# Patient Record
Sex: FEMALE | Race: WHITE | NOT HISPANIC OR LATINO | Employment: OTHER | URBAN - METROPOLITAN AREA
[De-identification: names, ages, dates, MRNs, and addresses within clinical notes are randomized per-mention and may not be internally consistent; named-entity substitution may affect disease eponyms.]

---

## 2022-12-13 ENCOUNTER — APPOINTMENT (OUTPATIENT)
Dept: RADIOLOGY | Facility: MEDICAL CENTER | Age: 77
DRG: 853 | End: 2022-12-13
Attending: INTERNAL MEDICINE
Payer: MEDICARE

## 2022-12-13 ENCOUNTER — HOSPITAL ENCOUNTER (INPATIENT)
Facility: MEDICAL CENTER | Age: 77
LOS: 10 days | DRG: 853 | End: 2022-12-23
Attending: STUDENT IN AN ORGANIZED HEALTH CARE EDUCATION/TRAINING PROGRAM | Admitting: INTERNAL MEDICINE
Payer: MEDICARE

## 2022-12-13 DIAGNOSIS — I10 HYPERTENSION, UNSPECIFIED TYPE: ICD-10-CM

## 2022-12-13 DIAGNOSIS — N20.0 NEPHROLITHIASIS: ICD-10-CM

## 2022-12-13 DIAGNOSIS — K57.20 COLONIC DIVERTICULAR ABSCESS: ICD-10-CM

## 2022-12-13 DIAGNOSIS — K57.92 DIVERTICULITIS: ICD-10-CM

## 2022-12-13 DIAGNOSIS — K57.80 DIVERTICULITIS OF INTESTINE WITH PERFORATION AND ABSCESS WITHOUT BLEEDING, UNSPECIFIED PART OF INTESTINAL TRACT: ICD-10-CM

## 2022-12-13 PROBLEM — K65.1 INTRA-ABDOMINAL ABSCESS (HCC): Status: ACTIVE | Noted: 2022-12-13

## 2022-12-13 PROBLEM — A41.9 SEPSIS (HCC): Status: ACTIVE | Noted: 2022-12-13

## 2022-12-13 LAB
ALBUMIN SERPL BCP-MCNC: 3.5 G/DL (ref 3.2–4.9)
ALBUMIN/GLOB SERPL: 1.1 G/DL
ALP SERPL-CCNC: 86 U/L (ref 30–99)
ALT SERPL-CCNC: 12 U/L (ref 2–50)
ANION GAP SERPL CALC-SCNC: 11 MMOL/L (ref 7–16)
AST SERPL-CCNC: 15 U/L (ref 12–45)
BASOPHILS # BLD AUTO: 0.3 % (ref 0–1.8)
BASOPHILS # BLD: 0.04 K/UL (ref 0–0.12)
BILIRUB SERPL-MCNC: 1.3 MG/DL (ref 0.1–1.5)
BUN SERPL-MCNC: 11 MG/DL (ref 8–22)
CALCIUM ALBUM COR SERPL-MCNC: 9.2 MG/DL (ref 8.5–10.5)
CALCIUM SERPL-MCNC: 8.8 MG/DL (ref 8.5–10.5)
CHLORIDE SERPL-SCNC: 104 MMOL/L (ref 96–112)
CO2 SERPL-SCNC: 20 MMOL/L (ref 20–33)
CREAT SERPL-MCNC: 0.94 MG/DL (ref 0.5–1.4)
EOSINOPHIL # BLD AUTO: 0.01 K/UL (ref 0–0.51)
EOSINOPHIL NFR BLD: 0.1 % (ref 0–6.9)
ERYTHROCYTE [DISTWIDTH] IN BLOOD BY AUTOMATED COUNT: 40.8 FL (ref 35.9–50)
GFR SERPLBLD CREATININE-BSD FMLA CKD-EPI: 62 ML/MIN/1.73 M 2
GLOBULIN SER CALC-MCNC: 3.1 G/DL (ref 1.9–3.5)
GLUCOSE SERPL-MCNC: 159 MG/DL (ref 65–99)
HCT VFR BLD AUTO: 39.7 % (ref 37–47)
HGB BLD-MCNC: 13.6 G/DL (ref 12–16)
IMM GRANULOCYTES # BLD AUTO: 0.08 K/UL (ref 0–0.11)
IMM GRANULOCYTES NFR BLD AUTO: 0.6 % (ref 0–0.9)
INR PPP: 1.09 (ref 0.87–1.13)
LACTATE SERPL-SCNC: 1.1 MMOL/L (ref 0.5–2)
LYMPHOCYTES # BLD AUTO: 0.64 K/UL (ref 1–4.8)
LYMPHOCYTES NFR BLD: 4.6 % (ref 22–41)
MCH RBC QN AUTO: 29.4 PG (ref 27–33)
MCHC RBC AUTO-ENTMCNC: 34.3 G/DL (ref 33.6–35)
MCV RBC AUTO: 85.9 FL (ref 81.4–97.8)
MONOCYTES # BLD AUTO: 1.23 K/UL (ref 0–0.85)
MONOCYTES NFR BLD AUTO: 8.8 % (ref 0–13.4)
NEUTROPHILS # BLD AUTO: 11.95 K/UL (ref 2–7.15)
NEUTROPHILS NFR BLD: 85.6 % (ref 44–72)
NRBC # BLD AUTO: 0 K/UL
NRBC BLD-RTO: 0 /100 WBC
PLATELET # BLD AUTO: 250 K/UL (ref 164–446)
PMV BLD AUTO: 9.7 FL (ref 9–12.9)
POTASSIUM SERPL-SCNC: 3.3 MMOL/L (ref 3.6–5.5)
PROCALCITONIN SERPL-MCNC: 0.09 NG/ML
PROT SERPL-MCNC: 6.6 G/DL (ref 6–8.2)
PROTHROMBIN TIME: 13.9 SEC (ref 12–14.6)
RBC # BLD AUTO: 4.62 M/UL (ref 4.2–5.4)
SODIUM SERPL-SCNC: 135 MMOL/L (ref 135–145)
WBC # BLD AUTO: 14 K/UL (ref 4.8–10.8)

## 2022-12-13 PROCEDURE — 36415 COLL VENOUS BLD VENIPUNCTURE: CPT

## 2022-12-13 PROCEDURE — 83605 ASSAY OF LACTIC ACID: CPT

## 2022-12-13 PROCEDURE — 80053 COMPREHEN METABOLIC PANEL: CPT

## 2022-12-13 PROCEDURE — 700111 HCHG RX REV CODE 636 W/ 250 OVERRIDE (IP): Performed by: INTERNAL MEDICINE

## 2022-12-13 PROCEDURE — 700102 HCHG RX REV CODE 250 W/ 637 OVERRIDE(OP): Performed by: INTERNAL MEDICINE

## 2022-12-13 PROCEDURE — 84145 PROCALCITONIN (PCT): CPT

## 2022-12-13 PROCEDURE — A9270 NON-COVERED ITEM OR SERVICE: HCPCS | Performed by: INTERNAL MEDICINE

## 2022-12-13 PROCEDURE — 85025 COMPLETE CBC W/AUTO DIFF WBC: CPT

## 2022-12-13 PROCEDURE — 87040 BLOOD CULTURE FOR BACTERIA: CPT

## 2022-12-13 PROCEDURE — 770006 HCHG ROOM/CARE - MED/SURG/GYN SEMI*

## 2022-12-13 PROCEDURE — 99221 1ST HOSP IP/OBS SF/LOW 40: CPT | Mod: AI | Performed by: INTERNAL MEDICINE

## 2022-12-13 PROCEDURE — 700105 HCHG RX REV CODE 258: Performed by: INTERNAL MEDICINE

## 2022-12-13 PROCEDURE — 85610 PROTHROMBIN TIME: CPT

## 2022-12-13 RX ORDER — ENOXAPARIN SODIUM 100 MG/ML
40 INJECTION SUBCUTANEOUS DAILY
Status: DISCONTINUED | OUTPATIENT
Start: 2022-12-13 | End: 2022-12-23 | Stop reason: HOSPADM

## 2022-12-13 RX ORDER — IBUPROFEN 200 MG
500 CAPSULE ORAL 2 TIMES DAILY
Status: ON HOLD | COMMUNITY
End: 2022-12-23

## 2022-12-13 RX ORDER — ONDANSETRON 4 MG/1
4 TABLET, ORALLY DISINTEGRATING ORAL EVERY 4 HOURS PRN
Status: DISCONTINUED | OUTPATIENT
Start: 2022-12-13 | End: 2022-12-23 | Stop reason: HOSPADM

## 2022-12-13 RX ORDER — POTASSIUM CHLORIDE 20 MEQ/1
40 TABLET, EXTENDED RELEASE ORAL ONCE
Status: COMPLETED | OUTPATIENT
Start: 2022-12-13 | End: 2022-12-13

## 2022-12-13 RX ORDER — ONDANSETRON 2 MG/ML
4 INJECTION INTRAMUSCULAR; INTRAVENOUS EVERY 4 HOURS PRN
Status: DISCONTINUED | OUTPATIENT
Start: 2022-12-13 | End: 2022-12-23 | Stop reason: HOSPADM

## 2022-12-13 RX ORDER — BISACODYL 10 MG
10 SUPPOSITORY, RECTAL RECTAL
Status: DISCONTINUED | OUTPATIENT
Start: 2022-12-13 | End: 2022-12-23 | Stop reason: HOSPADM

## 2022-12-13 RX ORDER — AMOXICILLIN 250 MG
2 CAPSULE ORAL 2 TIMES DAILY
Status: DISCONTINUED | OUTPATIENT
Start: 2022-12-13 | End: 2022-12-23 | Stop reason: HOSPADM

## 2022-12-13 RX ORDER — ACETAMINOPHEN 325 MG/1
650 TABLET ORAL EVERY 6 HOURS PRN
Status: DISCONTINUED | OUTPATIENT
Start: 2022-12-13 | End: 2022-12-23 | Stop reason: HOSPADM

## 2022-12-13 RX ORDER — ATENOLOL 50 MG/1
50 TABLET ORAL DAILY
Status: ON HOLD | COMMUNITY
End: 2022-12-23

## 2022-12-13 RX ORDER — SODIUM CHLORIDE 9 MG/ML
INJECTION, SOLUTION INTRAVENOUS CONTINUOUS
Status: DISCONTINUED | OUTPATIENT
Start: 2022-12-13 | End: 2022-12-14

## 2022-12-13 RX ORDER — POLYETHYLENE GLYCOL 3350 17 G/17G
1 POWDER, FOR SOLUTION ORAL
Status: DISCONTINUED | OUTPATIENT
Start: 2022-12-13 | End: 2022-12-23 | Stop reason: HOSPADM

## 2022-12-13 RX ADMIN — PIPERACILLIN AND TAZOBACTAM 3.38 G: 3; .375 INJECTION, POWDER, LYOPHILIZED, FOR SOLUTION INTRAVENOUS; PARENTERAL at 23:00

## 2022-12-13 RX ADMIN — SODIUM CHLORIDE: 9 INJECTION, SOLUTION INTRAVENOUS at 22:15

## 2022-12-13 RX ADMIN — POTASSIUM CHLORIDE 40 MEQ: 1500 TABLET, EXTENDED RELEASE ORAL at 22:58

## 2022-12-13 ASSESSMENT — COGNITIVE AND FUNCTIONAL STATUS - GENERAL
DAILY ACTIVITIY SCORE: 24
MOBILITY SCORE: 23
SUGGESTED CMS G CODE MODIFIER DAILY ACTIVITY: CH
TURNING FROM BACK TO SIDE WHILE IN FLAT BAD: A LITTLE
SUGGESTED CMS G CODE MODIFIER MOBILITY: CI

## 2022-12-13 ASSESSMENT — ENCOUNTER SYMPTOMS
NECK PAIN: 0
PHOTOPHOBIA: 0
DIARRHEA: 1
SPEECH CHANGE: 0
WEIGHT LOSS: 0
NAUSEA: 1
CONSTIPATION: 0
FEVER: 1
BLURRED VISION: 0
CLAUDICATION: 0
ABDOMINAL PAIN: 1
DOUBLE VISION: 0
CHILLS: 1
HEMOPTYSIS: 0
COUGH: 0
WEAKNESS: 0
VOMITING: 0
ORTHOPNEA: 0
PALPITATIONS: 0
MYALGIAS: 0
DIZZINESS: 0

## 2022-12-13 ASSESSMENT — PATIENT HEALTH QUESTIONNAIRE - PHQ9
2. FEELING DOWN, DEPRESSED, IRRITABLE, OR HOPELESS: NOT AT ALL
SUM OF ALL RESPONSES TO PHQ9 QUESTIONS 1 AND 2: 0
1. LITTLE INTEREST OR PLEASURE IN DOING THINGS: NOT AT ALL

## 2022-12-13 ASSESSMENT — LIFESTYLE VARIABLES
ON A TYPICAL DAY WHEN YOU DRINK ALCOHOL HOW MANY DRINKS DO YOU HAVE: 0
EVER HAD A DRINK FIRST THING IN THE MORNING TO STEADY YOUR NERVES TO GET RID OF A HANGOVER: NO
HAVE PEOPLE ANNOYED YOU BY CRITICIZING YOUR DRINKING: NO
TOTAL SCORE: 0
TOTAL SCORE: 0
HAVE YOU EVER FELT YOU SHOULD CUT DOWN ON YOUR DRINKING: NO
CONSUMPTION TOTAL: NEGATIVE
ALCOHOL_USE: NO
AVERAGE NUMBER OF DAYS PER WEEK YOU HAVE A DRINK CONTAINING ALCOHOL: 0
TOTAL SCORE: 0
EVER FELT BAD OR GUILTY ABOUT YOUR DRINKING: NO
DOES PATIENT WANT TO STOP DRINKING: NO
HOW MANY TIMES IN THE PAST YEAR HAVE YOU HAD 5 OR MORE DRINKS IN A DAY: 0

## 2022-12-13 ASSESSMENT — FIBROSIS 4 INDEX: FIB4 SCORE: 1.33

## 2022-12-13 ASSESSMENT — PAIN DESCRIPTION - PAIN TYPE: TYPE: ACUTE PAIN

## 2022-12-14 ENCOUNTER — APPOINTMENT (OUTPATIENT)
Dept: RADIOLOGY | Facility: MEDICAL CENTER | Age: 77
DRG: 853 | End: 2022-12-14
Attending: INTERNAL MEDICINE
Payer: MEDICARE

## 2022-12-14 LAB
ANION GAP SERPL CALC-SCNC: 11 MMOL/L (ref 7–16)
BASOPHILS # BLD AUTO: 0.2 % (ref 0–1.8)
BASOPHILS # BLD: 0.03 K/UL (ref 0–0.12)
BUN SERPL-MCNC: 11 MG/DL (ref 8–22)
CALCIUM SERPL-MCNC: 8.6 MG/DL (ref 8.5–10.5)
CHLORIDE SERPL-SCNC: 105 MMOL/L (ref 96–112)
CO2 SERPL-SCNC: 20 MMOL/L (ref 20–33)
CREAT SERPL-MCNC: 0.93 MG/DL (ref 0.5–1.4)
CRP SERPL HS-MCNC: 10.84 MG/DL (ref 0–0.75)
EOSINOPHIL # BLD AUTO: 0.01 K/UL (ref 0–0.51)
EOSINOPHIL NFR BLD: 0.1 % (ref 0–6.9)
ERYTHROCYTE [DISTWIDTH] IN BLOOD BY AUTOMATED COUNT: 41 FL (ref 35.9–50)
GFR SERPLBLD CREATININE-BSD FMLA CKD-EPI: 63 ML/MIN/1.73 M 2
GLUCOSE SERPL-MCNC: 120 MG/DL (ref 65–99)
HCT VFR BLD AUTO: 40.2 % (ref 37–47)
HGB BLD-MCNC: 13.5 G/DL (ref 12–16)
IMM GRANULOCYTES # BLD AUTO: 0.09 K/UL (ref 0–0.11)
IMM GRANULOCYTES NFR BLD AUTO: 0.6 % (ref 0–0.9)
LACTATE SERPL-SCNC: 0.8 MMOL/L (ref 0.5–2)
LACTATE SERPL-SCNC: 0.8 MMOL/L (ref 0.5–2)
LACTATE SERPL-SCNC: 1.2 MMOL/L (ref 0.5–2)
LYMPHOCYTES # BLD AUTO: 0.9 K/UL (ref 1–4.8)
LYMPHOCYTES NFR BLD: 6.2 % (ref 22–41)
MCH RBC QN AUTO: 29.1 PG (ref 27–33)
MCHC RBC AUTO-ENTMCNC: 33.6 G/DL (ref 33.6–35)
MCV RBC AUTO: 86.6 FL (ref 81.4–97.8)
MONOCYTES # BLD AUTO: 1.3 K/UL (ref 0–0.85)
MONOCYTES NFR BLD AUTO: 9 % (ref 0–13.4)
NEUTROPHILS # BLD AUTO: 12.17 K/UL (ref 2–7.15)
NEUTROPHILS NFR BLD: 83.9 % (ref 44–72)
NRBC # BLD AUTO: 0 K/UL
NRBC BLD-RTO: 0 /100 WBC
PLATELET # BLD AUTO: 245 K/UL (ref 164–446)
PMV BLD AUTO: 9.2 FL (ref 9–12.9)
POTASSIUM SERPL-SCNC: 3.8 MMOL/L (ref 3.6–5.5)
RBC # BLD AUTO: 4.64 M/UL (ref 4.2–5.4)
SODIUM SERPL-SCNC: 136 MMOL/L (ref 135–145)
WBC # BLD AUTO: 14.5 K/UL (ref 4.8–10.8)

## 2022-12-14 PROCEDURE — 0W9J30Z DRAINAGE OF PELVIC CAVITY WITH DRAINAGE DEVICE, PERCUTANEOUS APPROACH: ICD-10-PCS | Performed by: RADIOLOGY

## 2022-12-14 PROCEDURE — 99153 MOD SED SAME PHYS/QHP EA: CPT

## 2022-12-14 PROCEDURE — 85025 COMPLETE CBC W/AUTO DIFF WBC: CPT

## 2022-12-14 PROCEDURE — 80048 BASIC METABOLIC PNL TOTAL CA: CPT

## 2022-12-14 PROCEDURE — 83605 ASSAY OF LACTIC ACID: CPT | Mod: 91

## 2022-12-14 PROCEDURE — 700117 HCHG RX CONTRAST REV CODE 255: Performed by: INTERNAL MEDICINE

## 2022-12-14 PROCEDURE — 700111 HCHG RX REV CODE 636 W/ 250 OVERRIDE (IP): Performed by: RADIOLOGY

## 2022-12-14 PROCEDURE — 36415 COLL VENOUS BLD VENIPUNCTURE: CPT

## 2022-12-14 PROCEDURE — 87070 CULTURE OTHR SPECIMN AEROBIC: CPT

## 2022-12-14 PROCEDURE — 87205 SMEAR GRAM STAIN: CPT

## 2022-12-14 PROCEDURE — 87186 SC STD MICRODIL/AGAR DIL: CPT

## 2022-12-14 PROCEDURE — 770006 HCHG ROOM/CARE - MED/SURG/GYN SEMI*

## 2022-12-14 PROCEDURE — 700102 HCHG RX REV CODE 250 W/ 637 OVERRIDE(OP): Performed by: RADIOLOGY

## 2022-12-14 PROCEDURE — 74177 CT ABD & PELVIS W/CONTRAST: CPT

## 2022-12-14 PROCEDURE — 700111 HCHG RX REV CODE 636 W/ 250 OVERRIDE (IP)

## 2022-12-14 PROCEDURE — 700105 HCHG RX REV CODE 258: Performed by: STUDENT IN AN ORGANIZED HEALTH CARE EDUCATION/TRAINING PROGRAM

## 2022-12-14 PROCEDURE — 86140 C-REACTIVE PROTEIN: CPT

## 2022-12-14 PROCEDURE — 99221 1ST HOSP IP/OBS SF/LOW 40: CPT | Performed by: SURGERY

## 2022-12-14 PROCEDURE — 700105 HCHG RX REV CODE 258: Performed by: INTERNAL MEDICINE

## 2022-12-14 PROCEDURE — 87077 CULTURE AEROBIC IDENTIFY: CPT

## 2022-12-14 PROCEDURE — 700111 HCHG RX REV CODE 636 W/ 250 OVERRIDE (IP): Performed by: INTERNAL MEDICINE

## 2022-12-14 PROCEDURE — A9270 NON-COVERED ITEM OR SERVICE: HCPCS | Performed by: RADIOLOGY

## 2022-12-14 PROCEDURE — 99233 SBSQ HOSP IP/OBS HIGH 50: CPT | Performed by: STUDENT IN AN ORGANIZED HEALTH CARE EDUCATION/TRAINING PROGRAM

## 2022-12-14 RX ORDER — MIDAZOLAM HYDROCHLORIDE 1 MG/ML
INJECTION INTRAMUSCULAR; INTRAVENOUS
Status: COMPLETED
Start: 2022-12-14 | End: 2022-12-14

## 2022-12-14 RX ORDER — LIDOCAINE HYDROCHLORIDE 10 MG/ML
INJECTION, SOLUTION EPIDURAL; INFILTRATION; INTRACAUDAL; PERINEURAL
Status: DISPENSED
Start: 2022-12-14 | End: 2022-12-15

## 2022-12-14 RX ORDER — SODIUM CHLORIDE 9 MG/ML
500 INJECTION, SOLUTION INTRAVENOUS
Status: ACTIVE | OUTPATIENT
Start: 2022-12-14 | End: 2022-12-14

## 2022-12-14 RX ORDER — SODIUM CHLORIDE, SODIUM LACTATE, POTASSIUM CHLORIDE, CALCIUM CHLORIDE 600; 310; 30; 20 MG/100ML; MG/100ML; MG/100ML; MG/100ML
INJECTION, SOLUTION INTRAVENOUS CONTINUOUS
Status: DISCONTINUED | OUTPATIENT
Start: 2022-12-14 | End: 2022-12-15

## 2022-12-14 RX ORDER — MIDAZOLAM HYDROCHLORIDE 1 MG/ML
.5-2 INJECTION INTRAMUSCULAR; INTRAVENOUS PRN
Status: ACTIVE | OUTPATIENT
Start: 2022-12-14 | End: 2022-12-14

## 2022-12-14 RX ORDER — SIMVASTATIN 10 MG
10 TABLET ORAL DAILY
Status: ON HOLD | COMMUNITY
Start: 2022-10-31 | End: 2023-01-03 | Stop reason: SDUPTHER

## 2022-12-14 RX ORDER — HYDROMORPHONE HYDROCHLORIDE 1 MG/ML
0.5 INJECTION, SOLUTION INTRAMUSCULAR; INTRAVENOUS; SUBCUTANEOUS
Status: DISPENSED | OUTPATIENT
Start: 2022-12-14 | End: 2022-12-15

## 2022-12-14 RX ORDER — ONDANSETRON 2 MG/ML
4 INJECTION INTRAMUSCULAR; INTRAVENOUS PRN
Status: ACTIVE | OUTPATIENT
Start: 2022-12-14 | End: 2022-12-14

## 2022-12-14 RX ORDER — OXYCODONE HYDROCHLORIDE 10 MG/1
10 TABLET ORAL
Status: DISPENSED | OUTPATIENT
Start: 2022-12-14 | End: 2022-12-15

## 2022-12-14 RX ORDER — OXYCODONE HYDROCHLORIDE 5 MG/1
5 TABLET ORAL
Status: ACTIVE | OUTPATIENT
Start: 2022-12-14 | End: 2022-12-15

## 2022-12-14 RX ADMIN — MIDAZOLAM HYDROCHLORIDE 1 MG: 1 INJECTION, SOLUTION INTRAMUSCULAR; INTRAVENOUS at 16:17

## 2022-12-14 RX ADMIN — PIPERACILLIN AND TAZOBACTAM 3.38 G: 3; .375 INJECTION, POWDER, LYOPHILIZED, FOR SOLUTION INTRAVENOUS; PARENTERAL at 21:43

## 2022-12-14 RX ADMIN — PIPERACILLIN AND TAZOBACTAM 3.38 G: 3; .375 INJECTION, POWDER, LYOPHILIZED, FOR SOLUTION INTRAVENOUS; PARENTERAL at 06:15

## 2022-12-14 RX ADMIN — IOHEXOL 100 ML: 350 INJECTION, SOLUTION INTRAVENOUS at 10:40

## 2022-12-14 RX ADMIN — OXYCODONE HYDROCHLORIDE 10 MG: 10 TABLET ORAL at 20:07

## 2022-12-14 RX ADMIN — MIDAZOLAM HYDROCHLORIDE 0.5 MG: 1 INJECTION, SOLUTION INTRAMUSCULAR; INTRAVENOUS at 16:35

## 2022-12-14 RX ADMIN — FENTANYL CITRATE 25 MCG: 50 INJECTION, SOLUTION INTRAMUSCULAR; INTRAVENOUS at 16:17

## 2022-12-14 RX ADMIN — FENTANYL CITRATE 25 MCG: 50 INJECTION, SOLUTION INTRAMUSCULAR; INTRAVENOUS at 16:41

## 2022-12-14 RX ADMIN — MIDAZOLAM HYDROCHLORIDE 0.5 MG: 1 INJECTION, SOLUTION INTRAMUSCULAR; INTRAVENOUS at 16:40

## 2022-12-14 RX ADMIN — FENTANYL CITRATE 25 MCG: 50 INJECTION, SOLUTION INTRAMUSCULAR; INTRAVENOUS at 16:30

## 2022-12-14 RX ADMIN — PIPERACILLIN AND TAZOBACTAM 3.38 G: 3; .375 INJECTION, POWDER, LYOPHILIZED, FOR SOLUTION INTRAVENOUS; PARENTERAL at 13:21

## 2022-12-14 RX ADMIN — FENTANYL CITRATE 25 MCG: 50 INJECTION, SOLUTION INTRAMUSCULAR; INTRAVENOUS at 16:51

## 2022-12-14 RX ADMIN — SODIUM CHLORIDE, POTASSIUM CHLORIDE, SODIUM LACTATE AND CALCIUM CHLORIDE: 600; 310; 30; 20 INJECTION, SOLUTION INTRAVENOUS at 08:37

## 2022-12-14 ASSESSMENT — ENCOUNTER SYMPTOMS
CHILLS: 1
NECK PAIN: 0
VOMITING: 0
DOUBLE VISION: 0
COUGH: 0
CONSTIPATION: 0
WEAKNESS: 0
PHOTOPHOBIA: 0
HEMOPTYSIS: 0
PALPITATIONS: 0
NAUSEA: 1
FEVER: 1
WEIGHT LOSS: 0
BLURRED VISION: 0
SPEECH CHANGE: 0
ABDOMINAL PAIN: 1
DIARRHEA: 1
ORTHOPNEA: 0
CLAUDICATION: 0
DIZZINESS: 0
MYALGIAS: 0

## 2022-12-14 ASSESSMENT — PAIN DESCRIPTION - PAIN TYPE
TYPE: ACUTE PAIN

## 2022-12-14 NOTE — ASSESSMENT & PLAN NOTE
CT scan showed kidney stone and ureteral on the left side around 7 mm   no urinary symptoms  Follow-up with urology as outpatient

## 2022-12-14 NOTE — CONSULTS
"      CHIEF COMPLAINT: Lower abdominal pain.     HISTORY OF PRESENT ILLNESS: The patient is a 77-year-old obese female with prior history of diverticulitis, resulting in a hospitalization in 2015.  She was mated by the medical service as a transfer from outside hospital with diverticular abscess.  Patient was having significant pain yesterday but that is better now.  She has been on antibiotics for almost 24 hours.  She denies any fevers or chills at this time.  She denies any nausea or vomiting.  Had multiple soft/liquid bowel movements since admission.  They seem to be slowing down today.    PAST MEDICAL HISTORY:  has a past medical history of Arthritis, Cancer (HCC), Cataract, and Hypertension.    PAST SURGICAL HISTORY:  has a past surgical history that includes tubal ligation (1975) and gyn surgery (Right, 2013).    ALLERGIES:   Allergies   Allergen Reactions    Sulfa Drugs Unspecified     \"Destroyed my blood cells, I almost \"       CURRENT MEDICATIONS:   Home Medications       Reviewed by Jair Brink (Pharmacy Tech) on 22 at 1228  Med List Status: Complete     Medication Last Dose Status   atenolol (TENORMIN) 50 MG Tab UNK Active   calcium carbonate (OS-TORREY 500) 1250 (500 Ca) MG Tab UNK Active   simvastatin (ZOCOR) 10 MG Tab UNK Active                  FAMILY HISTORY: family history includes Arthritis in her brother; Dementia in her mother; Prostate cancer in her brother; Skin cancer in her brother and sister; Testicular Cancer in her father.    SOCIAL HISTORY:  reports that she has never smoked. She has never been exposed to tobacco smoke. She has never used smokeless tobacco. She reports that she does not drink alcohol and does not use drugs.    REVIEW OF SYSTEMS: Review of systems is remarkable for the following abdominal pain and diarrhea. The remainder of the comprehensive ROS is negative with the exception of the aforementioned HPI, PMH, and PSH bullets in accordance with " "CMS guideline.    PHYSICAL EXAMINATION:      Vital Signs: BP (!) 145/69   Pulse 80   Temp 36.1 °C (97 °F) (Temporal)   Resp 19   Ht 1.6 m (5' 3\")   Wt 105 kg (231 lb 7.7 oz)   SpO2 91%   Physical Exam  Vitals and nursing note reviewed.   Constitutional:       Appearance: She is obese.   HENT:      Head: Normocephalic and atraumatic.      Nose: Nose normal.      Mouth/Throat:      Mouth: Mucous membranes are moist.      Pharynx: Oropharynx is clear.   Eyes:      Extraocular Movements: Extraocular movements intact.      Conjunctiva/sclera: Conjunctivae normal.   Cardiovascular:      Rate and Rhythm: Normal rate and regular rhythm.      Pulses: Normal pulses.   Abdominal:      Palpations: Abdomen is soft.      Comments: These abdomen with left lower quadrant tenderness and palpable fullness  No obvious hernias   Musculoskeletal:         General: Normal range of motion.      Cervical back: Normal range of motion.      Right lower leg: No edema.      Left lower leg: No edema.   Skin:     General: Skin is warm and dry.      Coloration: Skin is not pale.   Neurological:      General: No focal deficit present.      Mental Status: She is alert and oriented to person, place, and time.       LABORATORY VALUES:   Recent Labs     12/13/22 2047 12/14/22 0224   WBC 14.0* 14.5*   RBC 4.62 4.64   HEMOGLOBIN 13.6 13.5   HEMATOCRIT 39.7 40.2   MCV 85.9 86.6   MCH 29.4 29.1   MCHC 34.3 33.6   RDW 40.8 41.0   PLATELETCT 250 245   MPV 9.7 9.2     Recent Labs     12/13/22 2047 12/14/22 0224   SODIUM 135 136   POTASSIUM 3.3* 3.8   CHLORIDE 104 105   CO2 20 20   GLUCOSE 159* 120*   BUN 11 11   CREATININE 0.94 0.93   CALCIUM 8.8 8.6     Recent Labs     12/13/22 2047 12/13/22 2222   ASTSGOT 15  --    ALTSGPT 12  --    TBILIRUBIN 1.3  --    ALKPHOSPHAT 86  --    GLOBULIN 3.1  --    INR  --  1.09     Recent Labs     12/13/22  2222   INR 1.09        IMAGING:   CT-ABDOMEN-PELVIS WITH   Final Result      1.  Acute sigmoid " diverticulitis with adjacent 4.6 x 3.3 cm abscess.   2.  Gallbladder sludge or stones.   3.  2.1 cm hypodense left hepatic lobe lesion, likely a hemangioma, though incompletely characterized on this CT. If there is clinical concern, consider further evaluation with nonemergent MRI hepatic protocol.   4.  Nonobstructing 6 mm inferior pole left renal stone.      Findings conveyed to Dr. Cordova at 1124 via Voalte messaging on 12/14/2022.      CT-DRAIN-PERITONEAL    (Results Pending)       ASSESSMENT AND PLAN:   77-year-old female with history of multiple hospitalizations for diverticular abscess who has recurrent diverticulitis with abscess near the sigmoid colon.  She is not overtly septic and is a candidate for percutaneous drainage.  Given the presentation of multiple recurrences hopefully will be able to manage this conservatively with drainage and antibiotics.  Patient could then be worked up for elective minimally invasive colectomy after colonoscopy.  If there is any deterioration during this hospitalization she may require more urgent colectomy with possible colostomy.    IR drain has been ordered  We will follow    Intra-abdominal abscess (HCC)  With diverticulitis  CT scan at outside facility showed 2x 3 cm abscess  At this moment continue IV antibiotics Zosyn and IV fluid  N.p.o. and general surgery consult tomorrow morning  IR was consulted, however the abscess might be small to get drain  Order CT for abdomen for evaluation      Diverticulitis  History of recurrent diverticulitis  Multiple colonoscopy did not show any mass  Came with abdominal pain with diverticulitis and small abscess  IV fluid and IV antibiotics  General surgery consult tomorrow morning    Hypertension  Patient is taking atenolol at home  Holding medications and close monitoring    Sepsis (HCC)  This is Sepsis Present on admission  SIRS criteria identified on my evaluation include: Tachycardia, with heart rate greater than 90 BPM and  Leukocytosis, with WBC greater than 12,000  Source is intra-abdominal abscess and diverticulitis  Sepsis protocol initiated  Fluid resuscitation ordered per protocol  Crystalloid Fluid Administration: Fluid resuscitation ordered per standard protocol - 30 mL/kg per current or ideal body weight  IV antibiotics as appropriate for source of sepsis  Reassessment: I have reassessed the patient's hemodynamic status  IV antibiotics Zosyn  Blood culture  IR was consulted for possible drain however the abscess it might be small for drain          Nephrolithiasis  CT scan showed kidney stone and ureteral on the left side around 7 mm   no urinary symptoms  Follow-up with urology as outpatient     ____________________________________     Mike Gagnon D.O.    DD: 12/14/2022  1:14 PM

## 2022-12-14 NOTE — CARE PLAN
The patient is Stable - Low risk of patient condition declining or worsening    Shift Goals  Clinical Goals: IV antibiotics, pain meds  Patient Goals: Pain control and rest    Progress made toward(s) clinical / shift goals:  clinical goals    Patient is not progressing towards the following goals:      Problem: Pain - Standard  Goal: Alleviation of pain or a reduction in pain to the patient’s comfort goal  Outcome: Not Met

## 2022-12-14 NOTE — DIETARY
NUTRITION SERVICES: BMI - Pt with BMI >40 (=Body mass index is 41.01 kg/m².), Class III obesity. Weight loss counseling not appropriate in acute care setting. RECOMMEND - If appropriate at DC please refer to outpatient nutrition services for weight management.

## 2022-12-14 NOTE — PROGRESS NOTES
Report received and pt care assumed at 645. Assessment completed and pain checked. A&OX4 and up adlib. Pain 6/10 in her abd. LBM 12/14. IV clean, dry, and intact. LR at 100ml/hr. Call light within reach.

## 2022-12-14 NOTE — ED TRIAGE NOTES
[facility] TRIAGE OFFICER DIRECT ADMISSION REPORT  Transferring [place] [facility]  Transferring physician: Dr meadows    Chief complaint: 77 female presented with left lower quadrant pain associated with fever.  Imaging significant for diverticulitis 2 x3 centimeters abscess.  Transfer requested for surgical evaluation    Pertinent imaging & lab results: Leukocytosis WC 12.86, normal lactic, CT scan noted with 7 mm renal stone    Code Status: full per transferring provider, I personally verified with the transferring provider patient's code status and the transferring provider has confirmed this with the patient.    Patient accepted for transfer: Yes    Admission status: Inpatient.   Floor requested: Med surg     Call surgery consult on arrival    Please inform the triage officer upon arrival of the patient to [facility] for assignment of a hospitalist to perform admission.     For any question or concerns regarding the care of this patient, please reach out to the assigned hospitalist.

## 2022-12-14 NOTE — H&P
Hospital Medicine History & Physical Note    Date of Service  12/13/2022    Primary Care Physician  No primary care provider on file.    Consultants  None     Code Status  Full Code    Chief Complaint  Transferred from outside facility for intra-abdominal abscess and diverticulitis    History of Presenting Illness      77-year-old female with history of hypertension, and diverticulitis who presented 12/13 abdominal pain.  Initially patient was seen in [facility] and later transferred to our facility for higher level of care.  Her symptoms started a week ago with diarrhea and abdominal pain, got worse recently with worsening abdominal pain and fever, her pain around lower abdominal area goes to the left side, denied any urinary symptoms denied any using of antibiotics.  Patient has had multiple episodes of diverticulitis and treated with IV antibiotics.  Patient had multiple colonoscopy and recent one was in 2018 which was normal.  On admission patient had leukocytosis, CT scan showed diverticulitis with 2 multiple 3 cm abscess.  Patient received IV Zosyn and IV fluid.      I discussed the plan of care with patient and bedside RN.  Outside chart was reviewed.  Review of Systems  Review of Systems   Constitutional:  Positive for chills, fever and malaise/fatigue. Negative for weight loss.   HENT:  Negative for ear pain, hearing loss and tinnitus.    Eyes:  Negative for blurred vision, double vision and photophobia.   Respiratory:  Negative for cough and hemoptysis.    Cardiovascular:  Negative for chest pain, palpitations, orthopnea and claudication.   Gastrointestinal:  Positive for abdominal pain, diarrhea and nausea. Negative for constipation and vomiting.   Genitourinary:  Negative for dysuria, frequency and urgency.   Musculoskeletal:  Negative for myalgias and neck pain.   Skin:  Negative for rash.   Neurological:  Negative for dizziness, speech change and weakness.     Past Medical  "History  Hypertension and breast cancer    Surgical History  Breast surgery    Family History  Family history of diverticulitis with her sister  Family history reviewed with patient. There is family history that is pertinent to the chief complaint.     Social History       Allergies  Allergies   Allergen Reactions    Sulfa Drugs Unspecified     \"Destroyed my blood cells, I almost \"       Medications  None       Physical Exam  Temp:  [36.8 °C (98.3 °F)-37.1 °C (98.7 °F)] 37.1 °C (98.7 °F)  Pulse:  [88-89] 88  Resp:  [18-19] 18  BP: (132-157)/(74-75) 157/74  SpO2:  [90 %-93 %] 93 %  Blood Pressure : 132/75   Temperature: 36.8 °C (98.3 °F)   Pulse: 89   Respiration: 19   Pulse Oximetry: 90 %       Physical Exam  Constitutional:       Appearance: She is ill-appearing.   HENT:      Head: Atraumatic.   Cardiovascular:      Rate and Rhythm: Normal rate.      Heart sounds: No murmur heard.  Pulmonary:      Effort: No respiratory distress.      Breath sounds: No wheezing.   Abdominal:      General: There is no distension.      Palpations: Abdomen is soft.      Tenderness: There is abdominal tenderness. There is no guarding.   Musculoskeletal:         General: No deformity.      Cervical back: No rigidity.      Right lower leg: No edema.      Left lower leg: No edema.   Skin:     Coloration: Skin is not jaundiced.      Findings: No bruising.   Neurological:      General: No focal deficit present.      Mental Status: She is alert and oriented to person, place, and time. Mental status is at baseline.      Cranial Nerves: No cranial nerve deficit.      Motor: No weakness.       Laboratory:          No results for input(s): ALTSGPT, ASTSGOT, ALKPHOSPHAT, TBILIRUBIN, DBILIRUBIN, GAMMAGT, AMYLASE, LIPASE, ALB, PREALBUMIN, GLUCOSE in the last 72 hours.      No results for input(s): NTPROBNP in the last 72 hours.      No results for input(s): TROPONINT in the last 72 hours.    Imaging:  IR-CONSULT AND TREAT    (Results Pending) "   CT-ABDOMEN-PELVIS WITH    (Results Pending)       X-Ray:  I have personally reviewed the images and compared with prior images.  EKG:  I have personally reviewed the images and compared with prior images.    Assessment/Plan:  Justification for Admission Status  I anticipate this patient will require at least two midnights for appropriate medical management, necessitating inpatient admission because patient needs IV antibiotics and monitor for intra-abdominal abscess    Patient will need a Med/Surg bed on MEDICAL service .  The need is secondary to intra-abdominal abscess.    * Diverticulitis- (present on admission)  Assessment & Plan  History of recurrent diverticulitis  Multiple colonoscopy did not show any mass  Came with abdominal pain with diverticulitis and small abscess  IV fluid and IV antibiotics  General surgery consult tomorrow morning    Intra-abdominal abscess (HCC)- (present on admission)  Assessment & Plan  With diverticulitis  CT scan at outside facility showed 2x 3 cm abscess  At this moment continue IV antibiotics Zosyn and IV fluid  N.p.o. and general surgery consult tomorrow morning  IR was consulted, however the abscess might be small to get drain  Order CT for abdomen for evaluation      Nephrolithiasis  Assessment & Plan  CT scan showed kidney stone and ureteral on the left side around 7 mm   no urinary symptoms  Follow-up with urology as outpatient    Sepsis (HCC)  Assessment & Plan  This is Sepsis Present on admission  SIRS criteria identified on my evaluation include: Tachycardia, with heart rate greater than 90 BPM and Leukocytosis, with WBC greater than 12,000  Source is intra-abdominal abscess and diverticulitis  Sepsis protocol initiated  Fluid resuscitation ordered per protocol  Crystalloid Fluid Administration: Fluid resuscitation ordered per standard protocol - 30 mL/kg per current or ideal body weight  IV antibiotics as appropriate for source of sepsis  Reassessment: I have  reassessed the patient's hemodynamic status  IV antibiotics Zosyn  Blood culture  IR was consulted for possible drain however the abscess it might be small for drain          Hypertension  Assessment & Plan  Patient is taking atenolol at home  Holding medications and close monitoring      VTE prophylaxis: SCDs/TEDs and enoxaparin ppx

## 2022-12-14 NOTE — PROGRESS NOTES
4 Eyes Skin Assessment Completed by CONNER Morin and CONNER Person.    Head WDL  Ears WDL  Nose WDL  Mouth WDL  Neck WDL  Breast/Chest WDL  Shoulder Blades WDL  Spine WDL  (R) Arm/Elbow/Hand WDL  (L) Arm/Elbow/Hand WDL  Abdomen WDL  Groin WDL  Scrotum/Coccyx/Buttocks WDL  (R) Leg WDL  (L) Leg WDL  (R) Heel/Foot/Toe WDL  (L) Heel/Foot/Toe WDL          Devices In Places Blood Pressure Cuff      Interventions In Place N/A    Possible Skin Injury No    Pictures Uploaded Into Epic N/A  Wound Consult Placed N/A  RN Wound Prevention Protocol Ordered No

## 2022-12-14 NOTE — ASSESSMENT & PLAN NOTE
History of recurrent diverticulitis  Multiple colonoscopy did not show any mass  Came with abdominal pain with diverticulitis and small abscess  IV fluid and IV antibiotics  General surgery consult tomorrow morning

## 2022-12-15 PROBLEM — K57.20 COLONIC DIVERTICULAR ABSCESS: Status: ACTIVE | Noted: 2022-12-15

## 2022-12-15 LAB
ERYTHROCYTE [DISTWIDTH] IN BLOOD BY AUTOMATED COUNT: 42.4 FL (ref 35.9–50)
GRAM STN SPEC: NORMAL
HCT VFR BLD AUTO: 38.1 % (ref 37–47)
HGB BLD-MCNC: 12.4 G/DL (ref 12–16)
MCH RBC QN AUTO: 28.6 PG (ref 27–33)
MCHC RBC AUTO-ENTMCNC: 32.5 G/DL (ref 33.6–35)
MCV RBC AUTO: 87.8 FL (ref 81.4–97.8)
PLATELET # BLD AUTO: 244 K/UL (ref 164–446)
PMV BLD AUTO: 9.6 FL (ref 9–12.9)
RBC # BLD AUTO: 4.34 M/UL (ref 4.2–5.4)
SIGNIFICANT IND 70042: NORMAL
SITE SITE: NORMAL
SOURCE SOURCE: NORMAL
WBC # BLD AUTO: 12.4 K/UL (ref 4.8–10.8)

## 2022-12-15 PROCEDURE — 36415 COLL VENOUS BLD VENIPUNCTURE: CPT

## 2022-12-15 PROCEDURE — 700111 HCHG RX REV CODE 636 W/ 250 OVERRIDE (IP): Performed by: RADIOLOGY

## 2022-12-15 PROCEDURE — 99233 SBSQ HOSP IP/OBS HIGH 50: CPT | Performed by: GENERAL PRACTICE

## 2022-12-15 PROCEDURE — 770006 HCHG ROOM/CARE - MED/SURG/GYN SEMI*

## 2022-12-15 PROCEDURE — 700102 HCHG RX REV CODE 250 W/ 637 OVERRIDE(OP): Performed by: RADIOLOGY

## 2022-12-15 PROCEDURE — A9270 NON-COVERED ITEM OR SERVICE: HCPCS | Performed by: INTERNAL MEDICINE

## 2022-12-15 PROCEDURE — 700102 HCHG RX REV CODE 250 W/ 637 OVERRIDE(OP)

## 2022-12-15 PROCEDURE — 85027 COMPLETE CBC AUTOMATED: CPT

## 2022-12-15 PROCEDURE — 700105 HCHG RX REV CODE 258: Performed by: INTERNAL MEDICINE

## 2022-12-15 PROCEDURE — 700102 HCHG RX REV CODE 250 W/ 637 OVERRIDE(OP): Performed by: INTERNAL MEDICINE

## 2022-12-15 PROCEDURE — A9270 NON-COVERED ITEM OR SERVICE: HCPCS

## 2022-12-15 PROCEDURE — 700111 HCHG RX REV CODE 636 W/ 250 OVERRIDE (IP): Performed by: INTERNAL MEDICINE

## 2022-12-15 PROCEDURE — A9270 NON-COVERED ITEM OR SERVICE: HCPCS | Performed by: RADIOLOGY

## 2022-12-15 RX ORDER — OXYCODONE HYDROCHLORIDE 5 MG/1
5 TABLET ORAL ONCE
Status: COMPLETED | OUTPATIENT
Start: 2022-12-15 | End: 2022-12-15

## 2022-12-15 RX ADMIN — OXYCODONE HYDROCHLORIDE 10 MG: 10 TABLET ORAL at 09:38

## 2022-12-15 RX ADMIN — PIPERACILLIN AND TAZOBACTAM 3.38 G: 3; .375 INJECTION, POWDER, LYOPHILIZED, FOR SOLUTION INTRAVENOUS; PARENTERAL at 15:36

## 2022-12-15 RX ADMIN — OXYCODONE 5 MG: 5 TABLET ORAL at 22:49

## 2022-12-15 RX ADMIN — PIPERACILLIN AND TAZOBACTAM 3.38 G: 3; .375 INJECTION, POWDER, LYOPHILIZED, FOR SOLUTION INTRAVENOUS; PARENTERAL at 06:33

## 2022-12-15 RX ADMIN — ENOXAPARIN SODIUM 40 MG: 40 INJECTION SUBCUTANEOUS at 18:07

## 2022-12-15 RX ADMIN — OXYCODONE HYDROCHLORIDE 10 MG: 10 TABLET ORAL at 03:55

## 2022-12-15 RX ADMIN — PIPERACILLIN AND TAZOBACTAM 3.38 G: 3; .375 INJECTION, POWDER, LYOPHILIZED, FOR SOLUTION INTRAVENOUS; PARENTERAL at 22:22

## 2022-12-15 RX ADMIN — ACETAMINOPHEN 650 MG: 325 TABLET, FILM COATED ORAL at 19:55

## 2022-12-15 RX ADMIN — HYDROMORPHONE HYDROCHLORIDE 0.5 MG: 1 INJECTION, SOLUTION INTRAMUSCULAR; INTRAVENOUS; SUBCUTANEOUS at 11:36

## 2022-12-15 ASSESSMENT — PAIN DESCRIPTION - PAIN TYPE
TYPE: ACUTE PAIN

## 2022-12-15 ASSESSMENT — ENCOUNTER SYMPTOMS: ABDOMINAL PAIN: 1

## 2022-12-15 NOTE — CARE PLAN
The patient is Stable - Low risk of patient condition declining or worsening    Shift Goals  Clinical Goals: Antibiotics  Patient Goals: Pain contrl  Family Goals: n/a    Progress made toward(s) clinical / shift goals:    Problem: Knowledge Deficit - Standard  Goal: Patient and family/care givers will demonstrate understanding of plan of care, disease process/condition, diagnostic tests and medications  Outcome: Progressing       Patient is not progressing towards the following goals:

## 2022-12-15 NOTE — HOSPITAL COURSE
This is a 77-year-old female with past medical history of hypertension, recurrent diverticulitis and morbid obesity with BMI of 41 who was transferred from an outside facility on 12/13/2022 due to sepsis secondary to diverticulitis with multiple abscesses, complicated by perforation.    Repeat CT imaging noted 4.6 x 3.3cm abscess.  IR was consulted, drain was placed on 12/14.  Blood cultures negative to date.  Wound cultures growing E. coli.  Patient currently on Zosyn.  Leukocytosis worsened, abdominal pain worsened, repeat CT imaging performed on 12/18 noted worsening pericolic abscess and now free air with perforation.  Surgery was consulted, patient is for laparotomy with likely Chávez's colectomy and abscess drainage.    Incidentally on CT imaging noted left-sided 7 mm nephrolithiasis, nonobstructing, patient with no urinary complaints follow-up with urology outpatient..

## 2022-12-15 NOTE — PROGRESS NOTES
Hospital Medicine Daily Progress Note    Date of Service  12/14/2022    Chief Complaint  Maira Johnson is a 77 y.o. female admitted 12/13/2022 with abdominal pain diarrhea    Hospital Course  77-year-old female with history of hypertension, and diverticulitis who presented 12/13 abdominal pain.  Initially patient was seen in [facility] and later transferred to our facility for higher level of care.  Her symptoms started a week ago with diarrhea and abdominal pain, got worse recently with worsening abdominal pain and fever, her pain around lower abdominal area goes to the left side, denied any urinary symptoms denied any using of antibiotics.  Patient has had multiple episodes of diverticulitis and treated with IV antibiotics.  Patient had multiple colonoscopy and recent one was in 2018 which was normal.  On admission patient had leukocytosis, CT scan showed diverticulitis with 2 multiple 3 cm abscess.  Patient received IV Zosyn and IV fluid.    Interval Problem Update  12/14/2022:  Patient repeat CT scan he had evidence of approximately 3 x 4 cm abscess in the sigmoid area.  Patient is status post IR guided drainage of the aforementioned abscess.  Patient tolerated procedure well without issue.  Patient will continue on antibiotics appreciate interventional radiology recommendations.  Follow-up in the postoperative setting on 12/15/2022.    I have discussed this patient's plan of care and discharge plan at IDT rounds today with Case Management, Nursing, Nursing leadership, and other members of the IDT team.    Consultants/Specialty  Interventional radiology, general surgery    Code Status  Full Code    Disposition  Patient is not medically cleared for discharge.   Anticipate discharge to to home with close outpatient follow-up.  I have placed the appropriate orders for post-discharge needs.    Review of Systems  Review of Systems   Constitutional:  Positive for chills, fever and malaise/fatigue.  Negative for weight loss.   HENT:  Negative for ear pain, hearing loss and tinnitus.    Eyes:  Negative for blurred vision, double vision and photophobia.   Respiratory:  Negative for cough and hemoptysis.    Cardiovascular:  Negative for chest pain, palpitations, orthopnea and claudication.   Gastrointestinal:  Positive for abdominal pain, diarrhea and nausea. Negative for constipation and vomiting.   Genitourinary:  Negative for dysuria, frequency and urgency.   Musculoskeletal:  Negative for myalgias and neck pain.   Skin:  Negative for rash.   Neurological:  Negative for dizziness, speech change and weakness.      Physical Exam  Temp:  [36.1 °C (97 °F)-37.1 °C (98.7 °F)] 36.1 °C (97 °F)  Pulse:  [80-90] 86  Resp:  [16-19] 17  BP: (128-166)/(69-84) 163/72  SpO2:  [90 %-98 %] 98 %    Physical Exam  Constitutional:       General: She is not in acute distress.     Appearance: She is ill-appearing.   HENT:      Head: Atraumatic.      Right Ear: External ear normal.      Left Ear: External ear normal.      Nose: No congestion or rhinorrhea.      Mouth/Throat:      Pharynx: No oropharyngeal exudate or posterior oropharyngeal erythema.   Eyes:      General:         Right eye: No discharge.         Left eye: No discharge.   Cardiovascular:      Rate and Rhythm: Normal rate.      Heart sounds: No murmur heard.  Pulmonary:      Effort: No respiratory distress.      Breath sounds: No wheezing.   Abdominal:      General: There is no distension.      Palpations: Abdomen is soft.      Tenderness: There is abdominal tenderness. There is no guarding.   Musculoskeletal:         General: No deformity.      Cervical back: No rigidity.      Right lower leg: No edema.      Left lower leg: No edema.   Skin:     Coloration: Skin is not jaundiced.      Findings: No bruising.   Neurological:      General: No focal deficit present.      Mental Status: She is alert and oriented to person, place, and time. Mental status is at baseline.       Cranial Nerves: No cranial nerve deficit.      Motor: No weakness.       Fluids    Intake/Output Summary (Last 24 hours) at 12/14/2022 1753  Last data filed at 12/13/2022 2200  Gross per 24 hour   Intake --   Output 2 ml   Net -2 ml       Laboratory  Recent Labs     12/13/22 2047 12/14/22 0224   WBC 14.0* 14.5*   RBC 4.62 4.64   HEMOGLOBIN 13.6 13.5   HEMATOCRIT 39.7 40.2   MCV 85.9 86.6   MCH 29.4 29.1   MCHC 34.3 33.6   RDW 40.8 41.0   PLATELETCT 250 245   MPV 9.7 9.2     Recent Labs     12/13/22 2047 12/14/22 0224   SODIUM 135 136   POTASSIUM 3.3* 3.8   CHLORIDE 104 105   CO2 20 20   GLUCOSE 159* 120*   BUN 11 11   CREATININE 0.94 0.93   CALCIUM 8.8 8.6     Recent Labs     12/13/22 2222   INR 1.09               Imaging  CT-DRAIN-PERITONEAL   Final Result      1.  CT guided pelvic diverticular abscess catheter drainage.   2.  The current plan is to obtain a follow-up CT in 5-7 days..      CT-ABDOMEN-PELVIS WITH   Final Result      1.  Acute sigmoid diverticulitis with adjacent 4.6 x 3.3 cm abscess.   2.  Gallbladder sludge or stones.   3.  2.1 cm hypodense left hepatic lobe lesion, likely a hemangioma, though incompletely characterized on this CT. If there is clinical concern, consider further evaluation with nonemergent MRI hepatic protocol.   4.  Nonobstructing 6 mm inferior pole left renal stone.      Findings conveyed to Dr. Cordova at 1124 via Voalte messaging on 12/14/2022.           Assessment/Plan  * Diverticulitis- (present on admission)  Assessment & Plan  History of recurrent diverticulitis  Multiple colonoscopy did not show any mass  Came with abdominal pain with diverticulitis and small abscess  IV fluid and IV antibiotics  General surgery consult tomorrow morning    Nephrolithiasis  Assessment & Plan  CT scan showed kidney stone and ureteral on the left side around 7 mm   no urinary symptoms  Follow-up with urology as outpatient    Sepsis (HCC)  Assessment & Plan  This is Sepsis Present on  admission  SIRS criteria identified on my evaluation include: Tachycardia, with heart rate greater than 90 BPM and Leukocytosis, with WBC greater than 12,000  Source is intra-abdominal abscess and diverticulitis  Sepsis protocol initiated  Fluid resuscitation ordered per protocol  Crystalloid Fluid Administration: Fluid resuscitation ordered per standard protocol - 30 mL/kg per current or ideal body weight  IV antibiotics as appropriate for source of sepsis  Reassessment: I have reassessed the patient's hemodynamic status  IV antibiotics Zosyn  Blood culture  IR was consulted for possible drain however the abscess it might be small for drain          Hypertension  Assessment & Plan  Patient is taking atenolol at home  Holding medications and close monitoring    Intra-abdominal abscess (HCC)- (present on admission)  Assessment & Plan  With diverticulitis  CT scan at outside facility showed 2x 3 cm abscess  At this moment continue IV antibiotics Zosyn and IV fluid  N.p.o. and general surgery consult tomorrow morning  IR was consulted, however the abscess might be small to get drain  Order CT for abdomen for evaluation         Please note that this dictation was created using voice recognition software. I have made every reasonable attempt to correct obvious errors, but I expect that there are errors of grammar and possibly context that I did not discover before finalizing the note.    VTE prophylaxis: SCDs/TEDs    I have performed a physical exam and reviewed and updated ROS and Plan today (12/14/2022). In review of yesterday's note (12/13/2022), there are no changes except as documented above.

## 2022-12-15 NOTE — CARE PLAN
The patient is Stable - Low risk of patient condition declining or worsening    Shift Goals  Clinical Goals: ABX  Patient Goals: Pain control  Family Goals: n/a    Progress made toward(s) clinical / shift goals:    Problem: Knowledge Deficit - Standard  Goal: Patient and family/care givers will demonstrate understanding of plan of care, disease process/condition, diagnostic tests and medications  Outcome: Progressing     Problem: Hemodynamics  Goal: Patient's hemodynamics, fluid balance and neurologic status will be stable or improve  Outcome: Progressing     Problem: Fluid Volume  Goal: Fluid volume balance will be maintained  Outcome: Progressing     Problem: Urinary - Renal Perfusion  Goal: Ability to achieve and maintain adequate renal perfusion and functioning will improve  Outcome: Progressing     Problem: Respiratory  Goal: Patient will achieve/maintain optimum respiratory ventilation and gas exchange  Outcome: Progressing       Patient is not progressing towards the following goals:

## 2022-12-15 NOTE — PROGRESS NOTES
Assumed patient care at 0700. Patient Aox4. Patient denies need for pain interventions at this time. Patient belongings are nearby, bed in the lowest position and locked. Will continue to monitor patient during shift accordingly.

## 2022-12-15 NOTE — ASSESSMENT & PLAN NOTE
IV Unasyn until 12/25/2022  CT guided pelvic diverticular abscess catheter drainage   12/14/2022  Exploratory laparotomy with drainage of pelvic abscesses and Chávez's colectomy with mobilization of the splenic flexure    12/18/2022  Pain control, encourage I-S  Diet per Surgery - Full liquids  PT and OT

## 2022-12-15 NOTE — CARE PLAN
Problem: Knowledge Deficit - Standard  Goal: Patient and family/care givers will demonstrate understanding of plan of care, disease process/condition, diagnostic tests and medications  Outcome: Met   The patient is Watcher - Medium risk of patient condition declining or worsening    Shift Goals  Clinical Goals: Patient will not show signs of infection and will tolerate IV antibiotics well during shift w/o complication.  Patient Goals: Pain Control, Comfort care  Family Goals: n/a    Progress made toward(s) clinical / shift goals:  Patient was able to take her antibiotics w/o complication and no new signs of infection.    Patient is not progressing towards the following goals:

## 2022-12-15 NOTE — PROGRESS NOTES
Pt presents to IR CT. Pt was consented by MD at bedside, confirmed by this RN and consent at bedside. Pt transferred to CT table in supine position. Patient underwent a peritoneal drain placement by Dr. Rodriguez. Procedure site was marked by MD and verified using imaging guidance. Pt placed on monitor, prepped and draped in a sterile fashion. Vitals were taken every 5 minutes and remained stable during procedure (see doc flow sheet for results). CO2 waveform capnography was monitored and remained WNL throughout procedure.    Report called to Chucky PRIETO. Pt transported by [facility] with RN to S523. Fluid culture hand delivered to lab.       [employer]   Flexima APDL locking pigtail  10 fr x 25 cm  REF: [id number]  LOT: [id number]  Exp: 07.25.2025

## 2022-12-15 NOTE — OR SURGEON
Immediate Post- Operative Note        Findings: Diverticular Abscess      Procedure(s): CT Drainage      Estimated Blood Loss: Less than 5 ml        Complications: None            12/14/2022     4:58 PM     Zachary Rodriguez M.D.

## 2022-12-15 NOTE — PROGRESS NOTES
Hospital Medicine Daily Progress Note    Date of Service  12/15/2022    Chief Complaint  Maira Johnson is a 77 y.o. female admitted 12/13/2022 with abdominal pain    Hospital Course  This is a 77-year-old female with past medical history of hypertension, recurrent diverticulitis and morbid obesity with BMI of 41 who was transferred from an outside facility on 12/13/2022 due to sepsis secondary to diverticulitis with multiple abscesses.    Repeat CT imaging noted 4.6 x 3.3cm abscess.  IR was consulted, drain was placed on 12/14.  Blood cultures negative to date.  Wound cultures pending.  Patient currently on Zosyn.    Incidentally on CT imaging noted left-sided 7 mm nephrolithiasis, nonobstructing, patient with no urinary complaints follow-up with urology outpatient..     Interval Problem Update  Patient reports her abdominal pain has improved, she is  in the left lower quadrant.  Diet advance from clear liquid to full liquid, will continue to advance as tolerated.    Drain was placed on 12/14 with continued output.  Leukocytosis resolving.    I have discussed this patient's plan of care and discharge plan at IDT rounds today with Case Management, Nursing, Nursing leadership, and other members of the IDT team.    Consultants/Specialty  general surgery and IR    Code Status  Full Code    Disposition  Patient is not medically cleared for discharge.   Anticipate discharge to to home with close outpatient follow-up.  I have placed the appropriate orders for post-discharge needs.    Review of Systems  Review of Systems   Gastrointestinal:  Positive for abdominal pain.   All other systems reviewed and are negative.     Physical Exam  Temp:  [36.3 °C (97.3 °F)-36.9 °C (98.4 °F)] 36.3 °C (97.3 °F)  Pulse:  [81-94] 94  Resp:  [16-20] 20  BP: (128-166)/(59-84) 135/59  SpO2:  [90 %-98 %] 94 %    Physical Exam  Vitals and nursing note reviewed.   Constitutional:       General: She is not in acute distress.      Appearance: She is obese.   HENT:      Head: Normocephalic and atraumatic.      Mouth/Throat:      Mouth: Mucous membranes are moist.      Pharynx: No oropharyngeal exudate.   Eyes:      Extraocular Movements: Extraocular movements intact.      Pupils: Pupils are equal, round, and reactive to light.   Cardiovascular:      Rate and Rhythm: Normal rate and regular rhythm.      Pulses: Normal pulses.      Heart sounds: No murmur heard.    No friction rub. No gallop.   Pulmonary:      Effort: Pulmonary effort is normal. No respiratory distress.      Breath sounds: No wheezing, rhonchi or rales.   Abdominal:      General: Bowel sounds are normal. There is no distension.      Palpations: Abdomen is soft. There is no mass.      Tenderness: There is abdominal tenderness (LLQ).   Musculoskeletal:         General: No swelling or tenderness. Normal range of motion.      Cervical back: Normal range of motion. No rigidity. No muscular tenderness.      Right lower leg: No edema.      Left lower leg: No edema.   Skin:     General: Skin is warm and dry.      Capillary Refill: Capillary refill takes less than 2 seconds.      Findings: No erythema or rash.   Neurological:      General: No focal deficit present.      Mental Status: She is alert and oriented to person, place, and time.      Motor: No weakness.      Gait: Gait normal.       Fluids    Intake/Output Summary (Last 24 hours) at 12/15/2022 1536  Last data filed at 12/15/2022 1000  Gross per 24 hour   Intake 440 ml   Output --   Net 440 ml       Laboratory  Recent Labs     12/13/22  2047 12/14/22  0224 12/15/22  0917   WBC 14.0* 14.5* 12.4*   RBC 4.62 4.64 4.34   HEMOGLOBIN 13.6 13.5 12.4   HEMATOCRIT 39.7 40.2 38.1   MCV 85.9 86.6 87.8   MCH 29.4 29.1 28.6   MCHC 34.3 33.6 32.5*   RDW 40.8 41.0 42.4   PLATELETCT 250 245 244   MPV 9.7 9.2 9.6     Recent Labs     12/13/22 2047 12/14/22 0224   SODIUM 135 136   POTASSIUM 3.3* 3.8   CHLORIDE 104 105   CO2 20 20   GLUCOSE 159*  120*   BUN 11 11   CREATININE 0.94 0.93   CALCIUM 8.8 8.6     Recent Labs     12/13/22  2222   INR 1.09               Imaging  CT-DRAIN-PERITONEAL   Final Result      1.  CT guided pelvic diverticular abscess catheter drainage.   2.  The current plan is to obtain a follow-up CT in 5-7 days..      CT-ABDOMEN-PELVIS WITH   Final Result      1.  Acute sigmoid diverticulitis with adjacent 4.6 x 3.3 cm abscess.   2.  Gallbladder sludge or stones.   3.  2.1 cm hypodense left hepatic lobe lesion, likely a hemangioma, though incompletely characterized on this CT. If there is clinical concern, consider further evaluation with nonemergent MRI hepatic protocol.   4.  Nonobstructing 6 mm inferior pole left renal stone.      Findings conveyed to Dr. Cordova at 1124 via Voalte messaging on 12/14/2022.           Assessment/Plan  * Colonic diverticular abscess  Assessment & Plan  With diverticulitis  Repeat CT imaging noted 4.6 x 3.3cm abscess.    IR was consulted, drain was placed on 12/14, repeat CT in 5-7 days  Blood cultures negative to date.    Wound cultures pending.    Patient currently on Zosyn.    Diverticulitis- (present on admission)  Assessment & Plan  History of recurrent diverticulitis  Multiple colonoscopy did not show any mass  Came with abdominal pain with diverticulitis and small abscess  IV fluid and IV antibiotics  General surgery consult tomorrow morning    Nephrolithiasis  Assessment & Plan  CT scan showed kidney stone and ureteral on the left side around 7 mm   no urinary symptoms  Follow-up with urology as outpatient    Sepsis (HCC)  Assessment & Plan  This is Sepsis Present on admission  SIRS criteria identified on my evaluation include: Tachycardia, with heart rate greater than 90 BPM and Leukocytosis, with WBC greater than 12,000  Source is intra-abdominal abscess and diverticulitis  Sepsis protocol initiated  Fluid resuscitation ordered per protocol  Crystalloid Fluid Administration: Fluid resuscitation  ordered per standard protocol - 30 mL/kg per current or ideal body weight  IV antibiotics as appropriate for source of sepsis  Reassessment: I have reassessed the patient's hemodynamic status    Hypertension  Assessment & Plan  Patient is taking atenolol at home  Holding medications and close monitoring       VTE prophylaxis: enoxaparin ppx    I have performed a physical exam and reviewed and updated ROS and Plan today (12/15/2022). In review of yesterday's note (12/14/2022), there are no changes except as documented above.

## 2022-12-15 NOTE — PROGRESS NOTES
Assumed care of patient. AO x4. With complaints of pain 8 on a scale of (0-10). Routine assessment done. Vital signs within normal limits. Call light within reach and personal belongings within reach. Bed locked and in lowest position. Policies and procedures reinforced patient verbalized understanding.Treaded socks in place. Will continue to monitor.

## 2022-12-16 ENCOUNTER — APPOINTMENT (OUTPATIENT)
Dept: RADIOLOGY | Facility: MEDICAL CENTER | Age: 77
DRG: 853 | End: 2022-12-16
Attending: GENERAL PRACTICE
Payer: MEDICARE

## 2022-12-16 LAB
BACTERIA WND AEROBE CULT: ABNORMAL
BACTERIA WND AEROBE CULT: ABNORMAL
ERYTHROCYTE [DISTWIDTH] IN BLOOD BY AUTOMATED COUNT: 41.8 FL (ref 35.9–50)
GRAM STN SPEC: ABNORMAL
HCT VFR BLD AUTO: 39.6 % (ref 37–47)
HGB BLD-MCNC: 13.1 G/DL (ref 12–16)
MCH RBC QN AUTO: 29.1 PG (ref 27–33)
MCHC RBC AUTO-ENTMCNC: 33.1 G/DL (ref 33.6–35)
MCV RBC AUTO: 88 FL (ref 81.4–97.8)
PLATELET # BLD AUTO: 279 K/UL (ref 164–446)
PMV BLD AUTO: 9.8 FL (ref 9–12.9)
RBC # BLD AUTO: 4.5 M/UL (ref 4.2–5.4)
SIGNIFICANT IND 70042: ABNORMAL
SITE SITE: ABNORMAL
SOURCE SOURCE: ABNORMAL
WBC # BLD AUTO: 13.2 K/UL (ref 4.8–10.8)

## 2022-12-16 PROCEDURE — 36415 COLL VENOUS BLD VENIPUNCTURE: CPT

## 2022-12-16 PROCEDURE — 700102 HCHG RX REV CODE 250 W/ 637 OVERRIDE(OP): Performed by: INTERNAL MEDICINE

## 2022-12-16 PROCEDURE — 770006 HCHG ROOM/CARE - MED/SURG/GYN SEMI*

## 2022-12-16 PROCEDURE — 700105 HCHG RX REV CODE 258: Performed by: INTERNAL MEDICINE

## 2022-12-16 PROCEDURE — 700102 HCHG RX REV CODE 250 W/ 637 OVERRIDE(OP): Performed by: GENERAL PRACTICE

## 2022-12-16 PROCEDURE — A9270 NON-COVERED ITEM OR SERVICE: HCPCS | Performed by: INTERNAL MEDICINE

## 2022-12-16 PROCEDURE — 85027 COMPLETE CBC AUTOMATED: CPT

## 2022-12-16 PROCEDURE — A9270 NON-COVERED ITEM OR SERVICE: HCPCS | Performed by: GENERAL PRACTICE

## 2022-12-16 PROCEDURE — 700111 HCHG RX REV CODE 636 W/ 250 OVERRIDE (IP): Performed by: INTERNAL MEDICINE

## 2022-12-16 PROCEDURE — 99233 SBSQ HOSP IP/OBS HIGH 50: CPT | Performed by: GENERAL PRACTICE

## 2022-12-16 RX ORDER — OXYCODONE HYDROCHLORIDE 5 MG/1
2.5 TABLET ORAL
Status: DISCONTINUED | OUTPATIENT
Start: 2022-12-16 | End: 2022-12-23 | Stop reason: HOSPADM

## 2022-12-16 RX ORDER — HYDROMORPHONE HYDROCHLORIDE 1 MG/ML
0.25 INJECTION, SOLUTION INTRAMUSCULAR; INTRAVENOUS; SUBCUTANEOUS
Status: DISCONTINUED | OUTPATIENT
Start: 2022-12-16 | End: 2022-12-16

## 2022-12-16 RX ORDER — OXYCODONE HYDROCHLORIDE 5 MG/1
5 TABLET ORAL
Status: DISCONTINUED | OUTPATIENT
Start: 2022-12-16 | End: 2022-12-23 | Stop reason: HOSPADM

## 2022-12-16 RX ADMIN — ACETAMINOPHEN 650 MG: 325 TABLET, FILM COATED ORAL at 03:14

## 2022-12-16 RX ADMIN — PIPERACILLIN AND TAZOBACTAM 3.38 G: 3; .375 INJECTION, POWDER, LYOPHILIZED, FOR SOLUTION INTRAVENOUS; PARENTERAL at 07:10

## 2022-12-16 RX ADMIN — PIPERACILLIN AND TAZOBACTAM 3.38 G: 3; .375 INJECTION, POWDER, LYOPHILIZED, FOR SOLUTION INTRAVENOUS; PARENTERAL at 21:36

## 2022-12-16 RX ADMIN — ACETAMINOPHEN 650 MG: 325 TABLET, FILM COATED ORAL at 07:01

## 2022-12-16 RX ADMIN — PIPERACILLIN AND TAZOBACTAM 3.38 G: 3; .375 INJECTION, POWDER, LYOPHILIZED, FOR SOLUTION INTRAVENOUS; PARENTERAL at 15:23

## 2022-12-16 RX ADMIN — ENOXAPARIN SODIUM 40 MG: 40 INJECTION SUBCUTANEOUS at 17:24

## 2022-12-16 RX ADMIN — OXYCODONE 5 MG: 5 TABLET ORAL at 17:23

## 2022-12-16 RX ADMIN — OXYCODONE 2.5 MG: 5 TABLET ORAL at 09:18

## 2022-12-16 RX ADMIN — OXYCODONE 5 MG: 5 TABLET ORAL at 20:18

## 2022-12-16 RX ADMIN — OXYCODONE 2.5 MG: 5 TABLET ORAL at 13:45

## 2022-12-16 ASSESSMENT — PAIN DESCRIPTION - PAIN TYPE
TYPE: ACUTE PAIN

## 2022-12-16 ASSESSMENT — ENCOUNTER SYMPTOMS: ABDOMINAL PAIN: 1

## 2022-12-16 ASSESSMENT — FIBROSIS 4 INDEX: FIB4 SCORE: 1.2

## 2022-12-16 NOTE — CARE PLAN
The patient is Stable - Low risk of patient condition declining or worsening    Shift Goals  Clinical Goals: Abx  Patient Goals: Pain control  Family Goals: n/a    Progress made toward(s) clinical / shift goals:    Problem: Knowledge Deficit - Standard  Goal: Patient and family/care givers will demonstrate understanding of plan of care, disease process/condition, diagnostic tests and medications  12/15/2022 2348 by Gordo Shetty R.N.  Outcome: Progressing  12/15/2022 2345 by Gordo Shetty R.N.  Outcome: Progressing     Problem: Pain - Standard  Goal: Alleviation of pain or a reduction in pain to the patient’s comfort goal  12/15/2022 2348 by Gordo Shetty R.N.  Outcome: Progressing  12/15/2022 2345 by Gordo Shetty R.N.  Outcome: Progressing     Problem: Bowel Elimination  Goal: Establish and maintain regular bowel function  12/15/2022 2348 by Gordo Shetty R.N.  Outcome: Progressing  12/15/2022 2345 by Gordo Shetty R.N.  Outcome: Progressing     Problem: Mobility  Goal: Patient's capacity to carry out activities will improve  12/15/2022 2348 by Gordo Shetty R.N.  Outcome: Progressing  12/15/2022 2345 by Gordo Shetty R.N.  Outcome: Progressing     Problem: Self Care  Goal: Patient will have the ability to perform ADLs independently or with assistance (bathe, groom, dress, toilet and feed)  12/15/2022 2348 by Gordo Shetty R.N.  Outcome: Progressing  12/15/2022 2345 by Gordo Shetty R.N.  Outcome: Progressing     Problem: Infection - Standard  Goal: Patient will remain free from infection  12/15/2022 2348 by Gordo Shetty R.N.  Outcome: Progressing  12/15/2022 2345 by Gordo Shetty R.N.  Outcome: Progressing     Problem: Wound/ / Incision Healing  Goal: Patient's wound/surgical incision will decrease in size and heals properly  12/15/2022 2348 by Gordo Shetty R.N.  Outcome: Progressing  12/15/2022 2345 by  Gordo Shetty R.N.  Outcome: Progressing       Patient is not progressing towards the following goals:       No

## 2022-12-16 NOTE — CARE PLAN
The patient is Stable - Low risk of patient condition declining or worsening    Shift Goals  Clinical Goals: pain control, ABX  Patient Goals: pain control  Family Goals: n/a    Progress made toward(s) clinical / shift goals:    Problem: Knowledge Deficit - Standard  Goal: Patient and family/care givers will demonstrate understanding of plan of care, disease process/condition, diagnostic tests and medications  Outcome: Progressing     Problem: Pain - Standard  Goal: Alleviation of pain or a reduction in pain to the patient’s comfort goal  Outcome: Progressing     Problem: Mobility  Goal: Patient's capacity to carry out activities will improve  Outcome: Progressing     Problem: Infection - Standard  Goal: Patient will remain free from infection  Outcome: Progressing       Patient is not progressing towards the following goals:

## 2022-12-16 NOTE — PROGRESS NOTES
Assumed care of patient. AO x4. With complaints of pain of 7 on a scale of (0-10). Routine assessment done. Vital signs within normal limits. Call light within reach and personal belongings within reach. Bed locked and in lowest position. Policies and procedures reinforced patient verbalized understanding.Treaded socks in place. Will continue to monitor.

## 2022-12-16 NOTE — PROGRESS NOTES
Received report from night shift RN. Pt A&O x 4 lying in bed with eyes open. She is complaining of ABD pain 6/10 pain scale but all she has is Acetaminophen for pain control at this time. Bed low and locked with call light in place for safety. Pt knows to call with needs or concerns.

## 2022-12-16 NOTE — CARE PLAN
The patient is Watcher - Medium risk of patient condition declining or worsening    Shift Goals  Clinical Goals: Patient will not show signs of infection and will tolerate IV antibiotics well during shift.  Patient Goals: Pain Control, Comfort care  Family Goals: n/a    Progress made toward(s) clinical / shift goals:    Problem: Knowledge Deficit - Standard  Goal: Patient and family/care givers will demonstrate understanding of plan of care, disease process/condition, diagnostic tests and medications  Outcome: Met       Patient is not progressing towards the following goals:

## 2022-12-16 NOTE — PROGRESS NOTES
Hospital Medicine Daily Progress Note    Date of Service  12/16/2022    Chief Complaint  Maira Johnson is a 77 y.o. female admitted 12/13/2022 with abdominal pain    Hospital Course  This is a 77-year-old female with past medical history of hypertension, recurrent diverticulitis and morbid obesity with BMI of 41 who was transferred from an outside facility on 12/13/2022 due to sepsis secondary to diverticulitis with multiple abscesses.    Repeat CT imaging noted 4.6 x 3.3cm abscess.  IR was consulted, drain was placed on 12/14.  Blood cultures negative to date.  Wound cultures pending.  Patient currently on Zosyn.    Incidentally on CT imaging noted left-sided 7 mm nephrolithiasis, nonobstructing, patient with no urinary complaints follow-up with urology outpatient..     Interval Problem Update  Patient reports her abdominal pain has improved, she is  in the left lower quadrant.  Diet advance from full liquid to GI soft, will continue to advance as tolerated.    Drain was placed on 12/14 with continued output.  Leukocytosis fluctuating. Will continue to monitor.     I have discussed this patient's plan of care and discharge plan at IDT rounds today with Case Management, Nursing, Nursing leadership, and other members of the IDT team.    Consultants/Specialty  general surgery and IR    Code Status  Full Code    Disposition  Patient is not medically cleared for discharge.   Anticipate discharge to to home with close outpatient follow-up.  I have placed the appropriate orders for post-discharge needs.    Review of Systems  Review of Systems   Gastrointestinal:  Positive for abdominal pain.   All other systems reviewed and are negative.     Physical Exam  Temp:  [36.4 °C (97.6 °F)-37.6 °C (99.7 °F)] 36.6 °C (97.9 °F)  Pulse:  [] 99  Resp:  [16-20] 18  BP: (136-176)/(62-86) 142/70  SpO2:  [91 %-96 %] 96 %    Physical Exam  Vitals and nursing note reviewed.   Constitutional:       General: She is not  in acute distress.     Appearance: She is obese.   HENT:      Head: Normocephalic and atraumatic.      Mouth/Throat:      Mouth: Mucous membranes are moist.      Pharynx: No oropharyngeal exudate.   Eyes:      Extraocular Movements: Extraocular movements intact.      Pupils: Pupils are equal, round, and reactive to light.   Cardiovascular:      Rate and Rhythm: Normal rate and regular rhythm.      Pulses: Normal pulses.      Heart sounds: No murmur heard.    No friction rub. No gallop.   Pulmonary:      Effort: Pulmonary effort is normal. No respiratory distress.      Breath sounds: No wheezing, rhonchi or rales.   Abdominal:      General: Bowel sounds are normal. There is no distension.      Palpations: Abdomen is soft. There is no mass.      Tenderness: There is abdominal tenderness (LLQ).   Musculoskeletal:         General: No swelling or tenderness. Normal range of motion.      Cervical back: Normal range of motion. No rigidity. No muscular tenderness.      Right lower leg: No edema.      Left lower leg: No edema.   Skin:     General: Skin is warm and dry.      Capillary Refill: Capillary refill takes less than 2 seconds.      Findings: No erythema or rash.   Neurological:      General: No focal deficit present.      Mental Status: She is alert and oriented to person, place, and time.      Motor: No weakness.      Gait: Gait normal.       Fluids    Intake/Output Summary (Last 24 hours) at 12/16/2022 1544  Last data filed at 12/16/2022 1000  Gross per 24 hour   Intake 240 ml   Output 76 ml   Net 164 ml       Laboratory  Recent Labs     12/14/22  0224 12/15/22  0917 12/16/22  0120   WBC 14.5* 12.4* 13.2*   RBC 4.64 4.34 4.50   HEMOGLOBIN 13.5 12.4 13.1   HEMATOCRIT 40.2 38.1 39.6   MCV 86.6 87.8 88.0   MCH 29.1 28.6 29.1   MCHC 33.6 32.5* 33.1*   RDW 41.0 42.4 41.8   PLATELETCT 245 244 279   MPV 9.2 9.6 9.8     Recent Labs     12/13/22  2047 12/14/22  0224   SODIUM 135 136   POTASSIUM 3.3* 3.8   CHLORIDE 104 105    CO2 20 20   GLUCOSE 159* 120*   BUN 11 11   CREATININE 0.94 0.93   CALCIUM 8.8 8.6     Recent Labs     12/13/22  2222   INR 1.09               Imaging  IR-US GUIDED PIV   Final Result    Ultrasound-guided PERIPHERAL IV INSERTION performed by    qualified nursing staff as above.      CT-DRAIN-PERITONEAL   Final Result      1.  CT guided pelvic diverticular abscess catheter drainage.   2.  The current plan is to obtain a follow-up CT in 5-7 days..      CT-ABDOMEN-PELVIS WITH   Final Result      1.  Acute sigmoid diverticulitis with adjacent 4.6 x 3.3 cm abscess.   2.  Gallbladder sludge or stones.   3.  2.1 cm hypodense left hepatic lobe lesion, likely a hemangioma, though incompletely characterized on this CT. If there is clinical concern, consider further evaluation with nonemergent MRI hepatic protocol.   4.  Nonobstructing 6 mm inferior pole left renal stone.      Findings conveyed to Dr. Cordova at 1124 via [employer] messaging on 12/14/2022.           Assessment/Plan  * Colonic diverticular abscess  Assessment & Plan  With diverticulitis  Repeat CT imaging noted 4.6 x 3.3cm abscess.    IR was consulted, drain was placed on 12/14, repeat CT in 5-7 days  Blood cultures negative to date.    Wound cultures pending.    Patient currently on Zosyn.    Diverticulitis- (present on admission)  Assessment & Plan  History of recurrent diverticulitis  Multiple colonoscopy did not show any mass  Came with abdominal pain with diverticulitis and small abscess  IV fluid and IV antibiotics  General surgery consult tomorrow morning    Nephrolithiasis  Assessment & Plan  CT scan showed kidney stone and ureteral on the left side around 7 mm   no urinary symptoms  Follow-up with urology as outpatient    Sepsis (HCC)  Assessment & Plan  This is Sepsis Present on admission  SIRS criteria identified on my evaluation include: Tachycardia, with heart rate greater than 90 BPM and Leukocytosis, with WBC greater than 12,000  Source is  intra-abdominal abscess and diverticulitis  Sepsis protocol initiated  Fluid resuscitation ordered per protocol  Crystalloid Fluid Administration: Fluid resuscitation ordered per standard protocol - 30 mL/kg per current or ideal body weight  IV antibiotics as appropriate for source of sepsis  Reassessment: I have reassessed the patient's hemodynamic status    Hypertension  Assessment & Plan  Patient is taking atenolol at home  Holding medications and close monitoring       VTE prophylaxis: enoxaparin ppx    I have performed a physical exam and reviewed and updated ROS and Plan today (12/16/2022). In review of yesterday's note (12/15/2022), there are no changes except as documented above.

## 2022-12-16 NOTE — DISCHARGE PLANNING
As of 12/16 patient is not medically cleared. Might possibly be cleared to discharge over the weekend. No needs. Patient is refusing home health.

## 2022-12-17 LAB
ERYTHROCYTE [DISTWIDTH] IN BLOOD BY AUTOMATED COUNT: 40 FL (ref 35.9–50)
HCT VFR BLD AUTO: 40.5 % (ref 37–47)
HGB BLD-MCNC: 13.5 G/DL (ref 12–16)
MCH RBC QN AUTO: 28.7 PG (ref 27–33)
MCHC RBC AUTO-ENTMCNC: 33.3 G/DL (ref 33.6–35)
MCV RBC AUTO: 86.2 FL (ref 81.4–97.8)
PLATELET # BLD AUTO: 292 K/UL (ref 164–446)
PMV BLD AUTO: 9.7 FL (ref 9–12.9)
RBC # BLD AUTO: 4.7 M/UL (ref 4.2–5.4)
WBC # BLD AUTO: 18.2 K/UL (ref 4.8–10.8)

## 2022-12-17 PROCEDURE — 36415 COLL VENOUS BLD VENIPUNCTURE: CPT

## 2022-12-17 PROCEDURE — A9270 NON-COVERED ITEM OR SERVICE: HCPCS | Performed by: GENERAL PRACTICE

## 2022-12-17 PROCEDURE — 700102 HCHG RX REV CODE 250 W/ 637 OVERRIDE(OP): Performed by: GENERAL PRACTICE

## 2022-12-17 PROCEDURE — 85027 COMPLETE CBC AUTOMATED: CPT

## 2022-12-17 PROCEDURE — 99233 SBSQ HOSP IP/OBS HIGH 50: CPT | Performed by: GENERAL PRACTICE

## 2022-12-17 PROCEDURE — 700105 HCHG RX REV CODE 258: Performed by: INTERNAL MEDICINE

## 2022-12-17 PROCEDURE — 770006 HCHG ROOM/CARE - MED/SURG/GYN SEMI*

## 2022-12-17 PROCEDURE — 700111 HCHG RX REV CODE 636 W/ 250 OVERRIDE (IP): Performed by: INTERNAL MEDICINE

## 2022-12-17 RX ADMIN — OXYCODONE 5 MG: 5 TABLET ORAL at 19:52

## 2022-12-17 RX ADMIN — OXYCODONE 5 MG: 5 TABLET ORAL at 22:54

## 2022-12-17 RX ADMIN — OXYCODONE 5 MG: 5 TABLET ORAL at 09:54

## 2022-12-17 RX ADMIN — PIPERACILLIN AND TAZOBACTAM 3.38 G: 3; .375 INJECTION, POWDER, LYOPHILIZED, FOR SOLUTION INTRAVENOUS; PARENTERAL at 05:48

## 2022-12-17 RX ADMIN — OXYCODONE 5 MG: 5 TABLET ORAL at 00:55

## 2022-12-17 RX ADMIN — PIPERACILLIN AND TAZOBACTAM 3.38 G: 3; .375 INJECTION, POWDER, LYOPHILIZED, FOR SOLUTION INTRAVENOUS; PARENTERAL at 13:53

## 2022-12-17 RX ADMIN — OXYCODONE 5 MG: 5 TABLET ORAL at 13:52

## 2022-12-17 RX ADMIN — OXYCODONE 5 MG: 5 TABLET ORAL at 05:44

## 2022-12-17 RX ADMIN — PIPERACILLIN AND TAZOBACTAM 3.38 G: 3; .375 INJECTION, POWDER, LYOPHILIZED, FOR SOLUTION INTRAVENOUS; PARENTERAL at 21:59

## 2022-12-17 ASSESSMENT — ENCOUNTER SYMPTOMS: ABDOMINAL PAIN: 1

## 2022-12-17 ASSESSMENT — PAIN DESCRIPTION - PAIN TYPE
TYPE: ACUTE PAIN

## 2022-12-17 ASSESSMENT — FIBROSIS 4 INDEX: FIB4 SCORE: 1.2

## 2022-12-17 ASSESSMENT — PAIN SCALES - WONG BAKER: WONGBAKER_NUMERICALRESPONSE: HURTS JUST A LITTLE BIT

## 2022-12-17 NOTE — CARE PLAN
The patient is Stable - Low risk of patient condition declining or worsening    Shift Goals  Clinical Goals: Pain management, IV ABX, MIGUE drain o/p, increase mobility  Patient Goals: Pain management, rest  Family Goals: n/a      Problem: Knowledge Deficit - Standard  Goal: Patient and family/care givers will demonstrate understanding of plan of care, disease process/condition, diagnostic tests and medications  Outcome: Progressing     Problem: Mobility  Goal: Patient's capacity to carry out activities will improve  Outcome: Progressing     Problem: Self Care  Goal: Patient will have the ability to perform ADLs independently or with assistance (bathe, groom, dress, toilet and feed)  Outcome: Progressing     Problem: Infection - Standard  Goal: Patient will remain free from infection  Outcome: Progressing     Problem: Pain - Standard  Goal: Alleviation of pain or a reduction in pain to the patient’s comfort goal  Outcome: Progressing       Progress made toward(s) clinical / shift goals:  Patient updated on the plan of care. All questions answered. Skin and pain assessed. Medicated per MAR. Patient is 1-2 person assist to the restroom. Care ongoing.

## 2022-12-17 NOTE — PROGRESS NOTES
Received report from CONNER Altamirano and assumed care at 19:00. Patient is A&Ox4, VSS on RA, complaining of 8/10 ABD pain. Medicated per MAR. 1 person assist with FWW to the restroom. Fall precautions in place. Bed in the lowest position, wheels locked, and call light within reach. Patient calls appropriately. Care ongoing.

## 2022-12-17 NOTE — PROGRESS NOTES
Hospital Medicine Daily Progress Note    Date of Service  12/17/2022    Chief Complaint  Maira Johnson is a 77 y.o. female admitted 12/13/2022 with abdominal pain    Hospital Course  This is a 77-year-old female with past medical history of hypertension, recurrent diverticulitis and morbid obesity with BMI of 41 who was transferred from an outside facility on 12/13/2022 due to sepsis secondary to diverticulitis with multiple abscesses.    Repeat CT imaging noted 4.6 x 3.3cm abscess.  IR was consulted, drain was placed on 12/14.  Blood cultures negative to date.  Wound cultures pending.  Patient currently on Zosyn.    Incidentally on CT imaging noted left-sided 7 mm nephrolithiasis, nonobstructing, patient with no urinary complaints follow-up with urology outpatient..     Interval Problem Update  Patient reports her abdominal pain has improved, now tender in the right lower quadrant. Tolerating GI Soft diet. + flatus + BM    Drain was placed on 12/14 with continued output.  Leukocytosis fluctuating increasing.  Patient has received 3 contrast CT imaging in the past 4 days.  If white count continues to uptrend, will repeat CT imaging with IV contrast tomorrow.     I have discussed this patient's plan of care and discharge plan at IDT rounds today with Case Management, Nursing, Nursing leadership, and other members of the IDT team.    Consultants/Specialty  general surgery and IR    Code Status  Full Code    Disposition  Patient is not medically cleared for discharge.   Anticipate discharge to to home with close outpatient follow-up.  I have placed the appropriate orders for post-discharge needs.    Review of Systems  Review of Systems   Gastrointestinal:  Positive for abdominal pain.   All other systems reviewed and are negative.     Physical Exam  Temp:  [36.4 °C (97.6 °F)-37.6 °C (99.6 °F)] 36.6 °C (97.9 °F)  Pulse:  [88-96] 94  Resp:  [16-18] 18  BP: (130-159)/(62-87) 159/80  SpO2:  [90 %-92 %] 90 %    Physical  Exam  Vitals and nursing note reviewed.   Constitutional:       General: She is not in acute distress.     Appearance: She is obese.   HENT:      Head: Normocephalic and atraumatic.      Mouth/Throat:      Mouth: Mucous membranes are moist.      Pharynx: No oropharyngeal exudate.   Eyes:      Extraocular Movements: Extraocular movements intact.      Pupils: Pupils are equal, round, and reactive to light.   Cardiovascular:      Rate and Rhythm: Normal rate and regular rhythm.      Pulses: Normal pulses.      Heart sounds: No murmur heard.    No friction rub. No gallop.   Pulmonary:      Effort: Pulmonary effort is normal. No respiratory distress.      Breath sounds: No wheezing, rhonchi or rales.   Abdominal:      General: Bowel sounds are normal. There is no distension.      Palpations: Abdomen is soft. There is no mass.      Tenderness: There is abdominal tenderness (RLQ).      Comments: LLQ drain in place with output   Musculoskeletal:         General: No swelling or tenderness. Normal range of motion.      Cervical back: Normal range of motion. No rigidity. No muscular tenderness.      Right lower leg: No edema.      Left lower leg: No edema.   Skin:     General: Skin is warm and dry.      Capillary Refill: Capillary refill takes less than 2 seconds.      Findings: No erythema or rash.   Neurological:      General: No focal deficit present.      Mental Status: She is alert and oriented to person, place, and time.      Motor: No weakness.      Gait: Gait normal.       Fluids    Intake/Output Summary (Last 24 hours) at 12/17/2022 1351  Last data filed at 12/17/2022 0548  Gross per 24 hour   Intake 1579.38 ml   Output 30 ml   Net 1549.38 ml       Laboratory  Recent Labs     12/15/22  0917 12/16/22  0120 12/17/22  0117   WBC 12.4* 13.2* 18.2*   RBC 4.34 4.50 4.70   HEMOGLOBIN 12.4 13.1 13.5   HEMATOCRIT 38.1 39.6 40.5   MCV 87.8 88.0 86.2   MCH 28.6 29.1 28.7   MCHC 32.5* 33.1* 33.3*   RDW 42.4 41.8 40.0   PLATELETCT  244 279 292   MPV 9.6 9.8 9.7                           Imaging  IR-US GUIDED PIV   Final Result    Ultrasound-guided PERIPHERAL IV INSERTION performed by    qualified nursing staff as above.      CT-DRAIN-PERITONEAL   Final Result      1.  CT guided pelvic diverticular abscess catheter drainage.   2.  The current plan is to obtain a follow-up CT in 5-7 days..      CT-ABDOMEN-PELVIS WITH   Final Result      1.  Acute sigmoid diverticulitis with adjacent 4.6 x 3.3 cm abscess.   2.  Gallbladder sludge or stones.   3.  2.1 cm hypodense left hepatic lobe lesion, likely a hemangioma, though incompletely characterized on this CT. If there is clinical concern, consider further evaluation with nonemergent MRI hepatic protocol.   4.  Nonobstructing 6 mm inferior pole left renal stone.      Findings conveyed to Dr. Cordova at 1124 via [employer] messaging on 12/14/2022.           Assessment/Plan  * Colonic diverticular abscess  Assessment & Plan  With diverticulitis  Repeat CT imaging noted 4.6 x 3.3cm abscess.    IR was consulted, drain was placed on 12/14, repeat CT in 5-7 days  Blood cultures negative to date.    Wound cultures pending.    Patient currently on Zosyn.    Diverticulitis- (present on admission)  Assessment & Plan  History of recurrent diverticulitis  Multiple colonoscopy did not show any mass  Came with abdominal pain with diverticulitis and small abscess  IV fluid and IV antibiotics  General surgery consult tomorrow morning    Nephrolithiasis  Assessment & Plan  CT scan showed kidney stone and ureteral on the left side around 7 mm   no urinary symptoms  Follow-up with urology as outpatient    Sepsis (HCC)  Assessment & Plan  This is Sepsis Present on admission  SIRS criteria identified on my evaluation include: Tachycardia, with heart rate greater than 90 BPM and Leukocytosis, with WBC greater than 12,000  Source is intra-abdominal abscess and diverticulitis  Sepsis protocol initiated  Fluid resuscitation  ordered per protocol  Crystalloid Fluid Administration: Fluid resuscitation ordered per standard protocol - 30 mL/kg per current or ideal body weight  IV antibiotics as appropriate for source of sepsis  Reassessment: I have reassessed the patient's hemodynamic status    Hypertension  Assessment & Plan  Patient is taking atenolol at home  Holding medications and close monitoring       VTE prophylaxis: enoxaparin ppx    I have performed a physical exam and reviewed and updated ROS and Plan today (12/17/2022). In review of yesterday's note (12/16/2022), there are no changes except as documented above.

## 2022-12-18 ENCOUNTER — ANESTHESIA (OUTPATIENT)
Dept: SURGERY | Facility: MEDICAL CENTER | Age: 77
DRG: 853 | End: 2022-12-18
Payer: MEDICARE

## 2022-12-18 ENCOUNTER — ANESTHESIA EVENT (OUTPATIENT)
Dept: SURGERY | Facility: MEDICAL CENTER | Age: 77
DRG: 853 | End: 2022-12-18
Payer: MEDICARE

## 2022-12-18 ENCOUNTER — APPOINTMENT (OUTPATIENT)
Dept: RADIOLOGY | Facility: MEDICAL CENTER | Age: 77
DRG: 853 | End: 2022-12-18
Attending: GENERAL PRACTICE
Payer: MEDICARE

## 2022-12-18 PROBLEM — K57.20 DIVERTICULITIS OF COLON WITH PERFORATION: Status: ACTIVE | Noted: 2022-12-18

## 2022-12-18 LAB
ALBUMIN SERPL BCP-MCNC: 2.5 G/DL (ref 3.2–4.9)
ALBUMIN/GLOB SERPL: 0.8 G/DL
ALP SERPL-CCNC: 103 U/L (ref 30–99)
ALT SERPL-CCNC: 21 U/L (ref 2–50)
ANION GAP SERPL CALC-SCNC: 10 MMOL/L (ref 7–16)
AST SERPL-CCNC: 28 U/L (ref 12–45)
BACTERIA BLD CULT: NORMAL
BACTERIA BLD CULT: NORMAL
BILIRUB SERPL-MCNC: 1 MG/DL (ref 0.1–1.5)
BUN SERPL-MCNC: 11 MG/DL (ref 8–22)
CALCIUM ALBUM COR SERPL-MCNC: 9.9 MG/DL (ref 8.5–10.5)
CALCIUM SERPL-MCNC: 8.7 MG/DL (ref 8.5–10.5)
CHLORIDE SERPL-SCNC: 101 MMOL/L (ref 96–112)
CO2 SERPL-SCNC: 24 MMOL/L (ref 20–33)
CREAT SERPL-MCNC: 0.93 MG/DL (ref 0.5–1.4)
ERYTHROCYTE [DISTWIDTH] IN BLOOD BY AUTOMATED COUNT: 41.1 FL (ref 35.9–50)
GFR SERPLBLD CREATININE-BSD FMLA CKD-EPI: 63 ML/MIN/1.73 M 2
GLOBULIN SER CALC-MCNC: 3.2 G/DL (ref 1.9–3.5)
GLUCOSE SERPL-MCNC: 118 MG/DL (ref 65–99)
HCT VFR BLD AUTO: 37.2 % (ref 37–47)
HGB BLD-MCNC: 12.3 G/DL (ref 12–16)
MCH RBC QN AUTO: 28.7 PG (ref 27–33)
MCHC RBC AUTO-ENTMCNC: 33.1 G/DL (ref 33.6–35)
MCV RBC AUTO: 86.7 FL (ref 81.4–97.8)
PLATELET # BLD AUTO: 267 K/UL (ref 164–446)
PMV BLD AUTO: 9.7 FL (ref 9–12.9)
POTASSIUM SERPL-SCNC: 3.2 MMOL/L (ref 3.6–5.5)
PROT SERPL-MCNC: 5.7 G/DL (ref 6–8.2)
RBC # BLD AUTO: 4.29 M/UL (ref 4.2–5.4)
SIGNIFICANT IND 70042: NORMAL
SIGNIFICANT IND 70042: NORMAL
SITE SITE: NORMAL
SITE SITE: NORMAL
SODIUM SERPL-SCNC: 135 MMOL/L (ref 135–145)
SOURCE SOURCE: NORMAL
SOURCE SOURCE: NORMAL
WBC # BLD AUTO: 18.2 K/UL (ref 4.8–10.8)

## 2022-12-18 PROCEDURE — 99140 ANES COMP EMERGENCY COND: CPT | Performed by: ANESTHESIOLOGY

## 2022-12-18 PROCEDURE — 87205 SMEAR GRAM STAIN: CPT

## 2022-12-18 PROCEDURE — 160009 HCHG ANES TIME/MIN: Performed by: SURGERY

## 2022-12-18 PROCEDURE — 0W9J0ZX DRAINAGE OF PELVIC CAVITY, OPEN APPROACH, DIAGNOSTIC: ICD-10-PCS | Performed by: SURGERY

## 2022-12-18 PROCEDURE — 160036 HCHG PACU - EA ADDL 30 MINS PHASE I: Performed by: SURGERY

## 2022-12-18 PROCEDURE — 700105 HCHG RX REV CODE 258: Performed by: ANESTHESIOLOGY

## 2022-12-18 PROCEDURE — 44143 PARTIAL REMOVAL OF COLON: CPT | Mod: AS | Performed by: NURSE PRACTITIONER

## 2022-12-18 PROCEDURE — 700101 HCHG RX REV CODE 250: Performed by: ANESTHESIOLOGY

## 2022-12-18 PROCEDURE — 700111 HCHG RX REV CODE 636 W/ 250 OVERRIDE (IP): Performed by: ANESTHESIOLOGY

## 2022-12-18 PROCEDURE — 44139 MOBILIZATION OF COLON: CPT | Performed by: SURGERY

## 2022-12-18 PROCEDURE — 110371 HCHG SHELL REV 272: Performed by: SURGERY

## 2022-12-18 PROCEDURE — 160042 HCHG SURGERY MINUTES - EA ADDL 1 MIN LEVEL 5: Performed by: SURGERY

## 2022-12-18 PROCEDURE — 700111 HCHG RX REV CODE 636 W/ 250 OVERRIDE (IP): Performed by: INTERNAL MEDICINE

## 2022-12-18 PROCEDURE — 87102 FUNGUS ISOLATION CULTURE: CPT

## 2022-12-18 PROCEDURE — 99100 ANES PT EXTEME AGE<1 YR&>70: CPT | Performed by: ANESTHESIOLOGY

## 2022-12-18 PROCEDURE — 99233 SBSQ HOSP IP/OBS HIGH 50: CPT | Performed by: GENERAL PRACTICE

## 2022-12-18 PROCEDURE — 160035 HCHG PACU - 1ST 60 MINS PHASE I: Performed by: SURGERY

## 2022-12-18 PROCEDURE — 700102 HCHG RX REV CODE 250 W/ 637 OVERRIDE(OP): Performed by: ANESTHESIOLOGY

## 2022-12-18 PROCEDURE — 87075 CULTR BACTERIA EXCEPT BLOOD: CPT

## 2022-12-18 PROCEDURE — 74177 CT ABD & PELVIS W/CONTRAST: CPT

## 2022-12-18 PROCEDURE — 99233 SBSQ HOSP IP/OBS HIGH 50: CPT | Mod: 57 | Performed by: SURGERY

## 2022-12-18 PROCEDURE — 770001 HCHG ROOM/CARE - MED/SURG/GYN PRIV*

## 2022-12-18 PROCEDURE — 49020 DRAINAGE ABDOM ABSCESS OPEN: CPT | Mod: AS,59 | Performed by: NURSE PRACTITIONER

## 2022-12-18 PROCEDURE — 160002 HCHG RECOVERY MINUTES (STAT): Performed by: SURGERY

## 2022-12-18 PROCEDURE — 700102 HCHG RX REV CODE 250 W/ 637 OVERRIDE(OP): Performed by: GENERAL PRACTICE

## 2022-12-18 PROCEDURE — 49020 DRAINAGE ABDOM ABSCESS OPEN: CPT | Mod: 59 | Performed by: SURGERY

## 2022-12-18 PROCEDURE — 88307 TISSUE EXAM BY PATHOLOGIST: CPT

## 2022-12-18 PROCEDURE — 0WPJX0Z REMOVAL OF DRAINAGE DEVICE FROM PELVIC CAVITY, EXTERNAL APPROACH: ICD-10-PCS | Performed by: SURGERY

## 2022-12-18 PROCEDURE — 0D1E0Z4 BYPASS LARGE INTESTINE TO CUTANEOUS, OPEN APPROACH: ICD-10-PCS | Performed by: SURGERY

## 2022-12-18 PROCEDURE — 36415 COLL VENOUS BLD VENIPUNCTURE: CPT

## 2022-12-18 PROCEDURE — 87070 CULTURE OTHR SPECIMN AEROBIC: CPT

## 2022-12-18 PROCEDURE — 80053 COMPREHEN METABOLIC PANEL: CPT

## 2022-12-18 PROCEDURE — 0DTNFZZ RESECTION OF SIGMOID COLON, VIA NATURAL OR ARTIFICIAL OPENING WITH PERCUTANEOUS ENDOSCOPIC ASSISTANCE: ICD-10-PCS | Performed by: SURGERY

## 2022-12-18 PROCEDURE — 87186 SC STD MICRODIL/AGAR DIL: CPT

## 2022-12-18 PROCEDURE — 44143 PARTIAL REMOVAL OF COLON: CPT | Performed by: SURGERY

## 2022-12-18 PROCEDURE — 160048 HCHG OR STATISTICAL LEVEL 1-5: Performed by: SURGERY

## 2022-12-18 PROCEDURE — 700117 HCHG RX CONTRAST REV CODE 255: Performed by: GENERAL PRACTICE

## 2022-12-18 PROCEDURE — 85027 COMPLETE CBC AUTOMATED: CPT

## 2022-12-18 PROCEDURE — 0DBN0ZZ EXCISION OF SIGMOID COLON, OPEN APPROACH: ICD-10-PCS | Performed by: SURGERY

## 2022-12-18 PROCEDURE — 160031 HCHG SURGERY MINUTES - 1ST 30 MINS LEVEL 5: Performed by: SURGERY

## 2022-12-18 PROCEDURE — 44139 MOBILIZATION OF COLON: CPT | Mod: AS | Performed by: NURSE PRACTITIONER

## 2022-12-18 PROCEDURE — A9270 NON-COVERED ITEM OR SERVICE: HCPCS | Performed by: GENERAL PRACTICE

## 2022-12-18 PROCEDURE — 87076 CULTURE ANAEROBE IDENT EACH: CPT

## 2022-12-18 PROCEDURE — 87077 CULTURE AEROBIC IDENTIFY: CPT | Mod: 91

## 2022-12-18 PROCEDURE — 00790 ANES IPER UPR ABD NOS: CPT | Performed by: ANESTHESIOLOGY

## 2022-12-18 PROCEDURE — 700105 HCHG RX REV CODE 258: Performed by: INTERNAL MEDICINE

## 2022-12-18 PROCEDURE — A9270 NON-COVERED ITEM OR SERVICE: HCPCS | Performed by: ANESTHESIOLOGY

## 2022-12-18 RX ORDER — PIPERACILLIN SODIUM, TAZOBACTAM SODIUM 3; .375 G/15ML; G/15ML
INJECTION, POWDER, LYOPHILIZED, FOR SOLUTION INTRAVENOUS PRN
Status: DISCONTINUED | OUTPATIENT
Start: 2022-12-18 | End: 2022-12-18 | Stop reason: SURG

## 2022-12-18 RX ORDER — MIDAZOLAM HYDROCHLORIDE 1 MG/ML
1 INJECTION INTRAMUSCULAR; INTRAVENOUS
Status: DISCONTINUED | OUTPATIENT
Start: 2022-12-18 | End: 2022-12-18 | Stop reason: HOSPADM

## 2022-12-18 RX ORDER — HALOPERIDOL 5 MG/ML
1 INJECTION INTRAMUSCULAR
Status: COMPLETED | OUTPATIENT
Start: 2022-12-18 | End: 2022-12-18

## 2022-12-18 RX ORDER — MEPERIDINE HYDROCHLORIDE 25 MG/ML
12.5 INJECTION INTRAMUSCULAR; INTRAVENOUS; SUBCUTANEOUS
Status: DISCONTINUED | OUTPATIENT
Start: 2022-12-18 | End: 2022-12-18 | Stop reason: HOSPADM

## 2022-12-18 RX ORDER — OXYCODONE HCL 5 MG/5 ML
10 SOLUTION, ORAL ORAL
Status: COMPLETED | OUTPATIENT
Start: 2022-12-18 | End: 2022-12-18

## 2022-12-18 RX ORDER — DEXAMETHASONE SODIUM PHOSPHATE 4 MG/ML
INJECTION, SOLUTION INTRA-ARTICULAR; INTRALESIONAL; INTRAMUSCULAR; INTRAVENOUS; SOFT TISSUE PRN
Status: DISCONTINUED | OUTPATIENT
Start: 2022-12-18 | End: 2022-12-18 | Stop reason: SURG

## 2022-12-18 RX ORDER — POTASSIUM CHLORIDE 20 MEQ/1
40 TABLET, EXTENDED RELEASE ORAL ONCE
Status: ACTIVE | OUTPATIENT
Start: 2022-12-18 | End: 2022-12-19

## 2022-12-18 RX ORDER — HYDROMORPHONE HYDROCHLORIDE 1 MG/ML
0.4 INJECTION, SOLUTION INTRAMUSCULAR; INTRAVENOUS; SUBCUTANEOUS
Status: DISCONTINUED | OUTPATIENT
Start: 2022-12-18 | End: 2022-12-18 | Stop reason: HOSPADM

## 2022-12-18 RX ORDER — HYDROMORPHONE HYDROCHLORIDE 1 MG/ML
0.2 INJECTION, SOLUTION INTRAMUSCULAR; INTRAVENOUS; SUBCUTANEOUS
Status: DISCONTINUED | OUTPATIENT
Start: 2022-12-18 | End: 2022-12-18 | Stop reason: HOSPADM

## 2022-12-18 RX ORDER — SODIUM CHLORIDE, SODIUM LACTATE, POTASSIUM CHLORIDE, CALCIUM CHLORIDE 600; 310; 30; 20 MG/100ML; MG/100ML; MG/100ML; MG/100ML
INJECTION, SOLUTION INTRAVENOUS CONTINUOUS
Status: DISCONTINUED | OUTPATIENT
Start: 2022-12-18 | End: 2022-12-18 | Stop reason: HOSPADM

## 2022-12-18 RX ORDER — LIDOCAINE HYDROCHLORIDE 20 MG/ML
INJECTION, SOLUTION EPIDURAL; INFILTRATION; INTRACAUDAL; PERINEURAL PRN
Status: DISCONTINUED | OUTPATIENT
Start: 2022-12-18 | End: 2022-12-18 | Stop reason: SURG

## 2022-12-18 RX ORDER — HYDROMORPHONE HYDROCHLORIDE 1 MG/ML
0.1 INJECTION, SOLUTION INTRAMUSCULAR; INTRAVENOUS; SUBCUTANEOUS
Status: DISCONTINUED | OUTPATIENT
Start: 2022-12-18 | End: 2022-12-18 | Stop reason: HOSPADM

## 2022-12-18 RX ORDER — ROCURONIUM BROMIDE 10 MG/ML
INJECTION, SOLUTION INTRAVENOUS PRN
Status: DISCONTINUED | OUTPATIENT
Start: 2022-12-18 | End: 2022-12-18 | Stop reason: SURG

## 2022-12-18 RX ORDER — LABETALOL HYDROCHLORIDE 5 MG/ML
5 INJECTION, SOLUTION INTRAVENOUS
Status: DISCONTINUED | OUTPATIENT
Start: 2022-12-18 | End: 2022-12-18 | Stop reason: HOSPADM

## 2022-12-18 RX ORDER — DIPHENHYDRAMINE HYDROCHLORIDE 50 MG/ML
12.5 INJECTION INTRAMUSCULAR; INTRAVENOUS
Status: DISCONTINUED | OUTPATIENT
Start: 2022-12-18 | End: 2022-12-18 | Stop reason: HOSPADM

## 2022-12-18 RX ORDER — OXYCODONE HCL 5 MG/5 ML
5 SOLUTION, ORAL ORAL
Status: COMPLETED | OUTPATIENT
Start: 2022-12-18 | End: 2022-12-18

## 2022-12-18 RX ORDER — HYDRALAZINE HYDROCHLORIDE 20 MG/ML
5 INJECTION INTRAMUSCULAR; INTRAVENOUS
Status: DISCONTINUED | OUTPATIENT
Start: 2022-12-18 | End: 2022-12-18 | Stop reason: HOSPADM

## 2022-12-18 RX ORDER — SODIUM CHLORIDE, SODIUM LACTATE, POTASSIUM CHLORIDE, CALCIUM CHLORIDE 600; 310; 30; 20 MG/100ML; MG/100ML; MG/100ML; MG/100ML
INJECTION, SOLUTION INTRAVENOUS
Status: DISCONTINUED | OUTPATIENT
Start: 2022-12-18 | End: 2022-12-18 | Stop reason: SURG

## 2022-12-18 RX ORDER — ONDANSETRON 2 MG/ML
4 INJECTION INTRAMUSCULAR; INTRAVENOUS
Status: COMPLETED | OUTPATIENT
Start: 2022-12-18 | End: 2022-12-18

## 2022-12-18 RX ORDER — POTASSIUM CHLORIDE 20 MEQ/1
40 TABLET, EXTENDED RELEASE ORAL ONCE
Status: COMPLETED | OUTPATIENT
Start: 2022-12-18 | End: 2022-12-18

## 2022-12-18 RX ADMIN — IOHEXOL 100 ML: 350 INJECTION, SOLUTION INTRAVENOUS at 10:52

## 2022-12-18 RX ADMIN — POTASSIUM CHLORIDE 40 MEQ: 1500 TABLET, EXTENDED RELEASE ORAL at 10:18

## 2022-12-18 RX ADMIN — ROCURONIUM BROMIDE 50 MG: 10 INJECTION, SOLUTION INTRAVENOUS at 14:38

## 2022-12-18 RX ADMIN — FENTANYL CITRATE 50 MCG: 50 INJECTION INTRAMUSCULAR; INTRAVENOUS at 16:17

## 2022-12-18 RX ADMIN — OXYCODONE HYDROCHLORIDE 10 MG: 5 SOLUTION ORAL at 15:54

## 2022-12-18 RX ADMIN — HYDROMORPHONE HYDROCHLORIDE 0.2 MG: 1 INJECTION, SOLUTION INTRAMUSCULAR; INTRAVENOUS; SUBCUTANEOUS at 17:03

## 2022-12-18 RX ADMIN — FENTANYL CITRATE 100 MCG: 50 INJECTION, SOLUTION INTRAMUSCULAR; INTRAVENOUS at 14:34

## 2022-12-18 RX ADMIN — PIPERACILLIN AND TAZOBACTAM 3.38 G: 3; .375 INJECTION, POWDER, LYOPHILIZED, FOR SOLUTION INTRAVENOUS; PARENTERAL at 05:14

## 2022-12-18 RX ADMIN — PIPERACILLIN SODIUM AND TAZOBACTAM SODIUM 3.38 G: 3; .375 INJECTION, POWDER, FOR SOLUTION INTRAVENOUS at 14:52

## 2022-12-18 RX ADMIN — PROPOFOL 150 MG: 10 INJECTION, EMULSION INTRAVENOUS at 14:38

## 2022-12-18 RX ADMIN — OXYCODONE 5 MG: 5 TABLET ORAL at 03:56

## 2022-12-18 RX ADMIN — OXYCODONE 5 MG: 5 TABLET ORAL at 21:42

## 2022-12-18 RX ADMIN — LIDOCAINE HYDROCHLORIDE 50 MG: 20 INJECTION, SOLUTION EPIDURAL; INFILTRATION; INTRACAUDAL at 14:38

## 2022-12-18 RX ADMIN — HALOPERIDOL LACTATE 1 MG: 5 INJECTION, SOLUTION INTRAMUSCULAR at 16:09

## 2022-12-18 RX ADMIN — FENTANYL CITRATE 50 MCG: 50 INJECTION INTRAMUSCULAR; INTRAVENOUS at 16:09

## 2022-12-18 RX ADMIN — PIPERACILLIN AND TAZOBACTAM 3.38 G: 3; .375 INJECTION, POWDER, LYOPHILIZED, FOR SOLUTION INTRAVENOUS; PARENTERAL at 21:41

## 2022-12-18 RX ADMIN — HYDROMORPHONE HYDROCHLORIDE 0.4 MG: 1 INJECTION, SOLUTION INTRAMUSCULAR; INTRAVENOUS; SUBCUTANEOUS at 16:40

## 2022-12-18 RX ADMIN — HALOPERIDOL LACTATE 1 MG: 5 INJECTION, SOLUTION INTRAMUSCULAR at 16:28

## 2022-12-18 RX ADMIN — ONDANSETRON 4 MG: 2 INJECTION INTRAMUSCULAR; INTRAVENOUS at 16:03

## 2022-12-18 RX ADMIN — DEXAMETHASONE SODIUM PHOSPHATE 8 MG: 4 INJECTION, SOLUTION INTRA-ARTICULAR; INTRALESIONAL; INTRAMUSCULAR; INTRAVENOUS; SOFT TISSUE at 14:47

## 2022-12-18 RX ADMIN — HYDROMORPHONE HYDROCHLORIDE 0.4 MG: 1 INJECTION, SOLUTION INTRAMUSCULAR; INTRAVENOUS; SUBCUTANEOUS at 16:47

## 2022-12-18 RX ADMIN — SODIUM CHLORIDE, POTASSIUM CHLORIDE, SODIUM LACTATE AND CALCIUM CHLORIDE: 600; 310; 30; 20 INJECTION, SOLUTION INTRAVENOUS at 14:52

## 2022-12-18 ASSESSMENT — PAIN DESCRIPTION - PAIN TYPE
TYPE: SURGICAL PAIN
TYPE: ACUTE PAIN
TYPE: ACUTE PAIN
TYPE: SURGICAL PAIN

## 2022-12-18 ASSESSMENT — ENCOUNTER SYMPTOMS: ABDOMINAL PAIN: 1

## 2022-12-18 ASSESSMENT — PAIN SCALES - GENERAL: PAIN_LEVEL: 4

## 2022-12-18 NOTE — ANESTHESIA PREPROCEDURE EVALUATION
Case: [id number] Date/Time: 12/18/22 1348    Procedure: LAPAROTOMY, EXPLORATORY (Abdomen)    Anesthesia type: General    Location: [place] [address] / SURGERY [facility]    Surgeons: Huseyin Asher M.D.          Relevant Problems   CARDIAC   (positive) Hypertension         (positive) Nephrolithiasis       Physical Exam    Airway   Mallampati: II  TM distance: >3 FB  Neck ROM: full       Cardiovascular - normal exam  Rhythm: regular  Rate: normal  (-) murmur     Dental - normal exam           Pulmonary - normal exam  Breath sounds clear to auscultation     Abdominal   (+) obese  Abdomen: tender     Neurological - normal exam                 Anesthesia Plan    ASA 3- EMERGENT   ASA physical status 3 criteria: morbid obesity - BMI greater than or equal to 40ASA physical status emergent criteria: acute peritonitis    Plan - general       Airway plan will be ETT          Induction: intravenous    Postoperative Plan: Postoperative administration of opioids is intended.    Pertinent diagnostic labs and testing reviewed    Informed Consent:    Anesthetic plan and risks discussed with patient.    Use of blood products discussed with: patient whom consented to blood products.

## 2022-12-18 NOTE — PROGRESS NOTES
Received report and assumed care of patient at change of shift. Patient is A&Ox4, on RA, and reports 4/10 pain at this time. Patient assessment completed, bed in lowest position, and call light and personal belongings are within reach. Patient expressed no further needs at this time.

## 2022-12-18 NOTE — PROGRESS NOTES
Received report and assumed care of patient at change of shift. Patient is A&Ox4, on RA, and reports 3/10 pain at this time. Patient assessment completed, bed in lowest position, and call light and personal belongings are within reach. Patient expressed no further needs at this time.

## 2022-12-18 NOTE — ASSESSMENT & PLAN NOTE
S/p exploratory laparotomy with drainage of pelvic abscesses and Chávez's colectomy with mobilization of the splenic flexure 12/18  Doing ok  Pain control  CLD, advance slowly  IV fluids  No clear ostomy output  Continue empiric IV abx's, F/U cultures  WBC down-trending slowly

## 2022-12-18 NOTE — CARE PLAN
The patient is Stable - Low risk of patient condition declining or worsening    Shift Goals  Clinical Goals: IV ABX, promote mobility, pain management  Patient Goals: Pain management, rest  Family Goals: n/a      Problem: Knowledge Deficit - Standard  Goal: Patient and family/care givers will demonstrate understanding of plan of care, disease process/condition, diagnostic tests and medications  Outcome: Progressing     Problem: Bowel Elimination  Goal: Establish and maintain regular bowel function  Outcome: Progressing     Problem: Mobility  Goal: Patient's capacity to carry out activities will improve  Outcome: Progressing     Problem: Self Care  Goal: Patient will have the ability to perform ADLs independently or with assistance (bathe, groom, dress, toilet and feed)  Outcome: Progressing     Problem: Infection - Standard  Goal: Patient will remain free from infection  Outcome: Progressing     Problem: Wound/ / Incision Healing  Goal: Patient's wound/surgical incision will decrease in size and heals properly  Outcome: Progressing     Problem: Pain - Standard  Goal: Alleviation of pain or a reduction in pain to the patient’s comfort goal  Outcome: Progressing       Progress made toward(s) clinical / shift goals:  Patient updated on the plan of care. All questions answered. Skin and pain assessed. Medicated per MAR. Care ongoing.

## 2022-12-18 NOTE — PROGRESS NOTES
Received report from CONNER Montilla and assumed care of patient at 19:00. A&Ox4, VSS on RA, complaining of 8/10 ABD pain. Medicated per MAR. 1 person assist with FWW to the restroom. Fall precautions in place. Bed in the lowest position, wheels locked, and call light within reach. Patient calls appropriately. Care ongoing.

## 2022-12-18 NOTE — OP REPORT
Preoperative diagnosis; perforated sigmoid diverticulitis with pelvic abscess and pneumoperitoneum  Postoperative diagnosis same;  Operation; exploratory laparotomy with drainage of pelvic abscesses and Chávez's colectomy with mobilization of the splenic flexure Surgeon;JAZZ De La Cruz  An assistant was required for the safe completion of the surgical case.  The assistant provided retraction, exposure, performed wound closure and assisted with instrumentation promoting the efficiency of the surgery performed.  I felt an assistant was necessary in the interest of safety and efficiency.  My request for assistance is based on my training and experience and should be patently obvious to the most casual observer as necessary.  Anesthesia; Dr. Wong  Wound classification; dirty  Estimated blood loss 200 cc complications  None  Counts correct x2;  MIGUE drain to pelvis  Operative note; this 77-year-old woman presents with a history of recurrent episodes of diverticulitis.  She has had previous small abscesses treated medically.  She presented on this admission with small abscesses and was treated with IV antibiotics.  She was initially seen by surgery but medicine was primary.  Over the last 2 days she underwent percutaneous drainage of an enlarging abscess but failed to have a positive clinical response and worsened today.  I was asked to see the patient back in consultation.  She had evidence of peritonitis and a pelvic mass.  The IR drain had been retracted away from that mass.  She was felt to be a candidate for urgent surgical intervention the risk possible complication of operation were carefully explained the patient in detail.  She understood and accepted these risks and readily agreed to proceed with surgical intervention she understood that fecal diversion was likely.  She consented to surgery and anesthesia was taken to the operating room therapeutic antibiotics were continued.  Sequential  stockings were applied as antiembolism prophylaxis she had also received Lovenox this morning.  He was placed under general anesthesia her abdomen is prepped with ChloraPrep solution her previous IR drain was removed sterile drapes were applied.  A timeout was affected a midline hypogastric incision was made using electrocautery down through skin subtenons tissue and fascia.  The peritoneum was opened.  There was a phlegmon in the pelvis with a mass with significant established inflammation.  This was carefully dissected and broken up and multiple abscesses were drained these were cultured.  The patient's colon was densely adherent to the adnexa and posterior wall of the uterus this was gradually mobilized.  The line of Toldt was incised laterally and the colon was medialized up to the splenic flexure.  This permitted mobilization of the rectosigmoid junction this was mobilized and transected using the Barling stapler the mesentery of the sigmoid was also taken down likely through the inferior mesenteric artery with an Barling stapler.  Hemostasis was achieved with packing and electrocautery.  The colon was mobilized and brought out through a circular incision through the rectus muscle in the left upper quadrant and later matured the colostomy using interrupted 3-0 Vicryl suture.  The abdomen was copiously irrigated and decompressed a 19 [place] Bard channel drain was placed in the pelvis particularly between the uterus and the rectum this was brought out through a separate stab incision sewn in place using Vicryl suture later connected to bulb suction the fascia was closed following soft counts being correct with interrupted Sanjivd Barroso #1 Vicryl suture the subcutaneous tissues were packed with normal saline moistened Kerlix.  A colostomy bag and absorbent dressing was applied.  Patient was awakened extubated taken to recovery room in stable satisfactory condition estimated blood loss was 200 cc sponge instrument  counts reported as correct the operation was uncomplicated patient will return to the surgical floor for postoperative care antibiotics will be continued pending cultures.  Sponge instrument counts reported correct x2

## 2022-12-18 NOTE — PROGRESS NOTES
Surgical Progress Note    Author: Huseyin Asher M.D. Date & Time created: 2022   1:21 PM     Interval Events:  Patient was admitted 1213 with recurrent episode of diverticulitis with small abscess formation.  Surgical consultation was obtained at that time but abscesses were too small to drain.  Subsequently patient did undergo an attempt on 1216 and IR drainage.  Despite drainage patient's condition has deteriorated.  Repeat CT scan today demonstrates an enlarging abscess and now evidence of perforation.  Examination consistent with diffuse peritonitis  Urgent surgical intervention now required  ROS abdominal pain  Hemodynamics:  Temp (24hrs), Av.6 °C (97.8 °F), Min:36.5 °C (97.7 °F), Max:36.7 °C (98 °F)  Temperature: 36.5 °C (97.7 °F)  Pulse  Av.8  Min: 80  Max: 103   Blood Pressure : (!) 143/65     Respiratory:    Respiration: 18, Pulse Oximetry: 91 %     Work Of Breathing / Effort: Mild  RUL Breath Sounds: Clear, RML Breath Sounds: Clear, RLL Breath Sounds: Diminished, CHRISTIANO Breath Sounds: Clear, LLL Breath Sounds: Diminished  Neuro:  GCS       Fluids:    Intake/Output Summary (Last 24 hours) at 2022 1321  Last data filed at 2022 2216  Gross per 24 hour   Intake 360 ml   Output 60 ml   Net 300 ml        Current Diet Order   Procedures    Diet NPO Restrict to: Strict     Physical Exam  Patient has had previous right mastectomy.  Patient has an IR drain abdomen is diffusely tender with some rigidity involuntary guarding with significant mass formation in the lower abdomen  She is morbidly obese  Vital signs are reasonably stable  No embarrassment on cardiac or pulmonary examination  Neurologically the patient is cognitively intact moves all extremities  Labs:  Recent Results (from the past 24 hour(s))   CBC WITHOUT DIFFERENTIAL    Collection Time: 22  5:44 AM   Result Value Ref Range    WBC 18.2 (H) 4.8 - 10.8 K/uL    RBC 4.29 4.20 - 5.40 M/uL    Hemoglobin 12.3 12.0 - 16.0 g/dL     Hematocrit 37.2 37.0 - 47.0 %    MCV 86.7 81.4 - 97.8 fL    MCH 28.7 27.0 - 33.0 pg    MCHC 33.1 (L) 33.6 - 35.0 g/dL    RDW 41.1 35.9 - 50.0 fL    Platelet Count 267 164 - 446 K/uL    MPV 9.7 9.0 - 12.9 fL   Comp Metabolic Panel    Collection Time: 12/18/22  5:44 AM   Result Value Ref Range    Sodium 135 135 - 145 mmol/L    Potassium 3.2 (L) 3.6 - 5.5 mmol/L    Chloride 101 96 - 112 mmol/L    Co2 24 20 - 33 mmol/L    Anion Gap 10.0 7.0 - 16.0    Glucose 118 (H) 65 - 99 mg/dL    Bun 11 8 - 22 mg/dL    Creatinine 0.93 0.50 - 1.40 mg/dL    Calcium 8.7 8.5 - 10.5 mg/dL    AST(SGOT) 28 12 - 45 U/L    ALT(SGPT) 21 2 - 50 U/L    Alkaline Phosphatase 103 (H) 30 - 99 U/L    Total Bilirubin 1.0 0.1 - 1.5 mg/dL    Albumin 2.5 (L) 3.2 - 4.9 g/dL    Total Protein 5.7 (L) 6.0 - 8.2 g/dL    Globulin 3.2 1.9 - 3.5 g/dL    A-G Ratio 0.8 g/dL   ESTIMATED GFR    Collection Time: 12/18/22  5:44 AM   Result Value Ref Range    GFR (CKD-EPI) 63 >60 mL/min/1.73 m 2   CORRECTED CALCIUM    Collection Time: 12/18/22  5:44 AM   Result Value Ref Range    Correct Calcium 9.9 8.5 - 10.5 mg/dL     Medical Decision Making, by Problem:  Active Hospital Problems    Diagnosis     Colonic diverticular abscess [K57.20]     Diverticulitis [K57.92]     Hypertension [I10]     Sepsis (HCC) [A41.9]     Nephrolithiasis [N20.0]      Plan:  Patient has perforated diverticulitis now with free air and impending diffuse peritonitis with multiloculated pelvic abscess and ineffective IR drain  Emergent laparotomy with likely Chávez's colectomy and abscess drainage  Risk possible complications were carefully explained to the patient satisfaction  There is a measurable risk of complications and mortality    Quality Measures:  Quality-Core Measures    Discussed patient condition with   Patient

## 2022-12-18 NOTE — PROGRESS NOTES
Hospital Medicine Daily Progress Note    Date of Service  12/18/2022    Chief Complaint  Maira Johnson is a 77 y.o. female admitted 12/13/2022 with abdominal pain    Hospital Course  This is a 77-year-old female with past medical history of hypertension, recurrent diverticulitis and morbid obesity with BMI of 41 who was transferred from an outside facility on 12/13/2022 due to sepsis secondary to diverticulitis with multiple abscesses.    Repeat CT imaging noted 4.6 x 3.3cm abscess.  IR was consulted, drain was placed on 12/14.  Blood cultures negative to date.  Wound cultures growing E. coli.  Patient currently on Zosyn.  Leukocytosis worsening, abdominal pain worsening, repeat CT imaging performed on 12/18 which noted worsening pericolic abscess and now free air with perforation.  Surgery was consulted, patient is for laparotomy with likely Chávez's colectomy and abscess drainage.    Incidentally on CT imaging noted left-sided 7 mm nephrolithiasis, nonobstructing, patient with no urinary complaints follow-up with urology outpatient..     Interval Problem Update  Leukocytosis worsening, abdominal pain worsening, repeat CT imaging performed on 12/18 which noted worsening pericolic abscess and now free air with perforation.      Surgery was consulted, patient is for laparotomy with likely Chávez's colectomy and abscess drainage.    Continue Zosyn.    Call placed to blank Morales, updated on plan of care, all questions answered.    I have discussed this patient's plan of care and discharge plan at IDT rounds today with Case Management, Nursing, Nursing leadership, and other members of the IDT team.    Consultants/Specialty  general surgery and IR    Code Status  Full Code    Disposition  Patient is not medically cleared for discharge.   Anticipate discharge to  TBD .  I have placed the appropriate orders for post-discharge needs.    Review of Systems  Review of Systems   Gastrointestinal:  Positive for abdominal  pain.   All other systems reviewed and are negative.     Physical Exam  Temp:  [36.5 °C (97.7 °F)-36.7 °C (98 °F)] 36.5 °C (97.7 °F)  Pulse:  [89-96] 95  Resp:  [18-19] 18  BP: (126-145)/(63-76) 143/65  SpO2:  [91 %-93 %] 91 %    Physical Exam  Vitals and nursing note reviewed.   Constitutional:       General: She is not in acute distress.     Appearance: She is obese.   HENT:      Head: Normocephalic and atraumatic.      Mouth/Throat:      Mouth: Mucous membranes are moist.      Pharynx: No oropharyngeal exudate.   Eyes:      Extraocular Movements: Extraocular movements intact.      Pupils: Pupils are equal, round, and reactive to light.   Cardiovascular:      Rate and Rhythm: Normal rate and regular rhythm.      Pulses: Normal pulses.      Heart sounds: No murmur heard.    No friction rub. No gallop.   Pulmonary:      Effort: Pulmonary effort is normal. No respiratory distress.      Breath sounds: No wheezing, rhonchi or rales.   Abdominal:      General: Bowel sounds are normal. There is distension.      Palpations: Abdomen is soft. There is no mass.      Tenderness: There is abdominal tenderness (diffuse). There is guarding.      Comments: LLQ drain in place with output   Musculoskeletal:         General: No swelling or tenderness. Normal range of motion.      Cervical back: Normal range of motion. No rigidity. No muscular tenderness.      Right lower leg: No edema.      Left lower leg: No edema.   Skin:     General: Skin is warm and dry.      Capillary Refill: Capillary refill takes less than 2 seconds.      Findings: No erythema or rash.   Neurological:      General: No focal deficit present.      Mental Status: She is alert and oriented to person, place, and time.      Motor: No weakness.      Gait: Gait normal.       Fluids    Intake/Output Summary (Last 24 hours) at 12/18/2022 1353  Last data filed at 12/17/2022 2216  Gross per 24 hour   Intake 360 ml   Output 60 ml   Net 300 ml       Laboratory  Recent Labs      12/16/22  0120 12/17/22  0117 12/18/22  0544   WBC 13.2* 18.2* 18.2*   RBC 4.50 4.70 4.29   HEMOGLOBIN 13.1 13.5 12.3   HEMATOCRIT 39.6 40.5 37.2   MCV 88.0 86.2 86.7   MCH 29.1 28.7 28.7   MCHC 33.1* 33.3* 33.1*   RDW 41.8 40.0 41.1   PLATELETCT 279 292 267   MPV 9.8 9.7 9.7     Recent Labs     12/18/22  0544   SODIUM 135   POTASSIUM 3.2*   CHLORIDE 101   CO2 24   GLUCOSE 118*   BUN 11   CREATININE 0.93   CALCIUM 8.7                       Imaging  CT-ABDOMEN-PELVIS WITH   Final Result      1.  New peritoneal air consistent with perforation and likely the sigmoid colon diverticulitis      2.  Multilocular paracolic abscess anterior to the sigmoid colon with the largest component measuring 6.4 x 5.4 cm and a smaller air component present left lateral      3.  Left lower quadrant drainage catheter and appears to have retracted.      4.  Findings were discussed with FABY STUART on 12/18/2022 12:40 PM.      IR-US GUIDED PIV   Final Result    Ultrasound-guided PERIPHERAL IV INSERTION performed by    qualified nursing staff as above.      CT-DRAIN-PERITONEAL   Final Result      1.  CT guided pelvic diverticular abscess catheter drainage.   2.  The current plan is to obtain a follow-up CT in 5-7 days..      CT-ABDOMEN-PELVIS WITH   Final Result      1.  Acute sigmoid diverticulitis with adjacent 4.6 x 3.3 cm abscess.   2.  Gallbladder sludge or stones.   3.  2.1 cm hypodense left hepatic lobe lesion, likely a hemangioma, though incompletely characterized on this CT. If there is clinical concern, consider further evaluation with nonemergent MRI hepatic protocol.   4.  Nonobstructing 6 mm inferior pole left renal stone.      Findings conveyed to Dr. Cordova at 1124 via [employer] messaging on 12/14/2022.           Assessment/Plan  * Colonic diverticular abscess  Assessment & Plan  With diverticulitis  Repeat CT imaging noted 4.6 x 3.3cm abscess.    IR was consulted, drain was placed on 12/14, repeat CT in 5-7 days  Blood  cultures negative to date.    Wound cultures grew E. coli  Patient currently on Zosyn.    Diverticulitis of colon with perforation  Assessment & Plan  Leukocytosis worsening, abdominal pain what worsening, repeat CT imaging performed on 12/18 which noted worsening pericolic abscess and now free air with perforation.    Surgery was consulted, patient is for laparotomy with likely Chávez's colectomy and abscess drainage.  Continue Zosyn    Diverticulitis- (present on admission)  Assessment & Plan  History of recurrent diverticulitis  Multiple colonoscopy did not show any mass  Came with abdominal pain with diverticulitis and small abscess  IV fluid and IV antibiotics  General surgery consult tomorrow morning    Nephrolithiasis  Assessment & Plan  CT scan showed kidney stone and ureteral on the left side around 7 mm   no urinary symptoms  Follow-up with urology as outpatient    Sepsis (HCC)  Assessment & Plan  This is Sepsis Present on admission  SIRS criteria identified on my evaluation include: Tachycardia, with heart rate greater than 90 BPM and Leukocytosis, with WBC greater than 12,000  Source is intra-abdominal abscess and diverticulitis  Sepsis protocol initiated  Fluid resuscitation ordered per protocol  Crystalloid Fluid Administration: Fluid resuscitation ordered per standard protocol - 30 mL/kg per current or ideal body weight  IV antibiotics as appropriate for source of sepsis  Reassessment: I have reassessed the patient's hemodynamic status    Hypertension  Assessment & Plan  Patient is taking atenolol at home  Holding medications and close monitoring       VTE prophylaxis: enoxaparin ppx    I have performed a physical exam and reviewed and updated ROS and Plan today (12/18/2022). In review of yesterday's note (12/17/2022), there are no changes except as documented above.

## 2022-12-18 NOTE — ANESTHESIA PROCEDURE NOTES
Airway    Date/Time: 12/18/2022 2:39 PM  Performed by: Roscoe Wong M.D.  Authorized by: Roscoe Wong M.D.     Location:  OR  Urgency:  Elective  Difficult Airway: No    Indications for Airway Management:  Anesthesia      Spontaneous Ventilation: absent    Sedation Level:  Deep  Preoxygenated: Yes    Patient Position:  Sniffing  Final Airway Type:  Endotracheal airway  Final Endotracheal Airway:  ETT  Cuffed: Yes    Technique Used for Successful ETT Placement:  Direct laryngoscopy    Insertion Site:  Oral  Blade Type:  Alexsandra  Laryngoscope Blade/Videolaryngoscope Blade Size:  3  ETT Size (mm):  7.5  Measured from:  Lips  ETT to Lips (cm):  21  Placement Verified by: auscultation and capnometry    Cormack-Lehane Classification:  Grade IIa - partial view of glottis  Number of Attempts at Approach:  1  Number of Other Approaches Attempted:  0

## 2022-12-19 PROBLEM — E66.01 CLASS 3 SEVERE OBESITY DUE TO EXCESS CALORIES WITH SERIOUS COMORBIDITY AND BODY MASS INDEX (BMI) OF 40.0 TO 44.9 IN ADULT (HCC): Status: ACTIVE | Noted: 2022-12-19

## 2022-12-19 PROBLEM — R73.9 HYPERGLYCEMIA: Status: ACTIVE | Noted: 2022-12-19

## 2022-12-19 LAB
ALBUMIN SERPL BCP-MCNC: 2.7 G/DL (ref 3.2–4.9)
ALBUMIN/GLOB SERPL: 0.8 G/DL
ALP SERPL-CCNC: 111 U/L (ref 30–99)
ALT SERPL-CCNC: 27 U/L (ref 2–50)
ANION GAP SERPL CALC-SCNC: 12 MMOL/L (ref 7–16)
AST SERPL-CCNC: 30 U/L (ref 12–45)
BILIRUB SERPL-MCNC: 0.9 MG/DL (ref 0.1–1.5)
BUN SERPL-MCNC: 18 MG/DL (ref 8–22)
CALCIUM ALBUM COR SERPL-MCNC: 9.7 MG/DL (ref 8.5–10.5)
CALCIUM SERPL-MCNC: 8.7 MG/DL (ref 8.5–10.5)
CHLORIDE SERPL-SCNC: 103 MMOL/L (ref 96–112)
CO2 SERPL-SCNC: 24 MMOL/L (ref 20–33)
CREAT SERPL-MCNC: 1.08 MG/DL (ref 0.5–1.4)
ERYTHROCYTE [DISTWIDTH] IN BLOOD BY AUTOMATED COUNT: 42.6 FL (ref 35.9–50)
GFR SERPLBLD CREATININE-BSD FMLA CKD-EPI: 53 ML/MIN/1.73 M 2
GLOBULIN SER CALC-MCNC: 3.4 G/DL (ref 1.9–3.5)
GLUCOSE SERPL-MCNC: 153 MG/DL (ref 65–99)
GRAM STN SPEC: NORMAL
HCT VFR BLD AUTO: 39.6 % (ref 37–47)
HGB BLD-MCNC: 12.9 G/DL (ref 12–16)
MAGNESIUM SERPL-MCNC: 1.9 MG/DL (ref 1.5–2.5)
MCH RBC QN AUTO: 28.3 PG (ref 27–33)
MCHC RBC AUTO-ENTMCNC: 32.6 G/DL (ref 33.6–35)
MCV RBC AUTO: 86.8 FL (ref 81.4–97.8)
PATHOLOGY CONSULT NOTE: NORMAL
PHOSPHATE SERPL-MCNC: 4.1 MG/DL (ref 2.5–4.5)
PLATELET # BLD AUTO: 312 K/UL (ref 164–446)
PMV BLD AUTO: 9.8 FL (ref 9–12.9)
POTASSIUM SERPL-SCNC: 4.3 MMOL/L (ref 3.6–5.5)
PROT SERPL-MCNC: 6.1 G/DL (ref 6–8.2)
RBC # BLD AUTO: 4.56 M/UL (ref 4.2–5.4)
SIGNIFICANT IND 70042: NORMAL
SITE SITE: NORMAL
SODIUM SERPL-SCNC: 139 MMOL/L (ref 135–145)
SOURCE SOURCE: NORMAL
WBC # BLD AUTO: 17.5 K/UL (ref 4.8–10.8)

## 2022-12-19 PROCEDURE — A9270 NON-COVERED ITEM OR SERVICE: HCPCS | Performed by: INTERNAL MEDICINE

## 2022-12-19 PROCEDURE — 700105 HCHG RX REV CODE 258: Performed by: INTERNAL MEDICINE

## 2022-12-19 PROCEDURE — 700102 HCHG RX REV CODE 250 W/ 637 OVERRIDE(OP): Performed by: GENERAL PRACTICE

## 2022-12-19 PROCEDURE — 97606 NEG PRS WND THER DME>50 SQCM: CPT

## 2022-12-19 PROCEDURE — 302098 PASTE RING (FLAT): Performed by: INTERNAL MEDICINE

## 2022-12-19 PROCEDURE — 84100 ASSAY OF PHOSPHORUS: CPT

## 2022-12-19 PROCEDURE — A9270 NON-COVERED ITEM OR SERVICE: HCPCS | Performed by: GENERAL PRACTICE

## 2022-12-19 PROCEDURE — 700105 HCHG RX REV CODE 258: Performed by: REGISTERED NURSE

## 2022-12-19 PROCEDURE — 700111 HCHG RX REV CODE 636 W/ 250 OVERRIDE (IP): Performed by: REGISTERED NURSE

## 2022-12-19 PROCEDURE — 97602 WOUND(S) CARE NON-SELECTIVE: CPT

## 2022-12-19 PROCEDURE — 770001 HCHG ROOM/CARE - MED/SURG/GYN PRIV*

## 2022-12-19 PROCEDURE — 83735 ASSAY OF MAGNESIUM: CPT

## 2022-12-19 PROCEDURE — 99232 SBSQ HOSP IP/OBS MODERATE 35: CPT | Performed by: INTERNAL MEDICINE

## 2022-12-19 PROCEDURE — 99024 POSTOP FOLLOW-UP VISIT: CPT | Performed by: REGISTERED NURSE

## 2022-12-19 PROCEDURE — 700102 HCHG RX REV CODE 250 W/ 637 OVERRIDE(OP): Performed by: INTERNAL MEDICINE

## 2022-12-19 PROCEDURE — 700111 HCHG RX REV CODE 636 W/ 250 OVERRIDE (IP): Performed by: INTERNAL MEDICINE

## 2022-12-19 PROCEDURE — 36415 COLL VENOUS BLD VENIPUNCTURE: CPT

## 2022-12-19 PROCEDURE — 700101 HCHG RX REV CODE 250: Performed by: INTERNAL MEDICINE

## 2022-12-19 PROCEDURE — 85027 COMPLETE CBC AUTOMATED: CPT

## 2022-12-19 PROCEDURE — 307059 PAD,EAR PROTECTOR: Performed by: INTERNAL MEDICINE

## 2022-12-19 PROCEDURE — 80053 COMPREHEN METABOLIC PANEL: CPT

## 2022-12-19 PROCEDURE — 306591 TRAY SUTURE REMOVAL DISP: Performed by: GENERAL PRACTICE

## 2022-12-19 RX ORDER — LIDOCAINE HYDROCHLORIDE 20 MG/ML
20 INJECTION, SOLUTION INFILTRATION; PERINEURAL
Status: DISCONTINUED | OUTPATIENT
Start: 2022-12-19 | End: 2022-12-23 | Stop reason: HOSPADM

## 2022-12-19 RX ORDER — LIDOCAINE HYDROCHLORIDE 40 MG/ML
SOLUTION TOPICAL
Status: DISCONTINUED | OUTPATIENT
Start: 2022-12-19 | End: 2022-12-23 | Stop reason: HOSPADM

## 2022-12-19 RX ADMIN — OXYCODONE 5 MG: 5 TABLET ORAL at 11:35

## 2022-12-19 RX ADMIN — OXYCODONE 2.5 MG: 5 TABLET ORAL at 22:21

## 2022-12-19 RX ADMIN — PIPERACILLIN AND TAZOBACTAM 3.38 G: 3; .375 INJECTION, POWDER, LYOPHILIZED, FOR SOLUTION INTRAVENOUS; PARENTERAL at 22:18

## 2022-12-19 RX ADMIN — LIDOCAINE HYDROCHLORIDE 1 APPLICATION: 40 SOLUTION TOPICAL at 11:35

## 2022-12-19 RX ADMIN — SENNOSIDES AND DOCUSATE SODIUM 2 TABLET: 50; 8.6 TABLET ORAL at 17:48

## 2022-12-19 RX ADMIN — SENNOSIDES AND DOCUSATE SODIUM 2 TABLET: 50; 8.6 TABLET ORAL at 05:09

## 2022-12-19 RX ADMIN — PIPERACILLIN AND TAZOBACTAM 3.38 G: 3; .375 INJECTION, POWDER, LYOPHILIZED, FOR SOLUTION INTRAVENOUS; PARENTERAL at 05:10

## 2022-12-19 RX ADMIN — ENOXAPARIN SODIUM 40 MG: 40 INJECTION SUBCUTANEOUS at 17:48

## 2022-12-19 RX ADMIN — OXYCODONE 2.5 MG: 5 TABLET ORAL at 17:47

## 2022-12-19 RX ADMIN — PIPERACILLIN AND TAZOBACTAM 3.38 G: 3; .375 INJECTION, POWDER, LYOPHILIZED, FOR SOLUTION INTRAVENOUS; PARENTERAL at 14:48

## 2022-12-19 ASSESSMENT — ENCOUNTER SYMPTOMS
VOMITING: 0
NAUSEA: 0
ABDOMINAL PAIN: 1

## 2022-12-19 ASSESSMENT — PAIN DESCRIPTION - PAIN TYPE
TYPE: SURGICAL PAIN

## 2022-12-19 NOTE — PROGRESS NOTES
"  DATE: 12/19/2022    Post Operative Day  1 Exploratory laparotomy with drainage of pelvic abscesses and Chávez's colectomy with mobilization of the splenic flexure.    INTERVAL EVENTS:  Feeling much better, pain well managed.  Ostomy viable, serous/sang drainage, no flatus, no stool.  MIGUE drain s/s  Abdomen soft.   Tolerating clear liquid diet.     PHYSICAL EXAMINATION:  Vital Signs: /63   Pulse 84   Temp 36.5 °C (97.7 °F) (Temporal)   Resp 17   Ht 1.6 m (5' 3\")   Wt 109 kg (240 lb 11.9 oz)   SpO2 92%     Surgical dressings in place, clean and dry. Ostomy pink, viable, serous output, no flatus or stool noted. Abdomen is soft, incisional tenderness.     LABORATORY VALUES:   Recent Labs     12/17/22  0117 12/18/22  0544 12/19/22  0314   WBC 18.2* 18.2* 17.5*   RBC 4.70 4.29 4.56   HEMOGLOBIN 13.5 12.3 12.9   HEMATOCRIT 40.5 37.2 39.6   MCV 86.2 86.7 86.8   MCH 28.7 28.7 28.3   MCHC 33.3* 33.1* 32.6*   RDW 40.0 41.1 42.6   PLATELETCT 292 267 312   MPV 9.7 9.7 9.8     Recent Labs     12/18/22  0544 12/19/22  0314   SODIUM 135 139   POTASSIUM 3.2* 4.3   CHLORIDE 101 103   CO2 24 24   GLUCOSE 118* 153*   BUN 11 18   CREATININE 0.93 1.08   CALCIUM 8.7 8.7     Recent Labs     12/18/22  0544 12/19/22  0314   ASTSGOT 28 30   ALTSGPT 21 27   TBILIRUBIN 1.0 0.9   ALKPHOSPHAT 103* 111*   GLOBULIN 3.2 3.4            IMAGING:   CT-ABDOMEN-PELVIS WITH   Final Result      1.  New peritoneal air consistent with perforation and likely the sigmoid colon diverticulitis      2.  Multilocular paracolic abscess anterior to the sigmoid colon with the largest component measuring 6.4 x 5.4 cm and a smaller air component present left lateral      3.  Left lower quadrant drainage catheter and appears to have retracted.      4.  Findings were discussed with FABY STUART on 12/18/2022 12:40 PM.      IR-US GUIDED PIV   Final Result    Ultrasound-guided PERIPHERAL IV INSERTION performed by    qualified nursing staff as above.    "   CT-DRAIN-PERITONEAL   Final Result      1.  CT guided pelvic diverticular abscess catheter drainage.   2.  The current plan is to obtain a follow-up CT in 5-7 days..      CT-ABDOMEN-PELVIS WITH   Final Result      1.  Acute sigmoid diverticulitis with adjacent 4.6 x 3.3 cm abscess.   2.  Gallbladder sludge or stones.   3.  2.1 cm hypodense left hepatic lobe lesion, likely a hemangioma, though incompletely characterized on this CT. If there is clinical concern, consider further evaluation with nonemergent MRI hepatic protocol.   4.  Nonobstructing 6 mm inferior pole left renal stone.      Findings conveyed to Dr. Cordova at 1124 via [email or web address] messaging on 12/14/2022.          ASSESSMENT AND PLAN:   Colonic diverticular abscess  Assessment & Plan  12/13 IR drainage  12/18 Follow up CT with evidence of perforation and worsening abscess.  -Exploratory laparotomy with drainage of pelvic abscesses and Chávez's colectomy with mobilization of the splenic flexure  12/19 Ostomy viable, no stool or gas. MIGUE with s/s drainage.          ____________________________________     DAYTON Gonzalez    DD: 12/19/2022  1:49 PM

## 2022-12-19 NOTE — PROGRESS NOTES
Received report from previous shift RN  Assessment complete.  A&O x 4. Patient calls appropriately.  Patient ambulates with x1 assist w/ FWW. Bed alarm off.   Patient has 5/10 pain. Pain managed with prescribed medications.  Denies N&V. Tolerating clear liquid diet.  Surgical midline incision.  LLQ MIGUE drain in place.  LLQ ostomy in place.  Zaragoza in place, - flatus or output from stoma  Patient denies SOB.  SCD's on.    Review plan of care with patient. Call light and personal belongings with in reach. Hourly rounding in place. All needs met at this time.

## 2022-12-19 NOTE — ASSESSMENT & PLAN NOTE
12/13 IR drainage  12/18 Follow up CT with evidence of perforation and worsening abscess.  -Exploratory laparotomy with drainage of pelvic abscesses and Chávez's colectomy with mobilization of the splenic flexure  12/20 Ostomy viable, no stool or gas. MIGUE with s/s drainage.  12/21 Flatus present, scant stool.   - Advanced diet to Full liquid.  12/22 Ostomy with stool. Advance diet to regular.

## 2022-12-19 NOTE — WOUND TEAM
[facility]  Inpatient Services  Initial Wound and Skin Care Evaluation    Admission Date: 12/13/2022     Last order of IP CONSULT TO WOUND CARE was found on 12/18/2022 from Hospital Encounter on 12/13/2022     HPI, PMH, SH: Reviewed    Past Surgical History:   Procedure Laterality Date    NE EXPLORATORY OF ABDOMEN  12/18/2022    Procedure: LAPAROTOMY, EXPLORATORY WITH OSTOMY CREATION;  Surgeon: Huseyin Asher M.D.;  Location: SURGERY [facility];  Service: General    GYN SURGERY Right 11/01/2013    Right mastecomy    TUBAL LIGATION  01/01/1975     Social History     Tobacco Use    Smoking status: Never     Passive exposure: Never    Smokeless tobacco: Never   Substance Use Topics    Alcohol use: Never     No chief complaint on file.    Diagnosis: Diverticulitis [K57.92]  Intra-abdominal abscess (HCC) [K65.1]    Unit where seen by Wound Team: T422/00     WOUND CONSULT/FOLLOW UP RELATED TO:  Abdomen vac change and new ostomy     WOUND HISTORY:  Pt is a 77yr old female with history of HTN and diverticulitis who presented with abdominal pain. [place] [facility] [place] has multiple episodes of diverticulitis which was treated with IV ABX. Pt was found to have perforated sigmoid diverticulitis with pelvic abscess and pneumoperitoneum. Pt underwent surgical intervention. Fascia was closed but skin was left open and wound was requested to place vac and begin ostomy education.    WOUND ASSESSMENT/LDA    Negative Pressure Wound Therapy 12/19/22 Surgical Abdomen (Active)   Vacuum Serial Number [id number] 12/19/22 1100   NPWT Pump Mode / Pressure Setting Ulta;Continuous;125 mmHg    Dressing Type Medium;Black Foam (Regular)    Number of Foam Pieces Used 2    Canister Changed Yes    NEXT Dressing Change/Treatment Date 12/21/22      Wound 12/18/22 Full Thickness Wound Abdomen Mid (Active)   Wound Image      12/19/22 1200   Site Assessment Red;Drainage    Periwound Assessment  Clean;Dry;Intact    Margins Attached edges;Defined edges    Closure Secondary intention    Drainage Amount Small    Drainage Description Serosanguineous    Treatments Cleansed;Site care    Wound Cleansing Approved Wound Cleanser    Periwound Protectant Skin Protectant Wipes to Periwound;Drape    Dressing Cleansing/Solutions Not Applicable    Dressing Options Wound Vac    Dressing Changed New    Dressing Status Clean;Dry;Intact    Dressing Change/Treatment Frequency Monday, Wednesday, Friday, and As Needed    NEXT Dressing Change/Treatment Date 12/21/22    NEXT Weekly Photo (Inpatient Only) 12/26/22    Non-staged Wound Description Full thickness    Wound Length (cm) 16 cm    Wound Width (cm) 4.6 cm    Wound Depth (cm) 5.3 cm    Wound Surface Area (cm^2) 73.6 cm^2    Wound Volume (cm^3) 390.08 cm^3    Shape Linear    Wound Odor None    Exposed Structures Adipose;Sutures    WOUND NURSE ONLY - Time Spent with Patient (mins) 60      Vascular:    MEME:   No results found.    Lab Values:    Lab Results   Component Value Date/Time    WBC 17.5 (H) 12/19/2022 03:14 AM    RBC 4.56 12/19/2022 03:14 AM    HEMOGLOBIN 12.9 12/19/2022 03:14 AM    HEMATOCRIT 39.6 12/19/2022 03:14 AM    CREACTPROT 10.84 (H) 12/14/2022 02:24 AM    HBA1C 5.9 (H) 07/20/2022 10:53 AM        Culture Results show:  Recent Results (from the past 720 hour(s))   CULTURE WOUND W/ GRAM STAIN    Collection Time: 12/14/22  4:50 PM    Specimen: Wound   Result Value Ref Range    Significant Indicator POS (POS)     Source WND     Site Peritoneal Abscess     Culture Result - (A)     Gram Stain Result Many WBCs.  No organisms seen.       Culture Result Escherichia coli  Rare growth   (A)        Susceptibility    Escherichia coli - JOSE     Ampicillin <=8 Sensitive mcg/mL     Ceftriaxone <=1 Sensitive mcg/mL     Cefazolin <=2 Sensitive mcg/mL     Ciprofloxacin <=0.25 Sensitive mcg/mL     Cefepime <=2 Sensitive mcg/mL     Cefuroxime <=4 Sensitive mcg/mL      Ampicillin/sulbactam <=4/2 Sensitive mcg/mL     Ertapenem <=0.5 Sensitive mcg/mL     Tobramycin <=2 Sensitive mcg/mL     Gentamicin <=2 Sensitive mcg/mL     Minocycline <=4 Sensitive mcg/mL     Moxifloxacin <=2 Sensitive mcg/mL     Pip/Tazobactam <=8 Sensitive mcg/mL     Trimeth/Sulfa 2/38 Sensitive mcg/mL     Tigecycline <=2 Sensitive mcg/mL     Pain Level/Medicated:  Lidocaine applied and allowed to dwell for 30min. Oxy 5mg given by primary RN 30min prior to start of change       INTERVENTIONS BY WOUND TEAM:  Chart and images reviewed. Discussed with bedside RN. All areas of concern (based on picture review, LDA review and discussion with bedside RN) have been thoroughly assessed. Documentation of areas based on significant findings. This RN in to assess patient. Performed standard wound care which includes appropriate positioning, dressing removal and non-selective debridement. Pictures and measurements obtained weekly if/when required.  Preparation for Dressing removal: Dressing soaked with NS and Lidocaine  Non-selectively Debrided with:  NS and gauze.  Sharp debridement: NA  Linwood wound: Cleansed with NS, Prepped with no sting, small piece of folded dry gauze in umbilicus, drape to immediate linwood-wound.  Primary Dressing: One piece of spiraled full thickness black regular foam packed into wound and secured with drape.   Secondary (Outer) Dressing: A hole was cut in drape and a 2nd piece of circular half thickness black foam was applied as a button for trac pad. Trac pad applied and suction initiated. Leak noted at umbilicus and ostomy appliance. Strip of paste ring was applied and secured with drape.    Bilateral heels assessed and are intact, heel mepilex applied.     Interdisciplinary consultation: Patient, Bedside RN (Yocasta), Augie JOHNSON (Wound RN)    EVALUATION / RATIONALE FOR TREATMENT:  Most Recent Date:  12/19/22: Wound is clean, has significant depth. Adipose visible. Fascial sutures visible and intact.  May benefit from veraflo vac. Applied regular vac to assist in granulation tissue development and manage drainage. Wound proximity is close to stoma and to umbilicus.      Goals: Steady decrease in wound area and depth weekly.    WOUND TEAM PLAN OF CARE ([X] for frequency of wound follow up,):   Nursing to follow dressing orders written for wound care. Contact wound team if area fails to progress, deteriorates or with any questions/concerns if something comes up before next scheduled follow up (See below as to whether wound is following and frequency of wound follow up)  Dressing changes by wound team:                   Follow up 3 times weekly:                NPWT change 3 times weekly:   X  Follow up 1-2 times weekly:      Follow up Bi-Monthly:           Follow up Monthly (High Risk):                        Follow up as needed:     Other (explain):     NURSING PLAN OF CARE ORDERS (X):  Dressing changes: See Dressing Care orders: X  Skin care: See Skin Care orders:   RN Prevention Protocol: X  Rectal tube care: See Rectal Tube Care orders:   Other orders:    RSKIN:   CURRENTLY IN PLACE (X), APPLIED THIS VISIT (A), ORDERED (O):   Q shift Justo:  X  Q shift pressure point assessments:  X    Surface/Positioning   Pressure redistribution mattress     X       Low Airloss          ICU Low Airloss   Bariatric EVERT     Waffle cushion        Waffle Overlay  O        Reposition q 2 hours   O   TAPs Turning system     Z Franklin Pillow     Offloading/Redistribution   Sacral Mepilex (Silicone dressing)   O  Heel Mepilex (Silicone dressing)      A   Heel float boots (Prevalon boot)             Float Heels off Bed with Pillows           Respiratory   Silicone O2 tubing    X     Gray Foam Ear protectors   O  Cannula fixation Device (Tender )          High flow offloading Clip    Elastic head band offloading device      Anchorfast                                                         Trach with Optifoam split foam              Containment/Moisture Prevention     Rectal tube or BMS    Purwick/Condom Cath        Zaragoza Catheter    Barrier wipes           Barrier paste       Antifungal tx      Interdry   O     Mobilization ALDEN      Up to chair        Ambulate      PT/OT      Nutrition       Dietician        Diabetes Education      PO   X- Clears  TF     TPN     NPO   # days     Other        Anticipated discharge plans: TBD will need follow up for vac and ostomy. Pt lives in [place]  LTACH:        SNF/Rehab:                  Home Health Care:           Outpatient Wound Center:            Self/Family Care:        Other:                  Vac Discharge Needs:   Not Applicable Pt not on a wound vac:       Regular Vac while inpatient, alternative dressing at DC:        Regular Vac in use and continued at DC:   X         Reg. Vac w/ Skin Sub/Biologic in use. Will need to be changed 2x wkly:      Veraflo Vac while inpatient, ok to transition to Regular Vac on Discharge:           Veraflo Vac while inpatient, will need to remain on Veraflo Vac upon discharge:           [facility]  Inpatient Services  New Ostomy Management & Teaching    HPI:  Reviewed  PMH: Reviewed   SH: Reviewed    Past Surgical History:   Procedure Laterality Date    AL EXPLORATORY OF ABDOMEN  12/18/2022    Procedure: LAPAROTOMY, EXPLORATORY WITH OSTOMY CREATION;  Surgeon: Huseyin Asher M.D.;  Location: SURGERY [facility];  Service: General    GYN SURGERY Right 11/01/2013    Right mastecomy    TUBAL LIGATION  01/01/1975     Surgery Date: 12/18/22    Surgeon(s):  Huseyin Asher M.D.    Procedure(s):  LAPAROTOMY, EXPLORATORY     Permanence: unknown    Pertinent History: See Above              Colostomy 12/18/22 (Active)   Wound Image    12/19/22 1200   Stomal Appliance Assessment Leaking;Changed    Stoma Assessment Red    Stoma Shape Irregular;Budded Less Than One Inch    Stoma Size (in) 1.75    Peristomal Assessment Clean;Dry;Intact    Mucocutaneous Junction  "Intact    Treatment Appliance Changed;Cleansed with water/washcloth;Site care    Peristomal Protectant Paste Ring    Stomal Appliance Paste Ring, 2\";2 1/2\" 1-piece Fecal    Output Color Bloody    Appliance (Pouch) # Paste Rin, FLat One piece Appliance: 8531    Appliance Brand [employer]    Appliance Supplier [employer]       Ostomy Appliance (type and size): One piece flat and 2\" Paste Ring    Interventions: This RN discussed ostomy. Pt declined to participate in ostomy appliance change. This RN demonstrated and verbalized each step of change. Previous appliance removed using push pull method. Stoma and peristomal skin cleansed with moist warm washcloth.  1 piece appliance then cut according to stoma size. Confirmed fit. Plastic backing removed and paper edges \"Dog Eared.\" Paste ring stretched to fit back of barrier and then applied to barrier. Appliance was applied down to skin and adhered with friction. Pouch end closed.       Pt education: Questions and concerns addressed    Needs for next visit:   Hands On    Evaluation: Pt states a close friend had an ostomy and was very vocal about her ostomy. Pt states she will likely be doing change herself, pt does have a son that can assist but she feels he likely won't want to.     Flatus: Not Present  Stool Output: Serosanguinous in pouch  Urine Output: NA, Fecal Ostomy  Diet: Clears  Mobility: Not Mobilizing    Plan: Ostomy nurses to continue to follow for ostomy needs and teaching until discharge    Anticipated discharge needs: Supplies, supplier information, possible HH, outpatient ostomy clinic, Skilled Nursing/Rehab     Secure Start Signed Completed by Wound team Tech  Outpatient Referral Placed Declined  5 Sets of appliances in Ostomy bag for discharge Ordered    INSURANCE OPTIONS: Medicare                longterm Plus & [employer] ([employer])      X        MediCARE/MEDICAID & All other Private Insurance companies (Prism Form)              MediCAID & Fee " for Service (Care Chest Paperwork + [employer] Form)                            Form signed/Catalog Marked and Copy left with patient OR medicaid paperwork given to patient      Anticipated Discharge Plans:  Skilled Nursing, Home Health Care, Family Care, and Self Care    Ostomy Supplies for DC:

## 2022-12-19 NOTE — DOCUMENTATION QUERY
[facility]                                                                       Query Response Note      PATIENT:               FRANKLYN GOMEZ  ACCT #:                  [id number]  MRN:                     [id number]  :                      1945  ADMIT DATE:       2022 7:45 PM  DISCH DATE:          RESPONDING  PROVIDER #:        [id number]           QUERY TEXT:    Please clarify  the relationship, if any, between sepsis and E. coli.      NOTE:  If an appropriate response is not listed below, please respond with a new note.      The patient's Clinical Indicators include:  Wound Cx on  positive for Escheria coli. CT-Abd/Pelvis on  noted sigmoid colon diverticulitis with perforation and multilocular paracolic abscess. Transferred from outside hospital for sepsis 2/2 intra-abdominal abscess.    Treatments include: Zosyn, Chávez's Colectomy, and Drain Placement for Pelvic Abscesses.    Risk factors include: dx Diverticulitis w/Perforation and Abscess.    Thank you,  Luis Choe RN, BSN  Clinical   Connect via [employer]  Options provided:   -- Sepsis 2/2 diverticulitis is due to or associated with E. coli   -- Sepsis 2/2 diverticulitis is NOT due to or associated with E. coli   -- Unable to determine      Query created by: Luis Choe on 2022 7:35 AM    RESPONSE TEXT:    Sepsis 2/2 diverticulitis is due to or associated with E. coli          Electronically signed by:  FABY STUART MD 2022 7:50 AM

## 2022-12-19 NOTE — ANESTHESIA POSTPROCEDURE EVALUATION
Patient: Maira Johnson    Procedure Summary     Date: 12/18/22 Room / Location: [place] [address] / SURGERY [facility]    Anesthesia Start: 1429 Anesthesia Stop: 1548    Procedure: LAPAROTOMY, EXPLORATORY WITH OSTOMY CREATION (Abdomen) Diagnosis: (perforated sigmoid diverticulitis with abscess)    Surgeons: Huseyin Asher M.D. Responsible Provider: Roscoe Wong M.D.    Anesthesia Type: general ASA Status: 3 - Emergent          Final Anesthesia Type: general  Last vitals  BP   Blood Pressure : 125/58    Temp   36.3 °C (97.3 °F)    Pulse   89   Resp   (!) 22    SpO2   92 %      Anesthesia Post Evaluation    Patient location during evaluation: PACU  Patient participation: complete - patient participated  Level of consciousness: awake and alert  Pain score: 4    Airway patency: patent  Anesthetic complications: no  Cardiovascular status: hemodynamically stable  Respiratory status: acceptable  Hydration status: euvolemic    PONV: none          No notable events documented.     Nurse Pain Score: 3 (NPRS)

## 2022-12-19 NOTE — PROGRESS NOTES
4 Eyes Skin Assessment Completed by CONNER Fritz and CONNER Ferrer.    Head WDL  Ears WDL  Nose WDL  Mouth WDL  Neck WDL  Breast/Chest WDL  Shoulder Blades WDL  Spine WDL  (R) Arm/Elbow/Hand WDL  (L) Arm/Elbow/Hand WDL  Abdomen Midline incision, LLQ ostomy, LLQ MIGUE drain.  Groin Zaragoza  Scrotum/Coccyx/Buttocks WDL  (R) Leg Bruising  (L) Leg Bruising  (R) Heel/Foot/Toe WDL  (L) Heel/Foot/Toe WDL          Devices In Places Pulse Ox, Zaragoza, SCD's, and Nasal Cannula      Interventions In Place NC W/Ear Foams, Pillows, and Pressure Redistribution Mattress    Possible Skin Injury No    Pictures Uploaded Into Epic N/A  Wound Consult Placed N/A, wound consult already placed  RN Wound Prevention Protocol Ordered No

## 2022-12-19 NOTE — WOUND TEAM
Assisted CONNER White with wound care, non-selective debridement performed using wound cleanser/NS and gauze. Please see Cindy's wound note for further wound care details.

## 2022-12-19 NOTE — CARE PLAN
The patient is Stable - Low risk of patient condition declining or worsening    Shift Goals  Clinical Goals: Pain control, safety, skin integrity  Patient Goals: Rest, pain management  Family Goals: n/a    Progress made toward(s) clinical / shift goals:  pt pain medicated per MAR, pt encouraged to use call light when needed, 2 RN skin check completed, bed locked and in lowest position.      Problem: Knowledge Deficit - Standard  Goal: Patient and family/care givers will demonstrate understanding of plan of care, disease process/condition, diagnostic tests and medications  Outcome: Progressing     Problem: Bowel Elimination  Goal: Establish and maintain regular bowel function  Outcome: Progressing     Problem: Mobility  Goal: Patient's capacity to carry out activities will improve  Outcome: Progressing     Problem: Wound/ / Incision Healing  Goal: Patient's wound/surgical incision will decrease in size and heals properly  Outcome: Progressing     Problem: Pain - Standard  Goal: Alleviation of pain or a reduction in pain to the patient’s comfort goal  Outcome: Progressing     Problem: Knowledge Deficit - Ostomy  Goal: Patient will demonstrate ability to manage and maintain ostomy  Outcome: Progressing

## 2022-12-20 PROBLEM — E83.42 HYPOMAGNESEMIA: Status: ACTIVE | Noted: 2022-12-20

## 2022-12-20 PROBLEM — N17.9 AKI (ACUTE KIDNEY INJURY) (HCC): Status: ACTIVE | Noted: 2022-12-20

## 2022-12-20 PROBLEM — K57.80 DIVERTICULITIS OF INTESTINE WITH PERFORATION AND ABSCESS: Status: ACTIVE | Noted: 2022-12-15

## 2022-12-20 PROBLEM — Z98.890 POSTOPERATIVE HYPOXIA: Status: ACTIVE | Noted: 2022-12-20

## 2022-12-20 PROBLEM — E87.6 HYPOKALEMIA: Status: ACTIVE | Noted: 2022-12-20

## 2022-12-20 PROBLEM — R09.02 POSTOPERATIVE HYPOXIA: Status: ACTIVE | Noted: 2022-12-20

## 2022-12-20 LAB
ANION GAP SERPL CALC-SCNC: 9 MMOL/L (ref 7–16)
BUN SERPL-MCNC: 23 MG/DL (ref 8–22)
CALCIUM SERPL-MCNC: 8.6 MG/DL (ref 8.5–10.5)
CHLORIDE SERPL-SCNC: 103 MMOL/L (ref 96–112)
CO2 SERPL-SCNC: 25 MMOL/L (ref 20–33)
CREAT SERPL-MCNC: 0.95 MG/DL (ref 0.5–1.4)
ERYTHROCYTE [DISTWIDTH] IN BLOOD BY AUTOMATED COUNT: 42.9 FL (ref 35.9–50)
FUNGUS SPEC FUNGUS STN: NORMAL
GFR SERPLBLD CREATININE-BSD FMLA CKD-EPI: 61 ML/MIN/1.73 M 2
GLUCOSE SERPL-MCNC: 114 MG/DL (ref 65–99)
HCT VFR BLD AUTO: 35.7 % (ref 37–47)
HGB BLD-MCNC: 11.6 G/DL (ref 12–16)
MCH RBC QN AUTO: 28.6 PG (ref 27–33)
MCHC RBC AUTO-ENTMCNC: 32.5 G/DL (ref 33.6–35)
MCV RBC AUTO: 88.1 FL (ref 81.4–97.8)
PLATELET # BLD AUTO: 313 K/UL (ref 164–446)
PMV BLD AUTO: 9.5 FL (ref 9–12.9)
POTASSIUM SERPL-SCNC: 3.9 MMOL/L (ref 3.6–5.5)
RBC # BLD AUTO: 4.05 M/UL (ref 4.2–5.4)
SIGNIFICANT IND 70042: NORMAL
SITE SITE: NORMAL
SODIUM SERPL-SCNC: 137 MMOL/L (ref 135–145)
SOURCE SOURCE: NORMAL
WBC # BLD AUTO: 14.1 K/UL (ref 4.8–10.8)

## 2022-12-20 PROCEDURE — 700105 HCHG RX REV CODE 258: Performed by: REGISTERED NURSE

## 2022-12-20 PROCEDURE — A9270 NON-COVERED ITEM OR SERVICE: HCPCS | Performed by: INTERNAL MEDICINE

## 2022-12-20 PROCEDURE — 99233 SBSQ HOSP IP/OBS HIGH 50: CPT | Mod: 24 | Performed by: REGISTERED NURSE

## 2022-12-20 PROCEDURE — 700102 HCHG RX REV CODE 250 W/ 637 OVERRIDE(OP): Performed by: INTERNAL MEDICINE

## 2022-12-20 PROCEDURE — 97535 SELF CARE MNGMENT TRAINING: CPT

## 2022-12-20 PROCEDURE — 99232 SBSQ HOSP IP/OBS MODERATE 35: CPT | Performed by: FAMILY MEDICINE

## 2022-12-20 PROCEDURE — 80048 BASIC METABOLIC PNL TOTAL CA: CPT

## 2022-12-20 PROCEDURE — 700111 HCHG RX REV CODE 636 W/ 250 OVERRIDE (IP): Performed by: INTERNAL MEDICINE

## 2022-12-20 PROCEDURE — 97162 PT EVAL MOD COMPLEX 30 MIN: CPT

## 2022-12-20 PROCEDURE — 36415 COLL VENOUS BLD VENIPUNCTURE: CPT

## 2022-12-20 PROCEDURE — 85027 COMPLETE CBC AUTOMATED: CPT

## 2022-12-20 PROCEDURE — A9270 NON-COVERED ITEM OR SERVICE: HCPCS | Performed by: GENERAL PRACTICE

## 2022-12-20 PROCEDURE — 770001 HCHG ROOM/CARE - MED/SURG/GYN PRIV*

## 2022-12-20 PROCEDURE — 700111 HCHG RX REV CODE 636 W/ 250 OVERRIDE (IP): Performed by: REGISTERED NURSE

## 2022-12-20 PROCEDURE — 97166 OT EVAL MOD COMPLEX 45 MIN: CPT

## 2022-12-20 PROCEDURE — 700102 HCHG RX REV CODE 250 W/ 637 OVERRIDE(OP): Performed by: GENERAL PRACTICE

## 2022-12-20 RX ADMIN — SENNOSIDES AND DOCUSATE SODIUM 2 TABLET: 50; 8.6 TABLET ORAL at 05:32

## 2022-12-20 RX ADMIN — OXYCODONE 5 MG: 5 TABLET ORAL at 09:23

## 2022-12-20 RX ADMIN — ENOXAPARIN SODIUM 40 MG: 40 INJECTION SUBCUTANEOUS at 16:30

## 2022-12-20 RX ADMIN — SENNOSIDES AND DOCUSATE SODIUM 2 TABLET: 50; 8.6 TABLET ORAL at 16:30

## 2022-12-20 RX ADMIN — PIPERACILLIN AND TAZOBACTAM 3.38 G: 3; .375 INJECTION, POWDER, LYOPHILIZED, FOR SOLUTION INTRAVENOUS; PARENTERAL at 05:32

## 2022-12-20 RX ADMIN — OXYCODONE 5 MG: 5 TABLET ORAL at 16:30

## 2022-12-20 RX ADMIN — PIPERACILLIN AND TAZOBACTAM 3.38 G: 3; .375 INJECTION, POWDER, LYOPHILIZED, FOR SOLUTION INTRAVENOUS; PARENTERAL at 15:21

## 2022-12-20 ASSESSMENT — GAIT ASSESSMENTS
DEVIATION: BRADYKINETIC;SHUFFLED GAIT
DISTANCE (FEET): 30
GAIT LEVEL OF ASSIST: MINIMAL ASSIST
ASSISTIVE DEVICE: FRONT WHEEL WALKER

## 2022-12-20 ASSESSMENT — ENCOUNTER SYMPTOMS
FOCAL WEAKNESS: 0
FLANK PAIN: 0
NAUSEA: 1
MYALGIAS: 0
HEARTBURN: 0
SENSORY CHANGE: 0
NECK PAIN: 0
ABDOMINAL PAIN: 1
WEAKNESS: 1
DIAPHORESIS: 0
WHEEZING: 0
DIZZINESS: 1
SHORTNESS OF BREATH: 0
PALPITATIONS: 0
NERVOUS/ANXIOUS: 0
DIARRHEA: 0
FEVER: 0
BACK PAIN: 0
CHILLS: 0
SPEECH CHANGE: 0
HEADACHES: 0
VOMITING: 0
BLURRED VISION: 0
COUGH: 0
SORE THROAT: 0

## 2022-12-20 ASSESSMENT — COGNITIVE AND FUNCTIONAL STATUS - GENERAL
CLIMB 3 TO 5 STEPS WITH RAILING: A LOT
HELP NEEDED FOR BATHING: A LOT
CLIMB 3 TO 5 STEPS WITH RAILING: A LOT
MOVING FROM LYING ON BACK TO SITTING ON SIDE OF FLAT BED: UNABLE
STANDING UP FROM CHAIR USING ARMS: A LITTLE
SUGGESTED CMS G CODE MODIFIER DAILY ACTIVITY: CH
TURNING FROM BACK TO SIDE WHILE IN FLAT BAD: A LITTLE
WALKING IN HOSPITAL ROOM: A LITTLE
MOVING FROM LYING ON BACK TO SITTING ON SIDE OF FLAT BED: A LITTLE
SUGGESTED CMS G CODE MODIFIER MOBILITY: CK
TOILETING: A LOT
DRESSING REGULAR LOWER BODY CLOTHING: A LOT
DAILY ACTIVITIY SCORE: 24
MOVING TO AND FROM BED TO CHAIR: A LITTLE
DAILY ACTIVITIY SCORE: 16
SUGGESTED CMS G CODE MODIFIER DAILY ACTIVITY: CK
STANDING UP FROM CHAIR USING ARMS: A LITTLE
MOVING TO AND FROM BED TO CHAIR: A LITTLE
MOBILITY SCORE: 17
TURNING FROM BACK TO SIDE WHILE IN FLAT BAD: A LITTLE
MOBILITY SCORE: 15
DRESSING REGULAR UPPER BODY CLOTHING: A LITTLE
PERSONAL GROOMING: A LITTLE
WALKING IN HOSPITAL ROOM: A LITTLE
SUGGESTED CMS G CODE MODIFIER MOBILITY: CK

## 2022-12-20 ASSESSMENT — PAIN DESCRIPTION - PAIN TYPE
TYPE: SURGICAL PAIN;ACUTE PAIN
TYPE: ACUTE PAIN;SURGICAL PAIN

## 2022-12-20 ASSESSMENT — ACTIVITIES OF DAILY LIVING (ADL): TOILETING: INDEPENDENT

## 2022-12-20 NOTE — DIETARY
Nutrition Services Brief Update:    Day 7 of admit.  Maira Johsnon is a 77 y.o. female with admitting DX of Diverticulitis [K57.92]  Intra-abdominal abscess (HCC) [K65.1]    Current Diet: Clear liquids    Problem: Nutritional:  Goal: Achieve adequate nutritional intake  Description: Patient will consume >50% of meals  Outcome: Not progressing    Pt w/ intermittent NPO/Clear liquids diet orders during admit; when on diet >clear liquids, PO intake has been <50%. Awaiting flatus/BM from ostomy. Due to poor PO intake this admit, will trial Boost Breeze TID w/ meals to bolster PO intake of protein/kcals. Pt meets criteria for Poor PO Intake Protocol w/ PO<50% x 48 hrs.    RD following.

## 2022-12-20 NOTE — DISCHARGE PLANNING
[facility] Transitional Care Coordination    Referral from:  Dr. Yessica Hsu  Insurance Provider on Facesheet:  Medicare/[employer]  Potential Rehab diagnosis:  Debility    Chart review indicates patient has ongoing medical management and may have therapy needs to possibly meet inpatient rehab facility criteria with the goal of returning to community.      D/C Support:  Son    Physiatry consult pended while waiting for additional information.  Perforated sigmoid diverticulitis.  POD 2  - exploratory laparotomy with drainage of pelvic abscesses and Chávez's colectomy.  Would welcome OT as clinically appropriate.  TCC will follow.  Please reach out sooner if PMR consult requested for medical management.     Last Covid test:    Thank you for the referral.

## 2022-12-20 NOTE — CARE PLAN
Problem: Knowledge Deficit - Standard  Goal: Patient and family/care givers will demonstrate understanding of plan of care, disease process/condition, diagnostic tests and medications  Outcome: Progressing     Problem: Pain - Standard  Goal: Alleviation of pain or a reduction in pain to the patient’s comfort goal  Outcome: Progressing       The patient is Stable - Low risk of patient condition declining or worsening    Shift Goals  Clinical Goals: pain control, pulmonary hygiene  Patient Goals: Rest  Family Goals: n/a    Progress made toward(s) clinical / shift goals: POC discussed with patient. Patient pain managed with prn medication per MAR.

## 2022-12-20 NOTE — PROGRESS NOTES
Bedside report received.  Assessment complete.  A&O x 4. Patient calls appropriately.  Patient ambulates with x1 assist.  Patient has 3-5/10 pain. Medicated per MAR.   MLI with wound vac in place. LLQ MIGUE w/ + serosang output.  Tolerating clear liquid diet. Denies N/V.  + void via kothari.  Ostomy to RLQ with min serosang output.  Reviewed plan of care with patient. Call light and personal belongings within reach. Hourly rounding in place. All needs met at this time.

## 2022-12-20 NOTE — PROGRESS NOTES
Received report of patient at start of shift. Patient is AOx4, daughter at bedside throughout shift. PRN oxycodone administered for complaints of pain. Assessment complete, patient on 4L O2 via NC. Zaragoza catheter, MIGUE drain and colostomy present. Assisted patient of bed. Patient ambulated 5 feet x2. Generalized weakness and increased pain noted with mobility. Patient updated on plan of care, encouraged to notify staff for any needs/assistance. Call light within reach.

## 2022-12-20 NOTE — PROGRESS NOTES
Hospital Medicine Daily Progress Note    Date of Service  12/20/2022    Chief Complaint  Maira Johnson is a 77 y.o. female admitted 12/13/2022 with diverticulitis with abscess    Hospital Course  Admitted with diverticulitis with abscess.  Patient was started on empiric coverage with IV Zosyn.  Surgery was consulted on the case.  Patient underwent CT guided pelvic diverticular abscess catheter drainage on 12/14/2022.  Scan on 12/18/2022 showed new peritoneal air consistent with perforation, and multilocular paracolic abscess.  Patient then underwent Exploratory laparotomy with drainage of pelvic abscesses and Chávez's colectomy with mobilization of the splenic flexure on 12/18/2022.    Interval Problem Update  Diverticulitis with abscess -pain controlled, tolerating clears, preliminary culture shows E. coli, Enterococcus, Streptococcus, Bacteroides  Hypoxia - O2 4 lpm  Hypertension - sbp 116-134    Updates given and plan of care discussed with patient's daughter who was at bedside.    I have discussed this patient's plan of care and discharge plan at IDT rounds today with Case Management, Nursing, Nursing leadership, and other members of the IDT team.    Consultants/Specialty  general surgery and interventional radiology    Code Status  Full Code    Disposition  Patient is not medically cleared for discharge.   Anticipate discharge to to skilled nursing facility.  I have placed the appropriate orders for post-discharge needs.    Review of Systems  Review of Systems   Constitutional:  Negative for chills, diaphoresis, fever and malaise/fatigue.   HENT:  Negative for congestion, hearing loss and sore throat.    Eyes:  Negative for blurred vision.   Respiratory:  Negative for cough, shortness of breath and wheezing.    Cardiovascular:  Positive for leg swelling. Negative for chest pain and palpitations.   Gastrointestinal:  Positive for abdominal pain and nausea. Negative for diarrhea, heartburn and vomiting.    Genitourinary:  Negative for dysuria, flank pain and hematuria.   Musculoskeletal:  Negative for back pain, joint pain, myalgias and neck pain.   Skin:  Negative for rash.   Neurological:  Positive for dizziness and weakness. Negative for sensory change, speech change, focal weakness and headaches.   Psychiatric/Behavioral:  The patient is not nervous/anxious.       Physical Exam  Temp:  [36.3 °C (97.4 °F)-36.8 °C (98.2 °F)] 36.4 °C (97.5 °F)  Pulse:  [80-88] 82  Resp:  [14-17] 16  BP: (116-134)/(50-65) 126/65  SpO2:  [93 %-98 %] 98 %    Physical Exam  Vitals and nursing note reviewed.   Constitutional:       Appearance: She is obese.   HENT:      Head: Normocephalic and atraumatic.      Nose: No congestion.      Mouth/Throat:      Mouth: Mucous membranes are moist.   Eyes:      Extraocular Movements: Extraocular movements intact.      Conjunctiva/sclera: Conjunctivae normal.   Cardiovascular:      Rate and Rhythm: Normal rate and regular rhythm.   Pulmonary:      Effort: Pulmonary effort is normal.      Breath sounds: Decreased air movement present.   Abdominal:      General: There is distension.      Tenderness: There is abdominal tenderness. There is no guarding or rebound.      Comments: Wound vac at midline  LLQ ostomy  LLQ drain   Musculoskeletal:      Cervical back: No tenderness.      Right lower leg: Edema present.      Left lower leg: Edema present.   Skin:     General: Skin is warm and dry.   Neurological:      General: No focal deficit present.      Mental Status: She is alert and oriented to person, place, and time.      Cranial Nerves: No cranial nerve deficit.       Fluids    Intake/Output Summary (Last 24 hours) at 12/20/2022 1438  Last data filed at 12/20/2022 0432  Gross per 24 hour   Intake 698.34 ml   Output 915 ml   Net -216.66 ml       Laboratory  Recent Labs     12/18/22  0544 12/19/22  0314 12/20/22  0317   WBC 18.2* 17.5* 14.1*   RBC 4.29 4.56 4.05*   HEMOGLOBIN 12.3 12.9 11.6*    HEMATOCRIT 37.2 39.6 35.7*   MCV 86.7 86.8 88.1   MCH 28.7 28.3 28.6   MCHC 33.1* 32.6* 32.5*   RDW 41.1 42.6 42.9   PLATELETCT 267 312 313   MPV 9.7 9.8 9.5     Recent Labs     12/18/22  0544 12/19/22  0314 12/20/22  0317   SODIUM 135 139 137   POTASSIUM 3.2* 4.3 3.9   CHLORIDE 101 103 103   CO2 24 24 25   GLUCOSE 118* 153* 114*   BUN 11 18 23*   CREATININE 0.93 1.08 0.95   CALCIUM 8.7 8.7 8.6                   Imaging  CT-ABDOMEN-PELVIS WITH   Final Result      1.  New peritoneal air consistent with perforation and likely the sigmoid colon diverticulitis      2.  Multilocular paracolic abscess anterior to the sigmoid colon with the largest component measuring 6.4 x 5.4 cm and a smaller air component present left lateral      3.  Left lower quadrant drainage catheter and appears to have retracted.      4.  Findings were discussed with FABY STUART on 12/18/2022 12:40 PM.      IR-US GUIDED PIV   Final Result    Ultrasound-guided PERIPHERAL IV INSERTION performed by    qualified nursing staff as above.      CT-DRAIN-PERITONEAL   Final Result      1.  CT guided pelvic diverticular abscess catheter drainage.   2.  The current plan is to obtain a follow-up CT in 5-7 days..      CT-ABDOMEN-PELVIS WITH   Final Result      1.  Acute sigmoid diverticulitis with adjacent 4.6 x 3.3 cm abscess.   2.  Gallbladder sludge or stones.   3.  2.1 cm hypodense left hepatic lobe lesion, likely a hemangioma, though incompletely characterized on this CT. If there is clinical concern, consider further evaluation with nonemergent MRI hepatic protocol.   4.  Nonobstructing 6 mm inferior pole left renal stone.      Findings conveyed to Dr. Cordova at 1124 via Voalte messaging on 12/14/2022.           Assessment/Plan  * Diverticulitis of intestine with perforation and abscess- (present on admission)  Assessment & Plan  IV Zosyn  CT guided pelvic diverticular abscess catheter drainage   12/14/2022  Exploratory laparotomy with drainage of  pelvic abscesses and Chávez's colectomy with mobilization of the splenic flexure    12/18/2022  Pain control, encourage I-S  Diet per surgery  Follow cultures  Will consult ID  PT and OT evals    Sepsis ([facility])- (present on admission)  Assessment & Plan  Source -perforated diverticulitis with abscess    Postoperative hypoxia- (present on admission)  Assessment & Plan  Encourage I-S, RT protocol    Hypomagnesemia- (present on admission)  Assessment & Plan  Follow level    Hypokalemia- (present on admission)  Assessment & Plan  Follow bmp    CHEPE (acute kidney injury) ([facility])- (present on admission)  Assessment & Plan  Follow bmp, check PTH    Hyperglycemia- (present on admission)  Assessment & Plan  Check [id number]    Hypertension- (present on admission)  Assessment & Plan  monitor    Class 3 severe obesity due to excess calories with serious comorbidity and body mass index (BMI) of 40.0 to 44.9 in adult ([facility])- (present on admission)  Assessment & Plan  Body mass index is 42.65 kg/m².       VTE prophylaxis: enoxaparin ppx    I have performed a physical exam and reviewed and updated ROS and Plan today (12/20/2022). In review of yesterday's note (12/19/2022), there are no changes except as documented above.

## 2022-12-20 NOTE — PROGRESS NOTES
"  DATE: 12/20/2022    Post Operative Day  2 Exploratory laparotomy with drainage of pelvic abscesses and Chávez's colectomy with mobilization of the splenic flexure.    INTERVAL EVENTS:  Feeling more sore today, pain well managed.  Ostomy pink, viable, serous/sang drainage, no flatus, no stool.  MIGUE drain s/s  Continue clear liquid diet until flatus or stool present.  DC kothari cathter.  PT/OT evaluations pending.  Wean supplemental oxygen as able.    PHYSICAL EXAMINATION:  Vital Signs: /65   Pulse 82   Temp 36.4 °C (97.5 °F) (Temporal)   Resp 16   Ht 1.6 m (5' 3\")   Wt 109 kg (240 lb 11.9 oz)   SpO2 98%     Surgical dressings in place, clean and dry. Ostomy pink, viable, serous output, no flatus or stool noted. Abdomen is soft, incisional tenderness.     LABORATORY VALUES:   Recent Labs     12/18/22  0544 12/19/22 0314 12/20/22 0317   WBC 18.2* 17.5* 14.1*   RBC 4.29 4.56 4.05*   HEMOGLOBIN 12.3 12.9 11.6*   HEMATOCRIT 37.2 39.6 35.7*   MCV 86.7 86.8 88.1   MCH 28.7 28.3 28.6   MCHC 33.1* 32.6* 32.5*   RDW 41.1 42.6 42.9   PLATELETCT 267 312 313   MPV 9.7 9.8 9.5     Recent Labs     12/18/22  0544 12/19/22 0314 12/20/22 0317   SODIUM 135 139 137   POTASSIUM 3.2* 4.3 3.9   CHLORIDE 101 103 103   CO2 24 24 25   GLUCOSE 118* 153* 114*   BUN 11 18 23*   CREATININE 0.93 1.08 0.95   CALCIUM 8.7 8.7 8.6     Recent Labs     12/18/22  0544 12/19/22  0314   ASTSGOT 28 30   ALTSGPT 21 27   TBILIRUBIN 1.0 0.9   ALKPHOSPHAT 103* 111*   GLOBULIN 3.2 3.4            IMAGING:   CT-ABDOMEN-PELVIS WITH   Final Result      1.  New peritoneal air consistent with perforation and likely the sigmoid colon diverticulitis      2.  Multilocular paracolic abscess anterior to the sigmoid colon with the largest component measuring 6.4 x 5.4 cm and a smaller air component present left lateral      3.  Left lower quadrant drainage catheter and appears to have retracted.      4.  Findings were discussed with FABY STUART on " 12/18/2022 12:40 PM.      IR-US GUIDED PIV   Final Result    Ultrasound-guided PERIPHERAL IV INSERTION performed by    qualified nursing staff as above.      CT-DRAIN-PERITONEAL   Final Result      1.  CT guided pelvic diverticular abscess catheter drainage.   2.  The current plan is to obtain a follow-up CT in 5-7 days..      CT-ABDOMEN-PELVIS WITH   Final Result      1.  Acute sigmoid diverticulitis with adjacent 4.6 x 3.3 cm abscess.   2.  Gallbladder sludge or stones.   3.  2.1 cm hypodense left hepatic lobe lesion, likely a hemangioma, though incompletely characterized on this CT. If there is clinical concern, consider further evaluation with nonemergent MRI hepatic protocol.   4.  Nonobstructing 6 mm inferior pole left renal stone.      Findings conveyed to Dr. Cordova at 1124 via Voalte messaging on 12/14/2022.          ASSESSMENT AND PLAN:   Colonic diverticular abscess- (present on admission)  Assessment & Plan  12/13 IR drainage  12/18 Follow up CT with evidence of perforation and worsening abscess.  -Exploratory laparotomy with drainage of pelvic abscesses and Chávez's colectomy with mobilization of the splenic flexure  12/20 Ostomy viable, no stool or gas. MIGUE with s/s drainage.          ____________________________________     PAULO Gonzalez.

## 2022-12-20 NOTE — DISCHARGE PLANNING
Case Management Discharge Planning    Admission Date: 12/13/2022  GMLOS: 9.6  ALOS: 7    6-Clicks ADL Score: 24  6-Clicks Mobility Score: 15  PT and/or OT Eval ordered: Yes  Post-acute Referrals Ordered: Yes  Post-acute Choice Obtained: Yes  Has referral(s) been sent to post-acute provider:  No      Anticipated Discharge Dispo: Discharge Disposition: Discharged to home/self care (01)    DME Needed: No    Action(s) Taken: OTHER. Discussed this case during IDT Rounds. Per MD, patient is not medically clear. Per MD, patient has a new ostomy and ID Consult is pending.    Escalations Completed: None    Medically Clear: No    Next Steps: Awaiting medical clearance.    Barriers to Discharge: Medical clearance    Is the patient up for discharge tomorrow:

## 2022-12-20 NOTE — CARE PLAN
The patient is Stable - Low risk of patient condition declining or worsening    Shift Goals  Clinical Goals: Pain control, increased mobility  Patient Goals: Rest    Progress made toward(s) clinical / shift goals: PRN oxycodone administered per MAR. Patient reports adequate pain control this shift. Patient ambulated 5 feet x2. Generalized weakness and increased pain noted with mobility. Patient resting through shift.     Patient is not progressing towards the following goals: N/A

## 2022-12-20 NOTE — THERAPY
"Physical Therapy   Initial Evaluation     Patient Name: Maira Johnson  Age:  77 y.o., Sex:  female  Medical Record #: [id number]  Today's Date: 12/20/2022     Precautions  Precautions: Fall Risk  Comments: abdominal surgery, new colostomy    Assessment  Patient is a 77 y.o. female who was admitted with diverticulitis and intra-abdominal abscess eventually requiring ex-lap with colostomy and abscess drainage. PMH significant for HTN, recurrent diverticulitis.    Pt received up in a chair at bed side and agreeable to PT evaluation. Pt presents with generalized weakness, pain, and impaired functional mobility as a result. Pt reports being independent with no DME at baseline and taking care of many animals at home. Pt currently required min A for transfers, ambulation in the room, and bed mobility. Pt was limited by fatigue, discomfort, and sensation of need to urinate despite the catheter in place. Pt may benefit from further rehab prior to return home, although will likely progress while in the hospital. Will continue to follow for acute PT to progress and update recommendations as appropriate.    Plan    Recommend Physical Therapy 4 times per week until therapy goals are met for the following treatments:  Bed Mobility, Gait Training, Neuro Re-Education / Balance, Self Care/Home Evaluation, Therapeutic Activities, and Therapeutic Exercises    DC Equipment Recommendations: Unable to determine at this time  Discharge Recommendations: Recommend post-acute placement for additional physical therapy services prior to discharge home       Subjective    \"I would say I'm pretty active for my age normally\"     Objective       12/20/22 1021   Precautions   Precautions Fall Risk   Comments abdominal surgery, new colostomy   Vitals   Vitals Comments Maintained on 4L throughout   Pain 0 - 10 Group   Therapist Pain Assessment During Activity;Nurse Notified;6  (pain in abdomen with some motions)   Prior Living Situation   Prior " Services Home-Independent   Housing / Facility [facility]   Steps Into Home   (ramp)   Steps In Home 0   Equipment Owned   (3WW)   Lives with - Patient's Self Care Capacity Alone and Able to Care For Self   Comments Pt reports her son lives next door and is able to assist. Pt [place] and has chickens, a dog, and a horse that her son is assisting with currently.   Prior Level of Functional Mobility   Comments Independent with no DME   History of Falls   History of Falls No   Cognition    Level of Consciousness Alert   Comments Pt is very pleasant and cooperative.   Passive ROM Lower Body   Passive ROM Lower Body WDL   Active ROM Lower Body    Active ROM Lower Body  WDL   Strength Lower Body   Comments Grossly 4/5 in BLE   Sensation Lower Body   Lower Extremity Sensation   WDL   Lower Body Muscle Tone   Lower Body Muscle Tone  WDL   Coordination Lower Body    Coordination Lower Body  WDL   Balance Assessment   Sitting Balance (Static) Fair +   Sitting Balance (Dynamic) Fair   Standing Balance (Static) Fair -   Standing Balance (Dynamic) Fair -   Weight Shift Sitting Fair   Weight Shift Standing Fair   Comments FWW for support in standing   Gait Analysis   Gait Level Of Assist Minimal Assist   Assistive Device Front Wheel Walker   Distance (Feet) 30   # of Times Distance was Traveled 1   Deviation Bradykinetic;Shuffled Gait   Comments Provided cues for FWW support   Bed Mobility    Sit to Supine Moderate Assist   Scooting Standby Assist   Comments Provided cues for log roll technique and scooting up in the bed in supine.   Functional Mobility   Sit to Stand Minimal Assist   Bed, Chair, Wheelchair Transfer Minimal Assist   Transfer Method Stand Step   Mobility up with FWW   Comments Cues for hand placement.   How much difficulty does the patient currently have...   Turning over in bed (including adjusting bedclothes, sheets and blankets)? 3   Sitting down on and standing up from a chair with arms (e.g.,  wheelchair, bedside commode, etc.) 1   Moving from lying on back to sitting on the side of the bed? 3   How much help from another person does the patient currently need...   Moving to and from a bed to a chair (including a wheelchair)? 3   Need to walk in a hospital room? 3   Climbing 3-5 steps with a railing? 2   6 clicks Mobility Score 15   Short Term Goals    Short Term Goal # 1 Pt will perform sup<>sit with supervision to progress function in 6 visits.   Short Term Goal # 2 Pt will transfer with FWW and supervision to progress function in 6 visits.   Short Term Goal # 3 Pt will ambulate 100ft with FWW and supervision to progress mobility in 6 visits.   Education Group   Education Provided Role of Physical Therapist   Role of Physical Therapist Patient Response Patient;Acceptance;Explanation;Verbal Demonstration   Problem List    Problems Impaired Transfers;Impaired Ambulation;Impaired Bed Mobility;Pain;Decreased Activity Tolerance   Anticipated Discharge Equipment and Recommendations   DC Equipment Recommendations Unable to determine at this time   Discharge Recommendations Recommend post-acute placement for additional physical therapy services prior to discharge home   Interdisciplinary Plan of Care Collaboration   IDT Collaboration with  Nursing;Family / Caregiver   Patient Position at End of Therapy In Bed;Call Light within Reach;Tray Table within Reach;Phone within Reach;Family / Friend in Room   Collaboration Comments RN updated. Daughter present during session   Session Information   Date / Session Number  12/20-1(1/4, 12/26)

## 2022-12-20 NOTE — PROGRESS NOTES
Received report from previous shift RN  Assessment complete.  A&O x 4. Patient calls appropriately.  Patient ambulates with x1 assist. Bed alarm off.   Patient has 5/10 pain. Pain managed with prescribed medications.  Denies N&V. Tolerating clear liquid diet.  Surgical incision to midline with woundvac in place, CDI. Midline ostomy with appliance in place,CDI. LLQ MIGUE drain to bulb suction, CDI.  + void via kothari, + flatus, - BM.  Patient denies SOB. Spo2>95% on 4L nasal cannula.  SCD's on.    Reviewed plan of care with patient. Call light and personal belongings with in reach. Hourly rounding in place. All needs met at this time.

## 2022-12-21 LAB
ANION GAP SERPL CALC-SCNC: 7 MMOL/L (ref 7–16)
BACTERIA SPEC ANAEROBE CULT: ABNORMAL
BACTERIA SPEC ANAEROBE CULT: ABNORMAL
BACTERIA WND AEROBE CULT: ABNORMAL
BUN SERPL-MCNC: 20 MG/DL (ref 8–22)
CALCIUM SERPL-MCNC: 8.8 MG/DL (ref 8.5–10.5)
CHLORIDE SERPL-SCNC: 100 MMOL/L (ref 96–112)
CO2 SERPL-SCNC: 29 MMOL/L (ref 20–33)
CREAT SERPL-MCNC: 1.04 MG/DL (ref 0.5–1.4)
ERYTHROCYTE [DISTWIDTH] IN BLOOD BY AUTOMATED COUNT: 43.1 FL (ref 35.9–50)
EST. AVERAGE GLUCOSE BLD GHB EST-MCNC: 126 MG/DL
GFR SERPLBLD CREATININE-BSD FMLA CKD-EPI: 55 ML/MIN/1.73 M 2
GLUCOSE SERPL-MCNC: 98 MG/DL (ref 65–99)
GRAM STN SPEC: ABNORMAL
HBA1C MFR BLD: 6 % (ref 4–5.6)
HCT VFR BLD AUTO: 36.6 % (ref 37–47)
HGB BLD-MCNC: 11.9 G/DL (ref 12–16)
MAGNESIUM SERPL-MCNC: 2.3 MG/DL (ref 1.5–2.5)
MCH RBC QN AUTO: 28.5 PG (ref 27–33)
MCHC RBC AUTO-ENTMCNC: 32.5 G/DL (ref 33.6–35)
MCV RBC AUTO: 87.8 FL (ref 81.4–97.8)
NT-PROBNP SERPL IA-MCNC: 297 PG/ML (ref 0–125)
PLATELET # BLD AUTO: 338 K/UL (ref 164–446)
PMV BLD AUTO: 9.6 FL (ref 9–12.9)
POTASSIUM SERPL-SCNC: 3.7 MMOL/L (ref 3.6–5.5)
PTH-INTACT SERPL-MCNC: 26.8 PG/ML (ref 14–72)
RBC # BLD AUTO: 4.17 M/UL (ref 4.2–5.4)
SIGNIFICANT IND 70042: ABNORMAL
SIGNIFICANT IND 70042: ABNORMAL
SITE SITE: ABNORMAL
SITE SITE: ABNORMAL
SODIUM SERPL-SCNC: 136 MMOL/L (ref 135–145)
SOURCE SOURCE: ABNORMAL
SOURCE SOURCE: ABNORMAL
TSH SERPL DL<=0.005 MIU/L-ACNC: 3.7 UIU/ML (ref 0.38–5.33)
WBC # BLD AUTO: 9.5 K/UL (ref 4.8–10.8)

## 2022-12-21 PROCEDURE — 99232 SBSQ HOSP IP/OBS MODERATE 35: CPT | Performed by: FAMILY MEDICINE

## 2022-12-21 PROCEDURE — 700111 HCHG RX REV CODE 636 W/ 250 OVERRIDE (IP): Performed by: REGISTERED NURSE

## 2022-12-21 PROCEDURE — 700102 HCHG RX REV CODE 250 W/ 637 OVERRIDE(OP): Performed by: INTERNAL MEDICINE

## 2022-12-21 PROCEDURE — A9270 NON-COVERED ITEM OR SERVICE: HCPCS | Performed by: INTERNAL MEDICINE

## 2022-12-21 PROCEDURE — 99024 POSTOP FOLLOW-UP VISIT: CPT

## 2022-12-21 PROCEDURE — 85027 COMPLETE CBC AUTOMATED: CPT

## 2022-12-21 PROCEDURE — 83036 HEMOGLOBIN GLYCOSYLATED A1C: CPT

## 2022-12-21 PROCEDURE — 700105 HCHG RX REV CODE 258: Performed by: INTERNAL MEDICINE

## 2022-12-21 PROCEDURE — 80048 BASIC METABOLIC PNL TOTAL CA: CPT

## 2022-12-21 PROCEDURE — 700111 HCHG RX REV CODE 636 W/ 250 OVERRIDE (IP): Performed by: INTERNAL MEDICINE

## 2022-12-21 PROCEDURE — 99223 1ST HOSP IP/OBS HIGH 75: CPT | Performed by: INTERNAL MEDICINE

## 2022-12-21 PROCEDURE — 83880 ASSAY OF NATRIURETIC PEPTIDE: CPT

## 2022-12-21 PROCEDURE — 700102 HCHG RX REV CODE 250 W/ 637 OVERRIDE(OP): Performed by: GENERAL PRACTICE

## 2022-12-21 PROCEDURE — 83970 ASSAY OF PARATHORMONE: CPT

## 2022-12-21 PROCEDURE — 700105 HCHG RX REV CODE 258: Performed by: REGISTERED NURSE

## 2022-12-21 PROCEDURE — A9270 NON-COVERED ITEM OR SERVICE: HCPCS | Performed by: GENERAL PRACTICE

## 2022-12-21 PROCEDURE — 36415 COLL VENOUS BLD VENIPUNCTURE: CPT

## 2022-12-21 PROCEDURE — 84443 ASSAY THYROID STIM HORMONE: CPT

## 2022-12-21 PROCEDURE — 83735 ASSAY OF MAGNESIUM: CPT

## 2022-12-21 PROCEDURE — 97606 NEG PRS WND THER DME>50 SQCM: CPT

## 2022-12-21 PROCEDURE — 306591 TRAY SUTURE REMOVAL DISP: Performed by: FAMILY MEDICINE

## 2022-12-21 PROCEDURE — 770001 HCHG ROOM/CARE - MED/SURG/GYN PRIV*

## 2022-12-21 RX ADMIN — OXYCODONE 5 MG: 5 TABLET ORAL at 16:19

## 2022-12-21 RX ADMIN — OXYCODONE 5 MG: 5 TABLET ORAL at 09:14

## 2022-12-21 RX ADMIN — ENOXAPARIN SODIUM 40 MG: 40 INJECTION SUBCUTANEOUS at 17:53

## 2022-12-21 RX ADMIN — SENNOSIDES AND DOCUSATE SODIUM 2 TABLET: 50; 8.6 TABLET ORAL at 06:00

## 2022-12-21 RX ADMIN — AMPICILLIN AND SULBACTAM 3 G: 2; 1 INJECTION, POWDER, FOR SOLUTION INTRAVENOUS at 17:55

## 2022-12-21 RX ADMIN — OXYCODONE 5 MG: 5 TABLET ORAL at 01:05

## 2022-12-21 RX ADMIN — AMPICILLIN AND SULBACTAM 3 G: 2; 1 INJECTION, POWDER, FOR SOLUTION INTRAVENOUS at 13:28

## 2022-12-21 RX ADMIN — SENNOSIDES AND DOCUSATE SODIUM 2 TABLET: 50; 8.6 TABLET ORAL at 17:53

## 2022-12-21 RX ADMIN — OXYCODONE 5 MG: 5 TABLET ORAL at 13:33

## 2022-12-21 RX ADMIN — OXYCODONE 5 MG: 5 TABLET ORAL at 21:22

## 2022-12-21 RX ADMIN — PIPERACILLIN AND TAZOBACTAM 3.38 G: 3; .375 INJECTION, POWDER, LYOPHILIZED, FOR SOLUTION INTRAVENOUS; PARENTERAL at 03:01

## 2022-12-21 ASSESSMENT — ENCOUNTER SYMPTOMS
VOMITING: 0
FLANK PAIN: 0
NAUSEA: 1
SENSORY CHANGE: 0
NERVOUS/ANXIOUS: 0
DIARRHEA: 0
HEARTBURN: 0
SPEECH CHANGE: 0
ABDOMINAL PAIN: 1
BACK PAIN: 0
FOCAL WEAKNESS: 0
MYALGIAS: 0
HEADACHES: 0
CHILLS: 0
BLURRED VISION: 0
SHORTNESS OF BREATH: 0
COUGH: 0
PALPITATIONS: 0
NECK PAIN: 0
SORE THROAT: 0
DIAPHORESIS: 0
DIZZINESS: 1
WEAKNESS: 1
FEVER: 0
WHEEZING: 0

## 2022-12-21 ASSESSMENT — PAIN DESCRIPTION - PAIN TYPE
TYPE: ACUTE PAIN

## 2022-12-21 NOTE — THERAPY
"Occupational Therapy   Initial Evaluation     Patient Name: Maira Johnson  Age:  77 y.o., Sex:  female  Medical Record #: [id number]  Today's Date: 12/20/2022       Precautions: Fall Risk  Comments: abdominal sx    Assessment  Patient is 77 y.o. female admitted with diverticulitis and intra-abdominal abscess.  Pt required ex-lap with colostomy and abscess drainage. PMH significant for HTN, recurrent diverticulitis.  Pt was co-operative despite being limited by pain & lightheadedness.  Pt was living alone PTA & reports her son lives next door & can assist as needed.  Pt currently needs Mod A for LB dressing & Min A for transfers.  Pt was able to stand at the sink to groom with CGA.  Pt encouraged to be OOB for all meals & amb to bathroom with staff.  Pt may benefit from Post Acute OT services prior to D/C home.      Plan    Recommend Occupational Therapy 3 times per week until therapy goals are met for the following treatments:  Adaptive Equipment, Neuro Re-Education / Balance, Self Care/Activities of Daily Living, Therapeutic Activities, and Therapeutic Exercises.    DC Equipment Recommendations: Unable to determine at this time  Discharge Recommendations: Recommend post-acute placement for additional occupational therapy services prior to discharge home     Subjective    \"Wow this has really taken a lot out of me\"     Objective       12/20/22 1147   Prior Living Situation   Prior Services None   Housing / Facility 1 [address] House   Steps Into Home   (ramp)   Bathroom Set up Bathtub / Shower Combination;Shower Chair   Equipment Owned   (3WW that was her mothers)   Lives with - Patient's Self Care Capacity Alone and Able to Care For Self   Comments pt's son lives next door & can assist   Cognition    Comments pleasant, co-operative, limited by pain   Balance Assessment   Sitting Balance (Static) Fair +   Sitting Balance (Dynamic) Fair   Standing Balance (Static) Fair -   Standing Balance (Dynamic) Fair -   Weight Shift " Sitting Fair   Weight Shift Standing Fair   Bed Mobility    Supine to Sit Minimal Assist   Sit to Supine   (pt left up in chair)   Scooting Supervised   Rolling Minimal Assist to Rt.   ADL Assessment   Eating Modified Independent   Grooming Contact Guard Assist;Standing   Bathing Moderate Assist   Upper Body Dressing Minimal Assist   Lower Body Dressing Maximal Assist   Toileting Maximal Assist   Functional Mobility   Sit to Stand Minimal Assist   Bed, Chair, Wheelchair Transfer Minimal Assist   Mobility pt amb to sink with FWW for grooming tasks   Patient / Family Goals   Patient / Family Goal #1 To have less pain   Short Term Goals   Short Term Goal # 1 Pt will be supervised for ADL transfers   Short Term Goal # 2 Pt will dress LB with supervision   Short Term Goal # 3 Pt will shower seated with supervision

## 2022-12-21 NOTE — PROGRESS NOTES
Hospital Medicine Daily Progress Note    Date of Service  12/21/2022    Chief Complaint  Maira Johnson is a 77 y.o. female admitted 12/13/2022 with diverticulitis with abscess    Hospital Course  Admitted with diverticulitis with abscess.  Patient was started on empiric coverage with IV Zosyn.  Surgery was consulted on the case.  Patient underwent CT guided pelvic diverticular abscess catheter drainage on 12/14/2022.  Scan on 12/18/2022 showed new peritoneal air consistent with perforation, and multilocular paracolic abscess.  Patient then underwent Exploratory laparotomy with drainage of pelvic abscesses and Chávez's colectomy with mobilization of the splenic flexure on 12/18/2022.  Cultures showed E. coli, Enterococcus, Streptococcus, and Bacteroides.    Interval Problem Update  Diverticulitis with abscess -pain controlled, tolerating clears, culture shows E. coli, Enterococcus, Streptococcus, Bacteroides, discussed case with ID  Hypoxia - O2 4 lpm  Hypertension - sbp 142-149    Updates given and plan of care discussed with patient's daughter who was at bedside.    I have discussed this patient's plan of care and discharge plan at IDT rounds today with Case Management, Nursing, Nursing leadership, and other members of the IDT team.    Consultants/Specialty  general surgery, infectious disease, and interventional radiology    Code Status  Full Code    Disposition  Patient is not medically cleared for discharge.   Anticipate discharge to to skilled nursing facility.  I have placed the appropriate orders for post-discharge needs.    Review of Systems  Review of Systems   Constitutional:  Negative for chills, diaphoresis, fever and malaise/fatigue.   HENT:  Negative for congestion, hearing loss and sore throat.    Eyes:  Negative for blurred vision.   Respiratory:  Negative for cough, shortness of breath and wheezing.    Cardiovascular:  Positive for leg swelling. Negative for chest pain and palpitations.    Gastrointestinal:  Positive for abdominal pain and nausea. Negative for diarrhea, heartburn and vomiting.   Genitourinary:  Negative for dysuria, flank pain and hematuria.   Musculoskeletal:  Negative for back pain, joint pain, myalgias and neck pain.   Skin:  Negative for rash.   Neurological:  Positive for dizziness and weakness. Negative for sensory change, speech change, focal weakness and headaches.   Psychiatric/Behavioral:  The patient is not nervous/anxious.       Physical Exam  Temp:  [36.5 °C (97.7 °F)-36.8 °C (98.2 °F)] 36.6 °C (97.9 °F)  Pulse:  [74-85] 78  Resp:  [16] 16  BP: (142-149)/(65-69) 149/69  SpO2:  [93 %-95 %] 93 %    Physical Exam  Vitals and nursing note reviewed.   Constitutional:       Appearance: She is obese.   HENT:      Head: Normocephalic and atraumatic.      Nose: No congestion.      Mouth/Throat:      Mouth: Mucous membranes are moist.   Eyes:      Extraocular Movements: Extraocular movements intact.      Conjunctiva/sclera: Conjunctivae normal.   Cardiovascular:      Rate and Rhythm: Normal rate and regular rhythm.   Pulmonary:      Effort: Pulmonary effort is normal.      Breath sounds: Decreased air movement present.   Abdominal:      General: There is distension.      Tenderness: There is abdominal tenderness. There is no guarding or rebound.      Comments: Wound vac at midline  LLQ ostomy  LLQ drain   Musculoskeletal:      Cervical back: No tenderness.      Right lower leg: Edema present.      Left lower leg: Edema present.   Skin:     General: Skin is warm and dry.   Neurological:      General: No focal deficit present.      Mental Status: She is alert and oriented to person, place, and time.      Cranial Nerves: No cranial nerve deficit.       Fluids    Intake/Output Summary (Last 24 hours) at 12/21/2022 1349  Last data filed at 12/21/2022 0845  Gross per 24 hour   Intake 545.21 ml   Output 425 ml   Net 120.21 ml       Laboratory  Recent Labs     12/19/22  9048  12/20/22 0317 12/21/22  0752   WBC 17.5* 14.1* 9.5   RBC 4.56 4.05* 4.17*   HEMOGLOBIN 12.9 11.6* 11.9*   HEMATOCRIT 39.6 35.7* 36.6*   MCV 86.8 88.1 87.8   MCH 28.3 28.6 28.5   MCHC 32.6* 32.5* 32.5*   RDW 42.6 42.9 43.1   PLATELETCT 312 313 338   MPV 9.8 9.5 9.6     Recent Labs     12/19/22  0314 12/20/22 0317 12/21/22  0752   SODIUM 139 137 136   POTASSIUM 4.3 3.9 3.7   CHLORIDE 103 103 100   CO2 24 25 29   GLUCOSE 153* 114* 98   BUN 18 23* 20   CREATININE 1.08 0.95 1.04   CALCIUM 8.7 8.6 8.8                   Imaging  CT-ABDOMEN-PELVIS WITH   Final Result      1.  New peritoneal air consistent with perforation and likely the sigmoid colon diverticulitis      2.  Multilocular paracolic abscess anterior to the sigmoid colon with the largest component measuring 6.4 x 5.4 cm and a smaller air component present left lateral      3.  Left lower quadrant drainage catheter and appears to have retracted.      4.  Findings were discussed with FABY STUART on 12/18/2022 12:40 PM.      IR-US GUIDED PIV   Final Result    Ultrasound-guided PERIPHERAL IV INSERTION performed by    qualified nursing staff as above.      CT-DRAIN-PERITONEAL   Final Result      1.  CT guided pelvic diverticular abscess catheter drainage.   2.  The current plan is to obtain a follow-up CT in 5-7 days..      CT-ABDOMEN-PELVIS WITH   Final Result      1.  Acute sigmoid diverticulitis with adjacent 4.6 x 3.3 cm abscess.   2.  Gallbladder sludge or stones.   3.  2.1 cm hypodense left hepatic lobe lesion, likely a hemangioma, though incompletely characterized on this CT. If there is clinical concern, consider further evaluation with nonemergent MRI hepatic protocol.   4.  Nonobstructing 6 mm inferior pole left renal stone.      Findings conveyed to Dr. Cordova at 1124 via [employer] messaging on 12/14/2022.           Assessment/Plan  * Diverticulitis of intestine with perforation and abscess- (present on admission)  Assessment & Plan  IV Unasyn until  12/25/2022  CT guided pelvic diverticular abscess catheter drainage   12/14/2022  Exploratory laparotomy with drainage of pelvic abscesses and Chávez's colectomy with mobilization of the splenic flexure    12/18/2022  Pain control, encourage I-S  Diet per Surgery - to be advanced  PT and OT     Sepsis (HCC)- (present on admission)  Assessment & Plan  Source -perforated diverticulitis with abscess    Postoperative hypoxia- (present on admission)  Assessment & Plan  Encourage I-S, RT protocol    Hypomagnesemia- (present on admission)  Assessment & Plan  Follow level    Hypokalemia- (present on admission)  Assessment & Plan  Follow bmp    CHEPE (acute kidney injury) (HCC)- (present on admission)  Assessment & Plan  Follow bmp    Hyperglycemia- (present on admission)  Assessment & Plan  hgba1c 6.0    Hypertension- (present on admission)  Assessment & Plan  monitor    Class 3 severe obesity due to excess calories with serious comorbidity and body mass index (BMI) of 40.0 to 44.9 in adult (HCC)- (present on admission)  Assessment & Plan  Body mass index is 42.65 kg/m².         VTE prophylaxis: enoxaparin ppx    I have performed a physical exam and reviewed and updated ROS and Plan today (12/21/2022). In review of yesterday's note (12/20/2022), there are no changes except as documented above.

## 2022-12-21 NOTE — CARE PLAN
The patient is Stable - Low risk of patient condition declining or worsening    Shift Goals  Clinical Goals: pain control, oob activity  Patient Goals: Rest  Family Goals: n/a    Progress made toward(s) clinical / shift goals:  Pain managed per MAR and with non-pharmacological pain modalities. Patient participated in PT/OT.    Patient is not progressing towards the following goals:

## 2022-12-21 NOTE — CONSULTS
Physical Medicine and Rehabilitation Consultation  Chart Review Only               Date of initial consultation: 12/21/2022  Requesting provider: Shadi Juan MD   Consulting provider: Crystal Jones D.O.  Reason for consultation: assess for acute inpatient rehab appropriateness  LOS: 8 Day(s)    Chief complaint: Intra-abdominal abscess with diverticulitis    HPI: The patient is a 77 y.o.female with a past medical history of hypertension and history of multiple episodes of diverticulitis previously requiring IV antibiotics;  who presented on 12/13/2022  7:45 PM as a transfer from an outside hospital with concern for an intra-abdominal abscess and diverticulitis.  Per documentation, patient was originally seen at [facility] with abdominal pain.  Reportedly she had diarrhea, abdominal pain and worsening cramping and fevers prior to her presenting to the emergency department.  Upon evaluation at the outside hospital, CT abdomen showed diverticulitis with multiple 3 cm abscesses.  Patient received IV Zosyn and IV fluids.  Patient was transferred to [facility] for higher level of care.  Orthopedic surgery was consulted and patient was taken to the OR on 12/18 for ex lap and drainage of pelvic abscesses with a Chávez's colectomy performed by Dr. Asher.  Postoperatively, patient continues to have a MIGUE drain in place.  She is on supplemental oxygen at 4 L.  Patient's leukocytosis has improved from 17.512/19-9.5 on 12/21.  Infectious diseases was consulted, fluid cultures from 12/18 are growing E coli ampicillin susceptible Enterococcus AVM, Bacteroides fragilis and Streptococcus angina gnosis.  Blood cultures have been negative.  Infectious diseases is recommending to discontinue IV Zosyn and transition to IV Unasyn 3 A every 6 hours with an anticipated stop date of 12/25/2022,  With transition to Augmentin 875 g twice daily at time of discharge home.  Functionally, patient has been willing to  participate with therapy, she has been limited by a deconditioned surgical abdomen.    Social Hx:  Patient lives in a one-story house with ramp access, lives alone, patient's son lives next door and can assist  0 MURALI  At prior level of function patient was independent with mobility and ADLs      THERAPY:  Restrictions: Fall risk  PT: Functional mobility   12/20 min assist with an FW W x30 feet, mod assist bed mobility, min assist sit to stand, min assist transfers    OT: ADLs  12/20 max assist lower body dressing, max assist toileting, min assist upper body dressing, min assist sit to stand    SLP:   None    IMAGING:  CT-ABDOMEN-PELVIS WITH   Final Result       1.  New peritoneal air consistent with perforation and likely the sigmoid colon diverticulitis       2.  Multilocular paracolic abscess anterior to the sigmoid colon with the largest component measuring 6.4 x 5.4 cm and a smaller air component present left lateral       3.  Left lower quadrant drainage catheter and appears to have retracted.       4.  Findings were discussed with FABY STUART on 12/18/2022 12:40 PM.       IR-US GUIDED PIV   Final Result    Ultrasound-guided PERIPHERAL IV INSERTION performed by    qualified nursing staff as above.       CT-DRAIN-PERITONEAL   Final Result       1.  CT guided pelvic diverticular abscess catheter drainage.   2.  The current plan is to obtain a follow-up CT in 5-7 days..       CT-ABDOMEN-PELVIS WITH   Final Result       1.  Acute sigmoid diverticulitis with adjacent 4.6 x 3.3 cm abscess.   2.  Gallbladder sludge or stones.   3.  2.1 cm hypodense left hepatic lobe lesion, likely a hemangioma, though incompletely characterized on this CT. If there is clinical concern, consider further evaluation with nonemergent MRI hepatic protocol.   4.  Nonobstructing 6 mm inferior pole left renal stone.       PROCEDURES:  12/13 IR drainage  12/18 ex lap with drainage of pelvic abscesses and Chávez's colectomy performed by   "Lb      PMH:  Past Medical History:   Diagnosis Date    Arthritis     Cancer (HCC)     Cataract     Hypertension        PSH:  Past Surgical History:   Procedure Laterality Date    MD EXPLORATORY OF ABDOMEN  2022    Procedure: LAPAROTOMY, EXPLORATORY WITH OSTOMY CREATION;  Surgeon: Huseyin Asher M.D.;  Location: SURGERY [facility];  Service: General    GYN SURGERY Right 2013    Right mastecomy    TUBAL LIGATION  1975       FHX:  Family History   Problem Relation Age of Onset    Dementia Mother     Testicular Cancer Father     Skin cancer Sister     Arthritis Brother     Skin cancer Brother     Prostate cancer Brother        Medications:  Current Facility-Administered Medications   Medication Dose    lidocaine (XYLOCAINE) 2 % injection 20 mL  20 mL    Or    lidocaine (XYLOCAINE) 4 % topical solution      piperacillin-tazobactam (Zosyn) 3.375 g in  mL IVPB  3.375 g    Pharmacy Consult Request ...Pain Management Review 1 Each  1 Each    oxyCODONE immediate-release (ROXICODONE) tablet 2.5 mg  2.5 mg    Or    oxyCODONE immediate-release (ROXICODONE) tablet 5 mg  5 mg    senna-docusate (PERICOLACE or SENOKOT S) 8.6-50 MG per tablet 2 Tablet  2 Tablet    And    polyethylene glycol/lytes (MIRALAX) PACKET 1 Packet  1 Packet    And    magnesium hydroxide (MILK OF MAGNESIA) suspension 30 mL  30 mL    And    bisacodyl (DULCOLAX) suppository 10 mg  10 mg    enoxaparin (Lovenox) inj 40 mg  40 mg    acetaminophen (Tylenol) tablet 650 mg  650 mg    ondansetron (ZOFRAN) syringe/vial injection 4 mg  4 mg    ondansetron (ZOFRAN ODT) dispertab 4 mg  4 mg       Allergies:  Allergies   Allergen Reactions    Sulfa Drugs Unspecified     \"Destroyed my blood cells, I almost \"       Physical Exam:    Physical Exam:  Vitals: BP (!) 149/69   Pulse 78   Temp 36.6 °C (97.9 °F) (Temporal)   Resp 16   Ht 1.6 m (5' 3\")   Wt 109 kg (240 lb 11.9 oz)   SpO2 93%       ASSESSMENT:  Patient is a 77 y.o. female " admitted with intra-abdominal abscess    [facility] Code / Diagnosis to Support: 0016 - Debility (Non-Cardiac, Non-Pulmonary)    Rehabilitation: Impaired ADLs and mobility  Patient is a good candidate for inpatient rehab based on needs for PT, OT, see disposition details below.    Barriers to transfer include: Insurance authorization, TCCs to verify disposition, medical clearance and bed availability     Additional Recommendations:  Debility status post diverticulitis and intra-abdominal abscess  - CT abdomen at outside facility showed multiple intra-abdominal abscesses and diverticulitis  - Abscesses drained by IR on 12/13  - General surgery consulted, patient taken to the OR on 12/18 for ex lap and Chávez's colostomy  - ID consulted, patient is to stay on IV Unasyn x7 days, with stop date of 12/25  - Documentation reveals that patient continues to have abdominal wound VAC in place, type of wound VAC not listed  - Therefore wound vacs are prohibitive for IRF level therapy.  Prevena wound VAC would be okay  - Continue with PT/OT deconditioning surgical abdomen    Hypoxia  - Postoperatively patient has required supplemental O2.  She is currently on 3 L O2  - In order to be accepted to inpatient rehab patient needs to remain stable on less than 5 L supplemental O2    Dispo:  - patient is currently functioning below their level of baseline, recommend post acute rehab  - recommend IRF level therapy with 3hr of therapy 5 days per week  - piror to acceptance to IRF, will need insurance auth and confirmation that patient does not have a vera flow wound VAC in place  - TCC to assist with insurance auth and DC support       Medical Complexity:  Diverticulitis  Intra-abdominal abscess  Hypertension  Hypoxia  Hypokalemia  CHEPE  Impaired mobility and ADLs      DVT PPX: Lovenox      Thank you for allowing us to participate in the care of this patient.     I spent 35 mins non face-to-face today reviewing progress notes, images, surgery  documentation, therapy notes, and laboratory values and medications. Visit start time 5 PM, visit stop time 5:35 PM.      Crystal Jones D.O.   Physical Medicine and Rehabilitation     Please note that this dictation was created using voice recognition software. I have made every reasonable attempt to correct obvious errors, but there may be errors of grammar and possibly content that I did not discover before finalizing the note.

## 2022-12-21 NOTE — PROGRESS NOTES
Received report from previous shift RN  Assessment complete.  A&O x 4. Patient calls appropriately.  Patient ambulates with x1 assist.   Patient has 5/10 pain. Pain managed with prescribed medications.  Denies N&V. Tolerating clear liquid diet.  Surgical incision to midline with woundvac in place, CDI. Midline ostomy with appliance in place, LLQ MIGUE drain to bulb suction, CDI.  + void, - flatus, - BM.  Patient denies SOB. Spo2>95% on 3L nasal cannula.  SCD's on.  Patient tearful, emotional support provided.  Reviewed plan of care with patient. Call light and personal belongings with in reach. Hourly rounding in place. All needs met at this time.

## 2022-12-21 NOTE — DISCHARGE PLANNING
Following for post acute services Medical surgical debility Ongoing medical management ID consult pending potential return to community support her son. Will need to verify support. Physiatry to consult per protocol. Medicare provider.

## 2022-12-21 NOTE — PROGRESS NOTES
"    DATE: 12/21/2022    Post Operative Day 3 Exploratory laparotomy with drainage of pelvic abscesses and Chávez's colectomy with mobilization of the splenic flexure.     INTERVAL EVENTS:  Emotional morning. Feeling better. Adequate pain control.  Ostomy pink, viable. Flatus present. Scant hard stool.  MIGUE drain with serosanguinous drainage.  Supplemental oxygen, 4 L NC.     - Advance diet.  - Therapies recommend post acute placement.  - Mobilize to chair.  - Aggressive pulmonary hygiene with goal to wean oxygen off.     PHYSICAL EXAMINATION:  Vital Signs: BP (!) 149/69   Pulse 78   Temp 36.6 °C (97.9 °F) (Temporal)   Resp 16   Ht 1.6 m (5' 3\")   Wt 109 kg (240 lb 11.9 oz)   SpO2 93%   Physical Exam  Vitals and nursing note reviewed. Chaperone present: Family at bedside.   Constitutional:       General: She is awake.      Appearance: She is obese.      Interventions: Nasal cannula in place.   Abdominal:      General: Bowel sounds are normal. There is no distension.      Palpations: Abdomen is soft.      Tenderness: There is abdominal tenderness (At surgical incision). There is no guarding.      Comments: Wound vac, MIGUE and ostomy with flatus present and scant hard stool.   Skin:     Comments: Surgical dressing in place.   Neurological:      Mental Status: She is alert.   Psychiatric:         Behavior: Behavior is cooperative.       LABORATORY VALUES:   Recent Labs     12/19/22 0314 12/20/22 0317 12/21/22  0752   WBC 17.5* 14.1* 9.5   RBC 4.56 4.05* 4.17*   HEMOGLOBIN 12.9 11.6* 11.9*   HEMATOCRIT 39.6 35.7* 36.6*   MCV 86.8 88.1 87.8   MCH 28.3 28.6 28.5   MCHC 32.6* 32.5* 32.5*   RDW 42.6 42.9 43.1   PLATELETCT 312 313 338   MPV 9.8 9.5 9.6     Recent Labs     12/19/22 0314 12/20/22 0317 12/21/22  0752   SODIUM 139 137 136   POTASSIUM 4.3 3.9 3.7   CHLORIDE 103 103 100   CO2 24 25 29   GLUCOSE 153* 114* 98   BUN 18 23* 20   CREATININE 1.08 0.95 1.04   CALCIUM 8.7 8.6 8.8     Recent Labs     12/19/22 0314 "   ASTSGOT 30   ALTSGPT 27   TBILIRUBIN 0.9   ALKPHOSPHAT 111*   GLOBULIN 3.4            IMAGING:   CT-ABDOMEN-PELVIS WITH   Final Result      1.  New peritoneal air consistent with perforation and likely the sigmoid colon diverticulitis      2.  Multilocular paracolic abscess anterior to the sigmoid colon with the largest component measuring 6.4 x 5.4 cm and a smaller air component present left lateral      3.  Left lower quadrant drainage catheter and appears to have retracted.      4.  Findings were discussed with FABY STUART on 12/18/2022 12:40 PM.      IR-US GUIDED PIV   Final Result    Ultrasound-guided PERIPHERAL IV INSERTION performed by    qualified nursing staff as above.      CT-DRAIN-PERITONEAL   Final Result      1.  CT guided pelvic diverticular abscess catheter drainage.   2.  The current plan is to obtain a follow-up CT in 5-7 days..      CT-ABDOMEN-PELVIS WITH   Final Result      1.  Acute sigmoid diverticulitis with adjacent 4.6 x 3.3 cm abscess.   2.  Gallbladder sludge or stones.   3.  2.1 cm hypodense left hepatic lobe lesion, likely a hemangioma, though incompletely characterized on this CT. If there is clinical concern, consider further evaluation with nonemergent MRI hepatic protocol.   4.  Nonobstructing 6 mm inferior pole left renal stone.      Findings conveyed to Dr. Cordova at 1124 via [employer] messaging on 12/14/2022.          Labs reviewed  Zaragoza catheter: No Zaragoza      DVT Prophylaxis: Enoxaparin (Lovenox)  DVT prophylaxis - mechanical: SCDs        ASSESSMENT AND PLAN:   * Diverticulitis of intestine with perforation and abscess- (present on admission)  Assessment & Plan  12/13 IR drainage  12/18 Follow up CT with evidence of perforation and worsening abscess.  -Exploratory laparotomy with drainage of pelvic abscesses and Chávez's colectomy with mobilization of the splenic flexure  12/20 Ostomy viable, no stool or gas. MIGUE with s/s drainage.  12/21 Flatus present, scant stool.   -  Advanced diet to Full liquid.        Discussed patient condition with Family, RN, Patient, and general surgery, Dr. Burk.

## 2022-12-21 NOTE — PROGRESS NOTES
Report received. Assumed care. Pt in bed sitting up and talking with her daughter.  A/O x4. VSS. Responds appropriately. Denies pain, SOB. Assessment complete. Discussed POC, , pt verbalizes understanding. Explained importance of calling before getting OOB. Call light and belongings within reach. Bed alarm on. Bed in the lowest position. Treaded socks in place. Hourly rounding in progress. Will continue to monitor .

## 2022-12-21 NOTE — CARE PLAN
The patient is Stable - Low risk of patient condition declining or worsening    Shift Goals  Clinical Goals: Pain control and rest  Patient Goals: rest  Family Goals: N/A    Progress made toward(s) clinical / shift goals:  ABX, Dressing asssessment, pain control and rest.    Patient is progressing towards the following goals:    Problem: Mobility  Goal: Patient's capacity to carry out activities will improve  Outcome: Progressing  Note: Educated patient on important of ambulation. Patient stated she got up 6 different times today with PT and OT. She is feeling better but very tired.      Problem: Wound/ / Incision Healing  Goal: Patient's wound/surgical incision will decrease in size and heals properly  Outcome: Progressing  Note: Dressing is in tact and hooked up to wound VAC      Problem: Pain - Standard  Goal: Alleviation of pain or a reduction in pain to the patient’s comfort goal  Outcome: Progressing  Note: Patient pain in under control per MAR

## 2022-12-21 NOTE — CONSULTS
INFECTIOUS DISEASES INPATIENT CONSULT NOTE     Date of Service: 12/21/2022    Consult Requested By: Shadi Juan M.D.    Reason for Consultation: Perforated diverticulitis, abscess    History of Present Illness:   Maira Johnson is a 77 y.o. man with a history of hypertension and diverticulitis (last episode in 2015) admitted 12/13/2022 from [facility] secondary to abdominal pain.  Patient states that she was starting to feel unwell approximately 1 week prior to presentation at the outside hospital.  He subsequently developed worsening abdominal pain as well as diarrhea and fevers.  She denied any nausea or vomiting.  No cough, or shortness of breath.  She was initially seen at the outside facility however pt transferred to [facility] for higher level of care.  Her last colonoscopy was back in 2018 which was reportedly normal.  On presentation, she was afebrile with a leukocytosis of 14.  Patient was started on Zosyn.  CT scan showed acute sigmoid diverticulitis with an adjacent 4.6 x 3.3 cm abscess.  Patient underwent CT-guided pelvic diverticular abscess catheter drainage on 12/14/2022.  Fluid cultures from 12/14 positive for E. coli.  She underwent a repeat CT scan of the abdomen pelvis given worsening abdominal pain and increased leukocytosis on 12/18.  CT scan showed new peritoneal air consistent with perforation and again sigmoid colon diverticulitis with a multilocular pericolic abscess anterior to the sigmoid colon measuring 6.4 x 5.4 cm.  She was evaluated by surgery and she underwent exploratory laparotomy with drainage of pelvic abscesses and Chávez's colectomy with mobilization of the splenic flexure on 12/18/2022.  The operative note, there was a phlegmon in the pelvis with a mass with significant inflammation and multiple abscesses were broken up and drained.  Fluid cultures from 12/18 are growing E. coli, ampicillin susceptible Enterococcus avium, Bacteroides fragilis and  Streptococcus anginosus.  Blood cultures on admission are negative to date.  Infectious disease service consulted for antibiotic recommendations.    All other review of systems reviewed and negative except those documented above in the HPI.     PMH:   Past Medical History:   Diagnosis Date    Arthritis     Cancer (HCC)     Cataract     Hypertension    Multiple episodes of diverticulitis    PSH:  Past Surgical History:   Procedure Laterality Date    NH EXPLORATORY OF ABDOMEN  12/18/2022    Procedure: LAPAROTOMY, EXPLORATORY WITH OSTOMY CREATION;  Surgeon: Huseyin Asher M.D.;  Location: SURGERY [facility];  Service: General    GYN SURGERY Right 11/01/2013    Right mastecomy    TUBAL LIGATION  01/01/1975       FAMILY HX:  Family History   Problem Relation Age of Onset    Dementia Mother     Testicular Cancer Father     Skin cancer Sister     Arthritis Brother     Skin cancer Brother     Prostate cancer Brother        SOCIAL HX:  Social History     Socioeconomic History    Marital status:      Spouse name: Not on file    Number of children: Not on file    Years of education: Not on file    Highest education level: Not on file   Occupational History    Not on file   Tobacco Use    Smoking status: Never     Passive exposure: Never    Smokeless tobacco: Never   Vaping Use    Vaping Use: Never used   Substance and Sexual Activity    Alcohol use: Never    Drug use: Never    Sexual activity: Not on file   Other Topics Concern    Not on file   Social History Narrative    Not on file     Social Determinants of Health     Financial Resource Strain: Not on file   Food Insecurity: Not on file   Transportation Needs: Not on file   Physical Activity: Not on file   Stress: Not on file   Social Connections: Not on file   Intimate Partner Violence: Not on file   Housing Stability: Not on file     Social History     Tobacco Use   Smoking Status Never    Passive exposure: Never   Smokeless Tobacco Never     Social History  "    Substance and Sexual Activity   Alcohol Use Never       Allergies/Intolerances:  Allergies   Allergen Reactions    Sulfa Drugs Unspecified     \"Destroyed my blood cells, I almost \"       History reviewed with the patient     Other Current Medications:    Current Facility-Administered Medications:     lidocaine (XYLOCAINE) 2 % injection 20 mL, 20 mL, Topical, QDAY PRN **OR** lidocaine (XYLOCAINE) 4 % topical solution, , Topical, QDAY PRN, Kye Plasencia M.D., 1 Application at 22 1135    [DISCONTINUED] piperacillin-tazobactam (Zosyn) 3.375 g in  mL IVPB, 3.375 g, Intravenous, Once **AND** piperacillin-tazobactam (Zosyn) 3.375 g in  mL IVPB, 3.375 g, Intravenous, Q8HRS, DAYTON Gonzalez, Stopped at 22 0701    Pharmacy Consult Request ...Pain Management Review 1 Each, 1 Each, Other, PHARMACY TO DOSE, Alberta Olvera D.O.    oxyCODONE immediate-release (ROXICODONE) tablet 2.5 mg, 2.5 mg, Oral, Q3HRS PRN, 2.5 mg at 22 2221 **OR** oxyCODONE immediate-release (ROXICODONE) tablet 5 mg, 5 mg, Oral, Q3HRS PRN, 5 mg at 22 0914 **OR** [DISCONTINUED] HYDROmorphone (Dilaudid) injection 0.25 mg, 0.25 mg, Intravenous, Q3HRS PRN, Alberta Olvera D.O.    senna-docusate (PERICOLACE or SENOKOT S) 8.6-50 MG per tablet 2 Tablet, 2 Tablet, Oral, BID, 2 Tablet at 22 0600 **AND** polyethylene glycol/lytes (MIRALAX) PACKET 1 Packet, 1 Packet, Oral, QDAY PRN **AND** magnesium hydroxide (MILK OF MAGNESIA) suspension 30 mL, 30 mL, Oral, QDAY PRN **AND** bisacodyl (DULCOLAX) suppository 10 mg, 10 mg, Rectal, QDAY PRN, Barbie Cordova M.D.    enoxaparin (Lovenox) inj 40 mg, 40 mg, Subcutaneous, DAILY AT 1800, Barbie Cordova M.D., 40 mg at 22 1630    acetaminophen (Tylenol) tablet 650 mg, 650 mg, Oral, Q6HRS PRN, Barbie Cordova M.D., 650 mg at 22 0701    ondansetron (ZOFRAN) syringe/vial injection 4 mg, 4 mg, Intravenous, Q4HRS PRN, Barbie Cordova M.D.    " "ondansetron (ZOFRAN ODT) dispertab 4 mg, 4 mg, Oral, Q4HRS PRN, Barbie Cordova M.D.  [unfilled]    Most Recent Vital Signs:  BP (!) 149/69   Pulse 78   Temp 36.6 °C (97.9 °F) (Temporal)   Resp 16   Ht 1.6 m (5' 3\")   Wt 109 kg (240 lb 11.9 oz)   SpO2 93%   BMI 42.65 kg/m²   Temp  Av.7 °C (98 °F)  Min: 36.1 °C (97 °F)  Max: 37.6 °C (99.7 °F)    Physical Exam:  General: well nourished, no diaphoresis, well-appearing, no acute distress  HEENT: sclera anicteric, PERRL, extraocular muscles intact, mucous membranes moist, oropharynx clear and moist, no oral lesions or exudate  Neck: supple, no lymphadenopathy  Chest: CTAB, no rales, rhonchi or wheezes, normal work of breathing.  Cardiac: regular rate and rhythm, normal S1 S2, no murmurs, rubs or gallops  Abdomen: Midline surgical site with wound VAC in place.  MIGUE drain also in place.  Diffusely tender to palpation.  Extremities: WWP, no edema, 2+ pedal pulses  Skin: warm and dry, no rashes or worrisome lesions  Neuro: Alert and oriented times 3, non-focal exam, speech fluent, full range of motion to bilateral upper and lower extremities  Psych: normal mood and behavior, pleasant; memory intact, normal judgement    Pertinent Lab Results:  Recent Labs     22  0752   WBC 17.5* 14.1* 9.5      Recent Labs     22  03122  03122  0752   HEMOGLOBIN 12.9 11.6* 11.9*   HEMATOCRIT 39.6 35.7* 36.6*   MCV 86.8 88.1 87.8   MCH 28.3 28.6 28.5   PLATELETCT 312 313 338         Recent Labs     22  03122  03122  0752   SODIUM 139 137 136   POTASSIUM 4.3 3.9 3.7   CHLORIDE 103 103 100   CO2 24 25 29   CREATININE 1.08 0.95 1.04        Recent Labs     22  0314   ALBUMIN 2.7*        Pertinent Micro:  Results       Procedure Component Value Units Date/Time    CULTURE WOUND W/ GRAM STAIN [[id number]]  (Abnormal)  (Susceptibility) Collected: 22 1459    Order Status: Completed " Specimen: Wound Updated: 12/21/22 0953     Significant Indicator POS     Source WND     Site Pelvic Abscess     Culture Result -     Gram Stain Result Many WBCs.  Many Gram positive cocci.  Rare Gram positive rods.       Culture Result Escherichia coli  Moderate growth        Enterococcus avium  Moderate growth        Streptococcus anginosus  Moderate growth      Narrative:      Surgery - swabs received    Susceptibility       Escherichia coli (1)       Antibiotic Interpretation Microscan   Method Status    Ampicillin Sensitive <=8 mcg/mL JOSE Final    Ceftriaxone Sensitive <=1 mcg/mL JOSE Final    Cefazolin Sensitive <=2 mcg/mL JOSE Final    Ciprofloxacin Sensitive <=0.25 mcg/mL JOSE Final    Cefepime Sensitive <=2 mcg/mL JOSE Final    Cefuroxime Sensitive <=4 mcg/mL JOSE Final    Ertapenem Sensitive <=0.5 mcg/mL JOSE Final    Gentamicin Sensitive <=2 mcg/mL JOSE Final    Ampicillin/sulbactam Sensitive <=4/2 mcg/mL JOSE Final    Minocycline Sensitive <=4 mcg/mL JOSE Final    Moxifloxacin Sensitive <=2 mcg/mL JOSE Final    Pip/Tazobactam Sensitive <=8 mcg/mL JOSE Final    Trimeth/Sulfa Sensitive 2/38 mcg/mL JOSE Final    Tigecycline Sensitive <=2 mcg/mL JOSE Final    Tobramycin Sensitive <=2 mcg/mL JOSE Final              Enterococcus avium (2)       Antibiotic Interpretation Microscan   Method Status    Ampicillin Sensitive <=2 mcg/mL JOSE Final    Vancomycin Sensitive 0.5 mcg/mL JOSE Final    Daptomycin Sensitive <=0.5 mcg/mL JOSE Final    Gent Synergy Sensitive <=500 mcg/mL JOSE Final    Penicillin Sensitive 1 mcg/mL JOSE Final                       Anaerobic Culture [[id number]]  (Abnormal) Collected: 12/18/22 1459    Order Status: Completed Specimen: Wound Updated: 12/21/22 0953     Significant Indicator POS     Source WND     Site Pelvic Abscess     Culture Result -      Bacteroides fragilis  Heavy growth      Narrative:      Surgery - swabs received    Fungal Culture [[id number]] Collected: 12/18/22 1459    Order Status: Completed  "Specimen: Wound Updated: 12/21/22 0953     Significant Indicator NEG     Source WND     Site Pelvic Abscess     Culture Result No fungal growth to date.     Fungal Smear Results No fungal elements seen.    Narrative:      Surgery - swabs received    Fungal Smear [[id number]] Collected: 12/18/22 1459    Order Status: Completed Specimen: Wound Updated: 12/20/22 1035     Significant Indicator NEG     Source WND     Site Pelvic Abscess     Fungal Smear Results No fungal elements seen.    Narrative:      Surgery - swabs received    GRAM STAIN [[id number]] Collected: 12/18/22 1459    Order Status: Completed Specimen: Wound Updated: 12/19/22 0527     Significant Indicator .     Source WND     Site Pelvic Abscess     Gram Stain Result Many WBCs.  Many Gram positive cocci.  Rare Gram positive rods.      Narrative:      Surgery - swabs received    BLOOD CULTURE [[id number]] Collected: 12/13/22 2047    Order Status: Completed Specimen: Blood from Peripheral Updated: 12/18/22 2300     Significant Indicator NEG     Source BLD     Site PERIPHERAL     Culture Result No growth after 5 days of incubation.    Narrative:      Per Hospital Policy: Only change Specimen Src: to \"Line\" if  specified by physician order.  Left Hand    BLOOD CULTURE [[id number]] Collected: 12/13/22 2222    Order Status: Completed Specimen: Blood from Peripheral Updated: 12/18/22 2300     Significant Indicator NEG     Source BLD     Site PERIPHERAL     Culture Result No growth after 5 days of incubation.    Narrative:      Per Hospital Policy: Only change Specimen Src: to \"Line\" if  specified by physician order.  Left Hand    CULTURE WOUND W/ GRAM STAIN [[id number]]  (Abnormal)  (Susceptibility) Collected: 12/14/22 1650    Order Status: Completed Specimen: Wound Updated: 12/16/22 0937     Significant Indicator POS     Source WND     Site Peritoneal Abscess     Culture Result -     Gram Stain Result Many WBCs.  No organisms seen.       Culture Result Escherichia " coli  Rare growth      Susceptibility       Escherichia coli (1)       Antibiotic Interpretation Microscan   Method Status    Ampicillin Sensitive <=8 mcg/mL JOSE Final    Ceftriaxone Sensitive <=1 mcg/mL JOSE Final    Cefazolin Sensitive <=2 mcg/mL JOSE Final    Ciprofloxacin Sensitive <=0.25 mcg/mL JOSE Final    Cefepime Sensitive <=2 mcg/mL JOSE Final    Cefuroxime Sensitive <=4 mcg/mL JOSE Final    Ampicillin/sulbactam Sensitive <=4/2 mcg/mL JOSE Final    Ertapenem Sensitive <=0.5 mcg/mL JOSE Final    Tobramycin Sensitive <=2 mcg/mL JOSE Final    Gentamicin Sensitive <=2 mcg/mL JOSE Final    Minocycline Sensitive <=4 mcg/mL JOSE Final    Moxifloxacin Sensitive <=2 mcg/mL JOSE Final    Pip/Tazobactam Sensitive <=8 mcg/mL JOSE Final    Trimeth/Sulfa Sensitive 2/38 mcg/mL JOSE Final    Tigecycline Sensitive <=2 mcg/mL JOSE Final                       GRAM STAIN [[id number]] Collected: 12/14/22 1650    Order Status: Completed Specimen: Wound Updated: 12/15/22 0223     Significant Indicator .     Source WND     Site Peritoneal Abscess     Gram Stain Result Many WBCs.  No organisms seen.      FLUID CULTURE W/GRAM STAIN [[id number]]     Order Status: Canceled Specimen: Other Body Fluid           No results found for: BLOODCULTU, BLDCULT, BCHOLD     Studies:  CT-ABDOMEN-PELVIS WITH    Result Date: 12/18/2022 12/18/2022 10:40 AM HISTORY/REASON FOR EXAM:  diverticulitis with abscess s/p drain worsening leukocytosis and abdominal pain. TECHNIQUE/EXAM DESCRIPTION:   CT scan of the abdomen and pelvis with contrast. Contrast-enhanced helical scanning was obtained from the diaphragmatic domes through the pubic symphysis following the bolus administration of nonionic contrast without complication. 100 mL of Omnipaque 350 nonionic contrast was administered without complication. Low dose optimization technique was utilized for this CT exam including automated exposure control and adjustment of the mA and/or kV according to patient size.  COMPARISON: CT abdomen and pelvis 12/14/2022 FINDINGS: LUNGS: Mild atelectasis and otherwise clear. Follow-up is recommended around. Osseous structures:  No significant finding. Abdomen: In the right paramedian anterior pelvis there is an extraluminal fluid collection containing gas measuring 6.4 x 5.4 cm. There is an adjacent air collection present. Findings are consistent with an abscess and there is probably remi perforation of the sigmoid colon. Left lower quadrant drainage catheters present and that is not within a fluid collection. It has probably retracted. LIVER: There is a simple cyst within the left lobe of the liver. GALLBLADDER and BILIARY SYSTEM The gallbladder is normal in CT appearance. There is no biliary dilation. SPLEEN: Normal in appearance. PANCREAS: The pancreas is normal in appearance. The splenic vein and portal vein enhance normally. ADRENAL glands: Normal in appearance. RIGHT kidney: Normal in appearance. LEFT kidney: There is a nonobstructed 6 mm lower pole calculus. There is no hydronephrosis. BOWEL and MESENTERY: Bowel and mesentery are normal in appearance.. AORTA and VASCULATURE: Normal in appearance.. Pelvis: Within normal limits. .     1.  New peritoneal air consistent with perforation and likely the sigmoid colon diverticulitis 2.  Multilocular paracolic abscess anterior to the sigmoid colon with the largest component measuring 6.4 x 5.4 cm and a smaller air component present left lateral 3.  Left lower quadrant drainage catheter and appears to have retracted. 4.  Findings were discussed with FABY STUART on 12/18/2022 12:40 PM.    CT-ABDOMEN-PELVIS WITH    Result Date: 12/14/2022 12/14/2022 10:39 AM HISTORY/REASON FOR EXAM:  Sepsis; Evaluate intra-abdominal abscess. TECHNIQUE/EXAM DESCRIPTION:   CT scan of the abdomen and pelvis with contrast. Contrast-enhanced helical scanning was obtained from the diaphragmatic domes through the pubic symphysis following the bolus  administration of nonionic contrast without complication. 100 mL of Omnipaque 350 nonionic contrast was administered without complication. Low dose optimization technique was utilized for this CT exam including automated exposure control and adjustment of the mA and/or kV according to patient size. COMPARISON: No prior studies available. FINDINGS: Lower Chest: Unremarkable. Liver: 2.1 cm left hepatic lobe hypodense lesion with likely early peripheral nodular enhancement. Spleen: Unremarkable. Pancreas: Fatty atrophy of the pancreas. Gallbladder: Gallbladder sludge or stones.. Biliary: Nondilated. Adrenal glands: Normal. Kidneys: Nonobstructing 6 mm inferior pole left renal stone.. Bowel: Diffuse colonic diverticulosis. There is a 4.6 x 3.3 cm rim-enhancing fluid collection adjacent to the sigmoid colon in the pelvis. There are inflammatory changes in the pelvis. The sigmoid colon is thickened. Lymph nodes: No adenopathy. Vasculature: Atherosclerotic changes. Peritoneum: Unremarkable without ascites. Musculoskeletal: No acute or destructive process. Pelvis: No adenopathy or free fluid.     1.  Acute sigmoid diverticulitis with adjacent 4.6 x 3.3 cm abscess. 2.  Gallbladder sludge or stones. 3.  2.1 cm hypodense left hepatic lobe lesion, likely a hemangioma, though incompletely characterized on this CT. If there is clinical concern, consider further evaluation with nonemergent MRI hepatic protocol. 4.  Nonobstructing 6 mm inferior pole left renal stone. Findings conveyed to Dr. Cordova at 1124 via Voalte messaging on 12/14/2022.    CT-DRAIN-PERITONEAL    Result Date: 12/14/2022 12/14/2022 3:59 PM HISTORY/REASON FOR EXAM: Diverticular abscess. TECHNIQUE/EXAM DESCRIPTION: LLQ pelvic abscess drainage with CT guidance. Low dose optimization technique was utilized for this CT exam including automated exposure control and adjustment of the mA and/or kV according to patient size. PROCEDURE: Informed consent was obtained.  Localizing CT images were obtained with the patient in supine position. The skin was prepped with Betadine and draped in a sterile fashion. Following local anesthesia with 1% Lidocaine, a 17 G guiding needle was placed and needle path confirmed with CT. An Amplatz guidewire was placed and following serial tract dilatation, a 10 Belgian pigtail locking catheter was placed. A specimen was collected and submitted for culture and sensitivity and Gram stain. Total of 3 mL reddish purulent fluid was drained. The catheter was secured to the skin and connected to suction bulb drainage. Final CT images were obtained documenting catheter position. The patient tolerated the procedure well with no evidence of complication. COMPARISON: CT scan abdomen/pelvis 12/14/2022 FINDINGS: The final CT images show satisfactory catheter position within the target collection.     1.  CT guided pelvic diverticular abscess catheter drainage. 2.  The current plan is to obtain a follow-up CT in 5-7 days..    IR-US GUIDED PIV    Result Date: 12/16/2022  EXAMINATION:                                                                    HISTORY/REASON FOR EXAM:  Ultrasound Guided PIV.  TECHNIQUE/EXAM DESCRIPTION AND NUMBER OF VIEWS:  Peripheral IV insertion with ultrasound guidance.  The procedure was prepared using maximal sterile barrier technique including sterile gown, mask, cap, and donning of sterile gloves following appropriate hand hygiene and/or sterile scrub. Patient skin site was prepped with 2% Chlorhexidine solution.   FINDINGS: Peripheral IV insertion with Ultrasound Guidance was performed by qualified imaging nursing staff without the assistance of a Radiologist.      Ultrasound-guided PERIPHERAL IV INSERTION performed by qualified nursing staff as above.      IMPRESSION:   1.  Perforated diverticulitis with abscesses status post exploratory laparotomy and drainage of pelvic abscess  2.  Polymicrobial bacterial infection      PLAN:    Maira Johnson is a 77 y.o. woman  with a history of hypertension and recurrent diverticulitis admitted on 12/13/2022 from an outside hospital where she presented with abdominal pain.and diarrhea.  She was found to be acute sigmoid diverticulitis with a 4.6 x 3.3 cm abscess status post drain placement on 12/14.  Hospital course complicated by increasing leukocytosis and worsening abdominal pain for which a repeat CT on 12/18 showed perforation and a multilocular paracolic abscess anterior to the sigmoid colon.  Patient is now status post exploratory laparotomy with drainage of pelvic abscesses and Chávez's colectomy with mobilization of the splenic flexure on 12/18/2022.  Operative cultures on 12/18 are polymicrobial, including strep anginosis, Enterococcus avium, Bacteroides fragilis and E. coli.  Prior drain cultures on 12/14 also with E. coli.  Blood cultures on admission are negative.    -Discontinue IV Zosyn  -Transition to IV Unasyn 3 g every 6 hours  -Anticipate a 7-day course of antibiotics postop since source control achieved with stop date of 12/25/2022  -If patient discharged prior to the antibiotic stop date, she can be discharged on Augmentin 875 mg twice a day to complete antibiotic course      Plan of care discussed with VERONICA Juan M.D.. ID signing off.  Please reconsult if needed    Danni Adams M.D.      Please note that this dictation was created using voice recognition software. I have worked with technical experts from [facility] to optimize the interface.  I have made every reasonable attempt to correct obvious errors, but there may be errors of grammar and possibly content that I did not discover before finalizing the note.

## 2022-12-22 LAB
ANION GAP SERPL CALC-SCNC: 7 MMOL/L (ref 7–16)
BUN SERPL-MCNC: 17 MG/DL (ref 8–22)
CALCIUM SERPL-MCNC: 8.5 MG/DL (ref 8.5–10.5)
CHLORIDE SERPL-SCNC: 103 MMOL/L (ref 96–112)
CO2 SERPL-SCNC: 27 MMOL/L (ref 20–33)
CREAT SERPL-MCNC: 0.75 MG/DL (ref 0.5–1.4)
ERYTHROCYTE [DISTWIDTH] IN BLOOD BY AUTOMATED COUNT: 42.6 FL (ref 35.9–50)
GFR SERPLBLD CREATININE-BSD FMLA CKD-EPI: 82 ML/MIN/1.73 M 2
GLUCOSE SERPL-MCNC: 103 MG/DL (ref 65–99)
HCT VFR BLD AUTO: 36 % (ref 37–47)
HGB BLD-MCNC: 11.7 G/DL (ref 12–16)
MCH RBC QN AUTO: 28.5 PG (ref 27–33)
MCHC RBC AUTO-ENTMCNC: 32.5 G/DL (ref 33.6–35)
MCV RBC AUTO: 87.8 FL (ref 81.4–97.8)
PLATELET # BLD AUTO: 322 K/UL (ref 164–446)
PMV BLD AUTO: 9.4 FL (ref 9–12.9)
POTASSIUM SERPL-SCNC: 4 MMOL/L (ref 3.6–5.5)
RBC # BLD AUTO: 4.1 M/UL (ref 4.2–5.4)
SODIUM SERPL-SCNC: 137 MMOL/L (ref 135–145)
WBC # BLD AUTO: 9.3 K/UL (ref 4.8–10.8)

## 2022-12-22 PROCEDURE — 700111 HCHG RX REV CODE 636 W/ 250 OVERRIDE (IP)

## 2022-12-22 PROCEDURE — 700105 HCHG RX REV CODE 258: Performed by: INTERNAL MEDICINE

## 2022-12-22 PROCEDURE — 85027 COMPLETE CBC AUTOMATED: CPT

## 2022-12-22 PROCEDURE — 700105 HCHG RX REV CODE 258

## 2022-12-22 PROCEDURE — 700111 HCHG RX REV CODE 636 W/ 250 OVERRIDE (IP): Performed by: INTERNAL MEDICINE

## 2022-12-22 PROCEDURE — 700102 HCHG RX REV CODE 250 W/ 637 OVERRIDE(OP): Performed by: INTERNAL MEDICINE

## 2022-12-22 PROCEDURE — 770001 HCHG ROOM/CARE - MED/SURG/GYN PRIV*

## 2022-12-22 PROCEDURE — 700102 HCHG RX REV CODE 250 W/ 637 OVERRIDE(OP): Performed by: FAMILY MEDICINE

## 2022-12-22 PROCEDURE — A9270 NON-COVERED ITEM OR SERVICE: HCPCS | Performed by: FAMILY MEDICINE

## 2022-12-22 PROCEDURE — A9270 NON-COVERED ITEM OR SERVICE: HCPCS | Performed by: INTERNAL MEDICINE

## 2022-12-22 PROCEDURE — 36415 COLL VENOUS BLD VENIPUNCTURE: CPT

## 2022-12-22 PROCEDURE — 80048 BASIC METABOLIC PNL TOTAL CA: CPT

## 2022-12-22 PROCEDURE — 99232 SBSQ HOSP IP/OBS MODERATE 35: CPT | Performed by: FAMILY MEDICINE

## 2022-12-22 PROCEDURE — 99024 POSTOP FOLLOW-UP VISIT: CPT

## 2022-12-22 RX ORDER — METOPROLOL SUCCINATE 25 MG/1
25 TABLET, EXTENDED RELEASE ORAL
Status: DISCONTINUED | OUTPATIENT
Start: 2022-12-22 | End: 2022-12-23 | Stop reason: HOSPADM

## 2022-12-22 RX ORDER — AMOXICILLIN AND CLAVULANATE POTASSIUM 875; 125 MG/1; MG/1
1 TABLET, FILM COATED ORAL EVERY 12 HOURS
Status: DISCONTINUED | OUTPATIENT
Start: 2022-12-22 | End: 2022-12-22

## 2022-12-22 RX ADMIN — METOPROLOL SUCCINATE 25 MG: 25 TABLET, EXTENDED RELEASE ORAL at 08:51

## 2022-12-22 RX ADMIN — SENNOSIDES AND DOCUSATE SODIUM 2 TABLET: 50; 8.6 TABLET ORAL at 06:06

## 2022-12-22 RX ADMIN — SENNOSIDES AND DOCUSATE SODIUM 2 TABLET: 50; 8.6 TABLET ORAL at 17:20

## 2022-12-22 RX ADMIN — ENOXAPARIN SODIUM 40 MG: 40 INJECTION SUBCUTANEOUS at 17:20

## 2022-12-22 RX ADMIN — AMPICILLIN AND SULBACTAM 3 G: 2; 1 INJECTION, POWDER, FOR SOLUTION INTRAVENOUS at 00:30

## 2022-12-22 RX ADMIN — AMPICILLIN AND SULBACTAM 3 G: 1; 2 INJECTION, POWDER, FOR SOLUTION INTRAMUSCULAR; INTRAVENOUS at 17:21

## 2022-12-22 RX ADMIN — AMPICILLIN AND SULBACTAM 3 G: 2; 1 INJECTION, POWDER, FOR SOLUTION INTRAVENOUS at 06:06

## 2022-12-22 RX ADMIN — AMPICILLIN AND SULBACTAM 3 G: 1; 2 INJECTION, POWDER, FOR SOLUTION INTRAMUSCULAR; INTRAVENOUS at 11:42

## 2022-12-22 RX ADMIN — AMPICILLIN AND SULBACTAM 3 G: 1; 2 INJECTION, POWDER, FOR SOLUTION INTRAMUSCULAR; INTRAVENOUS at 23:40

## 2022-12-22 ASSESSMENT — ENCOUNTER SYMPTOMS
PALPITATIONS: 0
DIZZINESS: 0
WHEEZING: 0
NAUSEA: 1
COUGH: 0
SHORTNESS OF BREATH: 0
FEVER: 0
SORE THROAT: 0
ABDOMINAL PAIN: 1
NECK PAIN: 0
DIARRHEA: 0
FOCAL WEAKNESS: 0
SPEECH CHANGE: 0
MYALGIAS: 0
FLANK PAIN: 0
NERVOUS/ANXIOUS: 0
DIAPHORESIS: 0
CHILLS: 0
HEADACHES: 0
SENSORY CHANGE: 0
BACK PAIN: 0
WEAKNESS: 1
BLURRED VISION: 0
HEARTBURN: 0
VOMITING: 0

## 2022-12-22 ASSESSMENT — PAIN DESCRIPTION - PAIN TYPE
TYPE: ACUTE PAIN
TYPE: ACUTE PAIN

## 2022-12-22 NOTE — DISCHARGE PLANNING
Following for post acute services Dr. Jones consult recommending IRF level of care. Need to verify choice and return to community support. Will follow for medical clearance.

## 2022-12-22 NOTE — WOUND TEAM
[facility]  Inpatient Services  \Wound and Skin Care Evaluation    Admission Date: 12/13/2022     Last order of IP CONSULT TO WOUND CARE was found on 12/18/2022 from Hospital Encounter on 12/13/2022     HPI, PMH, SH: Reviewed    Past Surgical History:   Procedure Laterality Date    [place] EXPLORATORY OF ABDOMEN  12/18/2022    Procedure: LAPAROTOMY, EXPLORATORY WITH OSTOMY CREATION;  Surgeon: Huseyin Asher M.D.;  Location: SURGERY [facility];  Service: General    GYN SURGERY Right 11/01/2013    Right mastecomy    TUBAL LIGATION  01/01/1975     Social History     Tobacco Use    Smoking status: Never     Passive exposure: Never    Smokeless tobacco: Never   Substance Use Topics    Alcohol use: Never     No chief complaint on file.    Diagnosis: Diverticulitis [K57.92]  Intra-abdominal abscess (HCC) [K65.1]    Unit where seen by Wound Team: [id number]/00     WOUND CONSULT/FOLLOW UP RELATED TO:  Abdomen vac change and new ostomy     WOUND HISTORY:  Pt is a 77yr old female with history of HTN and diverticulitis who presented with abdominal pain. Pt initially presented to [facility]. Pt has multiple episodes of diverticulitis which was treated with IV ABX. Pt was found to have perforated sigmoid diverticulitis with pelvic abscess and pneumoperitoneum. Pt underwent surgical intervention. Fascia was closed but skin was left open and wound was requested to place vac and begin ostomy education.    WOUND ASSESSMENT/LDA  Negative Pressure Wound Therapy 12/19/22 Surgical Abdomen (Active)   Vacuum Serial Number [id number]    NPWT Pump Mode / Pressure Setting Ulta;Continuous;125 mmHg    Dressing Type Medium;Black Foam (Regular)    Number of Foam Pieces Used 2    Canister Changed No    NEXT Dressing Change/Treatment Date 12/23/22      Wound 12/18/22 Full Thickness Wound Abdomen Mid (Active)   Wound Image      12/19/22 1200   Site Assessment Painful;Granulation tissue;Yellow    Periwound Assessment  Clean;Dry;Intact    Margins Defined edges;Unattached edges    Closure Secondary intention    Drainage Amount Scant    Drainage Description Serosanguineous    Treatments Cleansed;Topical Lidocaine    Wound Cleansing Normal Saline Irrigation    Periwound Protectant Skin Protectant Wipes to Periwound;Paste Ring;Drape    Dressing Cleansing/Solutions Not Applicable    Dressing Options Wound Vac    Dressing Changed Changed    Dressing Status Clean;Dry;Intact    Dressing Change/Treatment Frequency Monday, Wednesday, Friday, and As Needed    NEXT Dressing Change/Treatment Date 12/23/22    NEXT Weekly Photo (Inpatient Only) 12/28/22    Non-staged Wound Description Full thickness    Wound Length (cm) 16 cm 12/19/22 1200   Wound Width (cm) 4.6 cm 12/19/22 1200   Wound Depth (cm) 5.3 cm 12/19/22 1200   Wound Surface Area (cm^2) 73.6 cm^2 12/19/22 1200   Wound Volume (cm^3) 390.08 cm^3 12/19/22 1200   Shape Linear    Wound Odor None    Exposed Structures Adipose    WOUND NURSE ONLY - Time Spent with Patient (mins) 60      Vascular:    MEME:   No results found.    Lab Values:    Lab Results   Component Value Date/Time    WBC 9.5 12/21/2022 07:52 AM    RBC 4.17 (L) 12/21/2022 07:52 AM    HEMOGLOBIN 11.9 (L) 12/21/2022 07:52 AM    HEMATOCRIT 36.6 (L) 12/21/2022 07:52 AM    CREACTPROT 10.84 (H) 12/14/2022 02:24 AM    HBA1C 6.0 (H) 12/21/2022 07:52 AM        Culture Results show:  Recent Results (from the past 720 hour(s))   CULTURE WOUND W/ GRAM STAIN    Collection Time: 12/18/22  2:59 PM    Specimen: Wound   Result Value Ref Range    Significant Indicator POS (POS)     Source WND     Site Pelvic Abscess     Culture Result - (A)     Gram Stain Result       Many WBCs.  Many Gram positive cocci.  Rare Gram positive rods.      Culture Result Escherichia coli  Moderate growth   (A)     Culture Result Enterococcus avium  Moderate growth   (A)     Culture Result Streptococcus anginosus  Moderate growth   (A)        Susceptibility     Enterococcus avium - JOSE     Ampicillin <=2 Sensitive mcg/mL     Vancomycin 0.5 Sensitive mcg/mL     Daptomycin <=0.5 Sensitive mcg/mL     Gent Synergy <=500 Sensitive mcg/mL     Penicillin 1 Sensitive mcg/mL    Escherichia coli - JOSE     Ampicillin <=8 Sensitive mcg/mL     Ceftriaxone <=1 Sensitive mcg/mL     Cefazolin <=2 Sensitive mcg/mL     Ciprofloxacin <=0.25 Sensitive mcg/mL     Cefepime <=2 Sensitive mcg/mL     Cefuroxime <=4 Sensitive mcg/mL     Ertapenem <=0.5 Sensitive mcg/mL     Gentamicin <=2 Sensitive mcg/mL     Ampicillin/sulbactam <=4/2 Sensitive mcg/mL     Minocycline <=4 Sensitive mcg/mL     Moxifloxacin <=2 Sensitive mcg/mL     Pip/Tazobactam <=8 Sensitive mcg/mL     Trimeth/Sulfa 2/38 Sensitive mcg/mL     Tigecycline <=2 Sensitive mcg/mL     Tobramycin <=2 Sensitive mcg/mL     Pain Level/Medicated:  PO given 15mins prior, 4% topical lidocaine allowed to dwell for 10mins    INTERVENTIONS BY WOUND TEAM:  Chart and images reviewed. Discussed with bedside RN. All areas of concern (based on picture review, LDA review and discussion with bedside RN) have been thoroughly assessed. Documentation of areas based on significant findings. This RN in to assess patient. Performed standard wound care which includes appropriate positioning, dressing removal and non-selective debridement. Pictures and measurements obtained weekly if/when required.  Preparation for Dressing removal: Dressing soaked with NS and Lidocaine, removed without difficulty.  Non-selectively Debrided with:  NS and gauze.  Sharp debridement: NA  Radha wound: Cleansed with NS, Prepped with no sting and wedge of paste ring in umbilicus. Drape for remainder of periwound.  Primary Dressing: One piece of spiraled full thickness black regular foam packed into wound and secured with drape.   Secondary (Outer) Dressing: A hole was cut in drape and a 2nd piece of circular half thickness black foam was applied as a button for trac pad. Trac pad applied  and suction initiated. No leaks detected.    Interdisciplinary consultation: Patient, Bedside RN (Sol), Cindy HOLDEN (Wound RN) and Lenora LAWTON (Wound RN)    EVALUATION / RATIONALE FOR TREATMENT:  Most Recent Date:  12/21/22: Nearly all sutures are granulated over, wound progressing as expected.    12/19/22: Wound is clean, has significant depth. Adipose visible. Fascial sutures visible and intact. May benefit from veraflo vac. Applied regular vac to assist in granulation tissue development and manage drainage. Wound proximity is close to stoma and to umbilicus.      Goals: Steady decrease in wound area and depth weekly.    WOUND TEAM PLAN OF CARE ([X] for frequency of wound follow up,):   Nursing to follow dressing orders written for wound care. Contact wound team if area fails to progress, deteriorates or with any questions/concerns if something comes up before next scheduled follow up (See below as to whether wound is following and frequency of wound follow up)  Dressing changes by wound team:                   Follow up 3 times weekly:                NPWT change 3 times weekly:   X  Follow up 1-2 times weekly:      Follow up Bi-Monthly:           Follow up Monthly (High Risk):                        Follow up as needed:     Other (explain):     NURSING PLAN OF CARE ORDERS (X):  Dressing changes: See Dressing Care orders: X  Skin care: See Skin Care orders:   RN Prevention Protocol: X  Rectal tube care: See Rectal Tube Care orders:   Other orders:    RSKIN:   CURRENTLY IN PLACE (X), APPLIED THIS VISIT (A), ORDERED (O):   Q shift Justo:  X  Q shift pressure point assessments:  X    Surface/Positioning   Pressure redistribution mattress     X       Low Airloss          ICU Low Airloss   Bariatric EVERT     Waffle cushion        Waffle Overlay  O, outside room to be applied when patient next gets OOB        Reposition q 2 hours   X   TAPs Turning system     Z Franklin Pillow     Offloading/Redistribution   Sacral Mepilex (Silicone  dressing)   X  Heel Mepilex (Silicone dressing)      X   Heel float boots (Prevalon boot)             Float Heels off Bed with Pillows           Respiratory   Silicone O2 tubing    X     Gray Foam Ear protectors   X  Cannula fixation Device (Tender )          High flow offloading Clip    Elastic head band offloading device      Anchorfast                                                         Trach with Optifoam split foam             Containment/Moisture Prevention     Rectal tube or BMS    Purwick/Condom Cath        Zaragoza Catheter    Barrier wipes           Barrier paste       Antifungal tx      Interdry   X     Mobilization     Up to chair      X  Ambulate      PT/OT      Nutrition       Dietician        Diabetes Education      PO   X- Full liquid  TF     TPN     NPO   # days     Anticipated discharge plans: TBD will need follow up for vac and ostomy. Pt lives in [place]  LTACH:        SNF/Rehab:                  Home Health Care:           Outpatient Wound Center:            Self/Family Care:        Other:                  Vac Discharge Needs:   Not Applicable Pt not on a wound vac:       Regular Vac while inpatient, alternative dressing at DC:        Regular Vac in use and continued at DC:   X         Reg. Vac w/ Skin Sub/Biologic in use. Will need to be changed 2x wkly:      Veraflo Vac while inpatient, ok to transition to Regular Vac on Discharge:           Veraflo Vac while inpatient, will need to remain on Veraflo Vac upon discharge:           [facility]  Inpatient Services  New Ostomy Management & Teaching    HPI:  Reviewed  PMH: Reviewed   SH: Reviewed    Past Surgical History:   Procedure Laterality Date    CT EXPLORATORY OF ABDOMEN  12/18/2022    Procedure: LAPAROTOMY, EXPLORATORY WITH OSTOMY CREATION;  Surgeon: Huseyin Asher M.D.;  Location: SURGERY [facility];  Service: General    GYN SURGERY Right 11/01/2013    Right mastecomy    TUBAL LIGATION  01/01/1975     Surgery Date:  "22    Surgeon(s):  Huseyin Asher M.D.    Procedure(s):  LAPAROTOMY, EXPLORATORY     Permanence: unknown    Pertinent History: See Above              Colostomy 22 (Active)   Wound Image    22 1200   Stomal Appliance Assessment Changed    Stoma Assessment Red    Stoma Shape Budded Greater Than One Inch;Irregular    Stoma Size (in) 1.75    Peristomal Assessment Clean;Dry;Intact    Mucocutaneous Junction Intact    Treatment Appliance Changed    Peristomal Protectant Paste Ring    Stomal Appliance Paste Ring, 2\";2 1/2\" 1-piece Fecal    Output (mL) 0 mL    Output Color Bloody    Appliance (Pouch) # Paste Rin, FLat One piece Appliance: 8531    Appliance Brand [employer]    Appliance Supplier [employer]       Ostomy Appliance (type and size): One piece flat and 2\" Paste Ring    Interventions: Ostomy RN Cindy GHOSH went over colostomy booklet. Pt declined to participate in ostomy appliance change today. This RN demonstrated and verbalized each step of change. Previous appliance removed using push pull method. Stoma and peristomal skin cleansed with moist warm washcloth.  1 piece appliance then cut according to stoma size. Confirmed fit. Plastic backing removed and paper edges \"Dog Eared.\" Paste ring stretched to fit back of barrier and then applied to barrier. Appliance was applied down to skin and adhered with friction. Pouch end closed.     Pt education: Questions and concerns addressed    Needs for next visit:   Hands On    Evaluation:   22: Current appliance intact. Patient stated she was feeling emotional this morning regarding ostomy.  22: Pt states a close friend had an ostomy and was very vocal about her ostomy. Pt states she will likely be doing change herself, pt does have a son that can assist but she feels he likely won't want to.     Flatus: Not Present  Stool Output: Serosanguinous in pouch  Urine Output: NA, Fecal Ostomy  Diet: Fulls  Mobility: Up to chair    Plan: Ostomy nurses to " continue to follow for ostomy needs and teaching until discharge    Anticipated discharge needs: Supplies, supplier information, possible HH, outpatient ostomy clinic, Skilled Nursing/Rehab     Secure Start Signed Completed by Wound team Tech  Outpatient Referral Placed Declined  5 Sets of appliances in Ostomy bag for discharge Ordered    INSURANCE OPTIONS: Medicare                care home Plus & Xcedex ([employer])      X        MediCARE/MEDICAID & All other Private Insurance companies ([employer] Form)              MediCAID & Fee for Service (Care Chest Paperwork + [employer] Form)                            Form signed/Catalog Marked and Copy left with patient OR medicaid paperwork given to patient      Anticipated Discharge Plans:  Skilled Nursing, Home Health Care, Family Care, and Self Care    Ostomy Supplies for DC:

## 2022-12-22 NOTE — DISCHARGE PLANNING
Case Management Discharge Planning    Admission Date: 12/13/2022  GMLOS: 9.6  ALOS: 9    6-Clicks ADL Score: 16  6-Clicks Mobility Score: 15  PT and/or OT Eval ordered: Yes  Post-acute Referrals Ordered: Yes  Post-acute Choice Obtained: NA  Has referral(s) been sent to post-acute provider:  NA      Anticipated Discharge Dispo: Discharge Disposition: Discharge to Acute Inpatient Rehab VS SNF.    DME Needed: No    Action(s) Taken: OTHER. Discussed this case during IDT Rounds. Per MD, patient is not medically clear. Per MD, the anticipated discharge disposition is Acute Inpatient Rehab VS SNF, pending recommendations from Surgery.    Kurtis, Liaison with [facility] requested clarification regarding medical clearance, LSW informed, per MD, patient is not clear.    Escalations Completed: None    Medically Clear: No    Next Steps: Await Medical Clearance.     Barriers to Discharge: Medical clearance    Is the patient up for discharge tomorrow: TBD

## 2022-12-22 NOTE — PROGRESS NOTES
Report received. Assumed care. Pt in the chair sitting up talking with daughter. A/O x4. VSS. Responds appropriately. Slight  pain medicated per MAR, SOB. Assessment complete. Discussed POC, , pt verbalizes understanding. Explained importance of calling before getting OOB. Call light and belongings within reach. Bed alarm on. Bed in the lowest position. Treaded socks in place. Hourly rounding in progress. Will continue to monitor .

## 2022-12-22 NOTE — CARE PLAN
The patient is Stable - Low risk of patient condition declining or worsening    Shift Goals  Clinical Goals: ABX, pain control and MIGUE drain  Patient Goals: Rest  Family Goals: N/A    Progress made toward(s) clinical / shift goals:  ABX, MIGUE drain     Patient is progressing towards the following goals:    Problem: Mobility  Goal: Patient's capacity to carry out activities will improve  Outcome: Progressing  Flowsheets (Taken 12/22/2022 0154)  Mobility:   Encouraged mobilization per interdisciplinary team recommendations   Provided assistive devices  Activity Performed:   Ambulate   Edge of bed   Up to chair   Up to commode   Back to bed  Note: Patient is walking, steady. Understands to call before getting up.     Problem: Wound/ / Incision Healing  Goal: Patient's wound/surgical incision will decrease in size and heals properly  Outcome: Progressing  Note: Check wound vac and incision site. Dressing is clean, dry and intact. Moderate pain while dressing was changed. Patient tolerated well.

## 2022-12-22 NOTE — CARE PLAN
The patient is Stable - Low risk of patient condition declining or worsening    Shift Goals  Clinical Goals: pain control, oob activity  Patient Goals: rest  Family Goals: N/A    Progress made toward(s) clinical / shift goals:  Pain managed per MAR. Patient up to chair for all meals.     Patient is not progressing towards the following goals:

## 2022-12-22 NOTE — PROGRESS NOTES
"    DATE: 12/22/2022    Post Operative Day 4 Exploratory laparotomy with drainage of pelvic abscesses and Chávez's colectomy with mobilization of the splenic flexure.     INTERVAL EVENTS:  Feeling good. Adequate pain control.  Ostomy pink, viable and producing.  MIGUE drain with 10 mL serosanguinous drainage.  Supplemental oxygen, 2-3 L NC.    - Advance diet to regular.  - Mobilize to chair.  - Pulmonary hygiene with goal to wean oxygen off.    PHYSICAL EXAMINATION:  Vital Signs: BP (!) 154/72   Pulse 84   Temp 36.5 °C (97.7 °F) (Temporal)   Resp 16   Ht 1.6 m (5' 3\")   Wt 109 kg (240 lb 11.9 oz)   SpO2 94%   Physical Exam  Vitals and nursing note reviewed. Chaperone present: Family at bedside.   Constitutional:       General: She is awake.      Appearance: She is obese.      Interventions: Nasal cannula in place.   Abdominal:      General: Bowel sounds are normal. There is no distension.      Palpations: Abdomen is soft.      Tenderness: There is abdominal tenderness (At surgical incision). There is no guarding.      Comments: Wound vac, MIGUE.  Ostomy with stool.   Skin:     Comments: Surgical dressing in place.   Neurological:      Mental Status: She is alert.   Psychiatric:         Behavior: Behavior is cooperative.       LABORATORY VALUES:   Recent Labs     12/20/22  0317 12/21/22  0752 12/22/22  0326   WBC 14.1* 9.5 9.3   RBC 4.05* 4.17* 4.10*   HEMOGLOBIN 11.6* 11.9* 11.7*   HEMATOCRIT 35.7* 36.6* 36.0*   MCV 88.1 87.8 87.8   MCH 28.6 28.5 28.5   MCHC 32.5* 32.5* 32.5*   RDW 42.9 43.1 42.6   PLATELETCT 313 338 322   MPV 9.5 9.6 9.4     Recent Labs     12/20/22  0317 12/21/22  0752 12/22/22  0326   SODIUM 137 136 137   POTASSIUM 3.9 3.7 4.0   CHLORIDE 103 100 103   CO2 25 29 27   GLUCOSE 114* 98 103*   BUN 23* 20 17   CREATININE 0.95 1.04 0.75   CALCIUM 8.6 8.8 8.5                IMAGING:   CT-ABDOMEN-PELVIS WITH   Final Result      1.  New peritoneal air consistent with perforation and likely the sigmoid " colon diverticulitis      2.  Multilocular paracolic abscess anterior to the sigmoid colon with the largest component measuring 6.4 x 5.4 cm and a smaller air component present left lateral      3.  Left lower quadrant drainage catheter and appears to have retracted.      4.  Findings were discussed with FABY STUART on 12/18/2022 12:40 PM.      IR-US GUIDED PIV   Final Result    Ultrasound-guided PERIPHERAL IV INSERTION performed by    qualified nursing staff as above.      CT-DRAIN-PERITONEAL   Final Result      1.  CT guided pelvic diverticular abscess catheter drainage.   2.  The current plan is to obtain a follow-up CT in 5-7 days..      CT-ABDOMEN-PELVIS WITH   Final Result      1.  Acute sigmoid diverticulitis with adjacent 4.6 x 3.3 cm abscess.   2.  Gallbladder sludge or stones.   3.  2.1 cm hypodense left hepatic lobe lesion, likely a hemangioma, though incompletely characterized on this CT. If there is clinical concern, consider further evaluation with nonemergent MRI hepatic protocol.   4.  Nonobstructing 6 mm inferior pole left renal stone.      Findings conveyed to Dr. Cordova at 1124 via Voalte messaging on 12/14/2022.              ASSESSMENT AND PLAN:   * Diverticulitis of intestine with perforation and abscess- (present on admission)  Assessment & Plan  12/13 IR drainage  12/18 Follow up CT with evidence of perforation and worsening abscess.  -Exploratory laparotomy with drainage of pelvic abscesses and Chávez's colectomy with mobilization of the splenic flexure  12/20 Ostomy viable, no stool or gas. MIGUE with s/s drainage.  12/21 Flatus present, scant stool.   - Advanced diet to Full liquid.  12/22 Ostomy with stool. Advance diet to regular.        Discussed patient condition with Family, RN, Patient, and general surgery, Dr. Burk.

## 2022-12-22 NOTE — DISCHARGE PLANNING
Spoke with son Taz.  Taz lives next door to his mother.  He is currently re modeling her kitchen and bathroom.  He is making the bathroom ADA compliant with grab bars and a no step shower.  Taz is self employed so is home most every day.  He said he would be able and willing to be there for his mom, when she is released home.      Spoke with daughter Carmen.  Carmen lives in [place], [place]. But will be staying with mom for at least first 2 weeks after discharge.  She is also self employed.    Advised both Carmen and Taz regarding [facility] policies and procedures regarding admission, therapy and discharge.  Covid policy reviewed.  Visiting policy reviewed.      All questions and concerns addressed.     [facility] will follow for medical clearance.

## 2022-12-22 NOTE — CARE PLAN
Problem: Knowledge Deficit - Standard  Goal: Patient and family/care givers will demonstrate understanding of plan of care, disease process/condition, diagnostic tests and medications  Outcome: Progressing     Problem: Bowel Elimination  Goal: Establish and maintain regular bowel function  Outcome: Progressing     Problem: Self Care  Goal: Patient will have the ability to perform ADLs independently or with assistance (bathe, groom, dress, toilet and feed)  Outcome: Progressing   The patient is Stable - Low risk of patient condition declining or worsening    Shift Goals  Clinical Goals: Ambulation  Patient Goals: Ambulation  Family Goals: N/A    Progress made toward(s) clinical / shift goals:  Patient ambulated from commode to chair, back to bed with staff this shift     Patient is not progressing towards the following goals:

## 2022-12-22 NOTE — PREADMISSION SCREENING NOTE
"  Pre-Admission Screening Form    Patient Information:   Name: Maira Johnson     MRN: [id number]       : 1945      Age: 77 y.o.   Gender: female      Race: White [7]       Marital Status:  [5]  Family Contact: Taz Johnson        Relationship: Son [15]  Home Phone:            Cell Phone: [phone]  Advanced Directives: None  Code Status:  FULL  Current Attending Provider: Shadi Juan M.D.  Referring Physician: Dr. Juan      Physiatrist Consult: Dr. Jones       Referral Date: 22  Primary Payor Source:  MEDICARE  Secondary Payor Source:  [facility]    Medical Information:   Date of Admission to Acute Care [place]  Room Number: T422/00  Rehabilitation Diagnosis: 0016 - Debility (Non-Cardiac, Non-Pulmonary)    There is no immunization history on file for this patient.  Allergies   Allergen Reactions    Sulfa Drugs Unspecified     \"Destroyed my blood cells, I almost \"     Past Medical History:   Diagnosis Date    Arthritis     Cancer (HCC)     Cataract     Hypertension      Past Surgical History:   Procedure Laterality Date    DC EXPLORATORY OF ABDOMEN  2022    Procedure: LAPAROTOMY, EXPLORATORY WITH OSTOMY CREATION;  Surgeon: Huseyin Asher M.D.;  Location: SURGERY [facility];  Service: General    GYN SURGERY Right 2013    Right mastecomy    TUBAL LIGATION  1975       History Leading to Admission, Conditions that Caused the Need for Rehab (CMS): Patient is a 77 y.o. female admitted with intra-abdominal abscess  Co-morbidities: as noted above and below  Potential Risk - Complications: Cognitive Impairment, Deep Vein Thrombosis, Incontinence, Malnutrition, Pain, Paralysis, Perceptual Impairment, Pneumonia, Pressure Ulcer, Seizures, and Urinary Tract Infection  Level of Risk: High    Ongoing Medical Management Needed (Medical/Nursing Needs):   Patient Active Problem List    Diagnosis Date Noted    CHEPE (acute kidney injury) (HCC) 2022    Hypokalemia " 12/20/2022    Hypomagnesemia 12/20/2022    Postoperative hypoxia 12/20/2022    Class 3 severe obesity due to excess calories with serious comorbidity and body mass index (BMI) of 40.0 to 44.9 in adult (Formerly [facility]) 12/19/2022    Hyperglycemia 12/19/2022    Diverticulitis of intestine with perforation and abscess 12/15/2022    Hypertension 12/13/2022    Sepsis ([facility]) 12/13/2022       \INFECTIOUS DISEASES INPATIENT CONSULT NOTE     Date of Service: 12/21/2022     Consult Requested By: Shadi Juan M.D.     Reason for Consultation: Perforated diverticulitis, abscess     History of Present Illness:   Maira Johnson is a 77 y.o. man with a history of hypertension and diverticulitis (last episode in 2015) admitted 12/13/2022 from [facility] secondary to abdominal pain.  Patient states that she was starting to feel unwell approximately 1 week prior to presentation at the outside hospital.  He subsequently developed worsening abdominal pain as well as diarrhea and fevers.  She denied any nausea or vomiting.  No cough, or shortness of breath.  She was initially seen at the outside facility however pt transferred to [facility] for higher level of care.  Her last colonoscopy was back in 2018 which was reportedly normal.  On presentation, she was afebrile with a leukocytosis of 14.  Patient was started on Zosyn.  CT scan showed acute sigmoid diverticulitis with an adjacent 4.6 x 3.3 cm abscess.  Patient underwent CT-guided pelvic diverticular abscess catheter drainage on 12/14/2022.  Fluid cultures from 12/14 positive for E. coli.  She underwent a repeat CT scan of the abdomen pelvis given worsening abdominal pain and increased leukocytosis on 12/18.  CT scan showed new peritoneal air consistent with perforation and again sigmoid colon diverticulitis with a multilocular pericolic abscess anterior to the sigmoid colon measuring 6.4 x 5.4 cm.  She was evaluated by surgery and she underwent exploratory laparotomy  with drainage of pelvic abscesses and Chávez's colectomy with mobilization of the splenic flexure on 12/18/2022.  The operative note, there was a phlegmon in the pelvis with a mass with significant inflammation and multiple abscesses were broken up and drained.  Fluid cultures from 12/18 are growing E. coli, ampicillin susceptible Enterococcus avium, Bacteroides fragilis and Streptococcus anginosus.  Blood cultures on admission are negative to date.  Infectious disease service consulted for antibiotic recommendations.     All other review of systems reviewed and negative except those documented above in the HPI.      PMH:   Past Medical History  Past Medical History:  Diagnosis Date   Arthritis     Cancer (HCC)     Cataract     Hypertension      Multiple episodes of diverticulitis     PSH:  Past Surgical History  Past Surgical History:  Procedure Laterality Date   ND EXPLORATORY OF ABDOMEN   12/18/2022    Procedure: LAPAROTOMY, EXPLORATORY WITH OSTOMY CREATION;  Surgeon: Huseyin Asher M.D.;  Location: SURGERY [facility];  Service: General   GYN SURGERY Right 11/01/2013    Right mastecomy   TUBAL LIGATION   01/01/1975          FAMILY HX:  Family History  Family History  Problem Relation Age of Onset   Dementia Mother     Testicular Cancer Father     Skin cancer Sister     Arthritis Brother     Skin cancer Brother     Prostate cancer Brother            SOCIAL HX:  Social History         Socioeconomic History   Marital status:       Spouse name: Not on file   Number of children: Not on file   Years of education: Not on file   Highest education level: Not on file  Occupational History   Not on file  Tobacco Use   Smoking status: Never      Passive exposure: Never   Smokeless tobacco: Never  Vaping Use   Vaping Use: Never used  Substance and Sexual Activity   Alcohol use: Never   Drug use: Never   Sexual activity: Not on file  Other Topics Concern   Not on file  Social History Narrative   Not on file    "    Social Determinants of Health       Financial Resource Strain: Not on file  Food Insecurity: Not on file  Transportation Needs: Not on file  Physical Activity: Not on file  Stress: Not on file  Social Connections: Not on file  Intimate Partner Violence: Not on file  Housing Stability: Not on file       Social History     Tobacco Use  Smoking Status Never   Passive exposure: Never  Smokeless Tobacco Never     Social History     Substance and Sexual Activity  Alcohol Use Never        Allergies/Intolerances:  Allergies  Allergen Reactions   Sulfa Drugs Unspecified      \"Destroyed my blood cells, I almost \"        History reviewed with the patient      Other Current Medications:     Current Facility-Administered Medications:     lidocaine (XYLOCAINE) 2 % injection 20 mL, 20 mL, Topical, QDAY PRN **OR** lidocaine (XYLOCAINE) 4 % topical solution, , Topical, QDAY PRN, Kye Plasencia M.D., 1 Application at 22 1135    [DISCONTINUED] piperacillin-tazobactam (Zosyn) 3.375 g in  mL IVPB, 3.375 g, Intravenous, Once **AND** piperacillin-tazobactam (Zosyn) 3.375 g in  mL IVPB, 3.375 g, Intravenous, Q8HRS, Cristina Vigil A.P.RYaquelinNYaquelin, Stopped at 22 0701    Pharmacy Consult Request ...Pain Management Review 1 Each, 1 Each, Other, PHARMACY TO DOSE, Alberta Olvera D.O.    oxyCODONE immediate-release (ROXICODONE) tablet 2.5 mg, 2.5 mg, Oral, Q3HRS PRN, 2.5 mg at 22 2221 **OR** oxyCODONE immediate-release (ROXICODONE) tablet 5 mg, 5 mg, Oral, Q3HRS PRN, 5 mg at 22 0914 **OR** [DISCONTINUED] HYDROmorphone (Dilaudid) injection 0.25 mg, 0.25 mg, Intravenous, Q3HRS PRN, Alberta Olvera D.O.    senna-docusate (PERICOLACE or SENOKOT S) 8.6-50 MG per tablet 2 Tablet, 2 Tablet, Oral, BID, 2 Tablet at 22 0600 **AND** polyethylene glycol/lytes (MIRALAX) PACKET 1 Packet, 1 Packet, Oral, QDAY PRN **AND** magnesium hydroxide (MILK OF MAGNESIA) suspension 30 mL, 30 mL, Oral, QDAY PRN **AND** " "bisacodyl (DULCOLAX) suppository 10 mg, 10 mg, Rectal, QDAY PRN, Barbie Cordova M.D.    enoxaparin (Lovenox) inj 40 mg, 40 mg, Subcutaneous, DAILY AT 1800, Barbie Cordova M.D., 40 mg at 22 1630    acetaminophen (Tylenol) tablet 650 mg, 650 mg, Oral, Q6HRS PRN, Barbie Cordova M.D., 650 mg at 22 0701    ondansetron (ZOFRAN) syringe/vial injection 4 mg, 4 mg, Intravenous, Q4HRS PRN, Barbie Cordova M.D.    ondansetron (ZOFRAN ODT) dispertab 4 mg, 4 mg, Oral, Q4HRS PRN, Barbie Cordova M.D.  [unfilled]     Most Recent Vital Signs:  BP (!) 149/69   Pulse 78   Temp 36.6 °C (97.9 °F) (Temporal)   Resp 16   Ht 1.6 m (5' 3\")   Wt 109 kg (240 lb 11.9 oz)   SpO2 93%   BMI 42.65 kg/m²   Temp  Av.7 °C (98 °F)  Min: 36.1 °C (97 °F)  Max: 37.6 °C (99.7 °F)     Physical Exam:  General: well nourished, no diaphoresis, well-appearing, no acute distress  HEENT: sclera anicteric, PERRL, extraocular muscles intact, mucous membranes moist, oropharynx clear and moist, no oral lesions or exudate  Neck: supple, no lymphadenopathy  Chest: CTAB, no rales, rhonchi or wheezes, normal work of breathing.  Cardiac: regular rate and rhythm, normal S1 S2, no murmurs, rubs or gallops  Abdomen: Midline surgical site with wound VAC in place.  MIGUE drain also in place.  Diffusely tender to palpation.  Extremities: WWP, no edema, 2+ pedal pulses  Skin: warm and dry, no rashes or worrisome lesions  Neuro: Alert and oriented times 3, non-focal exam, speech fluent, full range of motion to bilateral upper and lower extremities  Psych: normal mood and behavior, pleasant; memory intact, normal judgement     Pertinent Lab Results:  Recent Labs    22  0752  WBC 17.5* 14.1* 9.5     Recent Labs    22  0752  HEMOGLOBIN 12.9 11.6* 11.9*  HEMATOCRIT 39.6 35.7* 36.6*  MCV 86.8 88.1 87.8  MCH 28.3 28.6 28.5  PLATELETCT 312 313 338           Recent " Labs    12/19/22  0314 12/20/22 0317 12/21/22  0752  SODIUM 139 137 136  POTASSIUM 4.3 3.9 3.7  CHLORIDE 103 103 100  CO2 24 25 29  CREATININE 1.08 0.95 1.04        Recent Labs    12/19/22 0314  ALBUMIN 2.7*        Pertinent Micro:  Results        Procedure Component Value Units Date/Time    CULTURE WOUND W/ GRAM STAIN [[id number]]  (Abnormal)  (Susceptibility) Collected: 12/18/22 1459    Order Status: Completed Specimen: Wound Updated: 12/21/22 0998      Significant Indicator POS      Source WND      Site Pelvic Abscess      Culture Result -      Gram Stain Result Many WBCs.  Many Gram positive cocci.  Rare Gram positive rods.         Culture Result Escherichia coli  Moderate growth          Enterococcus avium  Moderate growth          Streptococcus anginosus  Moderate growth      Narrative:      Surgery - swabs received      Susceptibility            Escherichia coli (1)          Antibiotic Interpretation Microscan   Method Status    Ampicillin Sensitive <=8 mcg/mL JOSE Final    Ceftriaxone Sensitive <=1 mcg/mL JOSE Final    Cefazolin Sensitive <=2 mcg/mL JOSE Final    Ciprofloxacin Sensitive <=0.25 mcg/mL JOSE Final    Cefepime Sensitive <=2 mcg/mL JOSE Final    Cefuroxime Sensitive <=4 mcg/mL JOSE Final    Ertapenem Sensitive <=0.5 mcg/mL JOSE Final    Gentamicin Sensitive <=2 mcg/mL JOSE Final    Ampicillin/sulbactam Sensitive <=4/2 mcg/mL JOSE Final    Minocycline Sensitive <=4 mcg/mL JOSE Final    Moxifloxacin Sensitive <=2 mcg/mL JOSE Final    Pip/Tazobactam Sensitive <=8 mcg/mL JOSE Final    Trimeth/Sulfa Sensitive 2/38 mcg/mL JOSE Final    Tigecycline Sensitive <=2 mcg/mL JOSE Final    Tobramycin Sensitive <=2 mcg/mL JOSE Final                     Enterococcus avium (2)          Antibiotic Interpretation Microscan   Method Status    Ampicillin Sensitive <=2 mcg/mL JOSE Final    Vancomycin Sensitive 0.5 mcg/mL JOSE Final    Daptomycin Sensitive <=0.5 mcg/mL JOSE Final    Gent Synergy Sensitive <=500 mcg/mL JOSE Final    " Penicillin Sensitive 1 mcg/mL JOSE Final                          Anaerobic Culture [[id number]]  (Abnormal) Collected: 12/18/22 1459    Order Status: Completed Specimen: Wound Updated: 12/21/22 0953      Significant Indicator POS      Source WND      Site Pelvic Abscess      Culture Result -        Bacteroides fragilis  Heavy growth      Narrative:      Surgery - swabs received    Fungal Culture [[id number]] Collected: 12/18/22 1459    Order Status: Completed Specimen: Wound Updated: 12/21/22 0953      Significant Indicator NEG      Source WND      Site Pelvic Abscess      Culture Result No fungal growth to date.      Fungal Smear Results No fungal elements seen.    Narrative:      Surgery - swabs received    Fungal Smear [[id number]] Collected: 12/18/22 1459    Order Status: Completed Specimen: Wound Updated: 12/20/22 1035      Significant Indicator NEG      Source WND      Site Pelvic Abscess      Fungal Smear Results No fungal elements seen.    Narrative:      Surgery - swabs received    GRAM STAIN [[id number]] Collected: 12/18/22 1459    Order Status: Completed Specimen: Wound Updated: 12/19/22 0527      Significant Indicator .      Source WND      Site Pelvic Abscess      Gram Stain Result Many WBCs.  Many Gram positive cocci.  Rare Gram positive rods.       Narrative:      Surgery - swabs received    BLOOD CULTURE [[id number]] Collected: 12/13/22 2047    Order Status: Completed Specimen: Blood from Peripheral Updated: 12/18/22 2300      Significant Indicator NEG      Source BLD      Site PERIPHERAL      Culture Result No growth after 5 days of incubation.    Narrative:      Per Hospital Policy: Only change Specimen Src: to \"Line\" if  specified by physician order.  Left Hand    BLOOD CULTURE [[id number]] Collected: 12/13/22 2222    Order Status: Completed Specimen: Blood from Peripheral Updated: 12/18/22 2300      Significant Indicator NEG      Source BLD      Site PERIPHERAL      Culture Result No growth after 5 " "days of incubation.    Narrative:      Per Hospital Policy: Only change Specimen Src: to \"Line\" if  specified by physician order.  Left Hand    CULTURE WOUND W/ GRAM STAIN [[id number]]  (Abnormal)  (Susceptibility) Collected: 12/14/22 1650    Order Status: Completed Specimen: Wound Updated: 12/16/22 0937      Significant Indicator POS      Source WND      Site Peritoneal Abscess      Culture Result -      Gram Stain Result Many WBCs.  No organisms seen.         Culture Result Escherichia coli  Rare growth        Susceptibility            Escherichia coli (1)          Antibiotic Interpretation Microscan   Method Status    Ampicillin Sensitive <=8 mcg/mL JOSE Final    Ceftriaxone Sensitive <=1 mcg/mL JOSE Final    Cefazolin Sensitive <=2 mcg/mL JOSE Final    Ciprofloxacin Sensitive <=0.25 mcg/mL JOSE Final    Cefepime Sensitive <=2 mcg/mL JOSE Final    Cefuroxime Sensitive <=4 mcg/mL JOSE Final    Ampicillin/sulbactam Sensitive <=4/2 mcg/mL JOSE Final    Ertapenem Sensitive <=0.5 mcg/mL JOSE Final    Tobramycin Sensitive <=2 mcg/mL JOSE Final    Gentamicin Sensitive <=2 mcg/mL JOSE Final    Minocycline Sensitive <=4 mcg/mL JOSE Final    Moxifloxacin Sensitive <=2 mcg/mL JOSE Final    Pip/Tazobactam Sensitive <=8 mcg/mL JOSE Final    Trimeth/Sulfa Sensitive 2/38 mcg/mL JOSE Final    Tigecycline Sensitive <=2 mcg/mL JOSE Final                          GRAM STAIN [[id number]] Collected: 12/14/22 1650    Order Status: Completed Specimen: Wound Updated: 12/15/22 0223      Significant Indicator .      Source WND      Site Peritoneal Abscess      Gram Stain Result Many WBCs.  No organisms seen.       FLUID CULTURE W/GRAM STAIN [[id number]]      Order Status: Canceled Specimen: Other Body Fluid             No results found for: BLOODCULTU, BLDCULT, BCHOLD      Studies:  CT-ABDOMEN-PELVIS WITH     Result Date: 12/18/2022 12/18/2022 10:40 AM HISTORY/REASON FOR EXAM:  diverticulitis with abscess s/p drain worsening leukocytosis and abdominal " pain. TECHNIQUE/EXAM DESCRIPTION:   CT scan of the abdomen and pelvis with contrast. Contrast-enhanced helical scanning was obtained from the diaphragmatic domes through the pubic symphysis following the bolus administration of nonionic contrast without complication. 100 mL of Omnipaque 350 nonionic contrast was administered without complication. Low dose optimization technique was utilized for this CT exam including automated exposure control and adjustment of the mA and/or kV according to patient size. COMPARISON: CT abdomen and pelvis 12/14/2022 FINDINGS: LUNGS: Mild atelectasis and otherwise clear. Follow-up is recommended around. Osseous structures:  No significant finding. Abdomen: In the right paramedian anterior pelvis there is an extraluminal fluid collection containing gas measuring 6.4 x 5.4 cm. There is an adjacent air collection present. Findings are consistent with an abscess and there is probably remi perforation of the sigmoid colon. Left lower quadrant drainage catheters present and that is not within a fluid collection. It has probably retracted. LIVER: There is a simple cyst within the left lobe of the liver. GALLBLADDER and BILIARY SYSTEM The gallbladder is normal in CT appearance. There is no biliary dilation. SPLEEN: Normal in appearance. PANCREAS: The pancreas is normal in appearance. The splenic vein and portal vein enhance normally. ADRENAL glands: Normal in appearance. RIGHT kidney: Normal in appearance. LEFT kidney: There is a nonobstructed 6 mm lower pole calculus. There is no hydronephrosis. BOWEL and MESENTERY: Bowel and mesentery are normal in appearance.. AORTA and VASCULATURE: Normal in appearance.. Pelvis: Within normal limits. .      1.  New peritoneal air consistent with perforation and likely the sigmoid colon diverticulitis 2.  Multilocular paracolic abscess anterior to the sigmoid colon with the largest component measuring 6.4 x 5.4 cm and a smaller air component present left  lateral 3.  Left lower quadrant drainage catheter and appears to have retracted. 4.  Findings were discussed with FABY STUART on 12/18/2022 12:40 PM.     CT-ABDOMEN-PELVIS WITH     Result Date: 12/14/2022 12/14/2022 10:39 AM HISTORY/REASON FOR EXAM:  Sepsis; Evaluate intra-abdominal abscess. TECHNIQUE/EXAM DESCRIPTION:   CT scan of the abdomen and pelvis with contrast. Contrast-enhanced helical scanning was obtained from the diaphragmatic domes through the pubic symphysis following the bolus administration of nonionic contrast without complication. 100 mL of Omnipaque 350 nonionic contrast was administered without complication. Low dose optimization technique was utilized for this CT exam including automated exposure control and adjustment of the mA and/or kV according to patient size. COMPARISON: No prior studies available. FINDINGS: Lower Chest: Unremarkable. Liver: 2.1 cm left hepatic lobe hypodense lesion with likely early peripheral nodular enhancement. Spleen: Unremarkable. Pancreas: Fatty atrophy of the pancreas. Gallbladder: Gallbladder sludge or stones.. Biliary: Nondilated. Adrenal glands: Normal. Kidneys: Nonobstructing 6 mm inferior pole left renal stone.. Bowel: Diffuse colonic diverticulosis. There is a 4.6 x 3.3 cm rim-enhancing fluid collection adjacent to the sigmoid colon in the pelvis. There are inflammatory changes in the pelvis. The sigmoid colon is thickened. Lymph nodes: No adenopathy. Vasculature: Atherosclerotic changes. Peritoneum: Unremarkable without ascites. Musculoskeletal: No acute or destructive process. Pelvis: No adenopathy or free fluid.      1.  Acute sigmoid diverticulitis with adjacent 4.6 x 3.3 cm abscess. 2.  Gallbladder sludge or stones. 3.  2.1 cm hypodense left hepatic lobe lesion, likely a hemangioma, though incompletely characterized on this CT. If there is clinical concern, consider further evaluation with nonemergent MRI hepatic protocol. 4.  Nonobstructing 6 mm  inferior pole left renal stone. Findings conveyed to Dr. Cordova at 1124 via [employer] messaging on 12/14/2022.     CT-DRAIN-PERITONEAL     Result Date: 12/14/2022 12/14/2022 3:59 PM HISTORY/REASON FOR EXAM: Diverticular abscess. TECHNIQUE/EXAM DESCRIPTION: LLQ pelvic abscess drainage with CT guidance. Low dose optimization technique was utilized for this CT exam including automated exposure control and adjustment of the mA and/or kV according to patient size. PROCEDURE: Informed consent was obtained. Localizing CT images were obtained with the patient in supine position. The skin was prepped with Betadine and draped in a sterile fashion. Following local anesthesia with 1% Lidocaine, a 17 G guiding needle was placed and needle path confirmed with CT. An Amplatz guidewire was placed and following serial tract dilatation, a 10 Indonesian pigtail locking catheter was placed. A specimen was collected and submitted for culture and sensitivity and Gram stain. Total of 3 mL reddish purulent fluid was drained. The catheter was secured to the skin and connected to suction bulb drainage. Final CT images were obtained documenting catheter position. The patient tolerated the procedure well with no evidence of complication. COMPARISON: CT scan abdomen/pelvis 12/14/2022 FINDINGS: The final CT images show satisfactory catheter position within the target collection.      1.  CT guided pelvic diverticular abscess catheter drainage. 2.  The current plan is to obtain a follow-up CT in 5-7 days..     IR-US GUIDED PIV     Result Date: 12/16/2022  EXAMINATION:                                                                    HISTORY/REASON FOR EXAM:  Ultrasound Guided PIV.  TECHNIQUE/EXAM DESCRIPTION AND NUMBER OF VIEWS:  Peripheral IV insertion with ultrasound guidance.  The procedure was prepared using maximal sterile barrier technique including sterile gown, mask, cap, and donning of sterile gloves following appropriate hand hygiene  and/or sterile scrub. Patient skin site was prepped with 2% Chlorhexidine solution.   FINDINGS: Peripheral IV insertion with Ultrasound Guidance was performed by qualified imaging nursing staff without the assistance of a Radiologist.       Ultrasound-guided PERIPHERAL IV INSERTION performed by qualified nursing staff as above.        IMPRESSION:  1.  Perforated diverticulitis with abscesses status post exploratory laparotomy and drainage of pelvic abscess  2.  Polymicrobial bacterial infection        PLAN:  Maira Johnson is a 77 y.o. woman  with a history of hypertension and recurrent diverticulitis admitted on 12/13/2022 from an outside hospital where she presented with abdominal pain.and diarrhea.  She was found to be acute sigmoid diverticulitis with a 4.6 x 3.3 cm abscess status post drain placement on 12/14.  Hospital course complicated by increasing leukocytosis and worsening abdominal pain for which a repeat CT on 12/18 showed perforation and a multilocular paracolic abscess anterior to the sigmoid colon.  Patient is now status post exploratory laparotomy with drainage of pelvic abscesses and Chávez's colectomy with mobilization of the splenic flexure on 12/18/2022.  Operative cultures on 12/18 are polymicrobial, including strep anginosis, Enterococcus avium, Bacteroides fragilis and E. coli.  Prior drain cultures on 12/14 also with E. coli.  Blood cultures on admission are negative.     -Discontinue IV Zosyn  -Transition to IV Unasyn 3 g every 6 hours  -Anticipate a 7-day course of antibiotics postop since source control achieved with stop date of 12/25/2022  -If patient discharged prior to the antibiotic stop date, she can be discharged on Augmentin 875 mg twice a day to complete antibiotic course          CHIEF COMPLAINT: Lower abdominal pain.      HISTORY OF PRESENT ILLNESS: The patient is a 77-year-old obese female with prior history of diverticulitis, resulting in a hospitalization in 2015.  She  "was mated by the medical service as a transfer from outside hospital with diverticular abscess.  Patient was having significant pain yesterday but that is better now.  She has been on antibiotics for almost 24 hours.  She denies any fevers or chills at this time.  She denies any nausea or vomiting.  Had multiple soft/liquid bowel movements since admission.  They seem to be slowing down today.     PAST MEDICAL HISTORY:  has a past medical history of Arthritis, Cancer (HCC), Cataract, and Hypertension.     PAST SURGICAL HISTORY:  has a past surgical history that includes tubal ligation (1975) and gyn surgery (Right, 2013).     ALLERGIES:         Allergies   Allergen Reactions    Sulfa Drugs Unspecified       \"Destroyed my blood cells, I almost \"         CURRENT MEDICATIONS:   Home Medications         Reviewed by Jair Brink (Pharmacy Tech) on 22 at 1228  Med List Status: Complete      Medication Last Dose Status   atenolol (TENORMIN) 50 MG Tab UNK Active   calcium carbonate (OS-TORREY 500) 1250 (500 Ca) MG Tab UNK Active   simvastatin (ZOCOR) 10 MG Tab UNK Active                       FAMILY HISTORY: family history includes Arthritis in her brother; Dementia in her mother; Prostate cancer in her brother; Skin cancer in her brother and sister; Testicular Cancer in her father.     SOCIAL HISTORY:  reports that she has never smoked. She has never been exposed to tobacco smoke. She has never used smokeless tobacco. She reports that she does not drink alcohol and does not use drugs.     REVIEW OF SYSTEMS: Review of systems is remarkable for the following abdominal pain and diarrhea. The remainder of the comprehensive ROS is negative with the exception of the aforementioned HPI, PMH, and PSH bullets in accordance with CMS guideline.     PHYSICAL EXAMINATION:       Vital Signs: BP (!) 145/69   Pulse 80   Temp 36.1 °C (97 °F) (Temporal)   Resp 19   Ht 1.6 m (5' 3\")   Wt 105 kg (231 lb 7.7 oz) "   SpO2 91%   Physical Exam  Vitals and nursing note reviewed.   Constitutional:       Appearance: She is obese.   HENT:      Head: Normocephalic and atraumatic.      Nose: Nose normal.      Mouth/Throat:      Mouth: Mucous membranes are moist.      Pharynx: Oropharynx is clear.   Eyes:      Extraocular Movements: Extraocular movements intact.      Conjunctiva/sclera: Conjunctivae normal.   Cardiovascular:      Rate and Rhythm: Normal rate and regular rhythm.      Pulses: Normal pulses.   Abdominal:      Palpations: Abdomen is soft.      Comments: These abdomen with left lower quadrant tenderness and palpable fullness  No obvious hernias   Musculoskeletal:         General: Normal range of motion.      Cervical back: Normal range of motion.      Right lower leg: No edema.      Left lower leg: No edema.   Skin:     General: Skin is warm and dry.      Coloration: Skin is not pale.   Neurological:      General: No focal deficit present.      Mental Status: She is alert and oriented to person, place, and time.         LABORATORY VALUES:        Recent Labs     12/13/22 2047 12/14/22 0224   WBC 14.0* 14.5*   RBC 4.62 4.64   HEMOGLOBIN 13.6 13.5   HEMATOCRIT 39.7 40.2   MCV 85.9 86.6   MCH 29.4 29.1   MCHC 34.3 33.6   RDW 40.8 41.0   PLATELETCT 250 245   MPV 9.7 9.2           Recent Labs     12/13/22 2047 12/14/22 0224   SODIUM 135 136   POTASSIUM 3.3* 3.8   CHLORIDE 104 105   CO2 20 20   GLUCOSE 159* 120*   BUN 11 11   CREATININE 0.94 0.93   CALCIUM 8.8 8.6           Recent Labs     12/13/22 2047 12/13/22 2222   ASTSGOT 15  --    ALTSGPT 12  --    TBILIRUBIN 1.3  --    ALKPHOSPHAT 86  --    GLOBULIN 3.1  --    INR  --  1.09          Recent Labs     12/13/22  2222   INR 1.09         IMAGING:   CT-ABDOMEN-PELVIS WITH   Final Result       1.  Acute sigmoid diverticulitis with adjacent 4.6 x 3.3 cm abscess.   2.  Gallbladder sludge or stones.   3.  2.1 cm hypodense left hepatic lobe lesion, likely a hemangioma, though  incompletely characterized on this CT. If there is clinical concern, consider further evaluation with nonemergent MRI hepatic protocol.   4.  Nonobstructing 6 mm inferior pole left renal stone.       Findings conveyed to Dr. Cordova at 1124 via Voalte messaging on 12/14/2022.       CT-DRAIN-PERITONEAL    (Results Pending)         ASSESSMENT AND PLAN:   77-year-old female with history of multiple hospitalizations for diverticular abscess who has recurrent diverticulitis with abscess near the sigmoid colon.  She is not overtly septic and is a candidate for percutaneous drainage.  Given the presentation of multiple recurrences hopefully will be able to manage this conservatively with drainage and antibiotics.  Patient could then be worked up for elective minimally invasive colectomy after colonoscopy.  If there is any deterioration during this hospitalization she may require more urgent colectomy with possible colostomy.     IR drain has been ordered  We will follow     Intra-abdominal abscess (HCC)  With diverticulitis  CT scan at outside facility showed 2x 3 cm abscess  At this moment continue IV antibiotics Zosyn and IV fluid  N.p.o. and general surgery consult tomorrow morning  IR was consulted, however the abscess might be small to get drain  Order CT for abdomen for evaluation        Diverticulitis  History of recurrent diverticulitis  Multiple colonoscopy did not show any mass  Came with abdominal pain with diverticulitis and small abscess  IV fluid and IV antibiotics  General surgery consult tomorrow morning     Hypertension  Patient is taking atenolol at home  Holding medications and close monitoring     Sepsis (HCC)  This is Sepsis Present on admission  SIRS criteria identified on my evaluation include: Tachycardia, with heart rate greater than 90 BPM and Leukocytosis, with WBC greater than 12,000  Source is intra-abdominal abscess and diverticulitis  Sepsis protocol initiated  Fluid resuscitation ordered  "per protocol  Crystalloid Fluid Administration: Fluid resuscitation ordered per standard protocol - 30 mL/kg per current or ideal body weight  IV antibiotics as appropriate for source of sepsis  Reassessment: I have reassessed the patient's hemodynamic status  IV antibiotics Zosyn  Blood culture  IR was consulted for possible drain however the abscess it might be small for drain        Current Vital Signs:   Temperature: 36.5 °C (97.7 °F) Pulse: 84 Respiration: 16 Blood Pressure : (!) 154/72  Weight: 109 kg (240 lb 11.9 oz) Height: 160 cm (5' 3\")  Pulse Oximetry: 94 % O2 (LPM): 2      Completed Laboratory Reports:  Recent Labs     12/20/22  0317 12/21/22  0752 12/22/22  0326   WBC 14.1* 9.5 9.3   HEMOGLOBIN 11.6* 11.9* 11.7*   HEMATOCRIT 35.7* 36.6* 36.0*   PLATELETCT 313 338 322   SODIUM 137 136 137   POTASSIUM 3.9 3.7 4.0   BUN 23* 20 17   CREATININE 0.95 1.04 0.75   GLUCOSE 114* 98 103*     Additional Labs: Not Applicable    Prior Living Situation:   Housing / Facility: [address] [facility]  Steps Into Home:  (ramp)  Steps In Home: 0  Lives with - Patient's Self Care Capacity: Alone and Able to Care For Self  Equipment Owned:  (3WW that was her mothers)    Prior Level of Function / Living Situation:   Physical Therapy: Prior Services: None  Housing / Facility: [address] [facility]  Steps Into Home:  (ramp)  Steps In Home: 0  Bathroom Set up: Bathtub / Shower Combination, Shower Chair  Equipment Owned:  (3WW that was her mothers)  Lives with - Patient's Self Care Capacity: Alone and Able to Care For Self     Current Level of Function:   Gait Level Of Assist: Minimal Assist  Assistive Device: Front Wheel Walker  Distance (Feet): 30  Deviation: Bradykinetic, Shuffled Gait  Supine to Sit: Minimal Assist  Sit to Supine:  (pt left up in chair)  Scooting: Supervised  Rolling: Minimal Assist to Rt.  Comments: Provided cues for log roll technique and scooting up in the bed in supine.  Sit to Stand: Minimal Assist  Bed, Chair, " Wheelchair Transfer: Minimal Assist  Transfer Method: Stand Step  Sitting in Chair: 35  Sitting Edge of Bed: 10  Standin  Occupational Therapy:   Self Feeding: Independent  Grooming / Hygiene: Independent  Bathing: Independent  Dressing: Independent  Toileting: Independent  Medication Management: Independent  Laundry: Independent  Kitchen Mobility: Independent  Finances: Independent  Home Management: Independent  Shopping: Independent  Prior Level Of Mobility: Independent Without Device in Community  Driving / Transportation: Driving Independent  Prior Services: None  Housing / Facility: [address] [facility]  Occupation (Pre-Hospital Vocational): Retired Due To Age  Current Level of Function:   Eating: Modified Independent  Bathing: Moderate Assist  Upper Body Dressing: Minimal Assist  Lower Body Dressing: Maximal Assist  Toileting: Maximal Assist  Speech Language Pathology:      Rehabilitation Prognosis/Potential: Good  Estimated Length of Stay: 10/ days    Nursing:      Incontinent    Scope/Intensity of Services Recommended:  Physical Therapy: 1.5 hr / day  5 days / week. Therapeutic Interventions Required: Maximize Endurance, Mobility, Strength, and Safety  Occupational Therapy: 1.5 hr / day 5 days / week. Therapeutic Interventions Required: Maximize Self Care, ADLs, IADLs, and Energy Conservation  Rehabilitation Nursin/7. Therapeutic Interventions Required: Monitor Pain, Skin, Wound(s), Vital Signs, Intake and Output, Labs, Safety, Aspiration Risk, Family Training, and wound vac  Rehabilitation Physician: 3 - 5 days / week. Therapeutic Interventions Required: Medical Management  Respiratory Care: consult. Therapeutic Interventions Required: Pulmonary Toileting  Dietician: consult. Therapeutic Interventions Required:      She requires 24-hour rehabilitation nursing to manage bowel and bladder function, skin care, surgical incision, wound, nutrition and fluid intake, pulmonary hygiene, pain control, safety,  medication management, and patient/family goals. In addition, rehabilitation nursing will reiterate and reinforce therapy skills and equipment use, including ADLs, as well as provide education to the patient and family. Maira Johnson is willing to participate in and is able to tolerate the proposed plan of care.    Rehabilitation Goals and Plan (Expected frequency & duration of treatment in the IRF):   Return to the Community, Supervised Level of Care, and Family Able to Provide 24/7 Assistance  Anticipated Date of Rehabilitation Admission:12/23/22  Patient/Family oriented IRF level of care/facility/plan: Yes  Patient/Family willing to participate in IRF care/facility/plan: Yes  Patient able to tolerate IRF level of care proposed: Yes  Patient has potential to benefit IRF level of care proposed: Yes  Comments:         Spoke with son Taz.  Taz lives next door to his mother.  He is currently re modeling her kitchen and bathroom.  He is making the bathroom ADA compliant with grab bars and a no step shower.  Taz is self employed so is home most every day.  He said he would be able and willing to be there for his mom, when she is released home.       Spoke with daughter Carmen.  Carmen lives in [facility] But will be staying with mom for at least first 2 weeks after discharge.  She is also self employed.    Advised both Carmen and Taz regarding [facility] policies and procedures regarding admission, therapy and discharge.  Covid policy reviewed.  Visiting policy reviewed.       All questions and concerns addressed.      TCC will follow for medical clearance      Special Needs or Precautions - Medical Necessity:  Safety Concerns/Precautions:  Balance  Complex Wound Care: Wound Vac, J drain  Pain Management  Diet:   DIET ORDERS (From admission to next 24h)       Start     Ordered    12/21/22 1100  Diet Order Diet: Full Liquid  START AT LUNCH        Question:  Diet:  Answer:  Full Liquid    12/21/22 1035    12/20/22 9694   Supplements  ALL MEALS        Question:  Which Supplement  Answer:  RESOURCE SWETHA JUICE    12/20/22 1058                    Anticipated Discharge Destination / Patient/Family Goal:  Destination: Home with Supervision Support System: Family   Anticipated home health services: OT, PT, Nursing, and Social Work  Previously used HH service/ provider: Not Applicable  Anticipated DME Needs: [employer]  Outpatient Services: OT and PT  Alternative resources to address additional identified needs:   No future appointments.    Pre-Screen Completed: 12/22/2022 10:58 AM Neelam Bacon L.P.N.

## 2022-12-22 NOTE — PROGRESS NOTES
Hospital Medicine Daily Progress Note    Date of Service  12/22/2022    Chief Complaint  Maira Johnson is a 77 y.o. female admitted 12/13/2022 with diverticulitis with abscess    Hospital Course  Admitted with diverticulitis with abscess.  Patient was started on empiric coverage with IV Zosyn.  Surgery was consulted on the case.  Patient underwent CT guided pelvic diverticular abscess catheter drainage on 12/14/2022.  Scan on 12/18/2022 showed new peritoneal air consistent with perforation, and multilocular paracolic abscess.  Patient then underwent Exploratory laparotomy with drainage of pelvic abscesses and Chávez's colectomy with mobilization of the splenic flexure on 12/18/2022.  Cultures showed E. coli, Enterococcus, Streptococcus, and Bacteroides.    Interval Problem Update  Diverticulitis with abscess -pain controlled, tolerating full liquids  Hypoxia - O2 2 lpm  Hypertension - sbp 146-154    Updates given and plan of care discussed with patient's daughter who was at bedside.    I have discussed this patient's plan of care and discharge plan at IDT rounds today with Case Management, Nursing, Nursing leadership, and other members of the IDT team.    Consultants/Specialty  general surgery, infectious disease, and interventional radiology    Code Status  Full Code    Disposition  Patient is not medically cleared for discharge.   Anticipate discharge to to skilled nursing facility.  I have placed the appropriate orders for post-discharge needs.    Review of Systems  Review of Systems   Constitutional:  Negative for chills, diaphoresis, fever and malaise/fatigue.   HENT:  Negative for congestion, hearing loss and sore throat.    Eyes:  Negative for blurred vision.   Respiratory:  Negative for cough, shortness of breath and wheezing.    Cardiovascular:  Positive for leg swelling. Negative for chest pain and palpitations.   Gastrointestinal:  Positive for abdominal pain and nausea. Negative for diarrhea,  heartburn and vomiting.   Genitourinary:  Negative for dysuria, flank pain and hematuria.   Musculoskeletal:  Negative for back pain, joint pain, myalgias and neck pain.   Skin:  Negative for rash.   Neurological:  Positive for weakness. Negative for dizziness, sensory change, speech change, focal weakness and headaches.   Psychiatric/Behavioral:  The patient is not nervous/anxious.       Physical Exam  Temp:  [36.3 °C (97.4 °F)-36.5 °C (97.7 °F)] 36.5 °C (97.7 °F)  Pulse:  [81-95] 84  Resp:  [16-18] 16  BP: (146-154)/(69-79) 154/72  SpO2:  [93 %-95 %] 94 %    Physical Exam  Vitals and nursing note reviewed.   Constitutional:       Appearance: She is obese.   HENT:      Head: Normocephalic and atraumatic.      Nose: No congestion.      Mouth/Throat:      Mouth: Mucous membranes are moist.   Eyes:      Extraocular Movements: Extraocular movements intact.      Conjunctiva/sclera: Conjunctivae normal.   Cardiovascular:      Rate and Rhythm: Normal rate and regular rhythm.   Pulmonary:      Effort: Pulmonary effort is normal.      Breath sounds: Decreased air movement present.   Abdominal:      General: There is distension.      Tenderness: There is abdominal tenderness. There is no guarding or rebound.      Comments: Wound vac at midline  LLQ ostomy  LLQ drain   Musculoskeletal:      Cervical back: No tenderness.      Right lower leg: Edema present.      Left lower leg: Edema present.   Skin:     General: Skin is warm and dry.   Neurological:      General: No focal deficit present.      Mental Status: She is alert and oriented to person, place, and time.      Cranial Nerves: No cranial nerve deficit.       Fluids    Intake/Output Summary (Last 24 hours) at 12/22/2022 1026  Last data filed at 12/22/2022 0800  Gross per 24 hour   Intake 495.68 ml   Output 365 ml   Net 130.68 ml         Laboratory  Recent Labs     12/20/22  0317 12/21/22  0752 12/22/22  0326   WBC 14.1* 9.5 9.3   RBC 4.05* 4.17* 4.10*   HEMOGLOBIN 11.6*  11.9* 11.7*   HEMATOCRIT 35.7* 36.6* 36.0*   MCV 88.1 87.8 87.8   MCH 28.6 28.5 28.5   MCHC 32.5* 32.5* 32.5*   RDW 42.9 43.1 42.6   PLATELETCT 313 338 322   MPV 9.5 9.6 9.4       Recent Labs     12/20/22  0317 12/21/22  0752 12/22/22  0326   SODIUM 137 136 137   POTASSIUM 3.9 3.7 4.0   CHLORIDE 103 100 103   CO2 25 29 27   GLUCOSE 114* 98 103*   BUN 23* 20 17   CREATININE 0.95 1.04 0.75   CALCIUM 8.6 8.8 8.5                     Imaging  CT-ABDOMEN-PELVIS WITH   Final Result      1.  New peritoneal air consistent with perforation and likely the sigmoid colon diverticulitis      2.  Multilocular paracolic abscess anterior to the sigmoid colon with the largest component measuring 6.4 x 5.4 cm and a smaller air component present left lateral      3.  Left lower quadrant drainage catheter and appears to have retracted.      4.  Findings were discussed with FABY STUART on 12/18/2022 12:40 PM.      IR-US GUIDED PIV   Final Result    Ultrasound-guided PERIPHERAL IV INSERTION performed by    qualified nursing staff as above.      CT-DRAIN-PERITONEAL   Final Result      1.  CT guided pelvic diverticular abscess catheter drainage.   2.  The current plan is to obtain a follow-up CT in 5-7 days..      CT-ABDOMEN-PELVIS WITH   Final Result      1.  Acute sigmoid diverticulitis with adjacent 4.6 x 3.3 cm abscess.   2.  Gallbladder sludge or stones.   3.  2.1 cm hypodense left hepatic lobe lesion, likely a hemangioma, though incompletely characterized on this CT. If there is clinical concern, consider further evaluation with nonemergent MRI hepatic protocol.   4.  Nonobstructing 6 mm inferior pole left renal stone.      Findings conveyed to Dr. Cordova at 1124 via Voalte messaging on 12/14/2022.             Assessment/Plan  * Diverticulitis of intestine with perforation and abscess- (present on admission)  Assessment & Plan  IV Unasyn until 12/25/2022  CT guided pelvic diverticular abscess catheter drainage    12/14/2022  Exploratory laparotomy with drainage of pelvic abscesses and Chávez's colectomy with mobilization of the splenic flexure    12/18/2022  Pain control, encourage I-S  Diet per Surgery - Full liquids  PT and OT     Sepsis ([facility])- (present on admission)  Assessment & Plan  Source -perforated diverticulitis with abscess    Postoperative hypoxia- (present on admission)  Assessment & Plan  Encourage I-S, RT protocol    Hypomagnesemia- (present on admission)  Assessment & Plan  Follow level    Hypokalemia- (present on admission)  Assessment & Plan  Follow bmp    CHEPE (acute kidney injury) ([facility])- (present on admission)  Assessment & Plan  Follow bmp    Hyperglycemia- (present on admission)  Assessment & Plan  hgba1c 6.0    Hypertension- (present on admission)  Assessment & Plan  Start Toprol    Class 3 severe obesity due to excess calories with serious comorbidity and body mass index (BMI) of 40.0 to 44.9 in adult ([facility])- (present on admission)  Assessment & Plan  Body mass index is 42.65 kg/m².         VTE prophylaxis: enoxaparin ppx    I have performed a physical exam and reviewed and updated ROS and Plan today (12/22/2022). In review of yesterday's note (12/21/2022), there are no changes except as documented above.

## 2022-12-22 NOTE — PROGRESS NOTES
Assumed care of patient at 0645. Bedside report received.  Assessment complete.  AA&Ox4. Denies CP/SOB.  Reporting 0/10 pain. Declined intervention at this time.  Educated patient regarding pharmacologic and non pharmacologic modalities for pain management.  Skin per flowsheets  Tolerating full liquid diet. Denies N/V.  + void via Purewick, +Output to Ostomy  Pt ambulates x1 assist w FWW.  All needs met at this time. Call light within reach. Pt calls appropriately. Bed low and locked, non skid socks in place. Hourly rounding in place.

## 2022-12-23 ENCOUNTER — HOSPITAL ENCOUNTER (INPATIENT)
Facility: REHABILITATION | Age: 77
LOS: 12 days | DRG: 949 | End: 2023-01-04
Attending: PHYSICAL MEDICINE & REHABILITATION | Admitting: PHYSICAL MEDICINE & REHABILITATION
Payer: MEDICARE

## 2022-12-23 VITALS
WEIGHT: 240.74 LBS | HEART RATE: 83 BPM | TEMPERATURE: 97.7 F | BODY MASS INDEX: 42.66 KG/M2 | DIASTOLIC BLOOD PRESSURE: 67 MMHG | HEIGHT: 63 IN | SYSTOLIC BLOOD PRESSURE: 143 MMHG | RESPIRATION RATE: 16 BRPM | OXYGEN SATURATION: 91 %

## 2022-12-23 DIAGNOSIS — I10 PRIMARY HYPERTENSION: ICD-10-CM

## 2022-12-23 DIAGNOSIS — K57.20 COLONIC DIVERTICULAR ABSCESS: ICD-10-CM

## 2022-12-23 DIAGNOSIS — E78.5 DYSLIPIDEMIA: ICD-10-CM

## 2022-12-23 PROBLEM — E83.42 HYPOMAGNESEMIA: Status: RESOLVED | Noted: 2022-12-20 | Resolved: 2022-12-23

## 2022-12-23 PROBLEM — Z87.898 H/O ABDOMINAL ABSCESS: Status: ACTIVE | Noted: 2022-12-23

## 2022-12-23 PROBLEM — A41.9 SEPSIS (HCC): Status: RESOLVED | Noted: 2022-12-13 | Resolved: 2022-12-23

## 2022-12-23 PROBLEM — K65.1 INTRA-ABDOMINAL ABSCESS (HCC): Status: ACTIVE | Noted: 2022-12-23

## 2022-12-23 PROBLEM — K57.92 DIVERTICULITIS: Status: ACTIVE | Noted: 2022-12-23

## 2022-12-23 LAB
ANION GAP SERPL CALC-SCNC: 5 MMOL/L (ref 7–16)
BUN SERPL-MCNC: 16 MG/DL (ref 8–22)
CALCIUM SERPL-MCNC: 8.2 MG/DL (ref 8.5–10.5)
CHLORIDE SERPL-SCNC: 105 MMOL/L (ref 96–112)
CO2 SERPL-SCNC: 28 MMOL/L (ref 20–33)
CREAT SERPL-MCNC: 0.74 MG/DL (ref 0.5–1.4)
ERYTHROCYTE [DISTWIDTH] IN BLOOD BY AUTOMATED COUNT: 41.9 FL (ref 35.9–50)
FLUAV RNA SPEC QL NAA+PROBE: NEGATIVE
FLUBV RNA SPEC QL NAA+PROBE: NEGATIVE
GFR SERPLBLD CREATININE-BSD FMLA CKD-EPI: 83 ML/MIN/1.73 M 2
GLUCOSE SERPL-MCNC: 113 MG/DL (ref 65–99)
HCT VFR BLD AUTO: 35.6 % (ref 37–47)
HGB BLD-MCNC: 11.6 G/DL (ref 12–16)
MCH RBC QN AUTO: 28.5 PG (ref 27–33)
MCHC RBC AUTO-ENTMCNC: 32.6 G/DL (ref 33.6–35)
MCV RBC AUTO: 87.5 FL (ref 81.4–97.8)
PLATELET # BLD AUTO: 375 K/UL (ref 164–446)
PMV BLD AUTO: 9.5 FL (ref 9–12.9)
POTASSIUM SERPL-SCNC: 4 MMOL/L (ref 3.6–5.5)
RBC # BLD AUTO: 4.07 M/UL (ref 4.2–5.4)
RSV RNA SPEC QL NAA+PROBE: NEGATIVE
SARS-COV-2 RNA RESP QL NAA+PROBE: NOTDETECTED
SODIUM SERPL-SCNC: 138 MMOL/L (ref 135–145)
SPECIMEN SOURCE: NORMAL
WBC # BLD AUTO: 9.9 K/UL (ref 4.8–10.8)

## 2022-12-23 PROCEDURE — A9270 NON-COVERED ITEM OR SERVICE: HCPCS | Performed by: INTERNAL MEDICINE

## 2022-12-23 PROCEDURE — 700102 HCHG RX REV CODE 250 W/ 637 OVERRIDE(OP): Performed by: GENERAL PRACTICE

## 2022-12-23 PROCEDURE — 97606 NEG PRS WND THER DME>50 SQCM: CPT

## 2022-12-23 PROCEDURE — 94760 N-INVAS EAR/PLS OXIMETRY 1: CPT

## 2022-12-23 PROCEDURE — 99223 1ST HOSP IP/OBS HIGH 75: CPT | Performed by: PHYSICAL MEDICINE & REHABILITATION

## 2022-12-23 PROCEDURE — A9270 NON-COVERED ITEM OR SERVICE: HCPCS | Performed by: PHYSICAL MEDICINE & REHABILITATION

## 2022-12-23 PROCEDURE — A9270 NON-COVERED ITEM OR SERVICE: HCPCS | Performed by: GENERAL PRACTICE

## 2022-12-23 PROCEDURE — A9270 NON-COVERED ITEM OR SERVICE: HCPCS | Performed by: FAMILY MEDICINE

## 2022-12-23 PROCEDURE — 700105 HCHG RX REV CODE 258

## 2022-12-23 PROCEDURE — 99024 POSTOP FOLLOW-UP VISIT: CPT

## 2022-12-23 PROCEDURE — 700102 HCHG RX REV CODE 250 W/ 637 OVERRIDE(OP): Performed by: FAMILY MEDICINE

## 2022-12-23 PROCEDURE — 700102 HCHG RX REV CODE 250 W/ 637 OVERRIDE(OP): Performed by: INTERNAL MEDICINE

## 2022-12-23 PROCEDURE — 306591 TRAY SUTURE REMOVAL DISP: Performed by: FAMILY MEDICINE

## 2022-12-23 PROCEDURE — 81001 URINALYSIS AUTO W/SCOPE: CPT

## 2022-12-23 PROCEDURE — 80048 BASIC METABOLIC PNL TOTAL CA: CPT

## 2022-12-23 PROCEDURE — 85027 COMPLETE CBC AUTOMATED: CPT

## 2022-12-23 PROCEDURE — 36415 COLL VENOUS BLD VENIPUNCTURE: CPT

## 2022-12-23 PROCEDURE — 700111 HCHG RX REV CODE 636 W/ 250 OVERRIDE (IP): Performed by: PHYSICAL MEDICINE & REHABILITATION

## 2022-12-23 PROCEDURE — 0241U HCHG SARS-COV-2 COVID-19 NFCT DS RESP RNA 4 TRGT MIC: CPT

## 2022-12-23 PROCEDURE — 700101 HCHG RX REV CODE 250: Performed by: INTERNAL MEDICINE

## 2022-12-23 PROCEDURE — 99239 HOSP IP/OBS DSCHRG MGMT >30: CPT | Performed by: FAMILY MEDICINE

## 2022-12-23 PROCEDURE — 770010 HCHG ROOM/CARE - REHAB SEMI PRIVAT*

## 2022-12-23 PROCEDURE — 700111 HCHG RX REV CODE 636 W/ 250 OVERRIDE (IP)

## 2022-12-23 PROCEDURE — 700102 HCHG RX REV CODE 250 W/ 637 OVERRIDE(OP): Performed by: PHYSICAL MEDICINE & REHABILITATION

## 2022-12-23 RX ORDER — ENOXAPARIN SODIUM 100 MG/ML
40 INJECTION SUBCUTANEOUS DAILY
Status: DISCONTINUED | OUTPATIENT
Start: 2022-12-23 | End: 2023-01-04 | Stop reason: HOSPADM

## 2022-12-23 RX ORDER — ECHINACEA PURPUREA EXTRACT 125 MG
2 TABLET ORAL PRN
Status: DISCONTINUED | OUTPATIENT
Start: 2022-12-23 | End: 2023-01-04 | Stop reason: HOSPADM

## 2022-12-23 RX ORDER — AMOXICILLIN 250 MG
2 CAPSULE ORAL 2 TIMES DAILY
Qty: 30 TABLET | Refills: 0 | Status: ON HOLD | OUTPATIENT
Start: 2022-12-23 | End: 2023-01-03

## 2022-12-23 RX ORDER — ALUMINA, MAGNESIA, AND SIMETHICONE 2400; 2400; 240 MG/30ML; MG/30ML; MG/30ML
20 SUSPENSION ORAL
Status: DISCONTINUED | OUTPATIENT
Start: 2022-12-23 | End: 2023-01-04 | Stop reason: HOSPADM

## 2022-12-23 RX ORDER — METOPROLOL SUCCINATE 25 MG/1
25 TABLET, EXTENDED RELEASE ORAL
Status: DISCONTINUED | OUTPATIENT
Start: 2022-12-24 | End: 2022-12-27

## 2022-12-23 RX ORDER — OXYCODONE HYDROCHLORIDE 5 MG/1
5 TABLET ORAL
Status: DISCONTINUED | OUTPATIENT
Start: 2022-12-23 | End: 2023-01-04 | Stop reason: HOSPADM

## 2022-12-23 RX ORDER — ONDANSETRON 2 MG/ML
4 INJECTION INTRAMUSCULAR; INTRAVENOUS 4 TIMES DAILY PRN
Status: DISCONTINUED | OUTPATIENT
Start: 2022-12-23 | End: 2023-01-04 | Stop reason: HOSPADM

## 2022-12-23 RX ORDER — POLYETHYLENE GLYCOL 3350 17 G/17G
1 POWDER, FOR SOLUTION ORAL
Status: DISCONTINUED | OUTPATIENT
Start: 2022-12-23 | End: 2023-01-04 | Stop reason: HOSPADM

## 2022-12-23 RX ORDER — ACETAMINOPHEN 500 MG
1000 TABLET ORAL EVERY 6 HOURS PRN
Status: DISCONTINUED | OUTPATIENT
Start: 2022-12-28 | End: 2022-12-25

## 2022-12-23 RX ORDER — OMEPRAZOLE 20 MG/1
20 CAPSULE, DELAYED RELEASE ORAL DAILY
Status: DISCONTINUED | OUTPATIENT
Start: 2022-12-23 | End: 2023-01-04 | Stop reason: HOSPADM

## 2022-12-23 RX ORDER — ACETAMINOPHEN 325 MG/1
650 TABLET ORAL EVERY 6 HOURS PRN
Qty: 30 TABLET | Refills: 0 | Status: SHIPPED
Start: 2022-12-23 | End: 2023-08-01

## 2022-12-23 RX ORDER — OXYCODONE HYDROCHLORIDE 5 MG/1
2.5 TABLET ORAL
Status: DISCONTINUED | OUTPATIENT
Start: 2022-12-23 | End: 2023-01-04 | Stop reason: HOSPADM

## 2022-12-23 RX ORDER — TRAZODONE HYDROCHLORIDE 50 MG/1
50 TABLET ORAL
Status: DISCONTINUED | OUTPATIENT
Start: 2022-12-23 | End: 2023-01-04 | Stop reason: HOSPADM

## 2022-12-23 RX ORDER — AMOXICILLIN 250 MG
2 CAPSULE ORAL 2 TIMES DAILY
Status: DISCONTINUED | OUTPATIENT
Start: 2022-12-23 | End: 2023-01-04 | Stop reason: HOSPADM

## 2022-12-23 RX ORDER — BISACODYL 10 MG
10 SUPPOSITORY, RECTAL RECTAL
Status: DISCONTINUED | OUTPATIENT
Start: 2022-12-23 | End: 2023-01-04 | Stop reason: HOSPADM

## 2022-12-23 RX ORDER — OXYCODONE HYDROCHLORIDE 5 MG/1
5 TABLET ORAL EVERY 6 HOURS PRN
Qty: 12 TABLET | Status: ON HOLD
Start: 2022-12-23 | End: 2023-01-03

## 2022-12-23 RX ORDER — ENOXAPARIN SODIUM 100 MG/ML
40 INJECTION SUBCUTANEOUS DAILY
Status: ON HOLD
Start: 2022-12-23 | End: 2023-01-03

## 2022-12-23 RX ORDER — AMOXICILLIN AND CLAVULANATE POTASSIUM 875; 125 MG/1; MG/1
1 TABLET, FILM COATED ORAL EVERY 12 HOURS
Status: COMPLETED | OUTPATIENT
Start: 2022-12-23 | End: 2022-12-25

## 2022-12-23 RX ORDER — POLYETHYLENE GLYCOL 3350 17 G/17G
17 POWDER, FOR SOLUTION ORAL
Refills: 3 | Status: ON HOLD
Start: 2022-12-23 | End: 2023-01-03

## 2022-12-23 RX ORDER — ONDANSETRON 4 MG/1
4 TABLET, ORALLY DISINTEGRATING ORAL EVERY 4 HOURS PRN
Qty: 10 TABLET | Refills: 0 | Status: ON HOLD
Start: 2022-12-23 | End: 2023-01-03

## 2022-12-23 RX ORDER — ONDANSETRON 4 MG/1
4 TABLET, ORALLY DISINTEGRATING ORAL 4 TIMES DAILY PRN
Status: DISCONTINUED | OUTPATIENT
Start: 2022-12-23 | End: 2023-01-04 | Stop reason: HOSPADM

## 2022-12-23 RX ORDER — AMOXICILLIN AND CLAVULANATE POTASSIUM 875; 125 MG/1; MG/1
1 TABLET, FILM COATED ORAL 2 TIMES DAILY
Qty: 4 TABLET | Refills: 0 | Status: ON HOLD
Start: 2022-12-23 | End: 2023-01-03

## 2022-12-23 RX ORDER — METOPROLOL SUCCINATE 25 MG/1
25 TABLET, EXTENDED RELEASE ORAL DAILY
Qty: 30 TABLET | Status: ON HOLD
Start: 2022-12-24 | End: 2023-01-03

## 2022-12-23 RX ORDER — ACETAMINOPHEN 500 MG
1000 TABLET ORAL EVERY 6 HOURS
Status: DISCONTINUED | OUTPATIENT
Start: 2022-12-23 | End: 2022-12-25

## 2022-12-23 RX ORDER — ENOXAPARIN SODIUM 100 MG/ML
40 INJECTION SUBCUTANEOUS DAILY
Status: CANCELLED | OUTPATIENT
Start: 2022-12-23

## 2022-12-23 RX ORDER — METOPROLOL SUCCINATE 25 MG/1
25 TABLET, EXTENDED RELEASE ORAL
Status: CANCELLED | OUTPATIENT
Start: 2022-12-24

## 2022-12-23 RX ORDER — LACTOBACILLUS RHAMNOSUS GG 10B CELL
1 CAPSULE ORAL DAILY
Status: DISCONTINUED | OUTPATIENT
Start: 2022-12-23 | End: 2022-12-27

## 2022-12-23 RX ADMIN — Medication 1 CAPSULE: at 15:22

## 2022-12-23 RX ADMIN — LIDOCAINE HYDROCHLORIDE 1 APPLICATION: 40 SOLUTION TOPICAL at 11:02

## 2022-12-23 RX ADMIN — SENNOSIDES AND DOCUSATE SODIUM 2 TABLET: 50; 8.6 TABLET ORAL at 21:27

## 2022-12-23 RX ADMIN — AMPICILLIN AND SULBACTAM 3 G: 1; 2 INJECTION, POWDER, FOR SOLUTION INTRAMUSCULAR; INTRAVENOUS at 06:35

## 2022-12-23 RX ADMIN — OXYCODONE 5 MG: 5 TABLET ORAL at 15:21

## 2022-12-23 RX ADMIN — OMEPRAZOLE 20 MG: 20 CAPSULE, DELAYED RELEASE ORAL at 15:22

## 2022-12-23 RX ADMIN — METOPROLOL SUCCINATE 25 MG: 25 TABLET, EXTENDED RELEASE ORAL at 06:30

## 2022-12-23 RX ADMIN — ENOXAPARIN SODIUM 40 MG: 40 INJECTION SUBCUTANEOUS at 18:37

## 2022-12-23 RX ADMIN — ACETAMINOPHEN 1000 MG: 500 TABLET ORAL at 15:22

## 2022-12-23 RX ADMIN — SENNOSIDES AND DOCUSATE SODIUM 2 TABLET: 50; 8.6 TABLET ORAL at 06:30

## 2022-12-23 RX ADMIN — OXYCODONE 5 MG: 5 TABLET ORAL at 11:12

## 2022-12-23 RX ADMIN — AMOXICILLIN AND CLAVULANATE POTASSIUM 1 TABLET: 875; 125 TABLET, FILM COATED ORAL at 21:27

## 2022-12-23 ASSESSMENT — PAIN DESCRIPTION - PAIN TYPE
TYPE: ACUTE PAIN

## 2022-12-23 ASSESSMENT — LIFESTYLE VARIABLES
EVER FELT BAD OR GUILTY ABOUT YOUR DRINKING: NO
TOTAL SCORE: 0
HAVE YOU EVER FELT YOU SHOULD CUT DOWN ON YOUR DRINKING: NO
ALCOHOL_USE: NO
ON A TYPICAL DAY WHEN YOU DRINK ALCOHOL HOW MANY DRINKS DO YOU HAVE: 0
HAVE PEOPLE ANNOYED YOU BY CRITICIZING YOUR DRINKING: NO
TOTAL SCORE: 0
EVER HAD A DRINK FIRST THING IN THE MORNING TO STEADY YOUR NERVES TO GET RID OF A HANGOVER: NO
HOW MANY TIMES IN THE PAST YEAR HAVE YOU HAD 5 OR MORE DRINKS IN A DAY: 0
TOTAL SCORE: 0
AVERAGE NUMBER OF DAYS PER WEEK YOU HAVE A DRINK CONTAINING ALCOHOL: 0
CONSUMPTION TOTAL: NEGATIVE

## 2022-12-23 ASSESSMENT — PATIENT HEALTH QUESTIONNAIRE - PHQ9
SUM OF ALL RESPONSES TO PHQ9 QUESTIONS 1 AND 2: 0
SUM OF ALL RESPONSES TO PHQ9 QUESTIONS 1 AND 2: 0
1. LITTLE INTEREST OR PLEASURE IN DOING THINGS: NOT AT ALL
1. LITTLE INTEREST OR PLEASURE IN DOING THINGS: NOT AT ALL
2. FEELING DOWN, DEPRESSED, IRRITABLE, OR HOPELESS: NOT AT ALL
2. FEELING DOWN, DEPRESSED, IRRITABLE, OR HOPELESS: NOT AT ALL

## 2022-12-23 ASSESSMENT — FIBROSIS 4 INDEX: FIB4 SCORE: 1.19

## 2022-12-23 NOTE — PROGRESS NOTES
Discharging patient to [facility] per physician order.  Discharged with [facility] transporter.  Patient and daughter demonstrated understanding of discharge instructions, follow up appointments, home medications, and prescriptions.  Patient ambulating with x2 assistance, voiding without difficulty, pain well controlled, tolerating oral medications, oxygen saturation greater than 90%, tolerating diet.   Educational handouts given and discussed.  Patient and daughter verbalized understanding of discharge instructions and educational handouts. All questions answered. MIGUE drain removed prior to discharge per order. Belongings with patient at time of discharge.    [facility]. Notified Rehab that patient is currently only her way. Phone number left for call back.     2847 - Received call from CONNER Prater at [facility]. Report given.

## 2022-12-23 NOTE — DISCHARGE PLANNING
Case Management Discharge Planning        Anticipated Discharge Dispo: [facility]    DME Needed: No    Action(s) Taken: LSW completed chart review. Per review, [facility] following.     LSW received message from BONG Hull inquiring about [facility]. LSW messaged MD. Per MD, pending clearance from surgery. LSW notified Kurtis.    0925: Per MD, pt got cleared. LSW notified Kurtis. Per Kurtis, he will discuss with team for admission.     0940: Kurtis inquired about pt's wound vac. LSW messaged RN for what type of wound vac pt has.    0957: RN confirmed pt has regular wound vac. LSW notified Kurtis.    1000: Per Kurtis, Dr. Jones accepted and [facility] will  at 1200. Per Kurtis, he notified bedside RN and surgery APN.    1015: LSW met with pt and pt's dtr Carmen at bedside. LSW notified pt and dtr of transport at 1200. Pt signed cobra consent to transfer.    1115: LSW provided completed cobra to bedside RN.    Escalations Completed: Provider    Next Steps: LSW to follow and assist as needed with transfer to IRF.    Barriers to Discharge: Medical clearance

## 2022-12-23 NOTE — FLOWSHEET NOTE
12/23/22 1526   Events/Summary/Plan   Events/Summary/Plan o2 spot check RT consult   Vital Signs   Pulse 88   Respiration 18   Pulse Oximetry 93 %   $ Pulse Oximetry (Spot Check) Yes   Respiratory Assessment   Level of Consciousness Alert   Chest Exam   Work Of Breathing / Effort Within Normal Limits   Breath Sounds   RUL Breath Sounds Clear   RML Breath Sounds Clear   RLL Breath Sounds Clear   CHRISTIANO Breath Sounds Clear   LLL Breath Sounds Clear   Oxygen   O2 Delivery Device None - Room Air

## 2022-12-23 NOTE — H&P
Physical Medicine & Rehabilitation  History and Physical (H&P)  &     Post Admission Physician Evaluation (JULIEN)       Date of Admission:12/23/2022    Date of Service: 12/23/2022   Maira Johnson  Room/bed right chest 3-2    [facility] Code to Support Admission: 0016 - Debility (Non-Cardiac, Non-Pulmonary)  Etiologic Diagnosis: Intra-abdominal abscess (HCC)    _______________________________________________    Chief Complaint: Abdominal wound    History of Present Illness:  Per my consult note on 12/21/2022  The patient is a 77 y.o.female with a past medical history of hypertension and history of multiple episodes of diverticulitis previously requiring IV antibiotics;  who presented on 12/13/2022  7:45 PM as a transfer from an outside hospital with concern for an intra-abdominal abscess and diverticulitis.  Per documentation, patient was originally seen at [facility] with abdominal pain.  Reportedly she had diarrhea, abdominal pain and worsening cramping and fevers prior to her presenting to the emergency department.  Upon evaluation at the outside hospital, CT abdomen showed diverticulitis with multiple 3 cm abscesses.  Patient received IV Zosyn and IV fluids.  Patient was transferred to [facility] for higher level of care.  Orthopedic surgery was consulted and patient was taken to the OR on 12/18 for ex lap and drainage of pelvic abscesses with a Chávez's colectomy performed by Dr. Asher.  Postoperatively, patient continues to have a MIGUE drain in place.  She is on supplemental oxygen at 4 L.  Patient's leukocytosis has improved from 17.512/19-9.5 on 12/21.  Infectious diseases was consulted, fluid cultures from 12/18 are growing E coli ampicillin susceptible Enterococcus AVM, Bacteroides fragilis and Streptococcus angina gnosis.  Blood cultures have been negative.  Infectious diseases is recommending to discontinue IV Zosyn and transition to IV Unasyn 3 A every 6 hours with an anticipated stop  date of 12/25/2022,  With transition to Augmentin 875 g twice daily at time of discharge home.  Functionally, patient has been willing to participate with therapy, she has been limited by a deconditioned surgical abdomen.    Patient seen and examined at bedside.  Patient reports she feels well.  Patient's wound VAC was changed 12/23 prior to [facility].  Patient has been admitted with continuation of Augmentin x2 days.  Otherwise patient denies headache, lightheadedness, shortness of breath, abdominal pain, she has adequate output at her ostomy.  Patient reports she is looking forward to therapy.  Has no complaints.    Review of Systems:     Comprehensive 14 point ROS was reviewed and all were negative except as noted elsewhere in this document.     Past Medical History:  Past Medical History:   Diagnosis Date    Arthritis     Cancer (HCC)     Cataract     Hypertension        Past Surgical History:  Past Surgical History:   Procedure Laterality Date    AK EXPLORATORY OF ABDOMEN  12/18/2022    Procedure: LAPAROTOMY, EXPLORATORY WITH OSTOMY CREATION;  Surgeon: Huseyin Asher M.D.;  Location: SURGERY [facility];  Service: General    GYN SURGERY Right 11/01/2013    Right mastecomy    TUBAL LIGATION  01/01/1975       Family History:  Family History   Problem Relation Age of Onset    Dementia Mother     Testicular Cancer Father     Skin cancer Sister     Arthritis Brother     Skin cancer Brother     Prostate cancer Brother        Medications:  No current facility-administered medications for this encounter.     Current Outpatient Medications   Medication    acetaminophen (TYLENOL) 325 MG Tab    enoxaparin (LOVENOX) 40 MG/0.4ML Solution Prefilled Syringe inj    [START ON 12/24/2022] metoprolol SR (TOPROL XL) 25 MG TABLET SR 24 HR    ondansetron (ZOFRAN ODT) 4 MG TABLET DISPERSIBLE    oxyCODONE immediate-release (ROXICODONE) 5 MG Tab    senna-docusate (PERICOLACE OR SENOKOT S) 8.6-50 MG Tab    polyethylene  "glycol/lytes (MIRALAX) 17 g Pack    amoxicillin-clavulanate (AUGMENTIN) 875-125 MG Tab     Facility-Administered Medications Ordered in Other Encounters   Medication Dose    metoprolol SR (TOPROL XL) tablet 25 mg  25 mg    ampicillin/sulbactam (UNASYN) 3 g in  mL IVPB  3 g    lidocaine (XYLOCAINE) 2 % injection 20 mL  20 mL    Or    lidocaine (XYLOCAINE) 4 % topical solution      Pharmacy Consult Request ...Pain Management Review 1 Each  1 Each    oxyCODONE immediate-release (ROXICODONE) tablet 2.5 mg  2.5 mg    Or    oxyCODONE immediate-release (ROXICODONE) tablet 5 mg  5 mg    senna-docusate (PERICOLACE or SENOKOT S) 8.6-50 MG per tablet 2 Tablet  2 Tablet    And    polyethylene glycol/lytes (MIRALAX) PACKET 1 Packet  1 Packet    And    magnesium hydroxide (MILK OF MAGNESIA) suspension 30 mL  30 mL    And    bisacodyl (DULCOLAX) suppository 10 mg  10 mg    enoxaparin (Lovenox) inj 40 mg  40 mg    acetaminophen (Tylenol) tablet 650 mg  650 mg    ondansetron (ZOFRAN) syringe/vial injection 4 mg  4 mg    ondansetron (ZOFRAN ODT) dispertab 4 mg  4 mg       Allergies:  Sulfa drugs    Psychosocial History:  Patient lives in a mobile home in [place] with ramp access, lives alone, patient's son lives next door and can assist  0 MURALI  At prior level of function patient was independent with mobility and ADLs      Level of Function Prior to Admission to [facility]:  Restrictions: Fall risk  PT: Functional mobility   12/20 min assist with an FW W x30 feet, mod assist bed mobility, min assist sit to stand, min assist transfers     OT: ADLs  12/20 max assist lower body dressing, max assist toileting, min assist upper body dressing, min assist sit to stand    CURRENT LEVEL OF FUNCTION:   Same as level of function prior to admission to [facility]    Physical Examination:   Physical Exam:  Vitals: /65   Pulse 88   Temp 36.6 °C (97.9 °F) (Oral)   Resp 18   Ht 1.6 m (5' 3\")   " Wt 108 kg (238 lb 1.6 oz)   SpO2 93%   Gen: NAD, laying comfortably in bed  Head:  NC/AT  Eyes/ Nose/ Mouth: PERRLA, moist mucous membranes  Cardio: RRR, good distal perfusion, warm extremities  Pulm: normal respiratory effort, no cyanosis   Abd: Wound VAC in place, ostomy in place with adequate output, wound edges peripherally without erythema, signs of infection or increased drainage  Ext: No peripheral edema. No calf tenderness. No clubbing.    Mental status:  A&Ox4 (person, place, date, situation) answers questions appropriately follows commands  Speech: fluent, no aphasia or dysarthria    CRANIAL NERVES:  2,3: visual acuity grossly intact, PERRL  3,4,6: EOMI bilaterally, no nystagmus or diplopia  5: sensation intact to light touch bilaterally and symmetric  7: no facial asymmetry  8: hearing grossly intact    Motor:      Upper Extremity  Myotome R L   Shoulder flexion C5 5/5 5/5   Elbow flexion C5 5/5 5/5   Wrist extension C6 5/5 5/5   Elbow extension C7 5/5 5/5   Finger flexion C8 5/5 5/5   Finger abduction T1 5/5 5/5     Lower Extremity Myotome R L   Hip flexion L2 5/5 5/5   Knee extension L3 5/5 5/5   Ankle dorsiflexion L4 5/5 5/5   Toe extension L5 5/5 5/5   Ankle plantarflexion S1 5/5 5/5       Negative Pronator drift bilaterally    Sensory:   intact to light touch through out bilateral upper and lower extremities      DTRs: 2+ in bilateral  biceps  No clonus at bilateral ankles  Negative Licea b/l     Tone: no spasticity noted, no cogwheeling noted    Coordination:   intact finger to nose bilaterally  intact fine motor with fingers bilaterally      Radiology:  CT-ABDOMEN-PELVIS WITH   Final Result       1.  New peritoneal air consistent with perforation and likely the sigmoid colon diverticulitis       2.  Multilocular paracolic abscess anterior to the sigmoid colon with the largest component measuring 6.4 x 5.4 cm and a smaller air component present left lateral       3.  Left lower quadrant drainage  catheter and appears to have retracted.       4.  Findings were discussed with FABY STUART on 12/18/2022 12:40 PM.       IR-US GUIDED PIV   Final Result    Ultrasound-guided PERIPHERAL IV INSERTION performed by    qualified nursing staff as above.       CT-DRAIN-PERITONEAL   Final Result       1.  CT guided pelvic diverticular abscess catheter drainage.   2.  The current plan is to obtain a follow-up CT in 5-7 days..       CT-ABDOMEN-PELVIS WITH   Final Result       1.  Acute sigmoid diverticulitis with adjacent 4.6 x 3.3 cm abscess.   2.  Gallbladder sludge or stones.   3.  2.1 cm hypodense left hepatic lobe lesion, likely a hemangioma, though incompletely characterized on this CT. If there is clinical concern, consider further evaluation with nonemergent MRI hepatic protocol.   4.  Nonobstructing 6 mm inferior pole left renal stone.       Laboratory Values:  Recent Labs     12/21/22  0752 12/22/22  0326 12/23/22  0248   SODIUM 136 137 138   POTASSIUM 3.7 4.0 4.0   CHLORIDE 100 103 105   CO2 29 27 28   GLUCOSE 98 103* 113*   BUN 20 17 16   CREATININE 1.04 0.75 0.74   CALCIUM 8.8 8.5 8.2*     Recent Labs     12/21/22  0752 12/22/22  0326 12/23/22  0248   WBC 9.5 9.3 9.9   RBC 4.17* 4.10* 4.07*   HEMOGLOBIN 11.9* 11.7* 11.6*   HEMATOCRIT 36.6* 36.0* 35.6*   MCV 87.8 87.8 87.5   MCH 28.5 28.5 28.5   MCHC 32.5* 32.5* 32.6*   RDW 43.1 42.6 41.9   PLATELETCT 338 322 375   MPV 9.6 9.4 9.5           Primary Rehabilitation Diagnosis:    This patient is a 77 y.o. female admitted for acute inpatient rehabilitation with   Intra-abdominal abscess (HCC).    Impairments:   ADLs/IADLs  Mobility    Secondary Diagnosis/Medical Co-morbidities Affecting Function  History of diverticulitis  Hypertension  Hypokalemia  CHEPE  Hypoxia    Relevant Changes Since Preadmission Evaluation:    Status unchanged    The patient's rehabilitation potential is Excellent  The patient's medical prognosis is excellent    Rehabilitation Plan:    Discussion and Recommendations:   1. The patient requires an acute inpatient rehabilitation program with a coordinated program of care at an intensity and frequency not available at a lower level of care. This recommendation is substantiated by the patient's medical physicians who recommend that the patient's intervention and assessment of medical issues needs to be done at an acute level of care for patient's safety and maximum outcome.   2. A coordinated program of care will be supplied by an interdisciplinary team of physical therapy, occupational therapy, rehab physician, rehab nursing, and, if needed, speech therapy and rehab psychology. Rehab team presents a patient-specific rehabilitation and education program concentrating on prevention of future problems related to accessibility, mobility, skin, bowel, bladder, sexuality, and psychosocial and medical/surgical problems.   3. Need for Rehabilitation Physician: The rehab physician will be evaluating the patient on a multi-weekly basis to help coordinate the program of care. The rehab physician communicates between medical physicians, therapists, and nurses to maximize the patient's potential outcome. Specific areas in which the rehab physician will be providing daily assessment include the following:   A. Assessing the patient's heart rate and blood pressure response (vitals monitoring) to activity and making adjustments in medications or conservative measures as needed.   B. The rehab physician will be assessing the frequency at which the program can be increased to allow the patient to reach optimal functional outcome.   C. The rehab physician will also provide assessments in daily skin care, especially in light of patient's impairments in mobility.   D. The rehab physician will provide special expertise in understanding how to work with functional impairment and recommend appropriate interventions, compensatory techniques, and education that will  facilitate the patient's outcome.   4. Rehab R.N.   The rehab RN will be working with patient to carry over in room mobility and activities of daily living when the patient is not in 3 hours of skilled therapy. Rehab nursing will be working in conjunction with rehab physician to address all the medical issues above and continue to assess laboratory work and discuss abnormalities with the treating physicians, assess vitals, and response to activity, and discuss and report abnormalities with the rehab physician. Rehab RN will also continue daily skin care, supervise bladder/bowel program, instruct in medication administration, and ensure patient safety.   5. Rehab Therapy: Therapies to treat at intensity and frequency of (may change after completion of evaluation by all therapeutic disciplines):       PT:  Physical therapy to address mobility, transfer, gait training and evaluation for adaptive equipment needs 1.5 hour/day at least 5 days/week for the duration of the ELOS (see below)       OT:  Occupational therapy to address ADLs, self-care, home management training, functional mobility/transfers and assistive device evaluation, and community re-integration 1 .5 hour/day at least 5 days/week for the duration of the ELOS (see below).       Medical management / Rehabilitation Issues/ Adverse Potential as part of rehabilitation plan     Rehabilitation Issues/Adverse Potential  1.  Debility after abdominal surgery: Patient demonstrates functional deficits in strength, balance, coordination, and ADL's. Patient is admitted to [facility] for comprehensive rehabilitation therapy as described below.   Rehabilitation nursing monitors bowel and bladder control, educates on medication administration, co-morbidities and monitors patient safety.      2.  Neurostimulants: None at this time but continue to assess daily for need to initiate should status change.    3.  DVT prophylaxis:  Patient is on Lovenox for  anticoagulation upon transfer. Encourage OOB. Monitor daily for signs and symptoms of DVT including but not limited to swelling and pain to prevent the development of DVT that may interfere with therapies.    4.  GI prophylaxis:  On prilosec to prevent gastritis/dyspepsia which may interfere with therapies.    5.  Pain: No issues with pain currently / Controlled with an oxycodone    6.  Nutrition/Dysphagia: Dietician monitors nutrient intake, recommend supplements prn and provide nutrition education to pt/family to promote optimal nutrition for wound healing/recovery.     7.  Bladder/bowel:  Start bowel and bladder program as described below, to prevent constipation, urinary retention (which may lead to UTI), and urinary incontinence (which will impact upon pt's functional independence).   - TV Q3h while awake with post void bladder scans, I&O cath for PVRs >400  - up to commode after meal     8.  Skin/dermal ulcer prophylaxis: Monitor for new skin conditions with q.2 h. turns as required to prevent the development of skin breakdown.     9.  Cognition/Behavior: As needed psychologist provides adjustment counseling to illness and psychosocial barriers that may be potential barriers to rehabilitation.     10. Respiratory therapy: RT performs O2 management prn, breathing retraining, pulmonary hygiene and bronchospasm management prn to optimize participation in therapies.     Medical Co-Morbidities/Adverse Potential Affecting Function:  Debility status post diverticulitis and intra-abdominal abscess  - CT abdomen at outside facility showed multiple intra-abdominal abscesses and diverticulitis  - Abscesses drained by IR on 12/13  - patient taken to the OR on 12/18 for ex lap and Chávez's colostomy  - ID consulted, patient is to stay on IV Unasyn x7 days, with stop date of 12/25  - Augmentin x2 days at time of discharge, stop date is 12/26  - Lactobacillus ordered  - Wound VAC in place, wound care to assist with wound  VAC changes and ostomy education  - Initiate comprehensive IRF level therapy with PT/OT/SLP  - Follow-up with general surgery    Hypertension  - Home dose metoprolol    Hypoxia  - Postoperatively patient has required up to 3 L O2  -Respiratory therapy consulted, continue to wean down to room air as able    Acute blood loss anemia  - Hemoglobin down to 11.6 on 12/23, recheck admission labs    Hypocalcemia  -Calcium 8.2, recheck on admission labs    Pain  - Scheduled Tylenol oxycodone    Bowel  - Monitor ostomy output  - PRN stool softeners    Skin - Patient at risk for skin breakdown due to debility in areas including sacrum, achilles, elbows and head in addition to other sites. Nursing to assess skin daily.  - Wound care to follow along changes    GI Ppx - Patient on Prilosec for GERD prophylaxis. Patient on Senna-docusate for constipation prophylaxis.     DVT Ppx - Patient Lovenox on transfer.    I personally performed a complete drug regimen review and no potential clinically significant medication issues were identified.     Goals/Expected Level of Function Based on Current Medical and Functional Status:  (may change based on patient's medical status and rate of impairment recovery)  Transfers:   Modified Independent  Mobility/Gait:   Modified Independent  ADL's:   Modified Independent    DISPOSITION: Discharge to pre-morbid independent living setting with the supportive care of patient's family.    ELOS: 10 to 14 days  ____________________________________    Crystal Jones D.O.  _________    Pt was seen today for 91 min, and entire time spent in face-to-face contact was >50% in counseling and coordination of care as detailed in A/P above, as well as 35 minutes spent reviewing the patient's chart on 12/21/2022 for greater than 35 minutes.  Previously documented in documentation from chart review on 12/21/2022

## 2022-12-23 NOTE — DIETARY
Nutrition Services: Update   Day 10 of admit.  Maira Johnson is a 77 y.o. female with admitting DX of Diverticulitis [K57.92]  Intra-abdominal abscess (HCC) [K65.1]    Problem: Nutritional:  Goal: Achieve adequate nutritional intake  Description: Patient will consume >50% of meals/supplements  Outcome: Met    Pt is currently on regular diet, Boost Breeze TID. Diet advanced >clear liquids 12/21. PO intake per ADLs since then has been 50-75%.  Wt (12/17): 109.2 kg via bed scale - increased from admit stand up scale wt of 105.4 kg. I/Os 12/17 +2.7L  +BM 12/22 via colostomy  Skin: Full thickness abdominal wound; no documented staged wounds or edema  Labs: Ca+ 8.2, glu 113, A1c 6.0%  MAR: senna  [facility]    Malnutrition Risk: No criteria met    Recommendations/Plan:  Change Boost Breeze TID to Boost Glucose Control BID to provide increased protein/kcal provision to support wound healing.   Encourage intake of meals and supplements >50%  Document intake of all meals as % taken in ADL's to provide interdisciplinary communication across all shifts.   Monitor weight.  Nutrition rep will continue to see patient for ongoing meal and snack preferences.    RD following

## 2022-12-23 NOTE — DISCHARGE SUMMARY
Discharge Summary    CHIEF COMPLAINT ON ADMISSION  No chief complaint on file.      Reason for Admission  Intraabdominal abscess    Admission Date  12/13/2022     CODE STATUS  Full Code    HPI & HOSPITAL COURSE  This is a 77 y.o. female here with  diverticulitis with abscess.  Patient was started on empiric coverage with IV Zosyn.  Surgery was consulted on the case.  Patient underwent CT guided pelvic diverticular abscess catheter drainage on 12/14/2022.  Scan on 12/18/2022 showed new peritoneal air consistent with perforation, and multilocular paracolic abscess.  Patient then underwent Exploratory laparotomy with drainage of pelvic abscesses and Chávez's colectomy with mobilization of the splenic flexure on 12/18/2022.  Cultures showed E. coli, Enterococcus, Streptococcus, and Bacteroides.  Infectious disease was consulted on the case, antibiotics were changed to IV Unasyn with end date of 12/25/2022, also with recommendation that antibiotics could be changed to Augmentin on discharge.  Patient was noted to have ostomy output, she was started on clear liquids, and has been advanced to a regular diet.  The MIGUE drain was removed.  She continues to have a midline wound VAC.  Physical therapy and Occupational Therapy recommended postacute placement.  Surgery has cleared her for discharge.  Plan of care and discharge plan was discussed with the patient's daughter who was at bedside.    Therefore, she is discharged in good and stable condition to an inpatient rehabilitation hospital.    The patient met 2-midnight criteria for an inpatient stay at the time of discharge.      FOLLOW UP ITEMS POST DISCHARGE  Follow-up as below    DISCHARGE DIAGNOSES  Principal Problem:    Diverticulitis of intestine with perforation and abscess POA: Yes  Active Problems:    Sepsis (HCC) POA: Yes    Hypertension POA: Yes    Hyperglycemia POA: Yes    CHEPE (acute kidney injury) (HCC) POA: Yes    Hypokalemia POA: Yes    Hypomagnesemia POA: Yes     Postoperative hypoxia POA: Yes    Class 3 severe obesity due to excess calories with serious comorbidity and body mass index (BMI) of 40.0 to 44.9 in adult (HCC) POA: Yes  Resolved Problems:    * No resolved hospital problems. *      FOLLOW UP  No future appointments.  Kortney Rasmussen NYaquelinP.  [address] [place] [address]  [phone]    Follow up      [facility] - [address] [address] [phone]  Follow up      [facility]  [address] [place] [id number] [phone]          MEDICATIONS ON DISCHARGE     Medication List        START taking these medications        Instructions   acetaminophen 325 MG Tabs  Commonly known as: Tylenol   Take 2 Tablets by mouth every 6 hours as needed for Mild Pain, Moderate Pain or Fever.  Dose: 650 mg     amoxicillin-clavulanate 875-125 MG Tabs  Commonly known as: AUGMENTIN   Take 1 Tablet by mouth 2 times a day for 2 days.  Dose: 1 Tablet     enoxaparin 40 MG/0.4ML Sosy inj  Commonly known as: Lovenox   Inject 40 mg under the skin every day at 6 PM.  Dose: 40 mg     metoprolol SR 25 MG Tb24  Start taking on: December 24, 2022  Commonly known as: TOPROL XL   Take 1 Tablet by mouth every day.  Dose: 25 mg     ondansetron 4 MG Tbdp  Commonly known as: ZOFRAN ODT   Take 1 Tablet by mouth every four hours as needed for Nausea/Vomiting (give PO if IV route is unavailable.).  Dose: 4 mg     oxyCODONE immediate-release 5 MG Tabs  Commonly known as: ROXICODONE   Take 1 Tablet by mouth every 6 hours as needed for Severe Pain for up to 3 days.  Dose: 5 mg     polyethylene glycol/lytes 17 g Pack  Commonly known as: MIRALAX   Take 1 Packet by mouth 1 time a day as needed (if sennosides and docusate ineffective after 24 hours).  Dose: 17 g     senna-docusate 8.6-50 MG Tabs  Commonly known as: PERICOLACE or SENOKOT S   Take 2 Tablets by mouth 2 times a day.  Dose: 2 Tablet            CONTINUE taking these  "medications        Instructions   simvastatin 10 MG Tabs  Commonly known as: ZOCOR   Take 10 mg by mouth every day.  Dose: 10 mg            STOP taking these medications      atenolol 50 MG Tabs  Commonly known as: TENORMIN     calcium carbonate 1250 (500 Ca) MG Tabs  Commonly known as: OS-TORREY 500              Allergies  Allergies   Allergen Reactions    Sulfa Drugs Unspecified     \"Destroyed my blood cells, I almost \"       DIET  Orders Placed This Encounter   Procedures    Diet Order Diet: Regular     Standing Status:   Standing     Number of Occurrences:   1     Order Specific Question:   Diet:     Answer:   Regular [1]       ACTIVITY  As tolerated and directed by rehab.  Weight bearing as tolerated    LINES, DRAINS, AND WOUNDS  This is an automated list. Peripheral IVs will be removed prior to discharge.  Peripheral IV 22 20 G Anterior;Left Forearm (Active)   Site Assessment Clean;Dry 22   Dressing Type Transparent Film;Occlusive 22   Line Status Scrubbed the hub prior to access;Flushed;Infusing 22   Dressing Status Clean;Dry;Intact 22   Dressing Intervention N/A 22   Dressing Change Due 22   Date Primary Tubing Changed 22   Date Secondary Tubing Changed 22   NEXT Primary Tubing Change  22   NEXT Secondary Tubing Change  22   Date IV Connector(s) Changed 22   Infiltration Grading ([facility], [facility]) 0 22   Phlebitis Scale ([facility] Only) 0 22     Closed/Suction Drain 1 Left Abdomen 19 Fr. (Active)   Site Description Unable to view 22   Dressing Type Split Gauze;Tape 22   Dressing Status Clean;Dry;Intact 22   Drainage Appearance Serosanguineous 22   Tube Status To bulb suction 22   Output (mL) 30 mL 22       External Urinary Catheter (Female Wick) " (Active)   Collection Container Standard drainage bag 12/22/22 0851   Output (mL) 300 mL 12/23/22 0500      Wound 12/18/22 Full Thickness Wound Abdomen Mid (Active)   Wound Image      12/19/22 1200   Site Assessment ALDEN 12/22/22 2100   Periwound Assessment ALDEN 12/22/22 2100   Margins ALDEN 12/22/22 2100   Closure ALDEN 12/22/22 2100   Drainage Amount ALDEN 12/22/22 2100   Drainage Description ALDEN 12/22/22 2100   Treatments Other (Comment) 12/21/22 2122   Wound Cleansing Not Applicable 12/21/22 2122   Periwound Protectant Not Applicable 12/21/22 2122   Dressing Cleansing/Solutions Not Applicable 12/21/22 2122   Dressing Options Wound Vac 12/22/22 2100   Dressing Changed Observed 12/22/22 2100   Dressing Status Clean;Dry;Intact 12/22/22 2100   Dressing Change/Treatment Frequency Monday, Wednesday, Friday, and As Needed 12/22/22 2100   NEXT Dressing Change/Treatment Date 12/23/22 12/22/22 0851   NEXT Weekly Photo (Inpatient Only) 12/28/22 12/21/22 2122   Non-staged Wound Description Full thickness 12/21/22 1600   Wound Length (cm) 16 cm 12/19/22 1200   Wound Width (cm) 4.6 cm 12/19/22 1200   Wound Depth (cm) 5.3 cm 12/19/22 1200   Wound Surface Area (cm^2) 73.6 cm^2 12/19/22 1200   Wound Volume (cm^3) 390.08 cm^3 12/19/22 1200   Shape Linear 12/21/22 1600   Wound Odor None 12/21/22 1600   Exposed Structures Adipose 12/21/22 1600   WOUND NURSE ONLY - Time Spent with Patient (mins) 60 12/21/22 1600       Peripheral IV 12/16/22 20 G Anterior;Left Forearm (Active)   Site Assessment Clean;Dry 12/22/22 0851   Dressing Type Transparent Film;Occlusive 12/22/22 0851   Line Status Scrubbed the hub prior to access;Flushed;Infusing 12/22/22 0851   Dressing Status Clean;Dry;Intact 12/22/22 0851   Dressing Intervention N/A 12/22/22 0851   Dressing Change Due 12/24/22 12/22/22 0851   Date Primary Tubing Changed 12/16/22 12/21/22 2122   Date Secondary Tubing Changed 12/22/22 12/22/22 0851   NEXT Primary Tubing Change  12/24/22 12/21/22 2122    NEXT Secondary Tubing Change  12/23/22 12/22/22 0851   Date IV Connector(s) Changed 12/16/22 12/21/22 2122   Infiltration Grading ([facility]) 0 12/22/22 0851   Phlebitis Scale ([facility] Only) 0 12/22/22 0851               MENTAL STATUS ON TRANSFER  Alert oriented x4          CONSULTATIONS  Surgery [facility]  Interventional radiology  Infectious disease    PROCEDURES  CT guided pelvic diverticular abscess catheter drainage on 12/14/2022.      Exploratory laparotomy with drainage of pelvic abscesses and Chávez's colectomy with mobilization of the splenic flexure on 12/18/2022    LABORATORY  Lab Results   Component Value Date    SODIUM 138 12/23/2022    POTASSIUM 4.0 12/23/2022    CHLORIDE 105 12/23/2022    CO2 28 12/23/2022    GLUCOSE 113 (H) 12/23/2022    BUN 16 12/23/2022    CREATININE 0.74 12/23/2022    GLOMRATE 54 (L) 07/20/2022        Lab Results   Component Value Date    WBC 9.9 12/23/2022    HEMOGLOBIN 11.6 (L) 12/23/2022    HEMATOCRIT 35.6 (L) 12/23/2022    PLATELETCT 375 12/23/2022        Total time of the discharge process exceeds 40 minutes.

## 2022-12-23 NOTE — FLOWSHEET NOTE
12/23/22 1527   Protocol Assessment   Initial Assessment Yes   Patient History   Pulmonary Diagnosis none   Procedures Relevant to Respiratory Status none   Home O2 No   Nocturnal CPAP No   Home Treatments/Frequency No   Sleep Apnea Screening   Have you had a sleep study? No   Have you been diagnosed with sleep apnea? No

## 2022-12-23 NOTE — WOUND TEAM
[facility]  Inpatient Services  Wound and Skin Care Evaluation    Admission Date: 12/13/2022     Last order of IP CONSULT TO WOUND CARE was found on 12/18/2022 from Hospital Encounter on 12/13/2022     HPI, PMH, SH: Reviewed    Past Surgical History:   Procedure Laterality Date    NE EXPLORATORY OF ABDOMEN  12/18/2022    Procedure: LAPAROTOMY, EXPLORATORY WITH OSTOMY CREATION;  Surgeon: Huseyin Asher M.D.;  Location: SURGERY [facility];  Service: General    GYN SURGERY Right 11/01/2013    Right mastecomy    TUBAL LIGATION  01/01/1975     Social History     Tobacco Use    Smoking status: Never     Passive exposure: Never    Smokeless tobacco: Never   Substance Use Topics    Alcohol use: Never     No chief complaint on file.    Diagnosis: Diverticulitis [K57.92]  Intra-abdominal abscess (HCC) [K65.1]    Unit where seen by Wound Team: T422/00     WOUND CONSULT/FOLLOW UP RELATED TO:  Abdomen vac change and new ostomy education     WOUND HISTORY:  Pt is a 77yr old female with history of HTN and diverticulitis who presented with abdominal pain. Pt initially presented to [facility]. Pt has multiple episodes of diverticulitis which was treated with IV ABX. Pt was found to have perforated sigmoid diverticulitis with pelvic abscess and pneumoperitoneum. Pt underwent surgical intervention. Fascia was closed but skin was left open and wound was requested to place vac and begin ostomy education.    WOUND ASSESSMENT/LDA  Negative Pressure Wound Therapy 12/19/22 Surgical Abdomen (Active)   Vacuum Serial Number [id number]    NPWT Pump Mode / Pressure Setting Ulta;Continuous;125 mmHg    Dressing Type Medium;Black Foam (Regular)    Number of Foam Pieces Used 2    Canister Changed No    Output (mL) 0 mL    NEXT Dressing Change/Treatment Date 12/26/22    WOUND NURSE ONLY - Time Spent with Patient (mins) 0      Wound 12/18/22 Full Thickness Wound Abdomen Mid (Active)   Wound Image      12/19/22 1200    Site Assessment Painful;Granulation tissue    Periwound Assessment Clean;Dry;Intact    Margins Defined edges;Unattached edges    Closure Secondary intention    Drainage Amount Scant    Drainage Description Serosanguineous    Treatments Cleansed;Topical Lidocaine    Wound Cleansing Approved Wound Cleanser    Periwound Protectant Skin Protectant Wipes to Periwound;Paste Ring;Drape    Dressing Cleansing/Solutions Not Applicable    Dressing Options Wound Vac    Dressing Changed Changed    Dressing Status Clean;Dry;Intact    Dressing Change/Treatment Frequency Monday, Wednesday, Friday, and As Needed    NEXT Dressing Change/Treatment Date 12/26/22    NEXT Weekly Photo (Inpatient Only) 12/28/22    Non-staged Wound Description Full thickness    Wound Length (cm) 16 cm 12/19/22 1200   Wound Width (cm) 4.6 cm 12/19/22 1200   Wound Depth (cm) 5.3 cm 12/19/22 1200   Wound Surface Area (cm^2) 73.6 cm^2 12/19/22 1200   Wound Volume (cm^3) 390.08 cm^3 12/19/22 1200   Shape Linear    Wound Odor None    Exposed Structures Adipose    WOUND NURSE ONLY - Time Spent with Patient (mins) 60      Vascular:    MEME:   No results found.    Lab Values:    Lab Results   Component Value Date/Time    WBC 9.9 12/23/2022 02:48 AM    RBC 4.07 (L) 12/23/2022 02:48 AM    HEMOGLOBIN 11.6 (L) 12/23/2022 02:48 AM    HEMATOCRIT 35.6 (L) 12/23/2022 02:48 AM    CREACTPROT 10.84 (H) 12/14/2022 02:24 AM    HBA1C 6.0 (H) 12/21/2022 07:52 AM        Culture Results show:  Recent Results (from the past 720 hour(s))   CULTURE WOUND W/ GRAM STAIN    Collection Time: 12/18/22  2:59 PM    Specimen: Wound   Result Value Ref Range    Significant Indicator POS (POS)     Source WND     Site Pelvic Abscess     Culture Result - (A)     Gram Stain Result       Many WBCs.  Many Gram positive cocci.  Rare Gram positive rods.      Culture Result Escherichia coli  Moderate growth   (A)     Culture Result Enterococcus avium  Moderate growth   (A)     Culture Result  Streptococcus anginosus  Moderate growth   (A)        Susceptibility    Enterococcus avium - JOSE     Ampicillin <=2 Sensitive mcg/mL     Vancomycin 0.5 Sensitive mcg/mL     Daptomycin <=0.5 Sensitive mcg/mL     Gent Synergy <=500 Sensitive mcg/mL     Penicillin 1 Sensitive mcg/mL    Escherichia coli - JOSE     Ampicillin <=8 Sensitive mcg/mL     Ceftriaxone <=1 Sensitive mcg/mL     Cefazolin <=2 Sensitive mcg/mL     Ciprofloxacin <=0.25 Sensitive mcg/mL     Cefepime <=2 Sensitive mcg/mL     Cefuroxime <=4 Sensitive mcg/mL     Ertapenem <=0.5 Sensitive mcg/mL     Gentamicin <=2 Sensitive mcg/mL     Ampicillin/sulbactam <=4/2 Sensitive mcg/mL     Minocycline <=4 Sensitive mcg/mL     Moxifloxacin <=2 Sensitive mcg/mL     Pip/Tazobactam <=8 Sensitive mcg/mL     Trimeth/Sulfa 2/38 Sensitive mcg/mL     Tigecycline <=2 Sensitive mcg/mL     Tobramycin <=2 Sensitive mcg/mL     Pain Level/Medicated:  PO given 30mins prior, 4% topical lidocaine allowed to dwell for 30mins    INTERVENTIONS BY WOUND TEAM:  Chart and images reviewed. Discussed with bedside RN. All areas of concern (based on picture review, LDA review and discussion with bedside RN) have been thoroughly assessed. Documentation of areas based on significant findings. This RN in to assess patient. Performed standard wound care which includes appropriate positioning, dressing removal and non-selective debridement. Pictures and measurements obtained weekly if/when required.  Preparation for Dressing removal: Dressing soaked with NS and Lidocaine, removed without difficulty.  Non-selectively Debrided with:  Wound cleanser and gauze.  Sharp debridement: NA  Radha wound: Cleansed with NS, Prepped with no sting and wedge of paste ring in umbilicus. Drape for remainder of periwound.  Primary Dressing: One piece of spiraled full thickness black regular foam packed into wound and secured with drape.   Secondary (Outer) Dressing: A hole was cut in drape and a 2nd piece of  circular half thickness black foam was applied as a button for trac pad. Trac pad applied and suction initiated. No leaks detected.    Interdisciplinary consultation: Patient, Bedside RN (Sol), Lenora LAWTON (Wound RN)    EVALUATION / RATIONALE FOR TREATMENT:  Most Recent Date:  12/23/22: Sutures completely granulated over, continuing POC. Patient discharging to rehab today, wound team will continue to do VAC changes there.    12/21/22: Nearly all sutures are granulated over, wound progressing as expected.  12/19/22: Wound is clean, has significant depth. Adipose visible. Fascial sutures visible and intact. May benefit from veraflo vac. Applied regular vac to assist in granulation tissue development and manage drainage. Wound proximity is close to stoma and to umbilicus.      Goals: Steady decrease in wound area and depth weekly.    WOUND TEAM PLAN OF CARE ([X] for frequency of wound follow up,):   Nursing to follow dressing orders written for wound care. Contact wound team if area fails to progress, deteriorates or with any questions/concerns if something comes up before next scheduled follow up (See below as to whether wound is following and frequency of wound follow up)  Dressing changes by wound team:                   Follow up 3 times weekly:                NPWT change 3 times weekly:   X  Follow up 1-2 times weekly:      Follow up Bi-Monthly:           Follow up Monthly (High Risk):                        Follow up as needed:     Other (explain):     NURSING PLAN OF CARE ORDERS (X):  Dressing changes: See Dressing Care orders: X  Skin care: See Skin Care orders:   RN Prevention Protocol: X  Rectal tube care: See Rectal Tube Care orders:   Other orders:    RSKIN:   CURRENTLY IN PLACE (X), APPLIED THIS VISIT (A), ORDERED (O):   Q shift Justo:  X  Q shift pressure point assessments:  X    Surface/Positioning   Pressure redistribution mattress     X       Low Airloss          ICU Low Airloss   Bariatric EVERT Arce  cushion        Waffle Overlay  X       Reposition q 2 hours   X   TAPs Turning system     Z Franklin Pillow     Offloading/Redistribution   Sacral Mepilex (Silicone dressing)   X  Heel Mepilex (Silicone dressing)      X   Heel float boots (Prevalon boot)             Float Heels off Bed with Pillows           Respiratory   Silicone O2 tubing    X     Gray Foam Ear protectors   X  Cannula fixation Device (Tender )          High flow offloading Clip    Elastic head band offloading device      Anchorfast                                                         Trach with Optifoam split foam             Containment/Moisture Prevention     Rectal tube or BMS    Purwick/Condom Cath      X  Zaragoza Catheter    Barrier wipes           Barrier paste       Antifungal tx      Interdry   X     Mobilization     Up to chair      X  Ambulate      PT/OT      Nutrition       Dietician        Diabetes Education      PO   X- Full liquid  TF     TPN     NPO   # days     Anticipated discharge plans: TBD will need follow up for vac and ostomy. Pt lives in [place]  LTACH:        SNF/Rehab:                  Home Health Care:           Outpatient Wound Center:            Self/Family Care:        Other:                  Vac Discharge Needs:   Not Applicable Pt not on a wound vac:       Regular Vac while inpatient, alternative dressing at DC:        Regular Vac in use and continued at DC:   X         Reg. Vac w/ Skin Sub/Biologic in use. Will need to be changed 2x wkly:      Veraflo Vac while inpatient, ok to transition to Regular Vac on Discharge:           Veraflo Vac while inpatient, will need to remain on Veraflo Vac upon discharge:           [facility]  Inpatient Services  New Ostomy Management & Teaching    HPI:  Reviewed  PMH: Reviewed   SH: Reviewed    Past Surgical History:   Procedure Laterality Date    LA EXPLORATORY OF ABDOMEN  12/18/2022    Procedure: LAPAROTOMY, EXPLORATORY WITH OSTOMY CREATION;  Surgeon: Huseyin EDUARDO  "CYNTHIA Asher;  Location: SURGERY [facility];  Service: General    GYN SURGERY Right 2013    Right mastecomy    TUBAL LIGATION  1975     Surgery Date: 22    Surgeon(s):  Huseyin Asher M.D.    Procedure(s):  LAPAROTOMY, EXPLORATORY     Permanence: unknown    Pertinent History: See Above              Colostomy 22 (Active)   Wound Image    22 1200   Stomal Appliance Assessment Changed    Stoma Assessment Red;Slough    Stoma Shape Budded Greater Than One Inch;Oval    Stoma Size (in) 1.75    Peristomal Assessment Clean;Dry;Intact    Mucocutaneous Junction Intact    Treatment Appliance Changed    Peristomal Protectant No Sting Skin Prep;Paste Ring    Stomal Appliance Paste Ring, 2\";2 1/2\" 1-piece Fecal    Output (mL) 100 mL    Output Color Brown    Appliance (Pouch) # Paste Rin, FLat One piece Appliance: 8531    Appliance Brand Miguel Ángel    Appliance Supplier Prism       Ostomy Appliance (type and size): One piece flat and 2\" Paste Ring    Interventions: This RN demonstrated and verbalized each step of change. Previous appliance removed using push pull method. Stoma and peristomal skin cleansed with moist warm washcloth.  Patient traced template onto new 1 piece appliance. This RN then cut appliance and confirmed fit. Plastic backing removed and paper edges \"Dog Eared.\" Patient stretched paste ring to fit and this RN applied it to back of barrier. Appliance was applied down to skin and adhered with friction. Pouch end closed.     Pt education: Questions and concerns addressed    Needs for next visit:   Hands on    Evaluation:   22: Patient has a hard time seeing stoma, she additionally participated a bit with the appliance change. Stoma noted to have increased slough around the edges. Five sets of one piece appliances in room.  22: Current appliance intact. Patient stated she was feeling emotional this morning regarding ostomy.  22: Pt states a close friend had an " ostomy and was very vocal about her ostomy. Pt states she will likely be doing change herself, pt does have a son that can assist but she feels he likely won't want to.     Flatus: Not Present  Stool Output: brown and soft  Urine Output: NA, Fecal Ostomy  Diet: Regular  Mobility: Up to chair    Plan: Ostomy nurses to continue to follow for ostomy needs and teaching until discharge    Anticipated discharge needs: Supplies, supplier information, possible HH, outpatient ostomy clinic, Skilled Nursing/Rehab     Secure Start Signed Completed by Wound team Tech  Outpatient Referral Placed Declined  5 Sets of appliances in Ostomy bag for discharge Ordered    INSURANCE OPTIONS: Medicare                [facility] & [facility])      X        MediCARE/MEDICAID & All other Private Insurance companies (Prism Form)              MediCAID & Fee for Service (Care Chest Paperwork + [employer] Form)                            Form signed/Catalog Marked and Copy left with patient OR medicaid paperwork given to patient      Anticipated Discharge Plans:  Skilled Nursing, Home Health Care, Family Care, and Self Care    Ostomy Supplies for DC:

## 2022-12-23 NOTE — THERAPY
Physical Therapy Contact Note     pt dc to rehab today; will follow up if dc delayed for continuation of plan of care;    Jovita EDWARDS, PT,  [phone]

## 2022-12-23 NOTE — PROGRESS NOTES
Received report of patient at start of shift. Patient is AOx4, family at bedside. Patient declines intervention for pain. Assessment complete, patient on 2L O2 via NC. MIGUE drain, colostomy, and wound vac present. Discharge orders received. Plan of care/discharge plan discussed with patient. Call light within reach.

## 2022-12-23 NOTE — PROGRESS NOTES
Received report from previous shift RN  Assessment complete.  A&O x 4. Patient calls appropriately.  Patient ambulates with standby assist w/ FWW. Bed alarm off.   Patient has 0/10 pain. Pain managed with prescribed medications.  Denies N&V. Tolerating regular diet.  Surgical midline incision, WV in place.  LLQ MIGUE drain, to bulb suction.  LLQ ostomy  + void, + output through ostomy  Patient denies SOB.  SCD's on.    Review plan of care with patient. Call light and personal belongings with in reach. Hourly rounding in place. All needs met at this time.

## 2022-12-23 NOTE — PROGRESS NOTES
"    DATE: 12/23/2022    Post Operative Day 4 Exploratory laparotomy with drainage of pelvic abscesses and Chávez's colectomy with mobilization of the splenic flexure.      INTERVAL EVENTS:  Emotional this morning other than that, doing well.  Adequate pain control.  Ostomy pink, viable and producing. Tolerating diet.  MIGUE drain with 0 mL.  Room air. IS [id number].    - Discontinue MIGUE drain.    Patient is medically cleared from a surgery stand point.     PHYSICAL EXAMINATION:  Vital Signs: BP (!) 143/67   Pulse 83   Temp 36.5 °C (97.7 °F) (Temporal)   Resp 16   Ht 1.6 m (5' 3\")   Wt 109 kg (240 lb 11.9 oz)   SpO2 91%   Physical Exam  Vitals and nursing note reviewed. Chaperone present: Family at bedside.   Constitutional:       General: She is awake.      Appearance: She is obese.   Abdominal:      General: Bowel sounds are normal. There is no distension.      Palpations: Abdomen is soft.      Tenderness: There is abdominal tenderness (At surgical incision). There is no guarding.      Comments: Wound vac, MIGUE (to be removed today)  Ostomy with stool.   Skin:     Comments: Surgical dressing in place.   Neurological:      Mental Status: She is alert.   Psychiatric:         Behavior: Behavior is cooperative.       LABORATORY VALUES:   Recent Labs     12/21/22  0752 12/22/22  0326 12/23/22  0248   WBC 9.5 9.3 9.9   RBC 4.17* 4.10* 4.07*   HEMOGLOBIN 11.9* 11.7* 11.6*   HEMATOCRIT 36.6* 36.0* 35.6*   MCV 87.8 87.8 87.5   MCH 28.5 28.5 28.5   MCHC 32.5* 32.5* 32.6*   RDW 43.1 42.6 41.9   PLATELETCT 338 322 375   MPV 9.6 9.4 9.5     Recent Labs     12/21/22  0752 12/22/22  0326 12/23/22  0248   SODIUM 136 137 138   POTASSIUM 3.7 4.0 4.0   CHLORIDE 100 103 105   CO2 29 27 28   GLUCOSE 98 103* 113*   BUN 20 17 16   CREATININE 1.04 0.75 0.74   CALCIUM 8.8 8.5 8.2*                IMAGING:   CT-ABDOMEN-PELVIS WITH   Final Result      1.  New peritoneal air consistent with perforation and likely the sigmoid colon " diverticulitis      2.  Multilocular paracolic abscess anterior to the sigmoid colon with the largest component measuring 6.4 x 5.4 cm and a smaller air component present left lateral      3.  Left lower quadrant drainage catheter and appears to have retracted.      4.  Findings were discussed with FABY STUART on 12/18/2022 12:40 PM.      IR-US GUIDED PIV   Final Result    Ultrasound-guided PERIPHERAL IV INSERTION performed by    qualified nursing staff as above.      CT-DRAIN-PERITONEAL   Final Result      1.  CT guided pelvic diverticular abscess catheter drainage.   2.  The current plan is to obtain a follow-up CT in 5-7 days..      CT-ABDOMEN-PELVIS WITH   Final Result      1.  Acute sigmoid diverticulitis with adjacent 4.6 x 3.3 cm abscess.   2.  Gallbladder sludge or stones.   3.  2.1 cm hypodense left hepatic lobe lesion, likely a hemangioma, though incompletely characterized on this CT. If there is clinical concern, consider further evaluation with nonemergent MRI hepatic protocol.   4.  Nonobstructing 6 mm inferior pole left renal stone.      Findings conveyed to Dr. Cordova at 1124 via Voalte messaging on 12/14/2022.              ASSESSMENT AND PLAN:   * Diverticulitis of intestine with perforation and abscess- (present on admission)  Assessment & Plan  12/13 IR drainage  12/18 Follow up CT with evidence of perforation and worsening abscess.  -Exploratory laparotomy with drainage of pelvic abscesses and Chávez's colectomy with mobilization of the splenic flexure  12/20 Ostomy viable, no stool or gas. MIGUE with s/s drainage.  12/21 Flatus present, scant stool.   - Advanced diet to Full liquid.  12/22 Ostomy with stool. Advance diet to regular.        Discussed patient condition with Family, RN, Patient, and general surgery, Dr. Ferguson.

## 2022-12-24 ENCOUNTER — APPOINTMENT (OUTPATIENT)
Dept: RADIOLOGY | Facility: REHABILITATION | Age: 77
DRG: 949 | End: 2022-12-24
Attending: PHYSICAL MEDICINE & REHABILITATION
Payer: MEDICARE

## 2022-12-24 LAB
25(OH)D3 SERPL-MCNC: 46 NG/ML (ref 30–100)
ALBUMIN SERPL BCP-MCNC: 2.7 G/DL (ref 3.2–4.9)
ALBUMIN/GLOB SERPL: 0.8 G/DL
ALP SERPL-CCNC: 98 U/L (ref 30–99)
ALT SERPL-CCNC: 35 U/L (ref 2–50)
ANION GAP SERPL CALC-SCNC: 7 MMOL/L (ref 7–16)
ANISOCYTOSIS BLD QL SMEAR: ABNORMAL
APPEARANCE UR: ABNORMAL
AST SERPL-CCNC: 32 U/L (ref 12–45)
BACTERIA #/AREA URNS HPF: NEGATIVE /HPF
BASOPHILS # BLD AUTO: 0 % (ref 0–1.8)
BASOPHILS # BLD: 0 K/UL (ref 0–0.12)
BILIRUB SERPL-MCNC: 0.4 MG/DL (ref 0.1–1.5)
BILIRUB UR QL STRIP.AUTO: NEGATIVE
BUN SERPL-MCNC: 18 MG/DL (ref 8–22)
CALCIUM ALBUM COR SERPL-MCNC: 9.9 MG/DL (ref 8.5–10.5)
CALCIUM SERPL-MCNC: 8.9 MG/DL (ref 8.5–10.5)
CAOX CRY #/AREA URNS HPF: ABNORMAL /HPF
CHLORIDE SERPL-SCNC: 103 MMOL/L (ref 96–112)
CO2 SERPL-SCNC: 28 MMOL/L (ref 20–33)
COLOR UR: YELLOW
CREAT SERPL-MCNC: 0.91 MG/DL (ref 0.5–1.4)
EOSINOPHIL # BLD AUTO: 0.1 K/UL (ref 0–0.51)
EOSINOPHIL NFR BLD: 0.9 % (ref 0–6.9)
EPI CELLS #/AREA URNS HPF: ABNORMAL /HPF
ERYTHROCYTE [DISTWIDTH] IN BLOOD BY AUTOMATED COUNT: 44.6 FL (ref 35.9–50)
EST. AVERAGE GLUCOSE BLD GHB EST-MCNC: 126 MG/DL
GFR SERPLBLD CREATININE-BSD FMLA CKD-EPI: 65 ML/MIN/1.73 M 2
GLOBULIN SER CALC-MCNC: 3.4 G/DL (ref 1.9–3.5)
GLUCOSE SERPL-MCNC: 96 MG/DL (ref 65–99)
GLUCOSE UR STRIP.AUTO-MCNC: NEGATIVE MG/DL
HBA1C MFR BLD: 6 % (ref 4–5.6)
HCT VFR BLD AUTO: 39.4 % (ref 37–47)
HGB BLD-MCNC: 12.4 G/DL (ref 12–16)
HYALINE CASTS #/AREA URNS LPF: ABNORMAL /LPF
KETONES UR STRIP.AUTO-MCNC: ABNORMAL MG/DL
LEUKOCYTE ESTERASE UR QL STRIP.AUTO: NEGATIVE
LYMPHOCYTES # BLD AUTO: 0.99 K/UL (ref 1–4.8)
LYMPHOCYTES NFR BLD: 8.7 % (ref 22–41)
MACROCYTES BLD QL SMEAR: ABNORMAL
MANUAL DIFF BLD: NORMAL
MCH RBC QN AUTO: 28.2 PG (ref 27–33)
MCHC RBC AUTO-ENTMCNC: 31.5 G/DL (ref 33.6–35)
MCV RBC AUTO: 89.5 FL (ref 81.4–97.8)
METAMYELOCYTES NFR BLD MANUAL: 2.6 %
MICRO URNS: ABNORMAL
MICROCYTES BLD QL SMEAR: ABNORMAL
MONOCYTES # BLD AUTO: 0.3 K/UL (ref 0–0.85)
MONOCYTES NFR BLD AUTO: 2.6 % (ref 0–13.4)
MORPHOLOGY BLD-IMP: NORMAL
NEUTROPHILS # BLD AUTO: 9.71 K/UL (ref 2–7.15)
NEUTROPHILS NFR BLD: 84.3 % (ref 44–72)
NEUTS BAND NFR BLD MANUAL: 0.9 % (ref 0–10)
NITRITE UR QL STRIP.AUTO: NEGATIVE
NRBC # BLD AUTO: 0 K/UL
NRBC BLD-RTO: 0 /100 WBC
PH UR STRIP.AUTO: 6.5 [PH] (ref 5–8)
PLATELET # BLD AUTO: 430 K/UL (ref 164–446)
PLATELET BLD QL SMEAR: NORMAL
PMV BLD AUTO: 9.7 FL (ref 9–12.9)
POLYCHROMASIA BLD QL SMEAR: NORMAL
POTASSIUM SERPL-SCNC: 4.3 MMOL/L (ref 3.6–5.5)
PROT SERPL-MCNC: 6.1 G/DL (ref 6–8.2)
PROT UR QL STRIP: NEGATIVE MG/DL
RBC # BLD AUTO: 4.4 M/UL (ref 4.2–5.4)
RBC # URNS HPF: ABNORMAL /HPF
RBC BLD AUTO: PRESENT
RBC UR QL AUTO: NEGATIVE
SODIUM SERPL-SCNC: 138 MMOL/L (ref 135–145)
SP GR UR STRIP.AUTO: 1.03
UROBILINOGEN UR STRIP.AUTO-MCNC: 0.2 MG/DL
WBC # BLD AUTO: 11.4 K/UL (ref 4.8–10.8)
WBC #/AREA URNS HPF: ABNORMAL /HPF

## 2022-12-24 PROCEDURE — 700102 HCHG RX REV CODE 250 W/ 637 OVERRIDE(OP): Performed by: PHYSICAL MEDICINE & REHABILITATION

## 2022-12-24 PROCEDURE — 85007 BL SMEAR W/DIFF WBC COUNT: CPT

## 2022-12-24 PROCEDURE — 36415 COLL VENOUS BLD VENIPUNCTURE: CPT

## 2022-12-24 PROCEDURE — 71045 X-RAY EXAM CHEST 1 VIEW: CPT

## 2022-12-24 PROCEDURE — 700111 HCHG RX REV CODE 636 W/ 250 OVERRIDE (IP): Performed by: PHYSICAL MEDICINE & REHABILITATION

## 2022-12-24 PROCEDURE — 80053 COMPREHEN METABOLIC PANEL: CPT

## 2022-12-24 PROCEDURE — 83036 HEMOGLOBIN GLYCOSYLATED A1C: CPT

## 2022-12-24 PROCEDURE — 85025 COMPLETE CBC W/AUTO DIFF WBC: CPT

## 2022-12-24 PROCEDURE — 99232 SBSQ HOSP IP/OBS MODERATE 35: CPT | Performed by: PHYSICAL MEDICINE & REHABILITATION

## 2022-12-24 PROCEDURE — 97162 PT EVAL MOD COMPLEX 30 MIN: CPT

## 2022-12-24 PROCEDURE — 97530 THERAPEUTIC ACTIVITIES: CPT

## 2022-12-24 PROCEDURE — 770010 HCHG ROOM/CARE - REHAB SEMI PRIVAT*

## 2022-12-24 PROCEDURE — 97166 OT EVAL MOD COMPLEX 45 MIN: CPT

## 2022-12-24 PROCEDURE — A9270 NON-COVERED ITEM OR SERVICE: HCPCS | Performed by: PHYSICAL MEDICINE & REHABILITATION

## 2022-12-24 PROCEDURE — 97535 SELF CARE MNGMENT TRAINING: CPT

## 2022-12-24 PROCEDURE — 82306 VITAMIN D 25 HYDROXY: CPT

## 2022-12-24 RX ADMIN — OMEPRAZOLE 20 MG: 20 CAPSULE, DELAYED RELEASE ORAL at 08:13

## 2022-12-24 RX ADMIN — ACETAMINOPHEN 1000 MG: 500 TABLET ORAL at 17:11

## 2022-12-24 RX ADMIN — ACETAMINOPHEN 1000 MG: 500 TABLET ORAL at 05:15

## 2022-12-24 RX ADMIN — SENNOSIDES AND DOCUSATE SODIUM 2 TABLET: 50; 8.6 TABLET ORAL at 08:13

## 2022-12-24 RX ADMIN — AMOXICILLIN AND CLAVULANATE POTASSIUM 1 TABLET: 875; 125 TABLET, FILM COATED ORAL at 20:14

## 2022-12-24 RX ADMIN — ENOXAPARIN SODIUM 40 MG: 40 INJECTION SUBCUTANEOUS at 17:12

## 2022-12-24 RX ADMIN — ACETAMINOPHEN 1000 MG: 500 TABLET ORAL at 12:11

## 2022-12-24 RX ADMIN — METOPROLOL SUCCINATE 25 MG: 25 TABLET, EXTENDED RELEASE ORAL at 05:15

## 2022-12-24 RX ADMIN — AMOXICILLIN AND CLAVULANATE POTASSIUM 1 TABLET: 875; 125 TABLET, FILM COATED ORAL at 08:13

## 2022-12-24 RX ADMIN — ACETAMINOPHEN 1000 MG: 500 TABLET ORAL at 00:20

## 2022-12-24 RX ADMIN — Medication 1 CAPSULE: at 08:13

## 2022-12-24 RX ADMIN — SENNOSIDES AND DOCUSATE SODIUM 2 TABLET: 50; 8.6 TABLET ORAL at 20:14

## 2022-12-24 ASSESSMENT — ACTIVITIES OF DAILY LIVING (ADL)
TOILETING: INDEPENDENT
BED_CHAIR_WHEELCHAIR_TRANSFER_DESCRIPTION: ADAPTIVE EQUIPMENT;SUPERVISION FOR SAFETY;VERBAL CUEING;SET-UP OF EQUIPMENT
BED_CHAIR_WHEELCHAIR_TRANSFER_DESCRIPTION: ADAPTIVE EQUIPMENT;INCREASED TIME;SET-UP OF EQUIPMENT;SUPERVISION FOR SAFETY;VERBAL CUEING
BED_CHAIR_WHEELCHAIR_TRANSFER_DESCRIPTION: ADAPTIVE EQUIPMENT;INCREASED TIME;INITIAL PREPARATION FOR TASK;SET-UP OF EQUIPMENT;VERBAL CUEING

## 2022-12-24 ASSESSMENT — BRIEF INTERVIEW FOR MENTAL STATUS (BIMS)
BIMS SUMMARY SCORE: 15
WHAT MONTH IS IT: ACCURATE WITHIN 5 DAYS
ASKED TO RECALL BLUE: YES, NO CUE REQUIRED
WHAT YEAR IS IT: CORRECT
ASKED TO RECALL BED: YES, NO CUE REQUIRED
INITIAL REPETITION OF BED BLUE SOCK - FIRST ATTEMPT: 3
ASKED TO RECALL SOCK: YES, NO CUE REQUIRED
WHAT DAY OF THE WEEK IS IT: CORRECT

## 2022-12-24 ASSESSMENT — GAIT ASSESSMENTS
DISTANCE (FEET): 25
GAIT LEVEL OF ASSIST: CONTACT GUARD ASSIST
ASSISTIVE DEVICE: FRONT WHEEL WALKER
DEVIATION: BRADYKINETIC;SHUFFLED GAIT;INCREASED BASE OF SUPPORT
DEVIATION: INCREASED BASE OF SUPPORT;SHUFFLED GAIT;DECREASED HEEL STRIKE;DECREASED TOE OFF
GAIT LEVEL OF ASSIST: CONTACT GUARD ASSIST
ASSISTIVE DEVICE: FRONT WHEEL WALKER

## 2022-12-24 ASSESSMENT — PAIN DESCRIPTION - PAIN TYPE
TYPE: ACUTE PAIN
TYPE: ACUTE PAIN

## 2022-12-24 ASSESSMENT — PATIENT HEALTH QUESTIONNAIRE - PHQ9
1. LITTLE INTEREST OR PLEASURE IN DOING THINGS: NOT AT ALL
2. FEELING DOWN, DEPRESSED, IRRITABLE, OR HOPELESS: NOT AT ALL
SUM OF ALL RESPONSES TO PHQ9 QUESTIONS 1 AND 2: 0
1. LITTLE INTEREST OR PLEASURE IN DOING THINGS: NOT AT ALL
2. FEELING DOWN, DEPRESSED, IRRITABLE, OR HOPELESS: NOT AT ALL
SUM OF ALL RESPONSES TO PHQ9 QUESTIONS 1 AND 2: 0

## 2022-12-24 NOTE — PROGRESS NOTES
"Rehab Progress Note     Encounter Date: 12/24/2022    CC: Feel well    Interval Events (Subjective)  Vital signs stable  As needed Oxy  BM 12/23  Voiding volitionally low PVRs    Patient seen and examined in her room working with therapy.  States that she feels well.  Discussed slightly elevated white blood cell count and that we will obtain a urinalysis and chest x-ray.  Otherwise her labs looked good.  Plan for follow-up labs tomorrow.  Had a bowel movement 12/23.  Voiding without issue.  No other complaints at this time.    14 point ROS reviewed and negative except as stated above.     Objective:  VITAL SIGNS: /69   Pulse 77   Temp 36.5 °C (97.7 °F) (Oral)   Resp 18   Ht 1.6 m (5' 3\")   Wt 108 kg (238 lb 1.6 oz)   LMP  (LMP Unknown)   SpO2 95%   BMI 42.18 kg/m²     GEN: No apparent distress, sitting up in manual wheelchair, working with therapy.  HEENT: Head normocephalic, atraumatic.  Sclera nonicteric bilaterally, no ocular discharge appreciated bilaterally.  CV: Extremities warm and well-perfused, no peripheral edema appreciated bilaterally.  PULMONARY: Breathing nonlabored on room air, no respiratory accessory muscle use.  Not requiring supplemental oxygen.  ABD: Soft, nontender.  SKIN: No appreciable skin breakdown on exposed areas of skin.  NEURO: Awake alert.  Conversational.  Logical thought content.  PSYCH: Mood and affect within normal limits.    Recent Results (from the past 72 hour(s))   CBC WITHOUT DIFFERENTIAL    Collection Time: 12/22/22  3:26 AM   Result Value Ref Range    WBC 9.3 4.8 - 10.8 K/uL    RBC 4.10 (L) 4.20 - 5.40 M/uL    Hemoglobin 11.7 (L) 12.0 - 16.0 g/dL    Hematocrit 36.0 (L) 37.0 - 47.0 %    MCV 87.8 81.4 - 97.8 fL    MCH 28.5 27.0 - 33.0 pg    MCHC 32.5 (L) 33.6 - 35.0 g/dL    RDW 42.6 35.9 - 50.0 fL    Platelet Count 322 164 - 446 K/uL    MPV 9.4 9.0 - 12.9 fL   Basic Metabolic Panel    Collection Time: 12/22/22  3:26 AM   Result Value Ref Range    Sodium 137 135 - " 145 mmol/L    Potassium 4.0 3.6 - 5.5 mmol/L    Chloride 103 96 - 112 mmol/L    Co2 27 20 - 33 mmol/L    Glucose 103 (H) 65 - 99 mg/dL    Bun 17 8 - 22 mg/dL    Creatinine 0.75 0.50 - 1.40 mg/dL    Calcium 8.5 8.5 - 10.5 mg/dL    Anion Gap 7.0 7.0 - 16.0   ESTIMATED GFR    Collection Time: 12/22/22  3:26 AM   Result Value Ref Range    GFR (CKD-EPI) 82 >60 mL/min/1.73 m 2   CBC WITHOUT DIFFERENTIAL    Collection Time: 12/23/22  2:48 AM   Result Value Ref Range    WBC 9.9 4.8 - 10.8 K/uL    RBC 4.07 (L) 4.20 - 5.40 M/uL    Hemoglobin 11.6 (L) 12.0 - 16.0 g/dL    Hematocrit 35.6 (L) 37.0 - 47.0 %    MCV 87.5 81.4 - 97.8 fL    MCH 28.5 27.0 - 33.0 pg    MCHC 32.6 (L) 33.6 - 35.0 g/dL    RDW 41.9 35.9 - 50.0 fL    Platelet Count 375 164 - 446 K/uL    MPV 9.5 9.0 - 12.9 fL   Basic Metabolic Panel    Collection Time: 12/23/22  2:48 AM   Result Value Ref Range    Sodium 138 135 - 145 mmol/L    Potassium 4.0 3.6 - 5.5 mmol/L    Chloride 105 96 - 112 mmol/L    Co2 28 20 - 33 mmol/L    Glucose 113 (H) 65 - 99 mg/dL    Bun 16 8 - 22 mg/dL    Creatinine 0.74 0.50 - 1.40 mg/dL    Calcium 8.2 (L) 8.5 - 10.5 mg/dL    Anion Gap 5.0 (L) 7.0 - 16.0   ESTIMATED GFR    Collection Time: 12/23/22  2:48 AM   Result Value Ref Range    GFR (CKD-EPI) 83 >60 mL/min/1.73 m 2   COV-2, FLU A/B, AND RSV BY PCR (2-4 HOURS CEPHEID): Collect NP swab in VTM    Collection Time: 12/23/22  3:00 PM    Specimen: Respirate   Result Value Ref Range    Influenza virus A RNA Negative Negative    Influenza virus B, PCR Negative Negative    RSV, PCR Negative Negative    SARS-CoV-2 by PCR NotDetected     SARS-CoV-2 Source Nasal Swab    CBC with Differential    Collection Time: 12/24/22  5:25 AM   Result Value Ref Range    WBC 11.4 (H) 4.8 - 10.8 K/uL    RBC 4.40 4.20 - 5.40 M/uL    Hemoglobin 12.4 12.0 - 16.0 g/dL    Hematocrit 39.4 37.0 - 47.0 %    MCV 89.5 81.4 - 97.8 fL    MCH 28.2 27.0 - 33.0 pg    MCHC 31.5 (L) 33.6 - 35.0 g/dL    RDW 44.6 35.9 - 50.0 fL     Platelet Count 430 164 - 446 K/uL    MPV 9.7 9.0 - 12.9 fL    Neutrophils-Polys 84.30 (H) 44.00 - 72.00 %    Lymphocytes 8.70 (L) 22.00 - 41.00 %    Monocytes 2.60 0.00 - 13.40 %    Eosinophils 0.90 0.00 - 6.90 %    Basophils 0.00 0.00 - 1.80 %    Nucleated RBC 0.00 /100 WBC    Neutrophils (Absolute) 9.71 (H) 2.00 - 7.15 K/uL    Lymphs (Absolute) 0.99 (L) 1.00 - 4.80 K/uL    Monos (Absolute) 0.30 0.00 - 0.85 K/uL    Eos (Absolute) 0.10 0.00 - 0.51 K/uL    Baso (Absolute) 0.00 0.00 - 0.12 K/uL    NRBC (Absolute) 0.00 K/uL    Anisocytosis 1+     Macrocytosis 1+     Microcytosis 1+    Comp Metabolic Panel (CMP)    Collection Time: 12/24/22  5:25 AM   Result Value Ref Range    Sodium 138 135 - 145 mmol/L    Potassium 4.3 3.6 - 5.5 mmol/L    Chloride 103 96 - 112 mmol/L    Co2 28 20 - 33 mmol/L    Anion Gap 7.0 7.0 - 16.0    Glucose 96 65 - 99 mg/dL    Bun 18 8 - 22 mg/dL    Creatinine 0.91 0.50 - 1.40 mg/dL    Calcium 8.9 8.5 - 10.5 mg/dL    AST(SGOT) 32 12 - 45 U/L    ALT(SGPT) 35 2 - 50 U/L    Alkaline Phosphatase 98 30 - 99 U/L    Total Bilirubin 0.4 0.1 - 1.5 mg/dL    Albumin 2.7 (L) 3.2 - 4.9 g/dL    Total Protein 6.1 6.0 - 8.2 g/dL    Globulin 3.4 1.9 - 3.5 g/dL    A-G Ratio 0.8 g/dL   HEMOGLOBIN A1C    Collection Time: 12/24/22  5:25 AM   Result Value Ref Range    Glycohemoglobin 6.0 (H) 4.0 - 5.6 %    Est Avg Glucose 126 mg/dL   Vitamin D, 25-hydroxy (blood)    Collection Time: 12/24/22  5:25 AM   Result Value Ref Range    25-Hydroxy   Vitamin D 25 46 30 - 100 ng/mL   CORRECTED CALCIUM    Collection Time: 12/24/22  5:25 AM   Result Value Ref Range    Correct Calcium 9.9 8.5 - 10.5 mg/dL   ESTIMATED GFR    Collection Time: 12/24/22  5:25 AM   Result Value Ref Range    GFR (CKD-EPI) 65 >60 mL/min/1.73 m 2   DIFFERENTIAL MANUAL    Collection Time: 12/24/22  5:25 AM   Result Value Ref Range    Bands-Stabs 0.90 0.00 - 10.00 %    Metamyelocytes 2.60 %    Manual Diff Status PERFORMED    PERIPHERAL SMEAR REVIEW     Collection Time: 12/24/22  5:25 AM   Result Value Ref Range    Peripheral Smear Review see below    PLATELET ESTIMATE    Collection Time: 12/24/22  5:25 AM   Result Value Ref Range    Plt Estimation Normal    MORPHOLOGY    Collection Time: 12/24/22  5:25 AM   Result Value Ref Range    RBC Morphology Present     Polychromia 1+        Current Facility-Administered Medications   Medication Frequency    Respiratory Therapy Consult Continuous RT    Pharmacy Consult Request ...Pain Management Review 1 Each PHARMACY TO DOSE    senna-docusate (PERICOLACE or SENOKOT S) 8.6-50 MG per tablet 2 Tablet BID    And    polyethylene glycol/lytes (MIRALAX) PACKET 1 Packet QDAY PRN    And    magnesium hydroxide (MILK OF MAGNESIA) suspension 30 mL QDAY PRN    And    bisacodyl (DULCOLAX) suppository 10 mg QDAY PRN    omeprazole (PRILOSEC) capsule 20 mg DAILY    mag hydrox-al hydrox-simeth (MAALOX PLUS ES or MYLANTA DS) suspension 20 mL Q2HRS PRN    ondansetron (ZOFRAN ODT) dispertab 4 mg 4X/DAY PRN    Or    ondansetron (ZOFRAN) syringe/vial injection 4 mg 4X/DAY PRN    traZODone (DESYREL) tablet 50 mg QHS PRN    sodium chloride (OCEAN) 0.65 % nasal spray 2 Spray PRN    acetaminophen (TYLENOL) tablet 1,000 mg Q6HRS    Followed by    [START ON 12/28/2022] acetaminophen (TYLENOL) tablet 1,000 mg Q6HRS PRN    oxyCODONE immediate-release (ROXICODONE) tablet 2.5 mg Q3HRS PRN    Or    oxyCODONE immediate-release (ROXICODONE) tablet 5 mg Q3HRS PRN    lactobacillus rhamnosus (CULTURELLE) capsule 1 Capsule DAILY    enoxaparin (Lovenox) inj 40 mg DAILY AT 1800    metoprolol SR (TOPROL XL) tablet 25 mg Q DAY    amoxicillin-clavulanate (AUGMENTIN) 875-125 MG per tablet 1 Tablet Q12HRS       Orders Placed This Encounter   Procedures    Diet Order Diet: Regular     Standing Status:   Standing     Number of Occurrences:   1     Order Specific Question:   Diet:     Answer:   Regular [1]       Assessment:  Active Hospital Problems    Diagnosis      *Intra-abdominal abscess (HCC)     Diverticulitis     H/O abdominal abscess     CHEPE (acute kidney injury) ([facility])     Hypokalemia     Postoperative hypoxia     Class 3 severe obesity due to excess calories with serious comorbidity and body mass index (BMI) of 40.0 to 44.9 in adult ([facility])     Hyperglycemia     Diverticulitis of intestine with perforation and abscess     Hypertension        Medical Decision Making and Plan:  Debility status post diverticulitis and intra-abdominal abscess  Continue aggressive inpatient rehabilitation    Leukocytosis, afebrile  UA   Chest x-ray  On Unasyn through 12/25  Repeat CBC in a.m.    Hypocalcemia  Normal on admission to rehab    Prediabetes  Hemoglobin A1c 6.0    Screening for vitamin D deficiency  Vitamin D normal at 46    Full code  DVT prophylaxis Lovenox 40 mg subcu daily    Total time:  25 minutes.  I spent greater than 50% of the time for patient care and coordination on this date, including unit/floor time, and face-to-face time with the patient as per assessment and plan above.    Neris Barroso D.O.

## 2022-12-24 NOTE — PROGRESS NOTES
Admit to [facility] from [facility] via [facility].  Dr. Jones to follow for diagnosis of diverticulitis. Initial assessment started. [facility] process, safety in room, bathroom, and call light.

## 2022-12-24 NOTE — THERAPY
"Occupational Therapy   Initial Evaluation     Patient Name: Maira Johnson  Age:  77 y.o., Sex:  female  Medical Record #: [id number]  Today's Date: 12/24/2022     Subjective    \"I've had a few breakdowns since being in the hospital. One was that my daughter came to see me after not seeing her for 2 years.\"  \"My goal is to be as independent as possible, just tell me what I need to do.\"       Objective       12/24/22 0831   OT Charge Group   Charges Yes   OT Self Care / ADL (Units) 3   OT Evaluation OT Evaluation Mod   OT Total Time Spent   OT Individual Total Time Spent (Mins) 90   Prior Living Situation   Prior Services Home-Independent   Housing / Facility Mobile Home   Steps Into Home 0  (Ramp)   Steps In Home 0   Bathroom Set up Bathtub / Shower Combination;Shower Curtain   Equipment Owned Other (Comments)  (3WW)   Lives with - Patient's Self Care Capacity Alone and Able to Care For Self   Comments Pt lives alone in mobile home with son next door who works as a PRN large . Per pt, son can assist upon DC.   Prior Level of ADL Function   Self Feeding Independent   Grooming / Hygiene Independent   Bathing Independent   Dressing Independent   Toileting Independent   Prior Level of IADL Function   Medication Management Independent   Laundry Independent   Kitchen Mobility Independent   Finances Independent   Home Management Independent   Shopping Independent   Prior Level Of Mobility Independent Without Device in Community;Independent With Steps in Community;Independent Without Device in Home;Independent With Steps in Home   Driving / Transportation Driving Independent   Occupation (Pre-Hospital Vocational) Retired Due To Age   Prior Functioning: Everyday Activities   Self Care Independent   Indoor Mobility (Ambulation) Independent   Stairs Independent   Functional Cognition Independent   Prior Device Use None of the given options   Pain   Intervention Declines   Cognition    Level of Consciousness " "Alert   Comments A&Ox4, pleasant and motivated to participate. Mildly tearful when discussing missing her dtr.   ABS (Agitated Behavior Scale)   Agitated Behavior Scale Performed No   Cognitive Pattern Assessment   Cognitive Pattern Assessment Used BIMS   Brief Interview for Mental Status (BIMS)   Repetition of Three Words (First Attempt) 3   Temporal Orientation: Year Correct   Temporal Orientation: Month Accurate within 5 days   Temporal Orientation: Day Correct   Recall: \"Sock\" Yes, no cue required   Recall: \"Blue\" Yes, no cue required   Recall: \"Bed\" Yes, no cue required   BIMS Summary Score 15   Confusion Assessment Method (CAM)   Is there evidence of an acute change in mental status from the patient's baseline? 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   Active ROM Upper Body   Active ROM Upper Body  WDL   Dominant Hand Right   Comments BUES WFL, RUE limited with flexion due to mastectomy.   Strength Upper Body   Upper Body Strength  X   Rt Elbow Extension Strength 3+ (F+)   Lt Elbow Extension Strength 3+ (F+)   Comments All remaining planes grossly 4/5 bilaterally.   Sensation Upper Body   Upper Extremity Sensation  X   Comments Pt reporting neuropathy in digits and toes.   Upper Body Muscle Tone   Upper Body Muscle Tone  WDL   Comments BUES   Balance Assessment   Sitting Balance (Static) Normal   Sitting Balance (Dynamic) Good   Standing Balance (Static) Fair +   Standing Balance (Dynamic) Fair -   Weight Shift Sitting Good   Weight Shift Standing Fair   Comments As supported by FWW in function   Bed Mobility    Supine to Sit Contact Guard Assist   Sit to Stand Contact Guard Assist   Scooting Standby " Assist   Rolling Standby Assist   Coordination Upper Body   Coordination WDL   Comments BUES   Eating   Assistance Needed Independent   CARE Score - Eating 6   Eating Discharge Goal   Discharge Goal 6   Oral Hygiene   Assistance Needed Set-up / clean-up;Verbal cues;Supervision   CARE Score - Oral Hygiene 4   Oral Hygiene Discharge Goal   Discharge Goal 6   Shower/Bathe Self   Assistance Needed Physical assistance   Physical Assistance Level 26%-50%   CARE Score - Shower/Bathe Self 3   Shower/Bathe Self Discharge Goal   Discharge Goal 6   Upper Body Dressing   Assistance Needed Set-up / clean-up;Supervision   CARE Score - Upper Body Dressing 4   Upper Body Dressing Discharge Goal   Discharge Goal 6   Lower Body Dressing   Assistance Needed Physical assistance   Physical Assistance Level 51%-75%   CARE Score - Lower Body Dressing 2   Lower Body Dressing Discharge Goal   Discharge Goal 6   Putting On/Taking Off Footwear   Assistance Needed Physical assistance   Physical Assistance Level 51%-75%   CARE Score - Putting On/Taking Off Footwear 2   Putting On/Taking Off Footwear Discharge Goal   Discharge Goal 6   Toileting Hygiene   Assistance Needed Physical assistance   Physical Assistance Level 76% or more   CARE Score - Toileting Hygiene 2   Toileting Hygiene Discharge Goal   Discharge Goal 6   Toilet Transfer   Assistance Needed Incidental touching   Physical Assistance Level No physical assistance   CARE Score - Toilet Transfer 4   Toilet Transfer Discharge Goal   Discharge Goal 6   Hearing, Speech, and Vision   Ability to Hear Minimal difficulty   Ability to See in Adequate Light Adequate   Expression of Ideas and Wants Without difficulty   Understanding Verbal and Non-Verbal Content Understands   Functional Level of Assist   Eating Independent   Grooming Seated;Supervision   Grooming Description Increased time;Initial preparation for task;Seated in wheelchair at sink;Supervision for safety;Verbal cueing  (cues for  abdominal precautions with functional reach to faucet, as pt would press ostomy into counter top)   Bathing Moderate Assist   Bathing Description Grab bar;Hand held shower;Tub bench;Assit wtih lower extremities;Assit with perineal;Increased time;Initial preparation for task;Set-up of equipment;Set up for wound protection;Supervision for safety;Verbal cueing  (OTR completed waterproofing of ostomy and wound vac area successfully. Assist for knee to toe bilaterally and rear in standing for washing.)   Upper Body Dressing Supervision   Upper Body Dressing Description Set-up of equipment;Supervision for safety   Lower Body Dressing Maximal Assist   Lower Body Dressing Description Grab bar;Assist with closures;Assist with threading into pant leg;Increased time;Initial preparation for task;Set-up of equipment;Supervision for safety;Verbal cueing  (Pt able to doff pant and socks with figure-4 and thread RLE only for brief. OTR completed all remaining tasks due to debility and fatigue.)   Toileting Maximal Assist   Toileting Description Assist to pull pants up;Assist for standing balance;Assist for hygiene;Increased time;Grab bar;Initial preparation for task;Set-up of equipment;Supervision for safety;Verbal cueing   Bed, Chair, Wheelchair Transfer Contact Guard Assist   Bed Chair Wheelchair Transfer Description Adaptive equipment;Supervision for safety;Verbal cueing;Set-up of equipment  (FWW for stand step)   Toilet Transfers Contact Guard Assist   Toilet Transfer Description Adaptive equipment;Grab bar;Verbal cueing;Supervision for safety;Set-up of equipment  (FWW for stand step)   Tub / Shower Transfers Contact Guard Assist   Tub Shower Transfer Description Grab bar;Adaptive equipment;Shower bench;Verbal cueing;Supervision for safety;Set-up of equipment  (FWW for entry, w/c for exit due to fatigue.)   Comprehension Independent   Expression Independent   Problem Solving Supervision   Memory Independent   Problem List    Problem List Decreased Active Daily Living Skills;Decreased Homemaking Skills;Decreased Upper Extremity Strength Right;Decreased Upper Extremity Strength Left;Decreased Upper Extremity AROM Right;Decreased Upper Extremity AROM Left;Decreased Functional Mobility;Decreased Activity Tolerance;Impaired Posture / Trunk Alignment;Limited Knowledge of Post Op Precautions   Precautions   Precautions Fall Risk;Other (See Comments)   Comments Abdominal wound vac, new ostomy   Current Discharge Plan   Current Discharge Plan Return to Prior Living Situation   Benefit    Therapy Benefit Patient Would Benefit from Inpatient Rehab Occupational Therapy to Maximize [place] with ADLs, IADLs and Functional Mobility.   Interdisciplinary Plan of Care Collaboration   IDT Collaboration with  Family / Caregiver;Nursing;Physical Therapist   Patient Position at End of Therapy Seated;Chair Alarm On;Tray Table within Reach;Call Light within Reach   Collaboration Comments Dtr arrived at end of session with clothing, Nursing: notified of bathing and small BM in toilet plus continent of urine, PT re: CLOF, POC   Equipment Needs   Assistive Device / DME Front-Wheel Walker;4-Wheel Walker;Wheelchair;Grab Bars In Shower / Tub;Grab Bars By Toilet;Tub Transfer Bench   Adaptive Equipment Reacher;Sock Aide;Dressing Stick;Long Handled Shoe [place];Long Handled Sponge   Strengths & Barriers   Strengths Able to follow instructions;Alert and oriented;Effective communication skills;Good balance;Good carryover of learning;Good insight into deficits/needs;Independent prior level of function;Making steady progress towards goals;Motivated for self care and independence;Pleasant and cooperative;Supportive family;Willingly participates in therapeutic activities   Barriers Fatigue;Generalized weakness;Impaired activity tolerance;Limited mobility;Pain     Pt completed short distance mobility from EOB>shower bench in BR using FWW at CGA level without LOB. Utilized  log rolling technique for abdominal protection during bed mobility. Pt was initially scheduled for 60 min session and 30 min later in day, schedule adjusted to 90 min.    Assessment  Patient is 77 y.o. female with a diagnosis of Intra-abdominal abscess s/p ex lap and drainage of pelvic abscesses on 12/18 with new ostomy placement and wound vac on abdomen.  Additional factors influencing patient status / progress (ie: cognitive factors, co-morbidities, social support, etc): Per Dr. Jones H&P - Pt with a past medical history of hypertension and history of multiple episodes of diverticulitis previously requiring IV antibiotics;  who presented on 12/13/2022  7:45 PM as a transfer from an outside hospital with concern for an intra-abdominal abscess and diverticulitis.  Per documentation, patient was originally seen at [facility] with abdominal pain.  Reportedly she had diarrhea, abdominal pain and worsening cramping and fevers prior to her presenting to the emergency department.  Upon evaluation at the outside hospital, CT abdomen showed diverticulitis with multiple 3 cm abscesses.  Patient received IV Zosyn and IV fluids.  Patient was transferred to [facility] for higher level of care.  Orthopedic surgery was consulted and patient was taken to the OR on 12/18 for ex lap and drainage of pelvic abscesses with a Chávez's colectomy performed by Dr. Asher.  Postoperatively, patient continues to have a MIGUE drain in place.  She is on supplemental oxygen at 4 L.  Patient's leukocytosis has improved from 17.512/19-9.5 on 12/21.  Infectious diseases was consulted, fluid cultures from 12/18 are growing E coli ampicillin susceptible Enterococcus AVM, Bacteroides fragilis and Streptococcus angina gnosis.  Blood cultures have been negative.  Infectious diseases is recommending to discontinue IV Zosyn and transition to IV Unasyn 3 A every 6 hours with an anticipated stop date of 12/25/2022,  With transition to  Augmentin 875 g twice daily at time of discharge home.  Functionally, patient has been willing to participate with therapy, she has been limited by a deconditioned surgical abdomen.    Pt lives in a mobile home with KALEE and her son next door who can assist PRN (possibly 24/7, though not confirmed). Pt presents to rehab below her normal independent level, currently functioning between SBA-Max A for ADLs, and CGA for functional transfers. Pt is limited by decreased strength, balance, endurance, and pain. Pt will benefit from ongoing intensive occupational therapy to address the aforementioned deficits in order to maximize ind with ADLs while reducing burden of care upon DC home with son to assist.       Plan  Recommend Occupational Therapy  minutes per day 5-7 days per week for 10-14 days for the following treatments:  OT Group Therapy, OT Self Care/ADL, OT Community Reintegration, OT Manual Ther Technique, OT Neuro Re-Ed/Balance, OT Therapeutic Activity, OT Evaluation, and OT Therapeutic Exercise.    Passport items to be completed:  Perform bathroom transfers, complete dressing, complete feeding, get ready for the day, prepare a simple meal, participate in household tasks, adapt home for safety needs, demonstrate home exercise program, complete caregiver training     Goals:  Long term and short term goals have been discussed with patient and they are in agreement.    Occupational Therapy Goals (Active)       Problem: Bathing       Dates: Start: 12/24/22         Goal: STG-Within one week, patient will bathe with CGA using DME/AE PRN.       Dates: Start: 12/24/22               Problem: Dressing       Dates: Start: 12/24/22         Goal: STG-Within one week, patient will dress LB with Mod A using DME/AE PRN.       Dates: Start: 12/24/22               Problem: Functional Transfers       Dates: Start: 12/24/22         Goal: STG-Within one week, patient will transfer to toilet with SBA.       Dates: Start: 12/24/22                Problem: OT Long Term Goals       Dates: Start: 12/24/22         Goal: LTG-By discharge, patient will complete basic self care tasks with SPV-Mod I using DME/AE PRN.       Dates: Start: 12/24/22            Goal: LTG-By discharge, patient will perform bathroom transfers with SPV-Mod I using DME/AE PRN.       Dates: Start: 12/24/22            Goal: LTG-By discharge, patient will complete basic home management with SPV-Mod I using DME/AE PRN.       Dates: Start: 12/24/22               Problem: Toileting       Dates: Start: 12/24/22         Goal: STG-Within one week, patient will complete toileting tasks with Mod A using DME/AE PRN.       Dates: Start: 12/24/22

## 2022-12-24 NOTE — PROGRESS NOTES
4 Eyes Skin Assessment Completed by CONNER Prater and Bernardo RN.    Head WDL  Ears WDL  Nose WDL  Mouth WDL  Neck WDL  Breast/Chest Scar  Shoulder Blades WDL  Spine WDL  (R) Arm/Elbow/Hand WDL  (L) Arm/Elbow/Hand WDL  Abdomen Incision for MIGUE drain site, ostomy on left, midline wound vac dressing.   Groin WDL  Scrotum/Coccyx/Buttocks WDL  (R) Leg WDL  (L) Leg WDL  (R) Heel/Foot/Toe WDL  (L) Heel/Foot/Toe WDL          Devices In Places: Wound vac, ostomy appliance      Interventions In Place Pressure Redistribution Mattress    Possible Skin Injury No    Pictures Uploaded Into Epic Yes  Wound Consult Placed :Followed by wound care already  RN Wound Prevention Protocol Ordered No

## 2022-12-24 NOTE — THERAPY
Physical Therapy   Initial Evaluation     Patient Name: Maira Johnson  Age:  77 y.o., Sex:  female  Medical Record #: [id number]  Today's Date: 12/24/2022     Subjective    Patient agreeable to eval, says she is just overall more tired than normal. Would like to return to independent status.      Objective       12/24/22 1031   PT Charge Group   PT Therapeutic Activities (Units) 1   PT Evaluation PT Evaluation Mod   PT Total Time Spent   PT Individual Total Time Spent (Mins) 60   Prior Living Situation   Prior Services Home-Independent   Housing / Facility Mobile Home   Steps Into Home 0  (ramp entry)   Steps In Home 0   Equipment Owned Other (Comments)  (3WW)   Lives with - Patient's Self Care Capacity Alone and Able to Care For Self   Comments Pt lives alone in mobile home with son next door who works as a PRN large . Per pt, son can assist upon DC.   Prior Level of Functional Mobility   Bed Mobility Independent   Transfer Status Independent   Ambulation Independent   Distance Ambulation (Feet)   (community)   Assistive Devices Used None   Stairs Independent   Prior Functioning: Everyday Activities   Self Care Independent   Indoor Mobility (Ambulation) Independent   Stairs Independent   Functional Cognition Independent   Prior Device Use None of the given options   Passive ROM Lower Body   Passive ROM Lower Body WDL   Active ROM Lower Body    Active ROM Lower Body  WDL   Strength Lower Body   Lower Body Strength  WDL   Sensation Lower Body   Lower Extremity Sensation   X   Paresthesia Present    Comments B plantar feet/toes   Lower Body Muscle Tone   Lower Body Muscle Tone  WDL   Neurological Concerns   Neurological Concerns No   Roll Left and Right   Assistance Needed Supervision;Verbal cues   CARE Score - Roll Left and Right 4   Roll Left and Right Discharge Goal   Discharge Goal 6   Sit to Lying   Assistance Needed Supervision   CARE Score - Sit to Lying 4   Sit to Lying Discharge Goal    Discharge Goal 6   Lying to Sitting on Side of Bed   Assistance Needed Physical assistance   Physical Assistance Level 25% or less   CARE Score - Lying to Sitting on Side of Bed 3   Lying to Sitting on Side of Bed Discharge Goal   Discharge Goal 6   Sit to Stand   Assistance Needed Incidental touching   CARE Score - Sit to Stand 4   Sit to Stand Discharge Goal   Discharge Goal 6   Chair/Bed-to-Chair Transfer   Assistance Needed Incidental touching   CARE Score - Chair/Bed-to-Chair Transfer 4   Chair/Bed-to-Chair Transfer Discharge Goal   Discharge Goal 6   Car Transfer   Reason if not Attempted Environmental limitations   CARE Score - Car Transfer 10   Car Transfer Discharge Goal   Discharge Goal 6   Walk 10 Feet   Assistance Needed Incidental touching;Adaptive equipment   CARE Score - Walk 10 Feet 4   Walk 10 Feet Discharge Goal   Discharge Goal 6   Walk 50 Feet with Two Turns   Assistance Needed Adaptive equipment;Incidental touching   CARE Score - Walk 50 Feet with Two Turns 4   Walk 50 Feet with Two Turns Discharge Goal   Discharge Goal 6   Walk 150 Feet   Reason if not Attempted Medical concerns   CARE Score - Walk 150 Feet 88   Walk 150 Feet Discharge Goal   Discharge Goal 6   Walking 10 Feet on Uneven Surfaces   Assistance Needed Incidental touching;Adaptive equipment   CARE Score - Walking 10 Feet on Uneven Surfaces 4   Walking 10 Feet on Uneven Surfaces Discharge Goal   Discharge Goal 6   1 Step (Curb)   Reason if not Attempted Activity not applicable   CARE Score - 1 Step (Curb) 9   1 Step (Curb) Discharge Goal   Discharge Goal 9   4 Steps   Reason if not Attempted Activity not applicable   CARE Score - 4 Steps 9   4 Steps Discharge Goal   Discharge Goal 9   12 Steps   Reason if not Attempted Activity not applicable   CARE Score - 12 Steps 9   12 Steps Discharge Goal   Discharge Goal 9   Picking Up Object   Assistance Needed Adaptive equipment;Incidental touching   CARE Score - Picking Up Object 4    Picking Up Object Discharge Goal   Discharge Goal 6   Wheel 50 Feet with Two Turns   Reason if not Attempted Activity not applicable   CARE Score - Wheel 50 Feet with Two Turns 9   Wheel 50 Feet with Two Turns Discharge Goal   Discharge Goal 9   Wheel 150 Feet   Reason if not Attempted Activity not applicable   CARE Score - Wheel 150 Feet 9   Wheel 150 Feet Discharge Goal   Discharge Goal 9   Gait Functional Level of Assist    Gait Level Of Assist Contact Guard Assist   Assistive Device Front Wheel Walker   Distance (Feet)   (70 feet x 1, 20 ft x 1 over uneven surface. declines further due to fatigue.)   Deviation Bradykinetic;Shuffled Gait;Increased Base Of Support   Transfer Functional Level of Assist   Bed, Chair, Wheelchair Transfer Contact Guard Assist   Bed Chair Wheelchair Transfer Description Adaptive equipment;Increased time;Set-up of equipment;Supervision for safety;Verbal cueing  (stand pivot w/c <> bed)   Problem List    Problems Pain;Impaired Bed Mobility;Impaired Transfers;Impaired Ambulation;Decreased Activity Tolerance   Precautions   Precautions Fall Risk;Other (See Comments)   Comments Abdominal wound vac, new ostomy, hx of mastectomy limb alert RUE   Current Discharge Plan   Current Discharge Plan Return to Prior Living Situation   Interdisciplinary Plan of Care Collaboration   IDT Collaboration with  Family / Caregiver;Physician;Occupational Therapist;Certified Nursing Assistant   Patient Position at End of Therapy Seated;Chair Alarm On;Other (Comments)  (CNA in room)   Collaboration Comments CNA to assist pt to bathroom. Her dtr is present at beginning of session. CLOF with OT. MD Dr. Barroso rounds during session.   Benefit   Therapy Benefit Patient Would Benefit from Inpatient Rehabilitation Physical Therapy to Maximize Functional [place] with ADLs, IADLs and Mobility.   Strengths & Barriers   Strengths Able to follow instructions;Adequate strength;Effective communication  "skills;Independent prior level of function;Making steady progress towards goals;Pleasant and cooperative;Supportive family;Willingly participates in therapeutic activities   Barriers Decreased endurance;Fatigue;Generalized weakness;Impaired activity tolerance   Seated hip flex, LAQ, ankle pumps  x 10, hip abd green band x 10.     Edu on rehab orientation, safety in room needing to use call light, barriers, goals and PT POC.     Encouraged \"spinal neutral\" precautions with abdominal wound vac and colostomy during rolling tasks    Attempted to find patient a lower w/c to fit body type, however unable at this time.     Assessment  Patient is 77 y.o. female with a diagnosis of debility following intra abdominal abscess.      Additional factors influencing patient status / progress (ie: cognitive factors, co-morbidities, social support, etc): past medical history of hypertension and history of multiple episodes of diverticulitis previously requiring IV antibiotics.  Patient reporting good social support from son, who lives next door, and daughter. Lives in single story mobile home with ramp access. PLOF independent without device use and very active on her [place] property. She currently has deficits in overall activity tolerance, ambulating 70 ft max using FWW. Generally deconditioned at this point and requiring CGA for transfers and gait. She has a colostomy and wound vac place on abdomen. Pt will benefit from skilled PT in order to maximize functional return to PLOF.    Plan  *pt may benefit from lower w/c to allow BLE self propulsion.     \Recommend Physical Therapy  minutes per day 5-7 days per week for 1-2 weeks for the following treatments:  PT Group Therapy, PT Gait Training, PT Self Care/Home Eval, PT Therapeutic Exercises, PT TENS Application, PT Neuro Re-Ed/Balance, PT Therapeutic Activity, PT Manual Therapy, and PT Evaluation.    Passport items to be completed:  Get in/out of bed safely, in/out of a " vehicle, safely use mobility device, walk or wheel around home/community, navigate up and down stairs, show how to get up/down from the ground, ensure home is accessible, demonstrate HEP, complete caregiver training    Goals:  Long term and short term goals have been discussed with patient and they are in agreement.    Physical Therapy Problems (Active)       Problem: Mobility       Dates: Start: 12/24/22         Goal: STG-Within one week, patient will ambulate 150 ft with FWW and SBA.        Dates: Start: 12/24/22               Problem: Mobility Transfers       Dates: Start: 12/24/22         Goal: STG-Within one week, patient will transfer bed to chair with spv-SBA.        Dates: Start: 12/24/22            Goal: STG-Within one week, patient will move supine to sit with CGA using bed functions.        Dates: Start: 12/24/22               Problem: PT-Long Term Goals       Dates: Start: 12/24/22         Goal: LTG-By discharge, patient will ambulate 100+ ft indoors/outdoors with LRD and mod I.        Dates: Start: 12/24/22            Goal: LTG-By discharge, patient will transfer one surface to another using LRD and mod I.        Dates: Start: 12/24/22            Goal: LTG-By discharge, patient will transfer in/out of a car with SPV.        Dates: Start: 12/24/22            Goal: LTG-By discharge, patient will ambulate up/down ramp with LRD and mod I.       Dates: Start: 12/24/22

## 2022-12-24 NOTE — PROGRESS NOTES
Assumed care of patient. Bedside report received from Josi PRIETO. Patient is in bed. Fall precautions in place. Call light and belongings within reach. Updated on POC, all questions and concerns answered at this time. No other needs at this time.

## 2022-12-24 NOTE — CARE PLAN
The patient is Stable - Low risk of patient condition declining or worsening    Shift Goals  Clinical Goals: Transfer to [facility]  Patient Goals: [facility]  Family Goals: N/A    Progress made toward(s) clinical / shift goals:  Patient transferred to [facility] today per MD order.    Patient is not progressing towards the following goals: N/A

## 2022-12-24 NOTE — THERAPY
"Physical Therapy   Daily Treatment     Patient Name: Maira Johnson  Age:  77 y.o., Sex:  female  Medical Record #: [id number]  Today's Date: 12/24/2022     Precautions  Precautions: Fall Risk, Other (See Comments)  Comments: Abdominal wound vac, new ostomy, hx of mastectomy limb alert RUE    Subjective    \"I usually push myself. This morning I hit a wall I did so much. I fell asleep just after 1 and didn't even move till you came in.\" Pt in bed at arrival, agreeable to PT treatment.      Objective       12/24/22 1431   PT Charge Group   PT Therapeutic Activities (Units) 3   PT Total Time Spent   PT Individual Total Time Spent (Mins) 45   Gait Functional Level of Assist    Gait Level Of Assist Contact Guard Assist   Assistive Device Front Wheel Walker   Distance (Feet) 25   # of Times Distance was Traveled 2   Deviation Increased Base Of Support;Shuffled Gait;Decreased Heel Strike;Decreased Toe Off   Transfer Functional Level of Assist   Bed, Chair, Wheelchair Transfer Contact Guard Assist   Bed Chair Wheelchair Transfer Description Adaptive equipment;Increased time;Initial preparation for task;Set-up of equipment;Verbal cueing  (Stand-step around c/ FWW)   Bed Mobility    Supine to Sit Supervised  (inc time)   Sit to Supine Supervised  (inc time)   Sit to Stand Contact Guard Assist   Scooting Standby Assist   Rolling Standby Assist   Interdisciplinary Plan of Care Collaboration   Patient Position at End of Therapy In Bed;Bed Alarm On;Call Light within Reach;Tray Table within Reach;Phone within Reach     Patient education: increased time spent for motivational interviewing and reflective listening re: pt questions and concerns related to recent exlap, wound vac, colon resection c/ ostomy placement and impact on life and mobility. Pt reports being both fearful and repulsed by prior efforts to manage ostomy and is interested in beginning some self management. With akhil and , assisted patient to express gas from " "ostomy bag.     Reviewed pt concerns re: debility and decreased activity tolerance, fall risk, and encouraged increased OOB time to promote postural endurance. Pt agreeable to sit up in chair for dinner after another rest period this afternoon.     Gait <> bathroom c/ CGA <> SBA c/ FWW.   Seated and standing balance c/ intermittent UE support for pericare, LB pants donning/doffing, and hand hygiene, SBA c/ occasional VC for posture, sequencing.     Assessment    Maira \"Jo\" is motivated to increase functional independence, requires increased time to perform activities and benefits from conversation to address concerns and rehab goals.      Strengths: Able to follow instructions, Adequate strength, Effective communication skills, Independent prior level of function, Making steady progress towards goals, Pleasant and cooperative, Supportive family, Willingly participates in therapeutic activities  Barriers: Decreased endurance, Fatigue, Generalized weakness, Impaired activity tolerance    Plan    Progress OOB time, may benefit from set therapy schedule and breaks depending on activity tolerance. Set goal c/ pt to eat all meals OOB.     Continue to progress gait, LE TE, balance, and CV endurance.     Passport items to be completed:  Get in/out of bed safely, in/out of a vehicle, safely use mobility device, walk or wheel around home/community, navigate up and down stairs, show how to get up/down from the ground, ensure home is accessible, demonstrate HEP, complete caregiver training    Physical Therapy Problems (Active)       Problem: Mobility       Dates: Start: 12/24/22         Goal: STG-Within one week, patient will ambulate 150 ft with FWW and SBA.        Dates: Start: 12/24/22               Problem: Mobility Transfers       Dates: Start: 12/24/22         Goal: STG-Within one week, patient will transfer bed to chair with spv-SBA.        Dates: Start: 12/24/22            Goal: STG-Within one week, patient will " move supine to sit with CGA using bed functions.        Dates: Start: 12/24/22               Problem: PT-Long Term Goals       Dates: Start: 12/24/22         Goal: LTG-By discharge, patient will ambulate 100+ ft indoors/outdoors with LRD and mod I.        Dates: Start: 12/24/22            Goal: LTG-By discharge, patient will transfer one surface to another using LRD and mod I.        Dates: Start: 12/24/22            Goal: LTG-By discharge, patient will transfer in/out of a car with SPV.        Dates: Start: 12/24/22            Goal: LTG-By discharge, patient will ambulate up/down ramp with LRD and mod I.       Dates: Start: 12/24/22

## 2022-12-24 NOTE — CARE PLAN
The patient is Stable - Low risk of patient condition declining or worsening    Shift Goals  Clinical Goals: safety  Patient Goals: Rest    Progress made toward(s) clinical / shift goals:    Problem: Pain - Standard  Goal: Alleviation of pain or a reduction in pain to the patient’s comfort goal  Outcome: Progressing   Pain assessed using 0-10 pain scale. Scheduled tylenol given for pain.  Pillows used for repositioning and comfort.  Problem: Skin Integrity  Goal: Skin integrity is maintained or improved  Outcome: Progressing   Wound vac in place. Ostomy site is clean, dry, and intact.     Patient is not progressing towards the following goals:

## 2022-12-24 NOTE — WOUND TEAM
Patient with abdominal wound vac and LQ colostomy. Both VAC and ostomy appliance were changed today 12/23 before patient's transfer to Rehab. Wound team to continue to follow patient. Next change scheduled for Monday 12/26.

## 2022-12-25 ENCOUNTER — APPOINTMENT (OUTPATIENT)
Dept: RADIOLOGY | Facility: MEDICAL CENTER | Age: 77
End: 2022-12-25
Attending: PHYSICAL MEDICINE & REHABILITATION
Payer: COMMERCIAL

## 2022-12-25 LAB
BASOPHILS # BLD AUTO: 1.7 % (ref 0–1.8)
BASOPHILS # BLD: 0.21 K/UL (ref 0–0.12)
EOSINOPHIL # BLD AUTO: 0.21 K/UL (ref 0–0.51)
EOSINOPHIL NFR BLD: 1.7 % (ref 0–6.9)
ERYTHROCYTE [DISTWIDTH] IN BLOOD BY AUTOMATED COUNT: 43.9 FL (ref 35.9–50)
HCT VFR BLD AUTO: 40.4 % (ref 37–47)
HGB BLD-MCNC: 13 G/DL (ref 12–16)
LYMPHOCYTES # BLD AUTO: 1.73 K/UL (ref 1–4.8)
LYMPHOCYTES NFR BLD: 14.3 % (ref 22–41)
MANUAL DIFF BLD: NORMAL
MCH RBC QN AUTO: 28.6 PG (ref 27–33)
MCHC RBC AUTO-ENTMCNC: 32.2 G/DL (ref 33.6–35)
MCV RBC AUTO: 88.8 FL (ref 81.4–97.8)
MONOCYTES # BLD AUTO: 0.4 K/UL (ref 0–0.85)
MONOCYTES NFR BLD AUTO: 3.3 % (ref 0–13.4)
MORPHOLOGY BLD-IMP: NORMAL
MYELOCYTES NFR BLD MANUAL: 1.7 %
NEUTROPHILS # BLD AUTO: 9.35 K/UL (ref 2–7.15)
NEUTROPHILS NFR BLD: 77.3 % (ref 44–72)
NRBC # BLD AUTO: 0 K/UL
NRBC BLD-RTO: 0 /100 WBC
PLATELET # BLD AUTO: 483 K/UL (ref 164–446)
PLATELET BLD QL SMEAR: NORMAL
PMV BLD AUTO: 9.6 FL (ref 9–12.9)
PROCALCITONIN SERPL-MCNC: 0.17 NG/ML
RBC # BLD AUTO: 4.55 M/UL (ref 4.2–5.4)
RBC BLD AUTO: NORMAL
WBC # BLD AUTO: 12.1 K/UL (ref 4.8–10.8)

## 2022-12-25 PROCEDURE — 700111 HCHG RX REV CODE 636 W/ 250 OVERRIDE (IP): Performed by: PHYSICAL MEDICINE & REHABILITATION

## 2022-12-25 PROCEDURE — 700117 HCHG RX CONTRAST REV CODE 255: Performed by: PHYSICAL MEDICINE & REHABILITATION

## 2022-12-25 PROCEDURE — 74177 CT ABD & PELVIS W/CONTRAST: CPT

## 2022-12-25 PROCEDURE — 99232 SBSQ HOSP IP/OBS MODERATE 35: CPT | Performed by: PHYSICAL MEDICINE & REHABILITATION

## 2022-12-25 PROCEDURE — 84145 PROCALCITONIN (PCT): CPT

## 2022-12-25 PROCEDURE — 85025 COMPLETE CBC W/AUTO DIFF WBC: CPT

## 2022-12-25 PROCEDURE — 770010 HCHG ROOM/CARE - REHAB SEMI PRIVAT*

## 2022-12-25 PROCEDURE — 85007 BL SMEAR W/DIFF WBC COUNT: CPT

## 2022-12-25 PROCEDURE — 87040 BLOOD CULTURE FOR BACTERIA: CPT

## 2022-12-25 PROCEDURE — A9270 NON-COVERED ITEM OR SERVICE: HCPCS | Performed by: PHYSICAL MEDICINE & REHABILITATION

## 2022-12-25 PROCEDURE — 700102 HCHG RX REV CODE 250 W/ 637 OVERRIDE(OP): Performed by: PHYSICAL MEDICINE & REHABILITATION

## 2022-12-25 PROCEDURE — 36415 COLL VENOUS BLD VENIPUNCTURE: CPT

## 2022-12-25 RX ADMIN — SENNOSIDES AND DOCUSATE SODIUM 2 TABLET: 50; 8.6 TABLET ORAL at 08:32

## 2022-12-25 RX ADMIN — METOPROLOL SUCCINATE 25 MG: 25 TABLET, EXTENDED RELEASE ORAL at 05:41

## 2022-12-25 RX ADMIN — ENOXAPARIN SODIUM 40 MG: 40 INJECTION SUBCUTANEOUS at 17:51

## 2022-12-25 RX ADMIN — ACETAMINOPHEN 1000 MG: 500 TABLET ORAL at 00:30

## 2022-12-25 RX ADMIN — Medication 1 CAPSULE: at 08:32

## 2022-12-25 RX ADMIN — OMEPRAZOLE 20 MG: 20 CAPSULE, DELAYED RELEASE ORAL at 08:32

## 2022-12-25 RX ADMIN — AMOXICILLIN AND CLAVULANATE POTASSIUM 1 TABLET: 875; 125 TABLET, FILM COATED ORAL at 08:32

## 2022-12-25 RX ADMIN — SENNOSIDES AND DOCUSATE SODIUM 2 TABLET: 50; 8.6 TABLET ORAL at 21:53

## 2022-12-25 RX ADMIN — IOHEXOL 100 ML: 350 INJECTION, SOLUTION INTRAVENOUS at 16:45

## 2022-12-25 RX ADMIN — ACETAMINOPHEN 1000 MG: 500 TABLET ORAL at 05:40

## 2022-12-25 ASSESSMENT — PATIENT HEALTH QUESTIONNAIRE - PHQ9
2. FEELING DOWN, DEPRESSED, IRRITABLE, OR HOPELESS: NOT AT ALL
SUM OF ALL RESPONSES TO PHQ9 QUESTIONS 1 AND 2: 0
1. LITTLE INTEREST OR PLEASURE IN DOING THINGS: NOT AT ALL
2. FEELING DOWN, DEPRESSED, IRRITABLE, OR HOPELESS: NOT AT ALL
SUM OF ALL RESPONSES TO PHQ9 QUESTIONS 1 AND 2: 0
1. LITTLE INTEREST OR PLEASURE IN DOING THINGS: NOT AT ALL

## 2022-12-25 NOTE — CONSULTS
"  HOSPITAL MEDICINE CONSULTATION    Requesting Physician:  Dr. Barroso    Reason for Consult:  Leukocytosis    History of Present Illness:  The patient is a 77-year-old  female with past medical history significant for hypertension and diverticulosis.  She was admitted to [facility] on 22 as a transfer from an outlying hospital for a higher level of care and subspecialty consultation.  The patient was diagnosed with diverticulitis w/ multiple intra-abdominal abscesses.  She underwent percutaneous drainage on 22 and was placed on intravenous antimicrobial therapy.  She developed bowel perforation and underwent exploratory laparotomy with Chelsie's colectomy on 22 by Dr. Asher.  Estimated blood loss was 200 mL.  Due to her ongoing functional debility, the patient was transferred to [facility] on 22.  Hospital Medicine consultation is requested to assist in the management of this patient's persistent leukocytosis.  She is also noted to have thrombocytosis.    Review of Systems:  Review of Systems   Constitutional:  Negative for chills and fever.   HENT: Negative.     Eyes: Negative.    Respiratory:  Negative for cough.    Cardiovascular:  Negative for chest pain and palpitations.   Gastrointestinal:  Positive for diarrhea. Negative for abdominal pain, nausea and vomiting.   Musculoskeletal:         Wound pain   Skin:  Negative for itching and rash.   Endo/Heme/Allergies:  Negative for polydipsia. Does not bruise/bleed easily.   All other systems reviewed and are negative.    Allergies:  Allergies   Allergen Reactions    Sulfa Drugs Unspecified     \"Destroyed my blood cells, I almost \"       Medications:    Current Facility-Administered Medications:     Special Contact Isolation, , , CONTINUOUS **AND** [CANCELED] C Diff by PCR rflx Toxin, , , Once **AND** Pharmacy Consult Request, 1 Each, Other, PHARMACY TO DOSE, Isabell Gong M.D.    Respiratory " Therapy Consult, , Nebulization, Continuous RT, Crystal Jones D.O.    Pharmacy Consult Request ...Pain Management Review 1 Each, 1 Each, Other, PHARMACY TO DOSE, Crystal Jones D.O.    senna-docusate (PERICOLACE or SENOKOT S) 8.6-50 MG per tablet 2 Tablet, 2 Tablet, Oral, BID, 2 Tablet at 12/26/22 0818 **AND** polyethylene glycol/lytes (MIRALAX) PACKET 1 Packet, 1 Packet, Oral, QDAY PRN **AND** magnesium hydroxide (MILK OF MAGNESIA) suspension 30 mL, 30 mL, Oral, QDAY PRN **AND** bisacodyl (DULCOLAX) suppository 10 mg, 10 mg, Rectal, QDAY PRN, Crystal Jones D.O.    omeprazole (PRILOSEC) capsule 20 mg, 20 mg, Oral, DAILY, Crystal Jones D.O., 20 mg at 12/26/22 0818    mag hydrox-al hydrox-simeth (MAALOX PLUS ES or MYLANTA DS) suspension 20 mL, 20 mL, Oral, Q2HRS PRN, Crystal Jones D.O.    ondansetron (ZOFRAN ODT) dispertab 4 mg, 4 mg, Oral, 4X/DAY PRN **OR** ondansetron (ZOFRAN) syringe/vial injection 4 mg, 4 mg, Intramuscular, 4X/DAY PRN, Crystal Jones D.O.    traZODone (DESYREL) tablet 50 mg, 50 mg, Oral, QHS PRN, Crystal Jones D.O.    sodium chloride (OCEAN) 0.65 % nasal spray 2 Spray, 2 Spray, Nasal, PRN, Crystal Jones D.O.    oxyCODONE immediate-release (ROXICODONE) tablet 2.5 mg, 2.5 mg, Oral, Q3HRS PRN **OR** oxyCODONE immediate-release (ROXICODONE) tablet 5 mg, 5 mg, Oral, Q3HRS PRN, ANNAMARIE GaitanOYaquelin, 5 mg at 12/23/22 1521    lactobacillus rhamnosus (CULTURELLE) capsule 1 Capsule, 1 Capsule, Oral, DAILY, Crystal Jones D.O., 1 Capsule at 12/26/22 0818    enoxaparin (Lovenox) inj 40 mg, 40 mg, Subcutaneous, DAILY AT 1800, ANNAMARIE GaitanO., 40 mg at 12/25/22 1751    metoprolol SR (TOPROL XL) tablet 25 mg, 25 mg, Oral, Q DAY, Crystal Jones D.O., 25 mg at 12/26/22 0514    Past Medical/Surgical History:  Past Medical History:   Diagnosis Date    Arthritis     Cancer (HCC)     Cataract     Hypertension      Past Surgical History:   Procedure Laterality Date    [place] EXPLORATORY OF ABDOMEN   12/18/2022    Procedure: LAPAROTOMY, EXPLORATORY WITH OSTOMY CREATION;  Surgeon: Huseyin Asher M.D.;  Location: SURGERY [facility];  Service: General    GYN SURGERY Right 11/01/2013    Right mastecomy    TUBAL LIGATION  01/01/1975       Social History:  Social History     Socioeconomic History    Marital status:      Spouse name: Not on file    Number of children: Not on file    Years of education: Not on file    Highest education level: Not on file   Occupational History    Not on file   Tobacco Use    Smoking status: Never     Passive exposure: Never    Smokeless tobacco: Never   Vaping Use    Vaping Use: Never used   Substance and Sexual Activity    Alcohol use: Never    Drug use: Never    Sexual activity: Not on file   Other Topics Concern    Not on file   Social History Narrative    Not on file     Social Determinants of Health     Financial Resource Strain: Not on file   Food Insecurity: Not on file   Transportation Needs: Not on file   Physical Activity: Not on file   Stress: Not on file   Social Connections: Not on file   Intimate Partner Violence: Not on file   Housing Stability: Not on file       Family History:  Family History   Problem Relation Age of Onset    Dementia Mother     Testicular Cancer Father     Skin cancer Sister     Arthritis Brother     Skin cancer Brother     Prostate cancer Brother        Physical Examination:   Vitals:    12/25/22 1833 12/26/22 0053 12/26/22 0500 12/26/22 0710   BP: (!) 149/85 108/77 (!) 149/77 137/67   Pulse: (!) 101 94 88 82   Resp: 18   18   Temp: 37 °C (98.6 °F)   36.8 °C (98.2 °F)   TempSrc: Oral   Oral   SpO2: 94% 95% 92% 94%   Weight:       Height:           Physical Exam  Vitals reviewed.   Constitutional:       General: She is not in acute distress.     Appearance: Normal appearance. She is not ill-appearing.   HENT:      Head: Normocephalic and atraumatic.      Right Ear: External ear normal.      Left Ear: External ear normal.      Nose: Nose  normal.      Mouth/Throat:      Pharynx: Oropharynx is clear.   Eyes:      General:         Right eye: No discharge.         Left eye: No discharge.      Extraocular Movements: Extraocular movements intact.      Conjunctiva/sclera: Conjunctivae normal.   Cardiovascular:      Rate and Rhythm: Normal rate and regular rhythm.   Pulmonary:      Effort: Pulmonary effort is normal. No respiratory distress.      Breath sounds: Normal breath sounds. No wheezing.   Abdominal:      General: Bowel sounds are normal. There is no distension.      Palpations: Abdomen is soft.      Tenderness: There is no abdominal tenderness. There is no guarding or rebound.      Comments: Left sided ostomy w/ liquid brown stool and gas  Wound vac on lower midline abdominal incision   Musculoskeletal:      Cervical back: Normal range of motion and neck supple.      Right lower leg: Edema present.      Left lower leg: Edema present.   Skin:     General: Skin is warm and dry.   Neurological:      Mental Status: She is alert and oriented to person, place, and time.       Laboratory Data:  Recent Labs     12/24/22  0525 12/25/22  0559 12/26/22  0556   WBC 11.4* 12.1* 12.3*   RBC 4.40 4.55 4.29   HEMOGLOBIN 12.4 13.0 12.2   HEMATOCRIT 39.4 40.4 38.2   MCV 89.5 88.8 89.0   MCH 28.2 28.6 28.4   MCHC 31.5* 32.2* 31.9*   RDW 44.6 43.9 45.0   PLATELETCT 430 483* 459*   MPV 9.7 9.6 9.6     Recent Labs     12/24/22  0525 12/26/22  0556   SODIUM 138 137   POTASSIUM 4.3 3.8   CHLORIDE 103 105   CO2 28 22   GLUCOSE 96 100*   BUN 18 13   CREATININE 0.91 0.80   CALCIUM 8.9 8.8           Impressions/Recommendations:  Leukocytosis  PCT normal  UA 0-2 WBC w/ negative bacteria  CXR atx, start IS  CT Abd/Pelvis negative for abscess  Request BCx x 2 and test stool for C Dif given persistently elevated WBC  Follow temp curve    Thrombocytosis  Likely reactive  Follow PLT    Hypertension  Observe blood pressure trends on Toprol    Diverticulitis of intestine with  perforation and abscess  Had intra-abd abscess S/P IR drainage on 12/14/22  Complicated by perf S/P ex lap w/ colostomy creation on 12/18/22 by Dr. Asher  Wound care and pain control per Physiatry  Intra-op cx polymicrobial  Completed Zosyn followed by Unasyn followed by Augmentin per ID    Full Code    Discussed with patient, bedside RN (Kathryn), and Dr. Barroso.  Thank you for the opportunity to assist in this patient's care.  We will continue to follow along with you.

## 2022-12-25 NOTE — CARE PLAN
The patient is Stable - Low risk of patient condition declining or worsening    Shift Goals  Clinical Goals: Pain management  Patient Goals: Rest    Progress made toward(s) clinical / shift goals:    Problem: Pain - Standard  Goal: Alleviation of pain or a reduction in pain to the patient’s comfort goal  Outcome: Progressing   Patient reports satisfactory pain control and decrease intensity after pharmacological pain management.

## 2022-12-25 NOTE — CARE PLAN
The patient is Stable - Low risk of patient condition declining or worsening    Shift Goals  Clinical Goals: Safety  Patient Goals: Safety, rest    Progress made toward(s) clinical / shift goals:    Problem: Skin Integrity  Goal: Skin integrity is maintained or improved  Outcome: Progressing   Wound vac in place. Patient is able to turn self in bed.  Problem: Bladder / Voiding  Goal: Patient will establish and maintain regular urinary output  Outcome: Progressing  Patient had regular urinary output throughout shift. Patient calls appropriately for assistance  Problem: Bowel Elimination  Goal: Patient will participate in bowel management program  Outcome: Progressing   Patient has ostomy bag in place. Appliance was emptied throughout shift.     Patient is not progressing towards the following goals:

## 2022-12-25 NOTE — PROGRESS NOTES
Assumed care of patient. Bedside report received from Kori PRIETO. Patient is in bed. Fall precautions in place. Call light and belongings within reach. Updated on POC, all questions and concerns answered at this time. No other needs at this time.

## 2022-12-25 NOTE — PROGRESS NOTES
"Rehab Progress Note     Encounter Date: 12/25/2022     CC: Urinary discomfort    Interval Events (Subjective)  Vital signs stable - 1x elevated   BM 12/25  Voiding volitionally low PVRs    Patient seen and examined in her room laying in bed.  States that she overall feels well.  She is fatigued today, but declines any systemic complaints such as fever, chills, pain.  She does report that she has extreme urinary urgency.  When she has to go she has to go to the bathroom immediately.  She does state that she has a weird sensation when she finally urinates that is not necessarily a pain but a cramping possibly from the musculature relaxing.  No abdominal pain wishes to have the Tylenol discontinued.  Discussed elevated white count with her.  Discussed could be inflammation versus infection, rule out infection before we consider just inflammation from movement.    14 point ROS reviewed and negative except as stated above.     Objective:  VITAL SIGNS: BP (!) 144/78   Pulse 80   Temp 36.9 °C (98.5 °F) (Oral)   Resp 18   Ht 1.6 m (5' 3\")   Wt 108 kg (238 lb 1.6 oz)   LMP  (LMP Unknown)   SpO2 93%   BMI 42.18 kg/m²     GEN: No apparent distress laying in bed comfortably.  HEENT: Head normocephalic, atraumatic.  Sclera nonicteric bilaterally, no ocular discharge appreciated bilaterally.  CV: Extremities warm and well-perfused, no peripheral edema appreciated bilaterally.  PULMONARY: Breathing nonlabored on room air, no respiratory accessory muscle use.  Not requiring supplemental oxygen.  ABD: Soft, nontender.  Colostomy with good output stoma nonconcerning.  Wound VAC area is nonconcerning for infection.  PSYCH: Mood and affect within normal limits.  NEURO: Awake alert.  Conversational.  Logical thought content.      Recent Results (from the past 72 hour(s))   CBC WITHOUT DIFFERENTIAL    Collection Time: 12/23/22  2:48 AM   Result Value Ref Range    WBC 9.9 4.8 - 10.8 K/uL    RBC 4.07 (L) 4.20 - 5.40 M/uL    " Hemoglobin 11.6 (L) 12.0 - 16.0 g/dL    Hematocrit 35.6 (L) 37.0 - 47.0 %    MCV 87.5 81.4 - 97.8 fL    MCH 28.5 27.0 - 33.0 pg    MCHC 32.6 (L) 33.6 - 35.0 g/dL    RDW 41.9 35.9 - 50.0 fL    Platelet Count 375 164 - 446 K/uL    MPV 9.5 9.0 - 12.9 fL   Basic Metabolic Panel    Collection Time: 12/23/22  2:48 AM   Result Value Ref Range    Sodium 138 135 - 145 mmol/L    Potassium 4.0 3.6 - 5.5 mmol/L    Chloride 105 96 - 112 mmol/L    Co2 28 20 - 33 mmol/L    Glucose 113 (H) 65 - 99 mg/dL    Bun 16 8 - 22 mg/dL    Creatinine 0.74 0.50 - 1.40 mg/dL    Calcium 8.2 (L) 8.5 - 10.5 mg/dL    Anion Gap 5.0 (L) 7.0 - 16.0   ESTIMATED GFR    Collection Time: 12/23/22  2:48 AM   Result Value Ref Range    GFR (CKD-EPI) 83 >60 mL/min/1.73 m 2   URINALYSIS    Collection Time: 12/23/22 12:00 PM    Specimen: Urine, Clean Catch   Result Value Ref Range    Color Yellow     Character Cloudy (A)     Specific Gravity 1.028 <1.035    Ph 6.5 5.0 - 8.0    Glucose Negative Negative mg/dL    Ketones Trace (A) Negative mg/dL    Protein Negative Negative mg/dL    Bilirubin Negative Negative    Urobilinogen, Urine 0.2 Negative    Nitrite Negative Negative    Leukocyte Esterase Negative Negative    Occult Blood Negative Negative    Micro Urine Req Microscopic    URINE MICROSCOPIC (W/UA)    Collection Time: 12/23/22 12:00 PM   Result Value Ref Range    WBC 0-2 /hpf    RBC 0-2 /hpf    Bacteria Negative None /hpf    Epithelial Cells Many (A) /hpf    Ca Oxalate Crystal Moderate /hpf    Hyaline Cast 0-2 /lpf   COV-2, FLU A/B, AND RSV BY PCR (2-4 HOURS CEPHEID): Collect NP swab in VTM    Collection Time: 12/23/22  3:00 PM    Specimen: Respirate   Result Value Ref Range    Influenza virus A RNA Negative Negative    Influenza virus B, PCR Negative Negative    RSV, PCR Negative Negative    SARS-CoV-2 by PCR NotDetected     SARS-CoV-2 Source Nasal Swab    CBC with Differential    Collection Time: 12/24/22  5:25 AM   Result Value Ref Range    WBC 11.4 (H)  4.8 - 10.8 K/uL    RBC 4.40 4.20 - 5.40 M/uL    Hemoglobin 12.4 12.0 - 16.0 g/dL    Hematocrit 39.4 37.0 - 47.0 %    MCV 89.5 81.4 - 97.8 fL    MCH 28.2 27.0 - 33.0 pg    MCHC 31.5 (L) 33.6 - 35.0 g/dL    RDW 44.6 35.9 - 50.0 fL    Platelet Count 430 164 - 446 K/uL    MPV 9.7 9.0 - 12.9 fL    Neutrophils-Polys 84.30 (H) 44.00 - 72.00 %    Lymphocytes 8.70 (L) 22.00 - 41.00 %    Monocytes 2.60 0.00 - 13.40 %    Eosinophils 0.90 0.00 - 6.90 %    Basophils 0.00 0.00 - 1.80 %    Nucleated RBC 0.00 /100 WBC    Neutrophils (Absolute) 9.71 (H) 2.00 - 7.15 K/uL    Lymphs (Absolute) 0.99 (L) 1.00 - 4.80 K/uL    Monos (Absolute) 0.30 0.00 - 0.85 K/uL    Eos (Absolute) 0.10 0.00 - 0.51 K/uL    Baso (Absolute) 0.00 0.00 - 0.12 K/uL    NRBC (Absolute) 0.00 K/uL    Anisocytosis 1+     Macrocytosis 1+     Microcytosis 1+    Comp Metabolic Panel (CMP)    Collection Time: 12/24/22  5:25 AM   Result Value Ref Range    Sodium 138 135 - 145 mmol/L    Potassium 4.3 3.6 - 5.5 mmol/L    Chloride 103 96 - 112 mmol/L    Co2 28 20 - 33 mmol/L    Anion Gap 7.0 7.0 - 16.0    Glucose 96 65 - 99 mg/dL    Bun 18 8 - 22 mg/dL    Creatinine 0.91 0.50 - 1.40 mg/dL    Calcium 8.9 8.5 - 10.5 mg/dL    AST(SGOT) 32 12 - 45 U/L    ALT(SGPT) 35 2 - 50 U/L    Alkaline Phosphatase 98 30 - 99 U/L    Total Bilirubin 0.4 0.1 - 1.5 mg/dL    Albumin 2.7 (L) 3.2 - 4.9 g/dL    Total Protein 6.1 6.0 - 8.2 g/dL    Globulin 3.4 1.9 - 3.5 g/dL    A-G Ratio 0.8 g/dL   HEMOGLOBIN A1C    Collection Time: 12/24/22  5:25 AM   Result Value Ref Range    Glycohemoglobin 6.0 (H) 4.0 - 5.6 %    Est Avg Glucose 126 mg/dL   Vitamin D, 25-hydroxy (blood)    Collection Time: 12/24/22  5:25 AM   Result Value Ref Range    25-Hydroxy   Vitamin D 25 46 30 - 100 ng/mL   CORRECTED CALCIUM    Collection Time: 12/24/22  5:25 AM   Result Value Ref Range    Correct Calcium 9.9 8.5 - 10.5 mg/dL   ESTIMATED GFR    Collection Time: 12/24/22  5:25 AM   Result Value Ref Range    GFR (CKD-EPI) 65  >60 mL/min/1.73 m 2   DIFFERENTIAL MANUAL    Collection Time: 12/24/22  5:25 AM   Result Value Ref Range    Bands-Stabs 0.90 0.00 - 10.00 %    Metamyelocytes 2.60 %    Manual Diff Status PERFORMED    PERIPHERAL SMEAR REVIEW    Collection Time: 12/24/22  5:25 AM   Result Value Ref Range    Peripheral Smear Review see below    PLATELET ESTIMATE    Collection Time: 12/24/22  5:25 AM   Result Value Ref Range    Plt Estimation Normal    MORPHOLOGY    Collection Time: 12/24/22  5:25 AM   Result Value Ref Range    RBC Morphology Present     Polychromia 1+    CBC WITH DIFFERENTIAL    Collection Time: 12/25/22  5:59 AM   Result Value Ref Range    WBC 12.1 (H) 4.8 - 10.8 K/uL    RBC 4.55 4.20 - 5.40 M/uL    Hemoglobin 13.0 12.0 - 16.0 g/dL    Hematocrit 40.4 37.0 - 47.0 %    MCV 88.8 81.4 - 97.8 fL    MCH 28.6 27.0 - 33.0 pg    MCHC 32.2 (L) 33.6 - 35.0 g/dL    RDW 43.9 35.9 - 50.0 fL    Platelet Count 483 (H) 164 - 446 K/uL    MPV 9.6 9.0 - 12.9 fL    Neutrophils-Polys 77.30 (H) 44.00 - 72.00 %    Lymphocytes 14.30 (L) 22.00 - 41.00 %    Monocytes 3.30 0.00 - 13.40 %    Eosinophils 1.70 0.00 - 6.90 %    Basophils 1.70 0.00 - 1.80 %    Nucleated RBC 0.00 /100 WBC    Neutrophils (Absolute) 9.35 (H) 2.00 - 7.15 K/uL    Lymphs (Absolute) 1.73 1.00 - 4.80 K/uL    Monos (Absolute) 0.40 0.00 - 0.85 K/uL    Eos (Absolute) 0.21 0.00 - 0.51 K/uL    Baso (Absolute) 0.21 (H) 0.00 - 0.12 K/uL    NRBC (Absolute) 0.00 K/uL   DIFFERENTIAL MANUAL    Collection Time: 12/25/22  5:59 AM   Result Value Ref Range    Myelocytes 1.70 %    Manual Diff Status PERFORMED    PERIPHERAL SMEAR REVIEW    Collection Time: 12/25/22  5:59 AM   Result Value Ref Range    Peripheral Smear Review see below    PLATELET ESTIMATE    Collection Time: 12/25/22  5:59 AM   Result Value Ref Range    Plt Estimation Increased    MORPHOLOGY    Collection Time: 12/25/22  5:59 AM   Result Value Ref Range    RBC Morphology Normal        Current Facility-Administered  Medications   Medication Frequency    Respiratory Therapy Consult Continuous RT    Pharmacy Consult Request ...Pain Management Review 1 Each PHARMACY TO DOSE    senna-docusate (PERICOLACE or SENOKOT S) 8.6-50 MG per tablet 2 Tablet BID    And    polyethylene glycol/lytes (MIRALAX) PACKET 1 Packet QDAY PRN    And    magnesium hydroxide (MILK OF MAGNESIA) suspension 30 mL QDAY PRN    And    bisacodyl (DULCOLAX) suppository 10 mg QDAY PRN    omeprazole (PRILOSEC) capsule 20 mg DAILY    mag hydrox-al hydrox-simeth (MAALOX PLUS ES or MYLANTA DS) suspension 20 mL Q2HRS PRN    ondansetron (ZOFRAN ODT) dispertab 4 mg 4X/DAY PRN    Or    ondansetron (ZOFRAN) syringe/vial injection 4 mg 4X/DAY PRN    traZODone (DESYREL) tablet 50 mg QHS PRN    sodium chloride (OCEAN) 0.65 % nasal spray 2 Spray PRN    oxyCODONE immediate-release (ROXICODONE) tablet 2.5 mg Q3HRS PRN    Or    oxyCODONE immediate-release (ROXICODONE) tablet 5 mg Q3HRS PRN    lactobacillus rhamnosus (CULTURELLE) capsule 1 Capsule DAILY    enoxaparin (Lovenox) inj 40 mg DAILY AT 1800    metoprolol SR (TOPROL XL) tablet 25 mg Q DAY       Orders Placed This Encounter   Procedures    Diet Order Diet: Regular     Standing Status:   Standing     Number of Occurrences:   1     Order Specific Question:   Diet:     Answer:   Regular [1]       Assessment:  Active Hospital Problems    Diagnosis     *Intra-abdominal abscess (HCC)     Diverticulitis     H/O abdominal abscess     CHEPE (acute kidney injury) ([facility])     Hypokalemia     Postoperative hypoxia     Class 3 severe obesity due to excess calories with serious comorbidity and body mass index (BMI) of 40.0 to 44.9 in adult (HCC)     Hyperglycemia     Diverticulitis of intestine with perforation and abscess     Hypertension        Medical Decision Making and Plan:  Debility status post diverticulitis and intra-abdominal abscess  12/13 IR drainage  12/18 Follow up CT with evidence of perforation and worsening  abscess.Exploratory laparotomy with drainage of pelvic abscesses and 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  Chest x-ray - linear left mid-lung atelectasis  On Augmentin through 12/25  Repeat CBC 12/25 - 12.1  CT Abd Pelvis 12/25  Repeat CBC 12/26  Consult hospitalist just to see if they have any additional recommendations.  DC Tylenol 12/25  Blood cultures    Thrombocytosis   Monitor    Hypocalcemia  Normal on admission to rehab    Prediabetes  Hemoglobin A1c 6.0    Screening for vitamin D deficiency  Vitamin D normal at 46    Full code  DVT prophylaxis Lovenox 40 mg subcu daily    Consult hospitalist.     Total time:  28 minutes.  I spent greater than 50% of the time for patient care and coordination on this date, including unit/floor time, and face-to-face time with the patient as per assessment and plan above.    Neris Barroso D.O.

## 2022-12-26 PROBLEM — D72.829 LEUKOCYTOSIS: Status: ACTIVE | Noted: 2022-12-26

## 2022-12-26 PROBLEM — D75.839 THROMBOCYTOSIS: Status: ACTIVE | Noted: 2022-12-26

## 2022-12-26 LAB
ALBUMIN SERPL BCP-MCNC: 2.7 G/DL (ref 3.2–4.9)
ALBUMIN/GLOB SERPL: 0.9 G/DL
ALP SERPL-CCNC: 88 U/L (ref 30–99)
ALT SERPL-CCNC: 21 U/L (ref 2–50)
ANION GAP SERPL CALC-SCNC: 10 MMOL/L (ref 7–16)
APPEARANCE UR: CLEAR
AST SERPL-CCNC: 17 U/L (ref 12–45)
BASOPHILS # BLD AUTO: 0.7 % (ref 0–1.8)
BASOPHILS # BLD: 0.08 K/UL (ref 0–0.12)
BILIRUB SERPL-MCNC: 0.4 MG/DL (ref 0.1–1.5)
BILIRUB UR QL STRIP.AUTO: NEGATIVE
BUN SERPL-MCNC: 13 MG/DL (ref 8–22)
C DIFF DNA SPEC QL NAA+PROBE: NEGATIVE
C DIFF TOX A+B STL QL IA: NEGATIVE
C DIFF TOX GENS STL QL NAA+PROBE: NORMAL
CALCIUM ALBUM COR SERPL-MCNC: 9.8 MG/DL (ref 8.5–10.5)
CALCIUM SERPL-MCNC: 8.8 MG/DL (ref 8.5–10.5)
CHLORIDE SERPL-SCNC: 105 MMOL/L (ref 96–112)
CO2 SERPL-SCNC: 22 MMOL/L (ref 20–33)
COLOR UR: YELLOW
CREAT SERPL-MCNC: 0.8 MG/DL (ref 0.5–1.4)
EOSINOPHIL # BLD AUTO: 0.26 K/UL (ref 0–0.51)
EOSINOPHIL NFR BLD: 2.1 % (ref 0–6.9)
ERYTHROCYTE [DISTWIDTH] IN BLOOD BY AUTOMATED COUNT: 45 FL (ref 35.9–50)
GFR SERPLBLD CREATININE-BSD FMLA CKD-EPI: 75 ML/MIN/1.73 M 2
GLOBULIN SER CALC-MCNC: 3.1 G/DL (ref 1.9–3.5)
GLUCOSE SERPL-MCNC: 100 MG/DL (ref 65–99)
GLUCOSE UR STRIP.AUTO-MCNC: NEGATIVE MG/DL
HCT VFR BLD AUTO: 38.2 % (ref 37–47)
HGB BLD-MCNC: 12.2 G/DL (ref 12–16)
IMM GRANULOCYTES # BLD AUTO: 0.56 K/UL (ref 0–0.11)
IMM GRANULOCYTES NFR BLD AUTO: 4.6 % (ref 0–0.9)
KETONES UR STRIP.AUTO-MCNC: NEGATIVE MG/DL
LEUKOCYTE ESTERASE UR QL STRIP.AUTO: NEGATIVE
LYMPHOCYTES # BLD AUTO: 1.17 K/UL (ref 1–4.8)
LYMPHOCYTES NFR BLD: 9.5 % (ref 22–41)
MCH RBC QN AUTO: 28.4 PG (ref 27–33)
MCHC RBC AUTO-ENTMCNC: 31.9 G/DL (ref 33.6–35)
MCV RBC AUTO: 89 FL (ref 81.4–97.8)
MICRO URNS: NORMAL
MONOCYTES # BLD AUTO: 0.74 K/UL (ref 0–0.85)
MONOCYTES NFR BLD AUTO: 6 % (ref 0–13.4)
NEUTROPHILS # BLD AUTO: 9.48 K/UL (ref 2–7.15)
NEUTROPHILS NFR BLD: 77.1 % (ref 44–72)
NITRITE UR QL STRIP.AUTO: NEGATIVE
NRBC # BLD AUTO: 0 K/UL
NRBC BLD-RTO: 0 /100 WBC
PH UR STRIP.AUTO: 7 [PH] (ref 5–8)
PLATELET # BLD AUTO: 459 K/UL (ref 164–446)
PMV BLD AUTO: 9.6 FL (ref 9–12.9)
POTASSIUM SERPL-SCNC: 3.8 MMOL/L (ref 3.6–5.5)
PROCALCITONIN SERPL-MCNC: 0.21 NG/ML
PROT SERPL-MCNC: 5.8 G/DL (ref 6–8.2)
PROT UR QL STRIP: NEGATIVE MG/DL
RBC # BLD AUTO: 4.29 M/UL (ref 4.2–5.4)
RBC UR QL AUTO: NEGATIVE
SODIUM SERPL-SCNC: 137 MMOL/L (ref 135–145)
SP GR UR STRIP.AUTO: 1.02
UROBILINOGEN UR STRIP.AUTO-MCNC: 0.2 MG/DL
WBC # BLD AUTO: 12.3 K/UL (ref 4.8–10.8)

## 2022-12-26 PROCEDURE — 87493 C DIFF AMPLIFIED PROBE: CPT

## 2022-12-26 PROCEDURE — 99232 SBSQ HOSP IP/OBS MODERATE 35: CPT | Performed by: PHYSICAL MEDICINE & REHABILITATION

## 2022-12-26 PROCEDURE — 97535 SELF CARE MNGMENT TRAINING: CPT

## 2022-12-26 PROCEDURE — 87324 CLOSTRIDIUM AG IA: CPT

## 2022-12-26 PROCEDURE — A9270 NON-COVERED ITEM OR SERVICE: HCPCS | Performed by: PHYSICAL MEDICINE & REHABILITATION

## 2022-12-26 PROCEDURE — 97530 THERAPEUTIC ACTIVITIES: CPT

## 2022-12-26 PROCEDURE — 84145 PROCALCITONIN (PCT): CPT

## 2022-12-26 PROCEDURE — 81003 URINALYSIS AUTO W/O SCOPE: CPT

## 2022-12-26 PROCEDURE — 97110 THERAPEUTIC EXERCISES: CPT

## 2022-12-26 PROCEDURE — 700111 HCHG RX REV CODE 636 W/ 250 OVERRIDE (IP): Performed by: PHYSICAL MEDICINE & REHABILITATION

## 2022-12-26 PROCEDURE — 99222 1ST HOSP IP/OBS MODERATE 55: CPT | Performed by: HOSPITALIST

## 2022-12-26 PROCEDURE — 97606 NEG PRS WND THER DME>50 SQCM: CPT

## 2022-12-26 PROCEDURE — 97112 NEUROMUSCULAR REEDUCATION: CPT

## 2022-12-26 PROCEDURE — 770010 HCHG ROOM/CARE - REHAB SEMI PRIVAT*

## 2022-12-26 PROCEDURE — 700102 HCHG RX REV CODE 250 W/ 637 OVERRIDE(OP): Performed by: PHYSICAL MEDICINE & REHABILITATION

## 2022-12-26 PROCEDURE — 80053 COMPREHEN METABOLIC PANEL: CPT

## 2022-12-26 PROCEDURE — 36415 COLL VENOUS BLD VENIPUNCTURE: CPT

## 2022-12-26 PROCEDURE — 85025 COMPLETE CBC W/AUTO DIFF WBC: CPT

## 2022-12-26 RX ADMIN — OMEPRAZOLE 20 MG: 20 CAPSULE, DELAYED RELEASE ORAL at 08:18

## 2022-12-26 RX ADMIN — SENNOSIDES AND DOCUSATE SODIUM 2 TABLET: 50; 8.6 TABLET ORAL at 20:46

## 2022-12-26 RX ADMIN — SENNOSIDES AND DOCUSATE SODIUM 2 TABLET: 50; 8.6 TABLET ORAL at 08:18

## 2022-12-26 RX ADMIN — OXYCODONE 5 MG: 5 TABLET ORAL at 16:11

## 2022-12-26 RX ADMIN — METOPROLOL SUCCINATE 25 MG: 25 TABLET, EXTENDED RELEASE ORAL at 05:14

## 2022-12-26 RX ADMIN — ENOXAPARIN SODIUM 40 MG: 40 INJECTION SUBCUTANEOUS at 17:40

## 2022-12-26 RX ADMIN — Medication 1 CAPSULE: at 08:18

## 2022-12-26 ASSESSMENT — ACTIVITIES OF DAILY LIVING (ADL)
TOILET_TRANSFER_DESCRIPTION: INCREASED TIME;GRAB BAR
BED_CHAIR_WHEELCHAIR_TRANSFER_DESCRIPTION: ADAPTIVE EQUIPMENT;VERBAL CUEING
TOILET_TRANSFER_DESCRIPTION: GRAB BAR
BED_CHAIR_WHEELCHAIR_TRANSFER_DESCRIPTION: INCREASED TIME
TOILET_TRANSFER_DESCRIPTION: ADAPTIVE EQUIPMENT;GRAB BAR

## 2022-12-26 ASSESSMENT — ENCOUNTER SYMPTOMS
COUGH: 0
ABDOMINAL PAIN: 0
CHILLS: 0
NAUSEA: 0
POLYDIPSIA: 0
PALPITATIONS: 0
VOMITING: 0
EYES NEGATIVE: 1
BRUISES/BLEEDS EASILY: 0
DIARRHEA: 1
FEVER: 0

## 2022-12-26 ASSESSMENT — GAIT ASSESSMENTS
DISTANCE (FEET): 30
ASSISTIVE DEVICE: FRONT WHEEL WALKER
DISTANCE (FEET): 30
GAIT LEVEL OF ASSIST: STANDBY ASSIST
GAIT LEVEL OF ASSIST: STANDBY ASSIST
ASSISTIVE DEVICE: FRONT WHEEL WALKER

## 2022-12-26 ASSESSMENT — PAIN DESCRIPTION - PAIN TYPE: TYPE: ACUTE PAIN

## 2022-12-26 ASSESSMENT — PATIENT HEALTH QUESTIONNAIRE - PHQ9
2. FEELING DOWN, DEPRESSED, IRRITABLE, OR HOPELESS: NOT AT ALL
2. FEELING DOWN, DEPRESSED, IRRITABLE, OR HOPELESS: NOT AT ALL
SUM OF ALL RESPONSES TO PHQ9 QUESTIONS 1 AND 2: 0
1. LITTLE INTEREST OR PLEASURE IN DOING THINGS: NOT AT ALL
1. LITTLE INTEREST OR PLEASURE IN DOING THINGS: NOT AT ALL
SUM OF ALL RESPONSES TO PHQ9 QUESTIONS 1 AND 2: 0

## 2022-12-26 NOTE — DIETARY
NUTRITION SERVICES: BMI - Pt with BMI >40 (=Body mass index is 42.18 kg/m².), Class III obesity. Weight loss counseling not appropriate in acute care setting. RECOMMEND - If appropriate at DC please refer to outpatient nutrition services for weight management.

## 2022-12-26 NOTE — PROGRESS NOTES
"Rehab Progress Note     Encounter Date: 12/26/2022     CC: Feels better today    Interval Events (Subjective)  VSS  BM 12/26  VV    Patient seen and examined in her room.  Discussed labs with her.  Fortunately her work-up has been negative.  Discussed that hospitalist will start antibiotics for few more days.  Blood cultures have been negative.  Will repeat urinalysis and get C. difficile just to be sure.    14 point ROS reviewed and negative except as stated above.     Objective:  VITAL SIGNS: /67   Pulse 82   Temp 36.8 °C (98.2 °F) (Oral)   Resp 18   Ht 1.6 m (5' 3\")   Wt 108 kg (238 lb 1.6 oz)   LMP  (LMP Unknown)   SpO2 94%   BMI 42.18 kg/m²     GEN: No apparent distress  HEENT: Head normocephalic, atraumatic.  Sclera nonicteric bilaterally, no ocular discharge appreciated bilaterally.  CV: Extremities warm and well-perfused, no peripheral edema appreciated bilaterally.  PULMONARY: Breathing nonlabored on room air, no respiratory accessory muscle use.  Not requiring supplemental oxygen.  ABD: Soft, nontender.  Colostomy with good output.  Abdominal incision intact with wound VAC.  PSYCH: Mood and affect within normal limits.  NEURO: Awake alert.  Conversational.  Logical thought content.        Recent Results (from the past 72 hour(s))   URINALYSIS    Collection Time: 12/23/22 12:00 PM    Specimen: Urine, Clean Catch   Result Value Ref Range    Color Yellow     Character Cloudy (A)     Specific Gravity 1.028 <1.035    Ph 6.5 5.0 - 8.0    Glucose Negative Negative mg/dL    Ketones Trace (A) Negative mg/dL    Protein Negative Negative mg/dL    Bilirubin Negative Negative    Urobilinogen, Urine 0.2 Negative    Nitrite Negative Negative    Leukocyte Esterase Negative Negative    Occult Blood Negative Negative    Micro Urine Req Microscopic    URINE MICROSCOPIC (W/UA)    Collection Time: 12/23/22 12:00 PM   Result Value Ref Range    WBC 0-2 /hpf    RBC 0-2 /hpf    Bacteria Negative None /hpf    " Epithelial Cells Many (A) /hpf    Ca Oxalate Crystal Moderate /hpf    Hyaline Cast 0-2 /lpf   COV-2, FLU A/B, AND RSV BY PCR (2-4 HOURS XO1): Collect NP swab in VTM    Collection Time: 12/23/22  3:00 PM    Specimen: Respirate   Result Value Ref Range    Influenza virus A RNA Negative Negative    Influenza virus B, PCR Negative Negative    RSV, PCR Negative Negative    SARS-CoV-2 by PCR NotDetected     SARS-CoV-2 Source Nasal Swab    CBC with Differential    Collection Time: 12/24/22  5:25 AM   Result Value Ref Range    WBC 11.4 (H) 4.8 - 10.8 K/uL    RBC 4.40 4.20 - 5.40 M/uL    Hemoglobin 12.4 12.0 - 16.0 g/dL    Hematocrit 39.4 37.0 - 47.0 %    MCV 89.5 81.4 - 97.8 fL    MCH 28.2 27.0 - 33.0 pg    MCHC 31.5 (L) 33.6 - 35.0 g/dL    RDW 44.6 35.9 - 50.0 fL    Platelet Count 430 164 - 446 K/uL    MPV 9.7 9.0 - 12.9 fL    Neutrophils-Polys 84.30 (H) 44.00 - 72.00 %    Lymphocytes 8.70 (L) 22.00 - 41.00 %    Monocytes 2.60 0.00 - 13.40 %    Eosinophils 0.90 0.00 - 6.90 %    Basophils 0.00 0.00 - 1.80 %    Nucleated RBC 0.00 /100 WBC    Neutrophils (Absolute) 9.71 (H) 2.00 - 7.15 K/uL    Lymphs (Absolute) 0.99 (L) 1.00 - 4.80 K/uL    Monos (Absolute) 0.30 0.00 - 0.85 K/uL    Eos (Absolute) 0.10 0.00 - 0.51 K/uL    Baso (Absolute) 0.00 0.00 - 0.12 K/uL    NRBC (Absolute) 0.00 K/uL    Anisocytosis 1+     Macrocytosis 1+     Microcytosis 1+    Comp Metabolic Panel (CMP)    Collection Time: 12/24/22  5:25 AM   Result Value Ref Range    Sodium 138 135 - 145 mmol/L    Potassium 4.3 3.6 - 5.5 mmol/L    Chloride 103 96 - 112 mmol/L    Co2 28 20 - 33 mmol/L    Anion Gap 7.0 7.0 - 16.0    Glucose 96 65 - 99 mg/dL    Bun 18 8 - 22 mg/dL    Creatinine 0.91 0.50 - 1.40 mg/dL    Calcium 8.9 8.5 - 10.5 mg/dL    AST(SGOT) 32 12 - 45 U/L    ALT(SGPT) 35 2 - 50 U/L    Alkaline Phosphatase 98 30 - 99 U/L    Total Bilirubin 0.4 0.1 - 1.5 mg/dL    Albumin 2.7 (L) 3.2 - 4.9 g/dL    Total Protein 6.1 6.0 - 8.2 g/dL    Globulin 3.4 1.9 -  3.5 g/dL    A-G Ratio 0.8 g/dL   HEMOGLOBIN A1C    Collection Time: 12/24/22  5:25 AM   Result Value Ref Range    Glycohemoglobin 6.0 (H) 4.0 - 5.6 %    Est Avg Glucose 126 mg/dL   Vitamin D, 25-hydroxy (blood)    Collection Time: 12/24/22  5:25 AM   Result Value Ref Range    25-Hydroxy   Vitamin D 25 46 30 - 100 ng/mL   CORRECTED CALCIUM    Collection Time: 12/24/22  5:25 AM   Result Value Ref Range    Correct Calcium 9.9 8.5 - 10.5 mg/dL   ESTIMATED GFR    Collection Time: 12/24/22  5:25 AM   Result Value Ref Range    GFR (CKD-EPI) 65 >60 mL/min/1.73 m 2   DIFFERENTIAL MANUAL    Collection Time: 12/24/22  5:25 AM   Result Value Ref Range    Bands-Stabs 0.90 0.00 - 10.00 %    Metamyelocytes 2.60 %    Manual Diff Status PERFORMED    PERIPHERAL SMEAR REVIEW    Collection Time: 12/24/22  5:25 AM   Result Value Ref Range    Peripheral Smear Review see below    PLATELET ESTIMATE    Collection Time: 12/24/22  5:25 AM   Result Value Ref Range    Plt Estimation Normal    MORPHOLOGY    Collection Time: 12/24/22  5:25 AM   Result Value Ref Range    RBC Morphology Present     Polychromia 1+    CBC WITH DIFFERENTIAL    Collection Time: 12/25/22  5:59 AM   Result Value Ref Range    WBC 12.1 (H) 4.8 - 10.8 K/uL    RBC 4.55 4.20 - 5.40 M/uL    Hemoglobin 13.0 12.0 - 16.0 g/dL    Hematocrit 40.4 37.0 - 47.0 %    MCV 88.8 81.4 - 97.8 fL    MCH 28.6 27.0 - 33.0 pg    MCHC 32.2 (L) 33.6 - 35.0 g/dL    RDW 43.9 35.9 - 50.0 fL    Platelet Count 483 (H) 164 - 446 K/uL    MPV 9.6 9.0 - 12.9 fL    Neutrophils-Polys 77.30 (H) 44.00 - 72.00 %    Lymphocytes 14.30 (L) 22.00 - 41.00 %    Monocytes 3.30 0.00 - 13.40 %    Eosinophils 1.70 0.00 - 6.90 %    Basophils 1.70 0.00 - 1.80 %    Nucleated RBC 0.00 /100 WBC    Neutrophils (Absolute) 9.35 (H) 2.00 - 7.15 K/uL    Lymphs (Absolute) 1.73 1.00 - 4.80 K/uL    Monos (Absolute) 0.40 0.00 - 0.85 K/uL    Eos (Absolute) 0.21 0.00 - 0.51 K/uL    Baso (Absolute) 0.21 (H) 0.00 - 0.12 K/uL    NRBC  (Absolute) 0.00 K/uL   DIFFERENTIAL MANUAL    Collection Time: 12/25/22  5:59 AM   Result Value Ref Range    Myelocytes 1.70 %    Manual Diff Status PERFORMED    PERIPHERAL SMEAR REVIEW    Collection Time: 12/25/22  5:59 AM   Result Value Ref Range    Peripheral Smear Review see below    PLATELET ESTIMATE    Collection Time: 12/25/22  5:59 AM   Result Value Ref Range    Plt Estimation Increased    MORPHOLOGY    Collection Time: 12/25/22  5:59 AM   Result Value Ref Range    RBC Morphology Normal    BLOOD CULTURE    Collection Time: 12/25/22 11:50 AM    Specimen: Peripheral; Blood   Result Value Ref Range    Significant Indicator NEG     Source BLD     Site PERIPHERAL     Culture Result       No Growth  Note: Blood cultures are incubated for 5 days and  are monitored continuously.Positive blood cultures  are called to the RN and reported as soon as  they are identified.     PROCALCITONIN    Collection Time: 12/25/22 11:50 AM   Result Value Ref Range    Procalcitonin 0.17 <0.25 ng/mL   BLOOD CULTURE    Collection Time: 12/25/22 12:01 PM    Specimen: Peripheral; Blood   Result Value Ref Range    Significant Indicator NEG     Source BLD     Site PERIPHERAL     Culture Result       No Growth  Note: Blood cultures are incubated for 5 days and  are monitored continuously.Positive blood cultures  are called to the RN and reported as soon as  they are identified.     CBC WITH DIFFERENTIAL    Collection Time: 12/26/22  5:56 AM   Result Value Ref Range    WBC 12.3 (H) 4.8 - 10.8 K/uL    RBC 4.29 4.20 - 5.40 M/uL    Hemoglobin 12.2 12.0 - 16.0 g/dL    Hematocrit 38.2 37.0 - 47.0 %    MCV 89.0 81.4 - 97.8 fL    MCH 28.4 27.0 - 33.0 pg    MCHC 31.9 (L) 33.6 - 35.0 g/dL    RDW 45.0 35.9 - 50.0 fL    Platelet Count 459 (H) 164 - 446 K/uL    MPV 9.6 9.0 - 12.9 fL    Neutrophils-Polys 77.10 (H) 44.00 - 72.00 %    Lymphocytes 9.50 (L) 22.00 - 41.00 %    Monocytes 6.00 0.00 - 13.40 %    Eosinophils 2.10 0.00 - 6.90 %    Basophils 0.70  0.00 - 1.80 %    Immature Granulocytes 4.60 (H) 0.00 - 0.90 %    Nucleated RBC 0.00 /100 WBC    Neutrophils (Absolute) 9.48 (H) 2.00 - 7.15 K/uL    Lymphs (Absolute) 1.17 1.00 - 4.80 K/uL    Monos (Absolute) 0.74 0.00 - 0.85 K/uL    Eos (Absolute) 0.26 0.00 - 0.51 K/uL    Baso (Absolute) 0.08 0.00 - 0.12 K/uL    Immature Granulocytes (abs) 0.56 (H) 0.00 - 0.11 K/uL    NRBC (Absolute) 0.00 K/uL   Comp Metabolic Panel    Collection Time: 12/26/22  5:56 AM   Result Value Ref Range    Sodium 137 135 - 145 mmol/L    Potassium 3.8 3.6 - 5.5 mmol/L    Chloride 105 96 - 112 mmol/L    Co2 22 20 - 33 mmol/L    Anion Gap 10.0 7.0 - 16.0    Glucose 100 (H) 65 - 99 mg/dL    Bun 13 8 - 22 mg/dL    Creatinine 0.80 0.50 - 1.40 mg/dL    Calcium 8.8 8.5 - 10.5 mg/dL    AST(SGOT) 17 12 - 45 U/L    ALT(SGPT) 21 2 - 50 U/L    Alkaline Phosphatase 88 30 - 99 U/L    Total Bilirubin 0.4 0.1 - 1.5 mg/dL    Albumin 2.7 (L) 3.2 - 4.9 g/dL    Total Protein 5.8 (L) 6.0 - 8.2 g/dL    Globulin 3.1 1.9 - 3.5 g/dL    A-G Ratio 0.9 g/dL   PROCALCITONIN    Collection Time: 12/26/22  5:56 AM   Result Value Ref Range    Procalcitonin 0.21 <0.25 ng/mL   CORRECTED CALCIUM    Collection Time: 12/26/22  5:56 AM   Result Value Ref Range    Correct Calcium 9.8 8.5 - 10.5 mg/dL   ESTIMATED GFR    Collection Time: 12/26/22  5:56 AM   Result Value Ref Range    GFR (CKD-EPI) 75 >60 mL/min/1.73 m 2       Current Facility-Administered Medications   Medication Frequency    Pharmacy Consult Request PHARMACY TO DOSE    Respiratory Therapy Consult Continuous RT    Pharmacy Consult Request ...Pain Management Review 1 Each PHARMACY TO DOSE    senna-docusate (PERICOLACE or SENOKOT S) 8.6-50 MG per tablet 2 Tablet BID    And    polyethylene glycol/lytes (MIRALAX) PACKET 1 Packet QDAY PRN    And    magnesium hydroxide (MILK OF MAGNESIA) suspension 30 mL QDAY PRN    And    bisacodyl (DULCOLAX) suppository 10 mg QDAY PRN    omeprazole (PRILOSEC) capsule 20 mg DAILY    mag  hydrox-al hydrox-simeth (MAALOX PLUS ES or MYLANTA DS) suspension 20 mL Q2HRS PRN    ondansetron (ZOFRAN ODT) dispertab 4 mg 4X/DAY PRN    Or    ondansetron (ZOFRAN) syringe/vial injection 4 mg 4X/DAY PRN    traZODone (DESYREL) tablet 50 mg QHS PRN    sodium chloride (OCEAN) 0.65 % nasal spray 2 Spray PRN    oxyCODONE immediate-release (ROXICODONE) tablet 2.5 mg Q3HRS PRN    Or    oxyCODONE immediate-release (ROXICODONE) tablet 5 mg Q3HRS PRN    lactobacillus rhamnosus (CULTURELLE) capsule 1 Capsule DAILY    enoxaparin (Lovenox) inj 40 mg DAILY AT 1800    metoprolol SR (TOPROL XL) tablet 25 mg Q DAY       Orders Placed This Encounter   Procedures    Diet Order Diet: Regular     Standing Status:   Standing     Number of Occurrences:   1     Order Specific Question:   Diet:     Answer:   Regular [1]       Assessment:  Active Hospital Problems    Diagnosis     *Intra-abdominal abscess (HCC)     Diverticulitis     H/O abdominal abscess     CHEPE (acute kidney injury) (HCC)     Hypokalemia     Postoperative hypoxia     Class 3 severe obesity due to excess calories with serious comorbidity and body mass index (BMI) of 40.0 to 44.9 in adult (HCC)     Hyperglycemia     Diverticulitis of intestine with perforation and abscess     Hypertension        Medical Decision Making and Plan:  Debility status post diverticulitis and intra-abdominal abscess  12/13 IR drainage  12/18 Follow up CT with evidence of perforation and worsening abscess.Exploratory laparotomy with drainage of pelvic abscesses and 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  Chest x-ray - linear left mid-lung atelectasis  On Augmentin through 12/25  Repeat CBC 12/25 - 12.1  CT Abd Pelvis  12/25  Repeat CBC 12/26 - 12.3  Consult hospitalist just to see if they have any additional recommendations.  DC Tylenol 12/25  Blood cultures - NGTD  CBC 12/27    Thrombocytosis   Monitor  Improving     Hypocalcemia  Normal on admission to rehab    Prediabetes  Hemoglobin A1c 6.0    Screening for vitamin D deficiency  Vitamin D normal at 46    Full code  DVT prophylaxis Lovenox 40 mg subcu daily    Consult hospitalist.     Total time:  25 minutes.  I spent greater than 50% of the time for patient care and coordination on this date, including unit/floor time, and face-to-face time with the patient as per assessment and plan above.    Neris Barroso D.O.

## 2022-12-26 NOTE — ASSESSMENT & PLAN NOTE
Had intra-abd abscess -- s/p IR drainage on 12/14/22  Complicated by perf viscus --> s/p ex lap w/ colostomy creation on 12/18/22  Intra-op cx polymicrobial  S/P Zosyn   S/P Unasyn (1/1)

## 2022-12-26 NOTE — THERAPY
Physical Therapy   Daily Treatment     Patient Name: Maira Johnson  Age:  77 y.o., Sex:  female  Medical Record #: [id number]  Today's Date: 12/26/2022     Precautions  Precautions: (P) Fall Risk, Other (See Comments)  Comments: (P) Abdominal wound vac, new ostomy, hx of mastectomy limb alert RUE    Subjective    Pt received in bed, agreeable to PT session. Notes her goal is to get back home and take care of her animals.     Objective       12/26/22 1115   PT Charge Group   PT Therapeutic Activities (Units) 2   PT Total Time Spent   PT Individual Total Time Spent (Mins) 30   Precautions   Precautions Fall Risk;Other (See Comments)   Comments Abdominal wound vac, new ostomy, hx of mastectomy limb alert RUE   Sleep/Wake Cycle   Sleep & Rest Awake   Gait Functional Level of Assist    Gait Level Of Assist Standby Assist   Assistive Device Front Wheel Walker   Distance (Feet) 30   # of Times Distance was Traveled 2   Transfer Functional Level of Assist   Bed, Chair, Wheelchair Transfer Standby Assist   Bed Chair Wheelchair Transfer Description Increased time   Toilet Transfers Standby Assist   Toilet Transfer Description Increased time;Grab bar   Bed Mobility    Supine to Sit Supervised   Sit to Supine Supervised   Sit to Stand Standby Assist   Scooting Standby Assist   Interdisciplinary Plan of Care Collaboration   IDT Collaboration with  Nursing   Patient Position at End of Therapy In Bed;Call Light within Reach;Tray Table within Reach;Phone within Reach   Collaboration Comments tx of care to RN     Standing at sink to perform ADLs with hold support, distant SPV.    PT updated room board requesting that pt ambulate to/from bathroom with FWW and SBA by staff.    Assessment    Pt ambulated to/from bathroom with FWW and SBA/SPV. Decreased speed however no signs of instability.     Strengths: Able to follow instructions, Adequate strength, Effective communication skills, Independent prior level of function, Making steady  progress towards goals, Pleasant and cooperative, Supportive family, Willingly participates in therapeutic activities  Barriers: Decreased endurance, Fatigue, Generalized weakness, Impaired activity tolerance    Plan    Progress OOB time, may benefit from set therapy schedule and breaks depending on activity tolerance. Set goal c/ pt to eat all meals OOB.         Physical Therapy Problems (Active)       Problem: Mobility       Dates: Start: 12/24/22         Goal: STG-Within one week, patient will ambulate 150 ft with FWW and SBA.        Dates: Start: 12/24/22               Problem: Mobility Transfers       Dates: Start: 12/24/22         Goal: STG-Within one week, patient will transfer bed to chair with spv-SBA.        Dates: Start: 12/24/22            Goal: STG-Within one week, patient will move supine to sit with CGA using bed functions.        Dates: Start: 12/24/22               Problem: PT-Long Term Goals       Dates: Start: 12/24/22         Goal: LTG-By discharge, patient will ambulate 100+ ft indoors/outdoors with LRD and mod I.        Dates: Start: 12/24/22            Goal: LTG-By discharge, patient will transfer one surface to another using LRD and mod I.        Dates: Start: 12/24/22            Goal: LTG-By discharge, patient will transfer in/out of a car with SPV.        Dates: Start: 12/24/22            Goal: LTG-By discharge, patient will ambulate up/down ramp with LRD and mod I.       Dates: Start: 12/24/22

## 2022-12-26 NOTE — THERAPY
Physical Therapy   Daily Treatment     Patient Name: Maira Johnson  Age:  77 y.o., Sex:  female  Medical Record #: [id number]  Today's Date: 12/26/2022     Precautions  Precautions: (P) Fall Risk, Other (See Comments)  Comments: (P) Abdominal wound vac, new ostomy, hx of mastectomy limb alert RUE    Subjective    Pt reported fatigue, but was agreeable to walk to/from bathroom, and participate in gentle supine core exercise.     Objective       12/26/22 1231   PT Charge Group   PT Therapeutic Exercise (Units) 1   PT Neuromuscular Re-Education / Balance (Units) 1   PT Therapeutic Activities (Units) 2   PT Total Time Spent   PT Individual Total Time Spent (Mins) 60   Precautions   Precautions Fall Risk;Other (See Comments)   Comments Abdominal wound vac, new ostomy, hx of mastectomy limb alert RUE   Gait Functional Level of Assist    Gait Level Of Assist Standby Assist   Assistive Device Front Wheel Walker   Distance (Feet) 30   # of Times Distance was Traveled 2   Deviation Decreased Heel Strike;Decreased Toe Off;Bradykinetic;Increased Base Of Support   Transfer Functional Level of Assist   Bed, Chair, Wheelchair Transfer Standby Assist   Bed Chair Wheelchair Transfer Description Adaptive equipment;Verbal cueing  (SPT FWW)   Toilet Transfers Standby Assist   Toilet Transfer Description Adaptive equipment;Grab bar   Supine Lower Body Exercise   Hip Adduction  2 sets of 10;Bilateral   Gluteal Isometrics 2 sets of 10;Bilateral   Sitting Lower Body Exercises   Ankle Pumps 1 set of 15;Bilateral   Bed Mobility    Supine to Sit Supervised   Sit to Supine Supervised   Sit to Stand Supervised   Scooting Supervised   Rolling Supervised   Neuro-Muscular Treatments   Neuro-Muscular Treatments Weight Shift Left;Weight Shift Right;Verbal Cuing;Sequencing;Tactile Cuing;Postural Changes   Comments Seated EOB x 5 min SPV. Standing during hand washing SPV.   Interdisciplinary Plan of Care Collaboration   IDT Collaboration with   Occupational Therapist   Patient Position at End of Therapy In Bed;Call Light within Reach;Tray Table within Reach;Phone within Reach   Collaboration Comments CLOF, POC   Strengths & Barriers   Strengths Able to follow instructions;Adequate strength;Effective communication skills;Independent prior level of function;Making steady progress towards goals;Pleasant and cooperative;Supportive family;Willingly participates in therapeutic activities   Barriers Decreased endurance;Fatigue;Generalized weakness;Impaired activity tolerance         Assessment    Pt participated in in-room ambulation, followed by initiation of core HEP, including gentle glute activation, and ADD squeezes. Pt     Strengths: (P) Able to follow instructions, Adequate strength, Effective communication skills, Independent prior level of function, Making steady progress towards goals, Pleasant and cooperative, Supportive family, Willingly participates in therapeutic activities  Barriers: (P) Decreased endurance, Fatigue, Generalized weakness, Impaired activity tolerance    Plan    Progress OOB time, may benefit from set therapy schedule and breaks depending on activity tolerance. Set goal c/ pt to eat all meals OOB. Gait endurance with FWW.    Passport items to be completed:  Get in/out of bed safely, in/out of a vehicle, safely use mobility device, walk or wheel around home/community, navigate up and down stairs, show how to get up/down from the ground, ensure home is accessible, demonstrate HEP, complete caregiver training    Physical Therapy Problems (Active)       Problem: Mobility       Dates: Start: 12/24/22         Goal: STG-Within one week, patient will ambulate 150 ft with FWW and SBA.        Dates: Start: 12/24/22               Problem: Mobility Transfers       Dates: Start: 12/24/22         Goal: STG-Within one week, patient will transfer bed to chair with spv-SBA.        Dates: Start: 12/24/22            Goal: STG-Within one week, patient  will move supine to sit with CGA using bed functions.        Dates: Start: 12/24/22               Problem: PT-Long Term Goals       Dates: Start: 12/24/22         Goal: LTG-By discharge, patient will ambulate 100+ ft indoors/outdoors with LRD and mod I.        Dates: Start: 12/24/22            Goal: LTG-By discharge, patient will transfer one surface to another using LRD and mod I.        Dates: Start: 12/24/22            Goal: LTG-By discharge, patient will transfer in/out of a car with SPV.        Dates: Start: 12/24/22            Goal: LTG-By discharge, patient will ambulate up/down ramp with LRD and mod I.       Dates: Start: 12/24/22

## 2022-12-26 NOTE — THERAPY
Occupational Therapy  Daily Treatment     Patient Name: Maira Johnson  Age:  77 y.o., Sex:  female  Medical Record #: [id number]  Today's Date: 12/26/2022     Precautions  Precautions: Fall Risk, Other (See Comments)  Comments: Abdominal wound vac, new ostomy, hx of mastectomy limb alert RUE         Subjective    Pt in bed upon arrival, pleasant and agreeable to OT session. Pt asking if this therapist discussed with PT about difficulty completing toilet hygiene.     Objective       12/26/22 1331   OT Charge Group   OT Therapy Activity (Units) 1   OT Therapeutic Exercise (Units) 1   OT Total Time Spent   OT Individual Total Time Spent (Mins) 30   Precautions   Precautions Fall Risk;Other (See Comments)   Comments Abdominal wound vac, new ostomy, hx of mastectomy limb alert RUE   Supine Upper Body Exercises   Chest Fly 2 sets of 10;Bilateral;Other Resistance (See Comments)  (Pink theraband)   Bicep Curl 1 set of 10;Right;Left;Other Resistance (See Comments)  (Pink theraband)   Interdisciplinary Plan of Care Collaboration   Patient Position at End of Therapy In Bed;Call Light within Reach;Tray Table within Reach;Phone within Reach         Provided pt with edu on various toileting aid options that could be utilized including bidet or a portable bidet, tongs for toilet paper, or other toileting aids. Pt stated that she would prefer to utilize a portable bidet     Assessment    Need to provide pt with handout for portable bidet. Pt completed all exercise to the best of their ability, though pt was fatigued this date. Would benefit from cont work on overall endurance.    Strengths: Able to follow instructions, Alert and oriented, Effective communication skills, Good balance, Good carryover of learning, Good insight into deficits/needs, Independent prior level of function, Making steady progress towards goals, Motivated for self care and independence, Pleasant and cooperative, Supportive family, Willingly participates in  therapeutic activities  Barriers: Fatigue, Generalized weakness, Impaired activity tolerance, Limited mobility, Pain    Plan    ADLs with AE training, ostomy care/management, IADLs, overall endurance and strengthening    Occupational Therapy Goals (Active)       Problem: Bathing       Dates: Start: 12/24/22         Goal: STG-Within one week, patient will bathe with CGA using DME/AE PRN.       Dates: Start: 12/24/22               Problem: Dressing       Dates: Start: 12/24/22         Goal: STG-Within one week, patient will dress LB with Mod A using DME/AE PRN.       Dates: Start: 12/24/22               Problem: Functional Transfers       Dates: Start: 12/24/22         Goal: STG-Within one week, patient will transfer to toilet with SBA.       Dates: Start: 12/24/22               Problem: OT Long Term Goals       Dates: Start: 12/24/22         Goal: LTG-By discharge, patient will complete basic self care tasks with SPV-Mod I using DME/AE PRN.       Dates: Start: 12/24/22            Goal: LTG-By discharge, patient will perform bathroom transfers with SPV-Mod I using DME/AE PRN.       Dates: Start: 12/24/22            Goal: LTG-By discharge, patient will complete basic home management with SPV-Mod I using DME/AE PRN.       Dates: Start: 12/24/22               Problem: Toileting       Dates: Start: 12/24/22         Goal: STG-Within one week, patient will complete toileting tasks with Mod A using DME/AE PRN.       Dates: Start: 12/24/22

## 2022-12-26 NOTE — ASSESSMENT & PLAN NOTE
PCT normal  BCx x 2 NGTD  UA 0-2 WBC w/ negative bacteria  C Dif negative  CXR atx, continue IS  CT Abd/Pelvis negative for abscess  But will resume Unasyn for GI coverage given escalating WBC

## 2022-12-26 NOTE — CARE PLAN
The patient is Watcher - Medium risk of patient condition declining or worsening    Shift Goals  Clinical Goals: rest/ colostomy education  Patient Goals: safety/sleep    Progress made toward(s) clinical / shift goals:      Problem: Pain - Standard  Goal: Alleviation of pain or a reduction in pain to the patient’s comfort goal  Outcome: Progressing, pt resting comfortably, increased pain when getting oob for toileting, pt declines pain medications.      Problem: Discharge Barriers/Planning  Goal: Patient's continuum of care needs are met  Outcome: Progressing, pt has increased concerns hs regarding her condition, she is very motivated to become more independent and be d/c to home.      Problem: Respiratory  Goal: Patient will understand use and administration of respiratory medications to improve respiratory function  Outcome: Progressing, pt sob with activity.

## 2022-12-26 NOTE — CARE PLAN
The patient is Stable - Low risk of patient condition declining or worsening    Shift Goals  Clinical Goals: Pain management  Patient Goals: Pain control  Progress made toward(s) clinical / shift goals:    Problem: Pain - Standard  Goal: Alleviation of pain or a reduction in pain to the patient’s comfort goal  Outcome: Progressing     Patient able to verbalize pain level and verbalize an acceptable level of pain.

## 2022-12-26 NOTE — THERAPY
"Occupational Therapy  Daily Treatment     Patient Name: Maira Johnson  Age:  77 y.o., Sex:  female  Medical Record #: [id number]  Today's Date: 12/26/2022     Precautions  Precautions: (P) Fall Risk, Other (See Comments)  Comments: (P) Abdominal wound vac, new ostomy, hx of mastectomy limb alert RUE         Subjective    \"It feels like Im walking or standing on gravel, but these gold toe socks really help\" re: sensation in feet with socks/shoes on. Pt prefers to wear gold toed socks either in shoes or under tread socks.     Objective       12/26/22 0931   OT Charge Group   OT Self Care / ADL (Units) 4   OT Total Time Spent   OT Individual Total Time Spent (Mins) 60   Precautions   Precautions Fall Risk;Other (See Comments)   Comments Abdominal wound vac, new ostomy, hx of mastectomy limb alert RUE   Functional Level of Assist   Lower Body Dressing Minimal Assist  (pt able to doff pants wihtout A, utilizied reacher for donning pants and stnading at [facility]. Pt then utilized dressing stick to doff socks and sock-aid to don with Min A for completion of donning.)   Lower Body Dressing Description Reacher;Sock aid;Increased time;Set-up of equipment;Supervision for safety;Verbal cueing;Dressing stick   Toileting Moderate Assist  (SBA for clothing management and hygiene post voiding urine only, req total A from CNA for management of ostomy bag)   Bed, Chair, Wheelchair Transfer Standby Assist   Toilet Transfers Standby Assist   Toilet Transfer Description Grab bar   Bed Mobility    Supine to Sit Supervised  (increased time)   Sit to Supine Supervised  (increased time)   Interdisciplinary Plan of Care Collaboration   IDT Collaboration with  Certified Nursing Assistant   Patient Position at End of Therapy In Bed;Call Light within Reach;Tray Table within Reach;Phone within Reach   Collaboration Comments CNA to A with ostomy managment while taking sample.         Assessment    Pt very receptive to all edu on AE for LB dressing " this session with pt problem solving how to take off socks in bed with dressing stick, how to don socks while seated EOB using sock-aid, and doffing and donning pants while utilizing reacher. Pt agreeable to trial own socks under tread socks to decrease overall sensitivity in feet when completing standing or transfer. May benefit from trial of shoes for mobility.    Strengths: Able to follow instructions, Alert and oriented, Effective communication skills, Good balance, Good carryover of learning, Good insight into deficits/needs, Independent prior level of function, Making steady progress towards goals, Motivated for self care and independence, Pleasant and cooperative, Supportive family, Willingly participates in therapeutic activities  Barriers: Fatigue, Generalized weakness, Impaired activity tolerance, Limited mobility, Pain    Plan    ADLs with AE training, ostomy care/management, IADLs, overall endurance and strengthening      Occupational Therapy Goals (Active)       Problem: Bathing       Dates: Start: 12/24/22         Goal: STG-Within one week, patient will bathe with CGA using DME/AE PRN.       Dates: Start: 12/24/22               Problem: Dressing       Dates: Start: 12/24/22         Goal: STG-Within one week, patient will dress LB with Mod A using DME/AE PRN.       Dates: Start: 12/24/22               Problem: Functional Transfers       Dates: Start: 12/24/22         Goal: STG-Within one week, patient will transfer to toilet with SBA.       Dates: Start: 12/24/22               Problem: OT Long Term Goals       Dates: Start: 12/24/22         Goal: LTG-By discharge, patient will complete basic self care tasks with SPV-Mod I using DME/AE PRN.       Dates: Start: 12/24/22            Goal: LTG-By discharge, patient will perform bathroom transfers with SPV-Mod I using DME/AE PRN.       Dates: Start: 12/24/22            Goal: LTG-By discharge, patient will complete basic home management with SPV-Mod I using  DME/AE PRN.       Dates: Start: 12/24/22               Problem: Toileting       Dates: Start: 12/24/22         Goal: STG-Within one week, patient will complete toileting tasks with Mod A using DME/AE PRN.       Dates: Start: 12/24/22

## 2022-12-26 NOTE — CARE PLAN
Problem: Knowledge Deficit - Standard  Goal: Patient and family/care givers will demonstrate understanding of plan of care, disease process/condition, diagnostic tests and medications  Outcome: Progressing     Problem: Skin Integrity  Goal: Skin integrity is maintained or improved  Outcome: Progressing   The patient is Stable - Low risk of patient condition declining or worsening    Shift Goals  Clinical Goals: Safety  Patient Goals: Safety, rest

## 2022-12-27 LAB
ERYTHROCYTE [DISTWIDTH] IN BLOOD BY AUTOMATED COUNT: 45.2 FL (ref 35.9–50)
HCT VFR BLD AUTO: 36.2 % (ref 37–47)
HGB BLD-MCNC: 11.7 G/DL (ref 12–16)
MAGNESIUM SERPL-MCNC: 2.1 MG/DL (ref 1.5–2.5)
MCH RBC QN AUTO: 28.6 PG (ref 27–33)
MCHC RBC AUTO-ENTMCNC: 32.3 G/DL (ref 33.6–35)
MCV RBC AUTO: 88.5 FL (ref 81.4–97.8)
PHOSPHATE SERPL-MCNC: 2.9 MG/DL (ref 2.5–4.5)
PLATELET # BLD AUTO: 466 K/UL (ref 164–446)
PMV BLD AUTO: 9.6 FL (ref 9–12.9)
RBC # BLD AUTO: 4.09 M/UL (ref 4.2–5.4)
WBC # BLD AUTO: 13.8 K/UL (ref 4.8–10.8)

## 2022-12-27 PROCEDURE — 700111 HCHG RX REV CODE 636 W/ 250 OVERRIDE (IP): Performed by: PHYSICAL MEDICINE & REHABILITATION

## 2022-12-27 PROCEDURE — 99233 SBSQ HOSP IP/OBS HIGH 50: CPT | Performed by: HOSPITALIST

## 2022-12-27 PROCEDURE — 83735 ASSAY OF MAGNESIUM: CPT

## 2022-12-27 PROCEDURE — 700102 HCHG RX REV CODE 250 W/ 637 OVERRIDE(OP): Performed by: PHYSICAL MEDICINE & REHABILITATION

## 2022-12-27 PROCEDURE — 84100 ASSAY OF PHOSPHORUS: CPT

## 2022-12-27 PROCEDURE — 97535 SELF CARE MNGMENT TRAINING: CPT

## 2022-12-27 PROCEDURE — 97110 THERAPEUTIC EXERCISES: CPT

## 2022-12-27 PROCEDURE — 700111 HCHG RX REV CODE 636 W/ 250 OVERRIDE (IP): Performed by: HOSPITALIST

## 2022-12-27 PROCEDURE — 85027 COMPLETE CBC AUTOMATED: CPT

## 2022-12-27 PROCEDURE — A9270 NON-COVERED ITEM OR SERVICE: HCPCS | Performed by: PHYSICAL MEDICINE & REHABILITATION

## 2022-12-27 PROCEDURE — 97530 THERAPEUTIC ACTIVITIES: CPT

## 2022-12-27 PROCEDURE — 99233 SBSQ HOSP IP/OBS HIGH 50: CPT | Performed by: PHYSICAL MEDICINE & REHABILITATION

## 2022-12-27 PROCEDURE — 97116 GAIT TRAINING THERAPY: CPT

## 2022-12-27 PROCEDURE — 36415 COLL VENOUS BLD VENIPUNCTURE: CPT

## 2022-12-27 PROCEDURE — 700105 HCHG RX REV CODE 258: Performed by: HOSPITALIST

## 2022-12-27 PROCEDURE — 770010 HCHG ROOM/CARE - REHAB SEMI PRIVAT*

## 2022-12-27 RX ORDER — METOPROLOL SUCCINATE 25 MG/1
37.5 TABLET, EXTENDED RELEASE ORAL
Status: DISCONTINUED | OUTPATIENT
Start: 2022-12-28 | End: 2022-12-31

## 2022-12-27 RX ADMIN — SENNOSIDES AND DOCUSATE SODIUM 2 TABLET: 50; 8.6 TABLET ORAL at 20:37

## 2022-12-27 RX ADMIN — Medication 1 CAPSULE: at 08:15

## 2022-12-27 RX ADMIN — ENOXAPARIN SODIUM 40 MG: 40 INJECTION SUBCUTANEOUS at 16:55

## 2022-12-27 RX ADMIN — SENNOSIDES AND DOCUSATE SODIUM 2 TABLET: 50; 8.6 TABLET ORAL at 08:15

## 2022-12-27 RX ADMIN — METOPROLOL SUCCINATE 25 MG: 25 TABLET, EXTENDED RELEASE ORAL at 05:46

## 2022-12-27 RX ADMIN — SODIUM CHLORIDE 1.5 G: 900 INJECTION INTRAVENOUS at 16:59

## 2022-12-27 RX ADMIN — OMEPRAZOLE 20 MG: 20 CAPSULE, DELAYED RELEASE ORAL at 08:15

## 2022-12-27 RX ADMIN — SODIUM CHLORIDE 1.5 G: 900 INJECTION INTRAVENOUS at 23:37

## 2022-12-27 RX ADMIN — SODIUM CHLORIDE 1.5 G: 900 INJECTION INTRAVENOUS at 12:00

## 2022-12-27 SDOH — ECONOMIC STABILITY: TRANSPORTATION INSECURITY
IN THE PAST 12 MONTHS, HAS LACK OF RELIABLE TRANSPORTATION KEPT YOU FROM MEDICAL APPOINTMENTS, MEETINGS, WORK OR FROM GETTING THINGS NEEDED FOR DAILY LIVING?: NO

## 2022-12-27 SDOH — ECONOMIC STABILITY: TRANSPORTATION INSECURITY
IN THE PAST 12 MONTHS, HAS THE LACK OF TRANSPORTATION KEPT YOU FROM MEDICAL APPOINTMENTS OR FROM GETTING MEDICATIONS?: NO

## 2022-12-27 ASSESSMENT — ENCOUNTER SYMPTOMS
COUGH: 0
PALPITATIONS: 0
NAUSEA: 0
EYES NEGATIVE: 1
SHORTNESS OF BREATH: 0
ABDOMINAL PAIN: 0
FEVER: 0
POLYDIPSIA: 0
VOMITING: 0
BRUISES/BLEEDS EASILY: 0
CHILLS: 0

## 2022-12-27 ASSESSMENT — PAIN DESCRIPTION - PAIN TYPE: TYPE: ACUTE PAIN

## 2022-12-27 ASSESSMENT — GAIT ASSESSMENTS
GAIT LEVEL OF ASSIST: STANDBY ASSIST
ASSISTIVE DEVICE: FRONT WHEEL WALKER
DEVIATION: DECREASED HEEL STRIKE;DECREASED TOE OFF;INCREASED BASE OF SUPPORT
GAIT LEVEL OF ASSIST: STANDBY ASSIST
DISTANCE (FEET): 200
ASSISTIVE DEVICE: FRONT WHEEL WALKER

## 2022-12-27 ASSESSMENT — PATIENT HEALTH QUESTIONNAIRE - PHQ9
1. LITTLE INTEREST OR PLEASURE IN DOING THINGS: NOT AT ALL
2. FEELING DOWN, DEPRESSED, IRRITABLE, OR HOPELESS: NOT AT ALL
2. FEELING DOWN, DEPRESSED, IRRITABLE, OR HOPELESS: NOT AT ALL
SUM OF ALL RESPONSES TO PHQ9 QUESTIONS 1 AND 2: 0
1. LITTLE INTEREST OR PLEASURE IN DOING THINGS: NOT AT ALL
SUM OF ALL RESPONSES TO PHQ9 QUESTIONS 1 AND 2: 0

## 2022-12-27 ASSESSMENT — ACTIVITIES OF DAILY LIVING (ADL)
BED_CHAIR_WHEELCHAIR_TRANSFER_DESCRIPTION: ADAPTIVE EQUIPMENT;INCREASED TIME;INITIAL PREPARATION FOR TASK;SUPERVISION FOR SAFETY
TOILET_TRANSFER_DESCRIPTION: ADAPTIVE EQUIPMENT;GRAB BAR;SUPERVISION FOR SAFETY
BED_CHAIR_WHEELCHAIR_TRANSFER_DESCRIPTION: ADAPTIVE EQUIPMENT;INCREASED TIME;SET-UP OF EQUIPMENT
BED_CHAIR_WHEELCHAIR_TRANSFER_DESCRIPTION: ADAPTIVE EQUIPMENT;INCREASED TIME;SET-UP OF EQUIPMENT

## 2022-12-27 NOTE — THERAPY
Physical Therapy   Daily Treatment     Patient Name: Maira Johnson  Age:  77 y.o., Sex:  female  Medical Record #: [id number]  Today's Date: 12/27/2022     Precautions  Precautions: (P) Fall Risk  Comments: (P) Abdominal wound vac, new ostomy, hx of mastectomy limb alert RUE    Subjective    Patient seated in w/c and agreeable to therapy.     Objective       12/27/22 0901   PT Charge Group   PT Gait Training (Units) 1   PT Therapeutic Exercise (Units) 1   PT Therapeutic Activities (Units) 2   PT Total Time Spent   PT Individual Total Time Spent (Mins) 60   Precautions   Precautions Fall Risk   Comments Abdominal wound vac, new ostomy, hx of mastectomy limb alert RUE   Pain 0 - 10 Group   Location Flank   Location Orientation Right   Gait Functional Level of Assist    Gait Level Of Assist Standby Assist   Assistive Device Front Wheel Walker   Distance (Feet) 200   # of Times Distance was Traveled 1   Deviation Decreased Heel Strike;Decreased Toe Off;Increased Base Of Support   Transfer Functional Level of Assist   Bed, Chair, Wheelchair Transfer Standby Assist   Bed Chair Wheelchair Transfer Description Adaptive equipment;Increased time;Initial preparation for task;Supervision for safety  (stand step transfer with FWW)   Bed Mobility    Sit to Supine Supervised   Sit to Stand Supervised   Scooting Supervised   Rolling Supervised   Interdisciplinary Plan of Care Collaboration   IDT Collaboration with  Occupational Therapist   Patient Position at End of Therapy In Bed;Call Light within Reach;Tray Table within Reach;Phone within Reach   Collaboration Comments CLOF     BLE Motomed for 15 minutes (4 minutes passive, 0.26 miles) level 5, in forward and reverse directions per patient preference.    Moist heat pack applied to R flank for 15 minutes for pain management.    Assessment    Patient tolerated session well, focus of session on activity tolerance and self-pacing. Requiring extra time to complete activities however  good insight into ability. Pleased with ambulation distance today.    Strengths: Able to follow instructions, Adequate strength, Effective communication skills, Independent prior level of function, Making steady progress towards goals, Pleasant and cooperative, Supportive family, Willingly participates in therapeutic activities  Barriers: Decreased endurance, Fatigue, Generalized weakness, Impaired activity tolerance    Plan    Progress OOB time, may benefit from set therapy schedule and breaks depending on activity tolerance. Set goal c/ pt to eat all meals OOB. Gait endurance with FWW. Standing tolerance, general strengthening.     Passport items to be completed:  Get in/out of bed safely, in/out of a vehicle, safely use mobility device, walk or wheel around home/community, navigate up and down stairs, show how to get up/down from the ground, ensure home is accessible, demonstrate HEP, complete caregiver training    Physical Therapy Problems (Active)       Problem: Mobility       Dates: Start: 12/24/22         Goal: STG-Within one week, patient will ambulate 150 ft with FWW and SBA.        Dates: Start: 12/24/22               Problem: Mobility Transfers       Dates: Start: 12/24/22         Goal: STG-Within one week, patient will transfer bed to chair with spv-SBA.        Dates: Start: 12/24/22            Goal: STG-Within one week, patient will move supine to sit with CGA using bed functions.        Dates: Start: 12/24/22               Problem: PT-Long Term Goals       Dates: Start: 12/24/22         Goal: LTG-By discharge, patient will ambulate 100+ ft indoors/outdoors with LRD and mod I.        Dates: Start: 12/24/22            Goal: LTG-By discharge, patient will transfer one surface to another using LRD and mod I.        Dates: Start: 12/24/22            Goal: LTG-By discharge, patient will transfer in/out of a car with SPV.        Dates: Start: 12/24/22            Goal: LTG-By discharge, patient will ambulate  up/down ramp with LRD and mod I.       Dates: Start: 12/24/22

## 2022-12-27 NOTE — THERAPY
Occupational Therapy  Daily Treatment     Patient Name: Maira Johnson  Age:  77 y.o., Sex:  female  Medical Record #: [id number]  Today's Date: 12/27/2022     Precautions  Precautions: (P) Fall Risk  Comments: (P) Abdominal wound vac, new ostomy, hx of mastectomy limb alert RUE    Subjective    Patient expressed having emotional trauma re: ostomy bag, undersigned therapist notified primary OT and recommended neuropsych consult.      Objective     12/27/22 1231   OT Charge Group   OT Self Care / ADL (Units) 1   OT Therapy Activity (Units) 1   OT Total Time Spent   OT Individual Total Time Spent (Mins) 30   Precautions   Precautions Fall Risk   Comments Abdominal wound vac, new ostomy, hx of mastectomy limb alert RUE   Vitals   O2 Delivery Device None - Room Air   Cognition    Level of Consciousness Alert   Sleep/Wake Cycle   Sleep & Rest Awake   Functional Level of Assist   Tub / Shower Transfers Standby Assist  (wc <> tub/shower w/ tub bench)   Interdisciplinary Plan of Care Collaboration   Patient Position at End of Therapy Seated;Call Light within Reach     SBA for functional mobility therapy gym > room w/ FWW     Discussed plan to eventually have patient manage ostomy bag, however due to patient disclosing emotional trauma this session, deferred until later date.     Assessment    Patient tolerated OT session well. Demo's good progress w/ functional mobility @ FWW level and SBA for dry tub/shower txfrs. Anticipate patient will benefit from neuropsych services to address trauma re: ostomy adjustment.   Strengths: Able to follow instructions, Alert and oriented, Effective communication skills, Good balance, Good carryover of learning, Good insight into deficits/needs, Independent prior level of function, Making steady progress towards goals, Motivated for self care and independence, Pleasant and cooperative, Supportive family, Willingly participates in therapeutic activities  Barriers: Fatigue, Generalized  weakness, Impaired activity tolerance, Limited mobility, Pain    Plan    ADLs with AE training, ostomy care/management, IADLs, overall endurance and strengthening    Occupational Therapy Goals (Active)       Problem: Bathing       Dates: Start: 12/24/22         Goal: STG-Within one week, patient will bathe with CGA using DME/AE PRN.       Dates: Start: 12/24/22               Problem: Dressing       Dates: Start: 12/24/22         Goal: STG-Within one week, patient will dress LB with Mod A using DME/AE PRN.       Dates: Start: 12/24/22               Problem: Functional Transfers       Dates: Start: 12/24/22         Goal: STG-Within one week, patient will transfer to toilet with SBA.       Dates: Start: 12/24/22               Problem: OT Long Term Goals       Dates: Start: 12/24/22         Goal: LTG-By discharge, patient will complete basic self care tasks with SPV-Mod I using DME/AE PRN.       Dates: Start: 12/24/22            Goal: LTG-By discharge, patient will perform bathroom transfers with SPV-Mod I using DME/AE PRN.       Dates: Start: 12/24/22            Goal: LTG-By discharge, patient will complete basic home management with SPV-Mod I using DME/AE PRN.       Dates: Start: 12/24/22               Problem: Toileting       Dates: Start: 12/24/22         Goal: STG-Within one week, patient will complete toileting tasks with Mod A using DME/AE PRN.       Dates: Start: 12/24/22

## 2022-12-27 NOTE — DISCHARGE PLANNING
CASE MANAGEMENT INITIAL ASSESSMENT    Admit Date:  12/23/2022     I met with patient to discuss role of case management / discharge planning / team conference.   Patient is a  77 y.o. female transferred from [facility].    Diagnosis: Diverticulitis [K57.92]  Intra-abdominal abscess (HCC) [K65.1]  H/O abdominal abscess [Z87.898]    Co-morbidities:   Patient Active Problem List    Diagnosis Date Noted    Leukocytosis 12/26/2022    Thrombocytosis 12/26/2022    Intra-abdominal abscess (HCC) 12/23/2022    Diverticulitis 12/23/2022    H/O abdominal abscess 12/23/2022    CHEPE (acute kidney injury) (Formerly [facility]) 12/20/2022    Hypokalemia 12/20/2022    Postoperative hypoxia 12/20/2022    Class 3 severe obesity due to excess calories with serious comorbidity and body mass index (BMI) of 40.0 to 44.9 in adult (Formerly [facility]) 12/19/2022    Hyperglycemia 12/19/2022    Diverticulitis of intestine with perforation and abscess 12/15/2022    Hypertension 12/13/2022     Prior Living Situation:  Housing / Facility: Mobile Home  Lives with - Patient's Self Care Capacity: Alone and Able to Care For Self    Prior Level of Function:  Medication Management: Independent  Finances: Independent  Home Management: Independent  Shopping: Independent  Prior Level Of Mobility: Independent Without Device in Community, Independent With Steps in Community, Independent Without Device in Home, Independent With Steps in Home  Driving / Transportation: Driving Independent    Support Systems:  Primary : Taz Johnson-son: [phone]    Advance Directives: Yes  Power of  (Name & Phone): TBD    Previous Services Utilized:   Equipment Owned: Other (Comments) (3WW)  Prior Services: Home-Independent    Other Information:  Occupation (Pre-Hospital Vocational): Retired Due To Age     Primary Payor Source: Medicare A, Medicare B  Secondary Payor Source: Other (Comments) ([employer])  Primary Care Practitioner : Kortney Rasmussen NP  Other MDs: Dr. Asher    Patient /  Family Goal:  Patient / Family Goal: 1. Gain strength 2. Gain better mobility    Plan:  1. Continue to follow patient through hospitalization and provide discharge planning in collaboration with patient, family, physicians and ancillary services.     2. Utilize community resources to ensure a safe discharge.

## 2022-12-27 NOTE — PROGRESS NOTES
Shift report received, patient alert and oriented x 4, awake in no acute distress, Denied any pain  At this time.

## 2022-12-27 NOTE — THERAPY
"Occupational Therapy  Daily Treatment     Patient Name: Maria Johnson  Age:  77 y.o., Sex:  female  Medical Record #: [id number]  Today's Date: 12/27/2022     Precautions  Precautions: Fall Risk  Comments: Abdominal wound vac, new ostomy, hx of mastectomy limb alert RUE         Subjective    \"I'm just so scared to move, my side has been spasming.\" Pt educated on mobility and redirected with dwindling reports of pain by end of session.     Objective       12/27/22 0701   OT Charge Group   OT Self Care / ADL (Units) 3   OT Therapy Activity (Units) 1   OT Total Time Spent   OT Individual Total Time Spent (Mins) 60   Pain 0 - 10 Group   Location Flank   Location Orientation Right   Description Cramping   Comfort Goal Comfort with Movement;Perform Activity   Therapist Pain Assessment During Activity   Cognition    Level of Consciousness Alert   Functional Level of Assist   Grooming Supervision;Standing   Grooming Description Increased time;Supervision for safety;Standing at sink  (standing for oral care, hand washing.)   Lower Body Dressing Standby Assist   Lower Body Dressing Description Assistive devices;Reacher;Sock aid;Increased time;Initial preparation for task;Set-up of equipment;Supervision for safety;Verbal cueing   Toileting Moderate Assist   Toileting Description Assist for standing balance;Grab bar;Assist for hygiene;Set-up of equipment;Supervision for safety;Verbal cueing;Increased time;Initial preparation for task   Bed, Chair, Wheelchair Transfer Standby Assist   Bed Chair Wheelchair Transfer Description Adaptive equipment;Increased time;Set-up of equipment   Toilet Transfers Standby Assist   Toilet Transfer Description Adaptive equipment;Grab bar;Supervision for safety   Interdisciplinary Plan of Care Collaboration   IDT Collaboration with  Physical Therapist;Nursing   Patient Position at End of Therapy Seated;Call Light within Reach;Tray Table within Reach   Collaboration Comments Pt cleared of C-Diff, no " longer on Iso     Pt completed all in room mobility at SBA-SPV level without LOB using FWW.   Educated on benefits of participation in tx, techniques for pain management, and body mechanics for bed mobility with log rolling.    Assessment    Pt tolerated session fairly progressing to well by end of session. She was hesitant to exit the bed, reporting emotional instability with low white cell count and isolation. [place] through session, isolation was lifted, with pt's mood improving substantially. She continues to make steady progress towards goals with self-care using AE, demoing good carryover.  Strengths: Able to follow instructions, Alert and oriented, Effective communication skills, Good balance, Good carryover of learning, Good insight into deficits/needs, Independent prior level of function, Making steady progress towards goals, Motivated for self care and independence, Pleasant and cooperative, Supportive family, Willingly participates in therapeutic activities  Barriers: Fatigue, Generalized weakness, Impaired activity tolerance, Limited mobility, Pain    Plan    ADLs with AE training, ostomy care/management, IADLs, overall endurance and strengthening    Occupational Therapy Goals (Active)       Problem: Bathing       Dates: Start: 12/24/22         Goal: STG-Within one week, patient will bathe with CGA using DME/AE PRN.       Dates: Start: 12/24/22               Problem: Dressing       Dates: Start: 12/24/22         Goal: STG-Within one week, patient will dress LB with Mod A using DME/AE PRN.       Dates: Start: 12/24/22               Problem: Functional Transfers       Dates: Start: 12/24/22         Goal: STG-Within one week, patient will transfer to toilet with SBA.       Dates: Start: 12/24/22               Problem: OT Long Term Goals       Dates: Start: 12/24/22         Goal: LTG-By discharge, patient will complete basic self care tasks with SPV-Mod I using DME/AE PRN.       Dates: Start: 12/24/22             Goal: LTG-By discharge, patient will perform bathroom transfers with SPV-Mod I using DME/AE PRN.       Dates: Start: 12/24/22            Goal: LTG-By discharge, patient will complete basic home management with SPV-Mod I using DME/AE PRN.       Dates: Start: 12/24/22               Problem: Toileting       Dates: Start: 12/24/22         Goal: STG-Within one week, patient will complete toileting tasks with Mod A using DME/AE PRN.       Dates: Start: 12/24/22

## 2022-12-27 NOTE — CARE PLAN
Problem: Pain - Standard  Goal: Alleviation of pain or a reduction in pain to the patient’s comfort goal  Outcome: Progressing  Note: Assessed for pain and discomfort, pain under control.      Problem: Skin Integrity  Goal: Skin integrity is maintained or improved  Outcome: Progressing  Note: Abdominal incision line with wound vac at 125 mm/hg suction. Cont monitored.     Problem: Bowel Elimination  Goal: Patient will participate in bowel management program  Outcome: Progressing  Note: Pt with colostomy draining yellow colored soft stool.     Problem: Mobility  Goal: Patient's capacity to carry out activities will improve  Outcome: Progressing  Note: Oob to bathroom using FWW with stand by assist .   The patient is Stable - Low risk of patient condition declining or worsening    Shift Goals  Clinical Goals: Pain management  Patient Goals: safety/sleep    Progress made toward(s) clinical / shift goals:  Pt pain level under control.

## 2022-12-27 NOTE — PROGRESS NOTES
"Rehab Progress Note     Encounter Date: 12/27/2022    CC: s/p intra abdominal abscess     Interval Events (Subjective)  Vitals reviewed: Hypertensive, SBP up to 144.  Otherwise within normal limits.  Labs reviewed: 12/27 WBC up to 13.8, remains afebrile.  Mild anemia hemoglobin 11.7  Patient seen and examined at bedside.  Patient reports she feels well.  Patient reports she feels some anxiety about her elevated WBCs, discussed plan to start IV antibiotics.  Otherwise patient reports she feels well.  Denies fevers, fatigue, abdominal pain.  Reports occasional increased gas from her ostomy site but otherwise adequate output.  No reported events overnight    Objective:  Physical Exam:  Vitals: BP (!) 144/75   Pulse 94   Temp 36.9 °C (98.4 °F) (Oral)   Resp 18   Ht 1.6 m (5' 3\")   Wt 108 kg (238 lb 1.6 oz)   SpO2 92%   Gen: NAD, laying comfortably in bed  Head:  NC/AT  Eyes/ Nose/ Mouth: PERRLA, moist mucous membranes  Cardio: RRR, good distal perfusion, warm extremities  Pulm: normal respiratory effort, no cyanosis   Abd: Soft NTND, ostomy with adequate output.  No erythema at abdominal incision  Ext: No peripheral edema. No calf tenderness. No clubbing.    Mental status:  A&Ox4 (person, place, date, situation) answers questions appropriately follows commands  Speech: fluent, no aphasia or dysarthria        Recent Results (from the past 72 hour(s))   CBC WITH DIFFERENTIAL    Collection Time: 12/25/22  5:59 AM   Result Value Ref Range    WBC 12.1 (H) 4.8 - 10.8 K/uL    RBC 4.55 4.20 - 5.40 M/uL    Hemoglobin 13.0 12.0 - 16.0 g/dL    Hematocrit 40.4 37.0 - 47.0 %    MCV 88.8 81.4 - 97.8 fL    MCH 28.6 27.0 - 33.0 pg    MCHC 32.2 (L) 33.6 - 35.0 g/dL    RDW 43.9 35.9 - 50.0 fL    Platelet Count 483 (H) 164 - 446 K/uL    MPV 9.6 9.0 - 12.9 fL    Neutrophils-Polys 77.30 (H) 44.00 - 72.00 %    Lymphocytes 14.30 (L) 22.00 - 41.00 %    Monocytes 3.30 0.00 - 13.40 %    Eosinophils 1.70 0.00 - 6.90 %    Basophils 1.70 0.00 " - 1.80 %    Nucleated RBC 0.00 /100 WBC    Neutrophils (Absolute) 9.35 (H) 2.00 - 7.15 K/uL    Lymphs (Absolute) 1.73 1.00 - 4.80 K/uL    Monos (Absolute) 0.40 0.00 - 0.85 K/uL    Eos (Absolute) 0.21 0.00 - 0.51 K/uL    Baso (Absolute) 0.21 (H) 0.00 - 0.12 K/uL    NRBC (Absolute) 0.00 K/uL   DIFFERENTIAL MANUAL    Collection Time: 12/25/22  5:59 AM   Result Value Ref Range    Myelocytes 1.70 %    Manual Diff Status PERFORMED    PERIPHERAL SMEAR REVIEW    Collection Time: 12/25/22  5:59 AM   Result Value Ref Range    Peripheral Smear Review see below    PLATELET ESTIMATE    Collection Time: 12/25/22  5:59 AM   Result Value Ref Range    Plt Estimation Increased    MORPHOLOGY    Collection Time: 12/25/22  5:59 AM   Result Value Ref Range    RBC Morphology Normal    BLOOD CULTURE    Collection Time: 12/25/22 11:50 AM    Specimen: Peripheral; Blood   Result Value Ref Range    Significant Indicator NEG     Source BLD     Site PERIPHERAL     Culture Result       No Growth  Note: Blood cultures are incubated for 5 days and  are monitored continuously.Positive blood cultures  are called to the RN and reported as soon as  they are identified.     PROCALCITONIN    Collection Time: 12/25/22 11:50 AM   Result Value Ref Range    Procalcitonin 0.17 <0.25 ng/mL   BLOOD CULTURE    Collection Time: 12/25/22 12:01 PM    Specimen: Peripheral; Blood   Result Value Ref Range    Significant Indicator NEG     Source BLD     Site PERIPHERAL     Culture Result       No Growth  Note: Blood cultures are incubated for 5 days and  are monitored continuously.Positive blood cultures  are called to the RN and reported as soon as  they are identified.     CBC WITH DIFFERENTIAL    Collection Time: 12/26/22  5:56 AM   Result Value Ref Range    WBC 12.3 (H) 4.8 - 10.8 K/uL    RBC 4.29 4.20 - 5.40 M/uL    Hemoglobin 12.2 12.0 - 16.0 g/dL    Hematocrit 38.2 37.0 - 47.0 %    MCV 89.0 81.4 - 97.8 fL    MCH 28.4 27.0 - 33.0 pg    MCHC 31.9 (L) 33.6 - 35.0  g/dL    RDW 45.0 35.9 - 50.0 fL    Platelet Count 459 (H) 164 - 446 K/uL    MPV 9.6 9.0 - 12.9 fL    Neutrophils-Polys 77.10 (H) 44.00 - 72.00 %    Lymphocytes 9.50 (L) 22.00 - 41.00 %    Monocytes 6.00 0.00 - 13.40 %    Eosinophils 2.10 0.00 - 6.90 %    Basophils 0.70 0.00 - 1.80 %    Immature Granulocytes 4.60 (H) 0.00 - 0.90 %    Nucleated RBC 0.00 /100 WBC    Neutrophils (Absolute) 9.48 (H) 2.00 - 7.15 K/uL    Lymphs (Absolute) 1.17 1.00 - 4.80 K/uL    Monos (Absolute) 0.74 0.00 - 0.85 K/uL    Eos (Absolute) 0.26 0.00 - 0.51 K/uL    Baso (Absolute) 0.08 0.00 - 0.12 K/uL    Immature Granulocytes (abs) 0.56 (H) 0.00 - 0.11 K/uL    NRBC (Absolute) 0.00 K/uL   Comp Metabolic Panel    Collection Time: 12/26/22  5:56 AM   Result Value Ref Range    Sodium 137 135 - 145 mmol/L    Potassium 3.8 3.6 - 5.5 mmol/L    Chloride 105 96 - 112 mmol/L    Co2 22 20 - 33 mmol/L    Anion Gap 10.0 7.0 - 16.0    Glucose 100 (H) 65 - 99 mg/dL    Bun 13 8 - 22 mg/dL    Creatinine 0.80 0.50 - 1.40 mg/dL    Calcium 8.8 8.5 - 10.5 mg/dL    AST(SGOT) 17 12 - 45 U/L    ALT(SGPT) 21 2 - 50 U/L    Alkaline Phosphatase 88 30 - 99 U/L    Total Bilirubin 0.4 0.1 - 1.5 mg/dL    Albumin 2.7 (L) 3.2 - 4.9 g/dL    Total Protein 5.8 (L) 6.0 - 8.2 g/dL    Globulin 3.1 1.9 - 3.5 g/dL    A-G Ratio 0.9 g/dL   PROCALCITONIN    Collection Time: 12/26/22  5:56 AM   Result Value Ref Range    Procalcitonin 0.21 <0.25 ng/mL   CORRECTED CALCIUM    Collection Time: 12/26/22  5:56 AM   Result Value Ref Range    Correct Calcium 9.8 8.5 - 10.5 mg/dL   ESTIMATED GFR    Collection Time: 12/26/22  5:56 AM   Result Value Ref Range    GFR (CKD-EPI) 75 >60 mL/min/1.73 m 2   Cdiff By PCR Rflx Toxin    Collection Time: 12/26/22 10:15 AM   Result Value Ref Range    C Diff by PCR See Toxin Negative    [id number] Presumptive Negative Negative   C Diff Toxin    Collection Time: 12/26/22 10:15 AM   Result Value Ref Range    C.Diff Toxin A&B Negative    URINALYSIS    Collection  Time: 12/26/22 11:45 AM    Specimen: Urine, Clean Catch   Result Value Ref Range    Color Yellow     Character Clear     Specific Gravity 1.024 <1.035    Ph 7.0 5.0 - 8.0    Glucose Negative Negative mg/dL    Ketones Negative Negative mg/dL    Protein Negative Negative mg/dL    Bilirubin Negative Negative    Urobilinogen, Urine 0.2 Negative    Nitrite Negative Negative    Leukocyte Esterase Negative Negative    Occult Blood Negative Negative    Micro Urine Req see below    CBC WITHOUT DIFFERENTIAL    Collection Time: 12/27/22  5:40 AM   Result Value Ref Range    WBC 13.8 (H) 4.8 - 10.8 K/uL    RBC 4.09 (L) 4.20 - 5.40 M/uL    Hemoglobin 11.7 (L) 12.0 - 16.0 g/dL    Hematocrit 36.2 (L) 37.0 - 47.0 %    MCV 88.5 81.4 - 97.8 fL    MCH 28.6 27.0 - 33.0 pg    MCHC 32.3 (L) 33.6 - 35.0 g/dL    RDW 45.2 35.9 - 50.0 fL    Platelet Count 466 (H) 164 - 446 K/uL    MPV 9.6 9.0 - 12.9 fL   MAGNESIUM    Collection Time: 12/27/22  5:40 AM   Result Value Ref Range    Magnesium 2.1 1.5 - 2.5 mg/dL   PHOSPHORUS    Collection Time: 12/27/22  5:40 AM   Result Value Ref Range    Phosphorus 2.9 2.5 - 4.5 mg/dL       Current Facility-Administered Medications   Medication Frequency    ampicillin/sulbactam (UNASYN) 1.5 g in  mL IVPB Q6HRS    Pharmacy Consult Request PHARMACY TO DOSE    Respiratory Therapy Consult Continuous RT    Pharmacy Consult Request ...Pain Management Review 1 Each PHARMACY TO DOSE    senna-docusate (PERICOLACE or SENOKOT S) 8.6-50 MG per tablet 2 Tablet BID    And    polyethylene glycol/lytes (MIRALAX) PACKET 1 Packet QDAY PRN    And    magnesium hydroxide (MILK OF MAGNESIA) suspension 30 mL QDAY PRN    And    bisacodyl (DULCOLAX) suppository 10 mg QDAY PRN    omeprazole (PRILOSEC) capsule 20 mg DAILY    mag hydrox-al hydrox-simeth (MAALOX PLUS ES or MYLANTA DS) suspension 20 mL Q2HRS PRN    ondansetron (ZOFRAN ODT) dispertab 4 mg 4X/DAY PRN    Or    ondansetron (ZOFRAN) syringe/vial injection 4 mg 4X/DAY PRN     traZODone (DESYREL) tablet 50 mg QHS PRN    sodium chloride (OCEAN) 0.65 % nasal spray 2 Spray PRN    oxyCODONE immediate-release (ROXICODONE) tablet 2.5 mg Q3HRS PRN    Or    oxyCODONE immediate-release (ROXICODONE) tablet 5 mg Q3HRS PRN    lactobacillus rhamnosus (CULTURELLE) capsule 1 Capsule DAILY    enoxaparin (Lovenox) inj 40 mg DAILY AT 1800    metoprolol SR (TOPROL XL) tablet 25 mg Q DAY       Orders Placed This Encounter   Procedures    Diet Order Diet: Regular     Standing Status:   Standing     Number of Occurrences:   1     Order Specific Question:   Diet:     Answer:   Regular [1]       Assessment:  Active Hospital Problems    Diagnosis     *Intra-abdominal abscess (HCC)     Leukocytosis     Thrombocytosis     Diverticulitis     H/O abdominal abscess     CHEPE (acute kidney injury) (HCC)     Hypokalemia     Postoperative hypoxia     Class 3 severe obesity due to excess calories with serious comorbidity and body mass index (BMI) of 40.0 to 44.9 in adult (HCC)     Hyperglycemia     Diverticulitis of intestine with perforation and abscess     Hypertension        Medical Decision Making and Plan:  Debility status post diverticulitis and intra-abdominal abscess  - CT abdomen at outside facility showed multiple intra-abdominal abscesses and diverticulitis  - Abscesses drained by IR on 12/13  - 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  - Started on Unasyn for increased WBCs after completing Augmentin.  - Stopped lactobacillus, patient has increased gas at ostomy site  - Wound VAC in place, wound care to assist with wound VAC changes and ostomy education  - Initiate comprehensive IRF level therapy with PT/OT/SLP  - Follow-up with general surgery      Leukocytosis  - Repeat CT abdomen/pelvis negative for abscess  - WBC is  continuing to trend up, hospitalist consulted  - WBC up to 13.8 on 12/27, to resumed Unasyn , plan to stop date 1/1     Hypertension  - Home dose metoprolol     Hypoxia  - Postoperatively patient has required up to 3 L O2  -Respiratory therapy consulted,  - Has been weaned to room air     Acute blood loss anemia  - Hemoglobin stable around 11-12     Hypocalcemia  -Resolved     Pain  - Scheduled Tylenol oxycodone     Bowel  - Monitor ostomy output  - PRN stool softeners     Skin - Patient at risk for skin breakdown due to debility in areas including sacrum, achilles, elbows and head in addition to other sites. Nursing to assess skin daily.  - Wound care to follow along changes     GI Ppx - Patient on Prilosec for GERD prophylaxis. Patient on Senna-docusate for constipation prophylaxis.      DVT Ppx - Patient Lovenox on transfer.    Total time:  35 minutes.  I spent greater than 50% of the time for patient care and coordination on this date, including unit/floor time, and face-to-face time with the patient as per assessment and plan above including , discussing antibiotics with hospitalist.    Crystal Jones D.O.

## 2022-12-27 NOTE — PROGRESS NOTES
Hospital Medicine Daily Progress Note      Chief Complaint  Leukocytosis    Interval Problem Update  Pt remains afebrile but c/o generalized malaise.  Lab, microbiology, and imaging results reviewed and discussed.    Review of Systems  Review of Systems   Constitutional:  Positive for malaise/fatigue. Negative for chills and fever.   HENT: Negative.     Eyes: Negative.    Respiratory:  Negative for cough and shortness of breath.    Cardiovascular:  Negative for chest pain and palpitations.   Gastrointestinal:  Negative for abdominal pain, nausea and vomiting.   Musculoskeletal:         Wound pain   Skin:  Negative for itching and rash.   Endo/Heme/Allergies:  Negative for polydipsia. Does not bruise/bleed easily.      Physical Exam  Temp:  [36.9 °C (98.4 °F)-37.2 °C (98.9 °F)] 36.9 °C (98.4 °F)  Pulse:  [] 94  Resp:  [18] 18  BP: (109-146)/(56-82) 144/75  SpO2:  [92 %-94 %] 92 %    Physical Exam  Vitals reviewed.   Constitutional:       General: She is not in acute distress.     Appearance: Normal appearance. She is not ill-appearing.   HENT:      Head: Normocephalic and atraumatic.      Right Ear: External ear normal.      Left Ear: External ear normal.      Nose: Nose normal.      Mouth/Throat:      Pharynx: Oropharynx is clear.   Eyes:      General:         Right eye: No discharge.         Left eye: No discharge.      Extraocular Movements: Extraocular movements intact.      Conjunctiva/sclera: Conjunctivae normal.   Cardiovascular:      Rate and Rhythm: Normal rate and regular rhythm.   Pulmonary:      Effort: Pulmonary effort is normal. No respiratory distress.      Breath sounds: Normal breath sounds. No wheezing.   Abdominal:      General: Bowel sounds are normal. There is no distension.      Palpations: Abdomen is soft.      Tenderness: There is no abdominal tenderness. There is no guarding or rebound.      Comments: Lower midline abdominal incision w/ wound vac  Left side ostomy w/ gas and liquid  brown stool   Musculoskeletal:      Cervical back: Normal range of motion and neck supple.      Right lower leg: No edema.      Left lower leg: No edema.   Skin:     General: Skin is warm and dry.   Neurological:      Mental Status: She is alert and oriented to person, place, and time.       Fluids    Intake/Output Summary (Last 24 hours) at 12/27/2022 1115  Last data filed at 12/27/2022 0830  Gross per 24 hour   Intake 700 ml   Output 50 ml   Net 650 ml       Laboratory  Recent Labs     12/25/22  0559 12/26/22  0556 12/27/22  0540   WBC 12.1* 12.3* 13.8*   RBC 4.55 4.29 4.09*   HEMOGLOBIN 13.0 12.2 11.7*   HEMATOCRIT 40.4 38.2 36.2*   MCV 88.8 89.0 88.5   MCH 28.6 28.4 28.6   MCHC 32.2* 31.9* 32.3*   RDW 43.9 45.0 45.2   PLATELETCT 483* 459* 466*   MPV 9.6 9.6 9.6     Recent Labs     12/26/22  0556   SODIUM 137   POTASSIUM 3.8   CHLORIDE 105   CO2 22   GLUCOSE 100*   BUN 13   CREATININE 0.80   CALCIUM 8.8                   Assessment/Plan  Thrombocytosis  Assessment & Plan  Likely reactive  Follow PLT    Leukocytosis  Assessment & Plan  PCT normal  BCx x 2 NGTD  UA 0-2 WBC w/ negative bacteria  C Dif negative  CXR atx, continue IS  CT Abd/Pelvis negative for abscess  But will resume Unasyn for GI coverage given escalating WBC    Diverticulitis of intestine with perforation and abscess- (present on admission)  Assessment & Plan  Had intra-abd abscess S/P IR drainage on 12/14/22  Complicated by perf viscus S/P ex lap w/ colostomy creation on 12/18/22 by Dr. Asher  Wound care and pain control per Physiatry  Intra-op cx polymicrobial  Completed Zosyn followed by Unasyn followed by Augmentin per ID  But will resume Unasyn given escalating WBC w/ no new infectious source found    Hypertension- (present on admission)  Assessment & Plan  Increase Toprol to optimize blood pressure management       Full Code    Discussed w/ pt and Dr. Jones

## 2022-12-27 NOTE — WOUND TEAM
[facility]  Inpatient Services  Initial Wound and Skin Care Evaluation    Admission Date: 12/23/2022     Last order of IP CONSULT TO WOUND CARE was found on 12/23/2022 from Hospital Encounter on 12/21/2022     HPI, PMH, SH: Reviewed    Past Surgical History:   Procedure Laterality Date    MI EXPLORATORY OF ABDOMEN  12/18/2022    Procedure: LAPAROTOMY, EXPLORATORY WITH OSTOMY CREATION;  Surgeon: Huseyin Asher M.D.;  Location: SURGERY [facility];  Service: General    GYN SURGERY Right 11/01/2013    Right mastecomy    TUBAL LIGATION  01/01/1975     Social History     Tobacco Use    Smoking status: Never     Passive exposure: Never    Smokeless tobacco: Never   Substance Use Topics    Alcohol use: Never     No chief complaint on file.    Diagnosis: Diverticulitis [K57.92]  Intra-abdominal abscess (HCC) [K65.1]  H/O abdominal abscess [Z87.898]    Unit where seen by Wound Team: [facility]03/02     WOUND CONSULT/FOLLOW UP RELATED TO:  VAC change abd and ostomy education     WOUND HISTORY:  Pt is a 77yr old female with history of HTN and diverticulitis who presented with abdominal pain. Pt initially presented to [facility] [place]. Pt has multiple episodes of diverticulitis which was treated with IV ABX. Pt was found to have perforated sigmoid diverticulitis with pelvic abscess and pneumoperitoneum. Pt underwent surgical intervention. Fascia was closed but skin was left open and wound was requested to place vac and begin ostomy education.    WOUND ASSESSMENT/LDA  Wound 12/18/22 Full Thickness Wound Abdomen Mid (Active)   Wound Image   12/26/22 1700   Site Assessment Red 12/26/22 1700   Periwound Assessment Intact 12/26/22 1700   Margins Defined edges 12/26/22 1700   Closure Secondary intention 12/26/22 1700   Drainage Amount Scant 12/26/22 1700   Drainage Description Serosanguineous 12/26/22 1700   Treatments Cleansed 12/26/22 1700   Wound Cleansing Normal Saline Irrigation 12/26/22 1700   Periwound  Protectant Skin Protectant Wipes to Periwound;Drape 12/26/22 1700   Dressing Cleansing/Solutions Not Applicable 12/26/22 1700   Dressing Options Wound Vac 12/26/22 1700   Dressing Changed Changed 12/26/22 1700   Dressing Status Intact 12/26/22 1700   Dressing Change/Treatment Frequency Monday, Wednesday, Friday, and As Needed 12/26/22 1700   NEXT Dressing Change/Treatment Date 12/28/22 12/26/22 1700   NEXT Weekly Photo (Inpatient Only) 01/02/23 12/26/22 1700   Non-staged Wound Description Full thickness 12/26/22 1700   Wound Length (cm) 16 cm 12/26/22 1700   Wound Width (cm) 3.3 cm 12/26/22 1700   Wound Depth (cm) 2.5 cm 12/26/22 1700   Wound Surface Area (cm^2) 52.8 cm^2 12/26/22 1700   Wound Volume (cm^3) 132 cm^3 12/26/22 1700   Wound Healing % 66 12/26/22 1700   Shape curved line 12/26/22 1700   Wound Odor None 12/26/22 1700   Exposed Structures None 12/26/22 1700   Number of days: 8        Vascular:    MEME:   No results found.    Lab Values:    Lab Results   Component Value Date/Time    WBC 12.3 (H) 12/26/2022 05:56 AM    RBC 4.29 12/26/2022 05:56 AM    HEMOGLOBIN 12.2 12/26/2022 05:56 AM    HEMATOCRIT 38.2 12/26/2022 05:56 AM    CREACTPROT 10.84 (H) 12/14/2022 02:24 AM    HBA1C 6.0 (H) 12/24/2022 05:25 AM        Culture Results show:  Recent Results (from the past 720 hour(s))   CULTURE WOUND W/ GRAM STAIN    Collection Time: 12/18/22  2:59 PM    Specimen: Wound   Result Value Ref Range    Significant Indicator POS (POS)     Source WND     Site Pelvic Abscess     Culture Result - (A)     Gram Stain Result       Many WBCs.  Many Gram positive cocci.  Rare Gram positive rods.      Culture Result Escherichia coli  Moderate growth   (A)     Culture Result Enterococcus avium  Moderate growth   (A)     Culture Result Streptococcus anginosus  Moderate growth   (A)        Susceptibility    Enterococcus avium - JOSE     Ampicillin <=2 Sensitive mcg/mL     Vancomycin 0.5 Sensitive mcg/mL     Daptomycin <=0.5 Sensitive  mcg/mL     Gent Synergy <=500 Sensitive mcg/mL     Penicillin 1 Sensitive mcg/mL    Escherichia coli - JOSE     Ampicillin <=8 Sensitive mcg/mL     Ceftriaxone <=1 Sensitive mcg/mL     Cefazolin <=2 Sensitive mcg/mL     Ciprofloxacin <=0.25 Sensitive mcg/mL     Cefepime <=2 Sensitive mcg/mL     Cefuroxime <=4 Sensitive mcg/mL     Ertapenem <=0.5 Sensitive mcg/mL     Gentamicin <=2 Sensitive mcg/mL     Ampicillin/sulbactam <=4/2 Sensitive mcg/mL     Minocycline <=4 Sensitive mcg/mL     Moxifloxacin <=2 Sensitive mcg/mL     Pip/Tazobactam <=8 Sensitive mcg/mL     Trimeth/Sulfa 2/38 Sensitive mcg/mL     Tigecycline <=2 Sensitive mcg/mL     Tobramycin <=2 Sensitive mcg/mL       Pain Level/Medicated:  PO pain meds with good result       INTERVENTIONS BY WOUND TEAM:  Chart and images reviewed. Discussed with bedside RN. All areas of concern (based on picture review, LDA review and discussion with bedside RN) have been thoroughly assessed. Documentation of areas based on significant findings. This RN in to assess patient. Performed standard wound care which includes appropriate positioning, dressing removal and non-selective debridement. Pictures and measurements obtained weekly if/when required.  Preparation for Dressing removal: Dressing soaked with Saline and Adhesive remover wipes  Non-selectively Debrided with:  Wound cleanser, Normal Saline, and Gauze   Sharp debridement: NA  Radha wound: Cleansed with Normal Saline, Prepped with No Sting and drape  Primary Dressing: Regular Black foam and Vac Drape  Secondary (Outer) Dressing:  NA    Interdisciplinary consultation: Patient, Bedside RN ,     EVALUATION / RATIONALE FOR TREATMENT:  Most Recent Date:  12/26: Pt's wound appears smaller than previous picture. Ostomy appliance intact and overlapping vAC drsg. Wound bed with some granulation. Crease at umbilicus needs to be built up for VAC and appliance.      Goals: Steady decrease in wound area and depth weekly.    WOUND  TEAM PLAN OF CARE ([X] for frequency of wound follow up,):   Nursing to follow dressing orders written for wound care. Contact wound team if area fails to progress, deteriorates or with any questions/concerns if something comes up before next scheduled follow up (See below as to whether wound is following and frequency of wound follow up)  Dressing changes by wound team:                   Follow up 3 times weekly:                NPWT change 3 times weekly:   x  Follow up 1-2 times weekly:      Follow up Bi-Monthly:           Follow up Monthly (High Risk):                        Follow up as needed:     Other (explain):     NURSING PLAN OF CARE ORDERS (X):  Dressing changes: See Dressing Care orders: x  Skin care: See Skin Care orders:   RN Prevention Protocol:   Rectal tube care: See Rectal Tube Care orders:   Other orders:    RSKIN:   CURRENTLY IN PLACE (X), APPLIED THIS VISIT (A), ORDERED (O):   Q shift Justo:  X  Q shift pressure point assessments:  X    Surface/Positioning   Pressure redistribution mattress  x          Low Airloss          ICU Low Airloss   Bariatric EVERT     Waffle cushion        Waffle Overlay          Reposition q 2 hours    remind and assist  TAPs Turning system     Z Franklin Pillow     Offloading/Redistribution Not assessed  Sacral Mepilex (Silicone dressing)     Heel Mepilex (Silicone dressing)         Heel float boots (Prevalon boot)             Float Heels off Bed with Pillows           Respiratory RA  Silicone O2 tubing         Gray Foam Ear protectors     Cannula fixation Device (Tender )          High flow offloading Clip    Elastic head band offloading device      Anchorfast                                                         Trach with Optifoam split foam             Containment/Moisture Prevention colostomy, bathroom    Rectal tube or BMS    Purwick/Condom Cath        Zaragoza Catheter    Barrier wipes           Barrier paste       Antifungal tx      Interdry        Mobilization    "    Up to chair   x     Ambulate    with FWW  PT/OT  x    Nutrition       Dietician        Diabetes Education      PO  x   TF     TPN     NPO   # days     Other        Anticipated discharge plans: VAc and supplies, HH  LTACH:        SNF/Rehab:                  Home Health Care:           Outpatient Wound Center:            Self/Family Care:        Other:                  Vac Discharge Needs:   Not Applicable Pt not on a wound vac:       Regular Vac while inpatient, alternative dressing at DC:        Regular Vac in use and continued at DC:      x      Reg. Vac w/ Skin Sub/Biologic in use. Will need to be changed 2x wkly:      Veraflo Vac while inpatient, ok to transition to Regular Vac on Discharge:           Veraflo Vac while inpatient, will need to remain on Veraflo Vac upon discharge:                              [facility]  Inpatient Services  New Ostomy Management & Teaching    HPI:  Reviewed  PMH: Reviewed   SH: Reviewed    Past Surgical History:   Procedure Laterality Date    [place] EXPLORATORY OF ABDOMEN  12/18/2022    Procedure: LAPAROTOMY, EXPLORATORY WITH OSTOMY CREATION;  Surgeon: Huseyin Asher M.D.;  Location: SURGERY [facility];  Service: General    GYN SURGERY Right 11/01/2013    Right mastecomy    TUBAL LIGATION  01/01/1975       Surgery Date: 12/18/22    Surgeon(s):  Huseyin Asher M.D.     Procedure(s):  LAPAROTOMY, EXPLORATORY      Permanence: unknown    Pertinent History: see above                   Colostomy 12/18/22 (Active)      12/26/22 1700   Stomal Appliance Assessment Intact;Changed    Stoma Assessment Beefy red    Stoma Shape Irregular;Oval    Stoma Size (in) 2    Peristomal Assessment Intact    Mucocutaneous Junction Intact    Treatment Cleansed with water/washcloth;Appliance Changed    Peristomal Protectant Paste Ring    Stomal Appliance 2 1/2\" 1-piece Fecal    Output (mL) 50 mL    Output Color Brown    WOUND RN ONLY - Stomal Appliance  1 Piece;2 1/2\" 1-piece Fecal;Paste " "Ring, 2\"    Appliance (Pouch) # 8531, [place] 7805    Appliance Brand [place]    Appliance Supplier [employer]    Secure Start completed No                                                         Ostomy Appliance (type and size): One piece flat and 2\" Paste Ring    Interventions: Removed previous appliance, cleansed peristomal skin with warm water/washcloth, patted dry, made template and traced onto back of barrier. Barrier CTF, checked and applied paste ring  to barrier.  Appliance applied to skin and end closed       Pt education: Questions and concerns addressed    Needs for next visit: Pt to perform more hands on--cut barrier and apply paste ring    Evaluation: Stoma with output and flatus. Barely protuding above skin. Body habitus needs one piece r/t crease.     Flatus: Present  Stool Output: small, brown, and soft  Urine Output: NA, Fecal Ostomy  Diet: Regular  Mobility: Up to chair    Plan: Ostomy nurses to continue to follow for ostomy needs and teaching until discharge    Anticipated discharge needs: Supplies, supplier information, possible HH, outpatient ostomy clinic, Skilled Nursing/Rehab     Secure Start Signed Yes  Outpatient Referral Placed Declined  5 Sets of appliances in Ostomy bag for discharge Ordered    INSURANCE OPTIONS:                 [employer] ([place])         x     MediCARE/MEDICAID & All other Private Insurance companies ([employer] Form)              MediCAID & Fee for Service (Care Chest Paperwork + [employer] Form)                            Form signed/Catalog Marked and Copy left with patient OR medicaid paperwork given to patient      Anticipated Discharge Plans:  Home Health Care, Family Care, and Self Care    Ostomy Supplies for DC:  12in Lock n' Roll ONE piece with Filter 2\" ([place] [address]), Skin Prep Wipes (3M Cav[place] 3344) - 2 box per month, Adapt No-Sting Universal Remover Wipes ([address] 2351) - 2 box per month, and Adapt Flat paste ring 2\" ([place] 6777) - 15 " per month

## 2022-12-27 NOTE — THERAPY
Physical Therapy   Daily Treatment     Patient Name: Maira Johnson  Age:  77 y.o., Sex:  female  Medical Record #: [id number]  Today's Date: 12/27/2022     Precautions  Precautions: Fall Risk  Comments: Abdominal wound vac, new ostomy, hx of mastectomy limb alert RUE    Subjective    Pt resting in bed, willing to participate     Objective       12/27/22 1101   PT Charge Group   PT Therapeutic Exercise (Units) 2   PT Total Time Spent   PT Individual Total Time Spent (Mins) 30   Gait Functional Level of Assist    Gait Level Of Assist Standby Assist   Assistive Device Front Wheel Walker   Distance (Feet)   (25 ft x 2 bed<>bathroom)   Transfer Functional Level of Assist   Bed, Chair, Wheelchair Transfer Standby Assist   Bed Chair Wheelchair Transfer Description Adaptive equipment;Increased time;Set-up of equipment   Sitting Lower Body Exercises   Sitting Lower Body Exercises Yes   Ankle Pumps 2 sets of 10   Hip Flexion 2 sets of 10  (AAROM required)   Hip Abduction 1 set of 10   Hip Adduction 1 set of 10   Long Arc Quad 2 sets of 10  (AAROM required)   Hamstring Curl 2 sets of 10         Assessment    Pt completed mobility in room as noted and seated exercises but required frequent rests due to fatigue and abdominal discomfort, requires AAROM due to weakness and abdominal pain with effort.    Strengths: Able to follow instructions, Adequate strength, Effective communication skills, Independent prior level of function, Making steady progress towards goals, Pleasant and cooperative, Supportive family, Willingly participates in therapeutic activities  Barriers: Decreased endurance, Fatigue, Generalized weakness, Impaired activity tolerance    Plan    Progress OOB time, may benefit from set therapy schedule and breaks depending on activity tolerance. Set goal c/ pt to eat all meals OOB. Gait endurance with FWW. Standing tolerance, general strengthening.     Passport items to be completed:  Get in/out of bed safely, in/out  of a vehicle, safely use mobility device, walk or wheel around home/community, navigate up and down stairs, show how to get up/down from the ground, ensure home is accessible, demonstrate HEP, complete caregiver training      Physical Therapy Problems (Active)       Problem: Mobility       Dates: Start: 12/24/22         Goal: STG-Within one week, patient will ambulate 150 ft with FWW and SBA.        Dates: Start: 12/24/22               Problem: Mobility Transfers       Dates: Start: 12/24/22         Goal: STG-Within one week, patient will transfer bed to chair with spv-SBA.        Dates: Start: 12/24/22            Goal: STG-Within one week, patient will move supine to sit with CGA using bed functions.        Dates: Start: 12/24/22               Problem: PT-Long Term Goals       Dates: Start: 12/24/22         Goal: LTG-By discharge, patient will ambulate 100+ ft indoors/outdoors with LRD and mod I.        Dates: Start: 12/24/22            Goal: LTG-By discharge, patient will transfer one surface to another using LRD and mod I.        Dates: Start: 12/24/22            Goal: LTG-By discharge, patient will transfer in/out of a car with SPV.        Dates: Start: 12/24/22            Goal: LTG-By discharge, patient will ambulate up/down ramp with LRD and mod I.       Dates: Start: 12/24/22

## 2022-12-28 LAB
ANION GAP SERPL CALC-SCNC: 10 MMOL/L (ref 7–16)
BUN SERPL-MCNC: 15 MG/DL (ref 8–22)
CALCIUM SERPL-MCNC: 8.9 MG/DL (ref 8.5–10.5)
CHLORIDE SERPL-SCNC: 107 MMOL/L (ref 96–112)
CO2 SERPL-SCNC: 23 MMOL/L (ref 20–33)
CREAT SERPL-MCNC: 0.8 MG/DL (ref 0.5–1.4)
ERYTHROCYTE [DISTWIDTH] IN BLOOD BY AUTOMATED COUNT: 45.9 FL (ref 35.9–50)
GFR SERPLBLD CREATININE-BSD FMLA CKD-EPI: 75 ML/MIN/1.73 M 2
GLUCOSE SERPL-MCNC: 90 MG/DL (ref 65–99)
HCT VFR BLD AUTO: 36.4 % (ref 37–47)
HGB BLD-MCNC: 11.6 G/DL (ref 12–16)
MCH RBC QN AUTO: 28.5 PG (ref 27–33)
MCHC RBC AUTO-ENTMCNC: 31.9 G/DL (ref 33.6–35)
MCV RBC AUTO: 89.4 FL (ref 81.4–97.8)
PLATELET # BLD AUTO: 464 K/UL (ref 164–446)
PMV BLD AUTO: 9.5 FL (ref 9–12.9)
POTASSIUM SERPL-SCNC: 4 MMOL/L (ref 3.6–5.5)
RBC # BLD AUTO: 4.07 M/UL (ref 4.2–5.4)
SODIUM SERPL-SCNC: 140 MMOL/L (ref 135–145)
WBC # BLD AUTO: 12 K/UL (ref 4.8–10.8)

## 2022-12-28 PROCEDURE — A9270 NON-COVERED ITEM OR SERVICE: HCPCS | Performed by: PHYSICAL MEDICINE & REHABILITATION

## 2022-12-28 PROCEDURE — 97530 THERAPEUTIC ACTIVITIES: CPT

## 2022-12-28 PROCEDURE — 97605 NEG PRS WND THER DME<=50SQCM: CPT

## 2022-12-28 PROCEDURE — 97116 GAIT TRAINING THERAPY: CPT

## 2022-12-28 PROCEDURE — 700111 HCHG RX REV CODE 636 W/ 250 OVERRIDE (IP): Performed by: HOSPITALIST

## 2022-12-28 PROCEDURE — 36415 COLL VENOUS BLD VENIPUNCTURE: CPT

## 2022-12-28 PROCEDURE — 97535 SELF CARE MNGMENT TRAINING: CPT

## 2022-12-28 PROCEDURE — 770010 HCHG ROOM/CARE - REHAB SEMI PRIVAT*

## 2022-12-28 PROCEDURE — 99232 SBSQ HOSP IP/OBS MODERATE 35: CPT | Performed by: HOSPITALIST

## 2022-12-28 PROCEDURE — 700105 HCHG RX REV CODE 258: Performed by: HOSPITALIST

## 2022-12-28 PROCEDURE — 700111 HCHG RX REV CODE 636 W/ 250 OVERRIDE (IP): Performed by: PHYSICAL MEDICINE & REHABILITATION

## 2022-12-28 PROCEDURE — 99233 SBSQ HOSP IP/OBS HIGH 50: CPT | Performed by: PHYSICAL MEDICINE & REHABILITATION

## 2022-12-28 PROCEDURE — 700102 HCHG RX REV CODE 250 W/ 637 OVERRIDE(OP): Performed by: HOSPITALIST

## 2022-12-28 PROCEDURE — 85027 COMPLETE CBC AUTOMATED: CPT

## 2022-12-28 PROCEDURE — 700101 HCHG RX REV CODE 250: Performed by: PHYSICAL MEDICINE & REHABILITATION

## 2022-12-28 PROCEDURE — 80048 BASIC METABOLIC PNL TOTAL CA: CPT

## 2022-12-28 PROCEDURE — A9270 NON-COVERED ITEM OR SERVICE: HCPCS | Performed by: HOSPITALIST

## 2022-12-28 PROCEDURE — 700102 HCHG RX REV CODE 250 W/ 637 OVERRIDE(OP): Performed by: PHYSICAL MEDICINE & REHABILITATION

## 2022-12-28 RX ORDER — LIDOCAINE 50 MG/G
1 PATCH TOPICAL EVERY 24 HOURS
Status: DISCONTINUED | OUTPATIENT
Start: 2022-12-28 | End: 2023-01-04 | Stop reason: HOSPADM

## 2022-12-28 RX ADMIN — SENNOSIDES AND DOCUSATE SODIUM 2 TABLET: 50; 8.6 TABLET ORAL at 08:46

## 2022-12-28 RX ADMIN — SENNOSIDES AND DOCUSATE SODIUM 2 TABLET: 50; 8.6 TABLET ORAL at 20:44

## 2022-12-28 RX ADMIN — OMEPRAZOLE 20 MG: 20 CAPSULE, DELAYED RELEASE ORAL at 08:46

## 2022-12-28 RX ADMIN — ENOXAPARIN SODIUM 40 MG: 40 INJECTION SUBCUTANEOUS at 17:51

## 2022-12-28 RX ADMIN — SODIUM CHLORIDE 1.5 G: 900 INJECTION INTRAVENOUS at 18:00

## 2022-12-28 RX ADMIN — SODIUM CHLORIDE 1.5 G: 900 INJECTION INTRAVENOUS at 05:34

## 2022-12-28 RX ADMIN — LIDOCAINE 1 PATCH: 700 PATCH TOPICAL at 12:45

## 2022-12-28 RX ADMIN — SODIUM CHLORIDE 1.5 G: 900 INJECTION INTRAVENOUS at 12:29

## 2022-12-28 RX ADMIN — METOPROLOL SUCCINATE 37.5 MG: 25 TABLET, EXTENDED RELEASE ORAL at 05:29

## 2022-12-28 RX ADMIN — OXYCODONE 5 MG: 5 TABLET ORAL at 16:10

## 2022-12-28 ASSESSMENT — GAIT ASSESSMENTS
ASSISTIVE DEVICE: 4 WHEEL WALKER
DISTANCE (FEET): 300
DISTANCE (FEET): 500
DEVIATION: INCREASED BASE OF SUPPORT
GAIT LEVEL OF ASSIST: STANDBY ASSIST
ASSISTIVE DEVICE: 4 WHEEL WALKER
DEVIATION: INCREASED BASE OF SUPPORT
GAIT LEVEL OF ASSIST: SUPERVISED

## 2022-12-28 ASSESSMENT — ENCOUNTER SYMPTOMS
NERVOUS/ANXIOUS: 0
ABDOMINAL PAIN: 0
NAUSEA: 0
DIARRHEA: 0
SHORTNESS OF BREATH: 0
CHILLS: 0
VOMITING: 0
FEVER: 0

## 2022-12-28 ASSESSMENT — PATIENT HEALTH QUESTIONNAIRE - PHQ9
1. LITTLE INTEREST OR PLEASURE IN DOING THINGS: NOT AT ALL
2. FEELING DOWN, DEPRESSED, IRRITABLE, OR HOPELESS: NOT AT ALL
SUM OF ALL RESPONSES TO PHQ9 QUESTIONS 1 AND 2: 0

## 2022-12-28 ASSESSMENT — ACTIVITIES OF DAILY LIVING (ADL)
TOILET_TRANSFER_DESCRIPTION: ADAPTIVE EQUIPMENT;GRAB BAR
TOILET_TRANSFER_DESCRIPTION: GRAB BAR;ADAPTIVE EQUIPMENT;INCREASED TIME;SUPERVISION FOR SAFETY;VERBAL CUEING
BED_CHAIR_WHEELCHAIR_TRANSFER_DESCRIPTION: ADAPTIVE EQUIPMENT;INCREASED TIME
TOILETING_LEVEL_OF_ASSIST_DESCRIPTION: GRAB BAR;INCREASED TIME;SUPERVISION FOR SAFETY
TOILET_TRANSFER_DESCRIPTION: ADAPTIVE EQUIPMENT;GRAB BAR;INCREASED TIME
BED_CHAIR_WHEELCHAIR_TRANSFER_DESCRIPTION: ADAPTIVE EQUIPMENT;INCREASED TIME
TOILET_TRANSFER_DESCRIPTION: GRAB BAR;ADAPTIVE EQUIPMENT;INCREASED TIME;SUPERVISION FOR SAFETY;VERBAL CUEING
TOILETING_LEVEL_OF_ASSIST_DESCRIPTION: ASSIST FOR HYGIENE;GRAB BAR;INCREASED TIME;SUPERVISION FOR SAFETY
BED_CHAIR_WHEELCHAIR_TRANSFER_DESCRIPTION: ADAPTIVE EQUIPMENT;INCREASED TIME

## 2022-12-28 NOTE — THERAPY
Physical Therapy   Daily Treatment     Patient Name: Maira Johnson  Age:  77 y.o., Sex:  female  Medical Record #: [id number]  Today's Date: 12/28/2022     Precautions  Precautions: (P) Fall Risk  Comments: (P) Abdominal wound vac, new ostomy, hx of mastectomy limb alert RUE    Subjective    Patient seated in w/c and agreeable to therapy, requesting to use bathroom.     Objective       12/28/22 0931   PT Charge Group   PT Gait Training (Units) 1   PT Therapeutic Activities (Units) 1   PT Total Time Spent   PT Individual Total Time Spent (Mins) 30   Precautions   Precautions Fall Risk   Comments Abdominal wound vac, new ostomy, hx of mastectomy limb alert RUE   Gait Functional Level of Assist    Gait Level Of Assist Supervised   Assistive Device 4 Wheel Walker   Distance (Feet) 500   # of Times Distance was Traveled 1   Deviation Increased Base Of Support   Transfer Functional Level of Assist   Bed, Chair, Wheelchair Transfer Supervised   Bed Chair Wheelchair Transfer Description Adaptive equipment;Increased time  (stand step transfer with 4WW)   Toilet Transfers Supervised   Toilet Transfer Description Adaptive equipment;Grab bar  (4WW approach)   Bed Mobility    Supine to Sit Modified Independent   Sit to Supine Modified Independent   Sit to Stand Supervised   Scooting Modified Independent   Rolling Modified Independent   Interdisciplinary Plan of Care Collaboration   Patient Position at End of Therapy In Bed;Call Light within Reach;Tray Table within Reach;Phone within Reach     Outdoor ambulation with 4WW, including instruction on how to utilize 4WW for seated rest break.     Toilet transfer x2 with 4WW and supervision, including standing LB dressing, hygiene, and hand hygiene.    Assessment    Patient tolerated session well, very excited to go outside and continues to demonstrate safe mobility with 4WW. No LOBs noted during session.    Strengths: Able to follow instructions, Adequate strength, Effective  communication skills, Independent prior level of function, Making steady progress towards goals, Pleasant and cooperative, Supportive family, Willingly participates in therapeutic activities  Barriers: Decreased endurance, Fatigue, Generalized weakness, Impaired activity tolerance    Plan    Set goal c/ pt to eat all meals OOB. Gait endurance with 4WW. Standing tolerance, general strengthening.     Passport items to be completed:  Get in/out of bed safely, in/out of a vehicle, safely use mobility device, walk or wheel around home/community, navigate up and down stairs, show how to get up/down from the ground, ensure home is accessible, demonstrate HEP, complete caregiver training       Physical Therapy Problems (Active)       Problem: Mobility       Dates: Start: 12/24/22         Goal: STG-Within one week, patient will ambulate 150 ft with FWW and SBA.        Dates: Start: 12/24/22               Problem: Mobility Transfers       Dates: Start: 12/24/22         Goal: STG-Within one week, patient will transfer bed to chair with spv-SBA.        Dates: Start: 12/24/22            Goal: STG-Within one week, patient will move supine to sit with CGA using bed functions.        Dates: Start: 12/24/22               Problem: PT-Long Term Goals       Dates: Start: 12/24/22         Goal: LTG-By discharge, patient will ambulate 100+ ft indoors/outdoors with LRD and mod I.        Dates: Start: 12/24/22            Goal: LTG-By discharge, patient will transfer one surface to another using LRD and mod I.        Dates: Start: 12/24/22            Goal: LTG-By discharge, patient will transfer in/out of a car with SPV.        Dates: Start: 12/24/22            Goal: LTG-By discharge, patient will ambulate up/down ramp with LRD and mod I.       Dates: Start: 12/24/22

## 2022-12-28 NOTE — ASSESSMENT & PLAN NOTE
WBC's: 13.8 --> 12.0 (12/28) --> 11.3 (12/30)  Afebrile  BC (12/25): showed 1 bottle positive for Cutibacterium species after 5 days -- contaminant  U/A (12/26): neg  Procalcitonin normal  C. Diff: neg  CXR: shows atelectasis  CT Abd/Pelvis: negative for abscess  See other assessment for abx for perf diverticulitis

## 2022-12-28 NOTE — THERAPY
Physical Therapy   Daily Treatment     Patient Name: Maira Johnson  Age:  77 y.o., Sex:  female  Medical Record #: [id number]  Today's Date: 12/28/2022     Precautions  Precautions: Fall Risk  Comments: Abdominal wound vac, new ostomy, hx of mastectomy limb alert RUE    Subjective    Patient asleep in bed, requiring extra time to wake up, agreeable to therapy once awake.     Objective       12/28/22 0701   PT Charge Group   PT Gait Training (Units) 2   PT Therapeutic Activities (Units) 2   PT Total Time Spent   PT Individual Total Time Spent (Mins) 60   Precautions   Precautions Fall Risk   Comments Abdominal wound vac, new ostomy, hx of mastectomy limb alert RUE   Pain 0 - 10 Group   Location Flank   Location Orientation Right   Gait Functional Level of Assist    Gait Level Of Assist Standby Assist  (to supervised)   Assistive Device 4 Wheel Walker   Distance (Feet) 300  (plus 300 ftx1 with FWW)   # of Times Distance was Traveled 1   Deviation Increased Base Of Support   Wheelchair Functional Level of Assist   Wheelchair Assist Modified Independent   Distance Wheelchair (Feet or Distance) 50   Wheelchair Description Extra time  (may benefit from brake extenders)   Transfer Functional Level of Assist   Bed, Chair, Wheelchair Transfer Standby Assist  (to supervised)   Bed Chair Wheelchair Transfer Description Adaptive equipment;Increased time  (stand step transfer with 4WW)   Toilet Transfers Supervised   Toilet Transfer Description Adaptive equipment;Grab bar;Increased time  (FWW approach)   Bed Mobility    Supine to Sit Modified Independent   Sit to Stand Supervised   Scooting Modified Independent   Rolling Modified Independent   Interdisciplinary Plan of Care Collaboration   IDT Collaboration with  Occupational Therapist   Patient Position at End of Therapy Seated;Self Releasing Lap Belt Applied;Call Light within Reach;Tray Table within Reach;Phone within Reach   Collaboration Comments CLOF, change in assistive  "device     Trialed 4WW with demonstration of brake management, adjusted to patient's height.     Exchaged current w/c for 22Wx18\"D w/c, with patient reporting improved comfort in new wheelchair and visibly in better sitting position.    Assessment    Patient tolerated session well, mildly perseverative on R rib/flank pain, provided paper/pen for patient to monitor frequency of symptoms. Good mobility with 4WW, will continue to benefit from additional practice.    Strengths: Able to follow instructions, Adequate strength, Effective communication skills, Independent prior level of function, Making steady progress towards goals, Pleasant and cooperative, Supportive family, Willingly participates in therapeutic activities  Barriers: Decreased endurance, Fatigue, Generalized weakness, Impaired activity tolerance    Plan    Progress OOB time, may benefit from set therapy schedule and breaks depending on activity tolerance. Set goal c/ pt to eat all meals OOB. Gait endurance with FWW. Standing tolerance, general strengthening.     Passport items to be completed:  Get in/out of bed safely, in/out of a vehicle, safely use mobility device, walk or wheel around home/community, navigate up and down stairs, show how to get up/down from the ground, ensure home is accessible, demonstrate HEP, complete caregiver training      Physical Therapy Problems (Active)       Problem: Mobility       Dates: Start: 12/24/22         Goal: STG-Within one week, patient will ambulate 150 ft with FWW and SBA.        Dates: Start: 12/24/22               Problem: Mobility Transfers       Dates: Start: 12/24/22         Goal: STG-Within one week, patient will transfer bed to chair with spv-SBA.        Dates: Start: 12/24/22            Goal: STG-Within one week, patient will move supine to sit with CGA using bed functions.        Dates: Start: 12/24/22               Problem: PT-Long Term Goals       Dates: Start: 12/24/22         Goal: LTG-By discharge, " patient will ambulate 100+ ft indoors/outdoors with LRD and mod I.        Dates: Start: 12/24/22            Goal: LTG-By discharge, patient will transfer one surface to another using LRD and mod I.        Dates: Start: 12/24/22            Goal: LTG-By discharge, patient will transfer in/out of a car with SPV.        Dates: Start: 12/24/22            Goal: LTG-By discharge, patient will ambulate up/down ramp with LRD and mod I.       Dates: Start: 12/24/22

## 2022-12-28 NOTE — PROGRESS NOTES
Hospital Medicine Daily Progress Note      Chief Complaint  Hypertension  Leukocytosis    Interval Problem Update  Discussed about her wbc's normalizing and still on abx.    Review of Systems  Review of Systems   Constitutional:  Negative for chills and fever.   Respiratory:  Negative for shortness of breath.    Cardiovascular:  Negative for chest pain.   Gastrointestinal:  Negative for abdominal pain, diarrhea, nausea and vomiting.   Psychiatric/Behavioral:  The patient is not nervous/anxious.       Physical Exam  Temp:  [36.9 °C (98.5 °F)] 36.9 °C (98.5 °F)  Pulse:  [] 84  Resp:  [18] 18  BP: (126-146)/(69-82) 135/69  SpO2:  [93 %-94 %] 93 %    Physical Exam  Vitals and nursing note reviewed.   Constitutional:       Appearance: Normal appearance.   HENT:      Head: Atraumatic.   Eyes:      Conjunctiva/sclera: Conjunctivae normal.      Pupils: Pupils are equal, round, and reactive to light.   Cardiovascular:      Rate and Rhythm: Normal rate and regular rhythm.      Heart sounds: No murmur heard.  Pulmonary:      Effort: Pulmonary effort is normal.      Breath sounds: No stridor. No wheezing or rales.   Abdominal:      General: There is no distension.      Palpations: Abdomen is soft.      Tenderness: There is no abdominal tenderness.      Comments: Lower midline abdominal incision with wound vac   Musculoskeletal:      Cervical back: Normal range of motion and neck supple.      Right lower leg: No edema.      Left lower leg: No edema.   Skin:     General: Skin is warm and dry.      Findings: No rash.   Neurological:      Mental Status: She is alert and oriented to person, place, and time.   Psychiatric:         Mood and Affect: Mood normal.         Behavior: Behavior normal.       Fluids    Intake/Output Summary (Last 24 hours) at 12/28/2022 0832  Last data filed at 12/28/2022 0018  Gross per 24 hour   Intake 2400 ml   Output 250 ml   Net 2150 ml         Laboratory  Recent Labs     12/26/22  9418  12/27/22  0540 12/28/22  0528   WBC 12.3* 13.8* 12.0*   RBC 4.29 4.09* 4.07*   HEMOGLOBIN 12.2 11.7* 11.6*   HEMATOCRIT 38.2 36.2* 36.4*   MCV 89.0 88.5 89.4   MCH 28.4 28.6 28.5   MCHC 31.9* 32.3* 31.9*   RDW 45.0 45.2 45.9   PLATELETCT 459* 466* 464*   MPV 9.6 9.6 9.5       Recent Labs     12/26/22  0556 12/28/22  0528   SODIUM 137 140   POTASSIUM 3.8 4.0   CHLORIDE 105 107   CO2 22 23   GLUCOSE 100* 90   BUN 13 15   CREATININE 0.80 0.80   CALCIUM 8.8 8.9                     Assessment/Plan  Leukocytosis  Assessment & Plan  WBC's: 13.8 --> 12.0 (12/28)  Afebrile  BC (12/25): neg  U/A (12/26): neg  Procalcitonin normal  C. Diff: neg  CXR: shows atelectasis  CT Abd/Pelvis: negative for abscess  See other assessment for abx for perf diverticulitis    Diverticulitis of intestine with perforation and abscess- (present on admission)  Assessment & Plan  Had intra-abd abscess -- s/p IR drainage on 12/14/22  Complicated by perf viscus --> s/p ex lap w/ colostomy creation on 12/18/22  Intra-op cx polymicrobial  S/P Zosyn   On Unasyn (thru 1/1)   Note: was to follow with Augmentin per ID            per Dr. Gong: will resume Unasyn given escalating WBC with no new infectious source found    Hypertension- (present on admission)  Assessment & Plan  BP ok, occ rises up a little  On Toprol XL -- dose increased 12/28  Monitor

## 2022-12-28 NOTE — CARE PLAN
Problem: Bowel Elimination  Goal: Patient will participate in bowel management program  Outcome: Progressing  Note: Pt with colostomy draining to a yellow brownish  soft stool, emptied bag drained 250 ml and cleansed .     Problem: Skin Integrity  Goal: Patient's skin integrity will be maintained or improve  Outcome: Progressing  Note: With abdominal incision line dressing intact with wound vac at 125 mm /hg suction , ,draining serousang in mod amount. Cannister changed .   The patient is Stable - Low risk of patient condition declining or worsening    Shift Goals  Clinical Goals: Pain management  Patient Goals: safety/sleep    Progress made toward(s) clinical / shift goals:  Pain under control, able to sleep.

## 2022-12-28 NOTE — PROGRESS NOTES
"Rehab Progress Note     Encounter Date: 12/28/2022    CC: s/p intra abdominal abscess     Interval Events (Subjective)  Vitals reviewed:  WNL   Labs reviewed: WBC improved to 12   Patient seen and examined.  Patient reports she feels well.  Patient reports occasionally she has muscle spasms on her right side.  She notices that these only happen when she is coughing or hiccuping.  Denies shortness of breath or chest pain.  Does not keep her awake at night.  Discussed option for Flexeril, patient would like to avoid using medications if appropriate, she does not want to make her sleepy.  Patient willing to try lidocaine patch on her right side.  Otherwise patient reports feeling much better today, she was able to go outside and feel the sunshine on her face, which has significantly improved her mood.    Objective:  Physical Exam:  Vitals: /69   Pulse 84   Temp 36.9 °C (98.5 °F) (Oral)   Resp 18   Ht 1.6 m (5' 3\")   Wt 108 kg (238 lb 1.6 oz)   SpO2 93%   Gen: NAD, seated comfortably in manual wheelchair eating lunch  Head:  NC/AT  Eyes/ Nose/ Mouth: PERRLA, moist mucous membranes  Cardio: RRR, good distal perfusion, warm extremities  Pulm: normal respiratory effort, no cyanosis   Abd: Soft NTND, ostomy bag just recently emptied.  No erythema at abdominal incision  Ext: No peripheral edema. No calf tenderness. No clubbing.  No pain to palpation at right side of ribs    Mental status:  A&Ox4 (person, place, date, situation) answers questions appropriately follows commands  Speech: fluent, no aphasia or dysarthria        Recent Results (from the past 72 hour(s))   BLOOD CULTURE    Collection Time: 12/25/22 11:50 AM    Specimen: Peripheral; Blood   Result Value Ref Range    Significant Indicator NEG     Source BLD     Site PERIPHERAL     Culture Result       A significant status has been triggered by the BACTEC  instrument due to an increase in CO2 production.  Gram  stain of blood culture bottle shows NO " ORGANISMS SEEN.  Further investigation is in progress.  Note: Blood cultures are incubated for 5 days and  are monitored continuously. Positive blood cultures  are called to the RN and reported as soon as  they are identified.     PROCALCITONIN    Collection Time: 12/25/22 11:50 AM   Result Value Ref Range    Procalcitonin 0.17 <0.25 ng/mL   BLOOD CULTURE    Collection Time: 12/25/22 12:01 PM    Specimen: Peripheral; Blood   Result Value Ref Range    Significant Indicator NEG     Source BLD     Site PERIPHERAL     Culture Result       No Growth  Note: Blood cultures are incubated for 5 days and  are monitored continuously.Positive blood cultures  are called to the RN and reported as soon as  they are identified.     CBC WITH DIFFERENTIAL    Collection Time: 12/26/22  5:56 AM   Result Value Ref Range    WBC 12.3 (H) 4.8 - 10.8 K/uL    RBC 4.29 4.20 - 5.40 M/uL    Hemoglobin 12.2 12.0 - 16.0 g/dL    Hematocrit 38.2 37.0 - 47.0 %    MCV 89.0 81.4 - 97.8 fL    MCH 28.4 27.0 - 33.0 pg    MCHC 31.9 (L) 33.6 - 35.0 g/dL    RDW 45.0 35.9 - 50.0 fL    Platelet Count 459 (H) 164 - 446 K/uL    MPV 9.6 9.0 - 12.9 fL    Neutrophils-Polys 77.10 (H) 44.00 - 72.00 %    Lymphocytes 9.50 (L) 22.00 - 41.00 %    Monocytes 6.00 0.00 - 13.40 %    Eosinophils 2.10 0.00 - 6.90 %    Basophils 0.70 0.00 - 1.80 %    Immature Granulocytes 4.60 (H) 0.00 - 0.90 %    Nucleated RBC 0.00 /100 WBC    Neutrophils (Absolute) 9.48 (H) 2.00 - 7.15 K/uL    Lymphs (Absolute) 1.17 1.00 - 4.80 K/uL    Monos (Absolute) 0.74 0.00 - 0.85 K/uL    Eos (Absolute) 0.26 0.00 - 0.51 K/uL    Baso (Absolute) 0.08 0.00 - 0.12 K/uL    Immature Granulocytes (abs) 0.56 (H) 0.00 - 0.11 K/uL    NRBC (Absolute) 0.00 K/uL   Comp Metabolic Panel    Collection Time: 12/26/22  5:56 AM   Result Value Ref Range    Sodium 137 135 - 145 mmol/L    Potassium 3.8 3.6 - 5.5 mmol/L    Chloride 105 96 - 112 mmol/L    Co2 22 20 - 33 mmol/L    Anion Gap 10.0 7.0 - 16.0    Glucose 100 (H) 65  - 99 mg/dL    Bun 13 8 - 22 mg/dL    Creatinine 0.80 0.50 - 1.40 mg/dL    Calcium 8.8 8.5 - 10.5 mg/dL    AST(SGOT) 17 12 - 45 U/L    ALT(SGPT) 21 2 - 50 U/L    Alkaline Phosphatase 88 30 - 99 U/L    Total Bilirubin 0.4 0.1 - 1.5 mg/dL    Albumin 2.7 (L) 3.2 - 4.9 g/dL    Total Protein 5.8 (L) 6.0 - 8.2 g/dL    Globulin 3.1 1.9 - 3.5 g/dL    A-G Ratio 0.9 g/dL   PROCALCITONIN    Collection Time: 12/26/22  5:56 AM   Result Value Ref Range    Procalcitonin 0.21 <0.25 ng/mL   CORRECTED CALCIUM    Collection Time: 12/26/22  5:56 AM   Result Value Ref Range    Correct Calcium 9.8 8.5 - 10.5 mg/dL   ESTIMATED GFR    Collection Time: 12/26/22  5:56 AM   Result Value Ref Range    GFR (CKD-EPI) 75 >60 mL/min/1.73 m 2   Cdiff By PCR Rflx Toxin    Collection Time: 12/26/22 10:15 AM   Result Value Ref Range    C Diff by PCR See Toxin Negative    [id number] Presumptive Negative Negative   C Diff Toxin    Collection Time: 12/26/22 10:15 AM   Result Value Ref Range    C.Diff Toxin A&B Negative    URINALYSIS    Collection Time: 12/26/22 11:45 AM    Specimen: Urine, Clean Catch   Result Value Ref Range    Color Yellow     Character Clear     Specific Gravity 1.024 <1.035    Ph 7.0 5.0 - 8.0    Glucose Negative Negative mg/dL    Ketones Negative Negative mg/dL    Protein Negative Negative mg/dL    Bilirubin Negative Negative    Urobilinogen, Urine 0.2 Negative    Nitrite Negative Negative    Leukocyte Esterase Negative Negative    Occult Blood Negative Negative    Micro Urine Req see below    CBC WITHOUT DIFFERENTIAL    Collection Time: 12/27/22  5:40 AM   Result Value Ref Range    WBC 13.8 (H) 4.8 - 10.8 K/uL    RBC 4.09 (L) 4.20 - 5.40 M/uL    Hemoglobin 11.7 (L) 12.0 - 16.0 g/dL    Hematocrit 36.2 (L) 37.0 - 47.0 %    MCV 88.5 81.4 - 97.8 fL    MCH 28.6 27.0 - 33.0 pg    MCHC 32.3 (L) 33.6 - 35.0 g/dL    RDW 45.2 35.9 - 50.0 fL    Platelet Count 466 (H) 164 - 446 K/uL    MPV 9.6 9.0 - 12.9 fL   MAGNESIUM    Collection Time:  12/27/22  5:40 AM   Result Value Ref Range    Magnesium 2.1 1.5 - 2.5 mg/dL   PHOSPHORUS    Collection Time: 12/27/22  5:40 AM   Result Value Ref Range    Phosphorus 2.9 2.5 - 4.5 mg/dL   CBC WITHOUT DIFFERENTIAL    Collection Time: 12/28/22  5:28 AM   Result Value Ref Range    WBC 12.0 (H) 4.8 - 10.8 K/uL    RBC 4.07 (L) 4.20 - 5.40 M/uL    Hemoglobin 11.6 (L) 12.0 - 16.0 g/dL    Hematocrit 36.4 (L) 37.0 - 47.0 %    MCV 89.4 81.4 - 97.8 fL    MCH 28.5 27.0 - 33.0 pg    MCHC 31.9 (L) 33.6 - 35.0 g/dL    RDW 45.9 35.9 - 50.0 fL    Platelet Count 464 (H) 164 - 446 K/uL    MPV 9.5 9.0 - 12.9 fL   Basic Metabolic Panel    Collection Time: 12/28/22  5:28 AM   Result Value Ref Range    Sodium 140 135 - 145 mmol/L    Potassium 4.0 3.6 - 5.5 mmol/L    Chloride 107 96 - 112 mmol/L    Co2 23 20 - 33 mmol/L    Glucose 90 65 - 99 mg/dL    Bun 15 8 - 22 mg/dL    Creatinine 0.80 0.50 - 1.40 mg/dL    Calcium 8.9 8.5 - 10.5 mg/dL    Anion Gap 10.0 7.0 - 16.0   ESTIMATED GFR    Collection Time: 12/28/22  5:28 AM   Result Value Ref Range    GFR (CKD-EPI) 75 >60 mL/min/1.73 m 2       Current Facility-Administered Medications   Medication Frequency    ampicillin/sulbactam (UNASYN) 1.5 g in  mL IVPB Q6HRS    metoprolol SR (TOPROL XL) tablet 37.5 mg Q DAY    Pharmacy Consult Request PHARMACY TO DOSE    Respiratory Therapy Consult Continuous RT    Pharmacy Consult Request ...Pain Management Review 1 Each PHARMACY TO DOSE    senna-docusate (PERICOLACE or SENOKOT S) 8.6-50 MG per tablet 2 Tablet BID    And    polyethylene glycol/lytes (MIRALAX) PACKET 1 Packet QDAY PRN    And    magnesium hydroxide (MILK OF MAGNESIA) suspension 30 mL QDAY PRN    And    bisacodyl (DULCOLAX) suppository 10 mg QDAY PRN    omeprazole (PRILOSEC) capsule 20 mg DAILY    mag hydrox-al hydrox-simeth (MAALOX PLUS ES or MYLANTA DS) suspension 20 mL Q2HRS PRN    ondansetron (ZOFRAN ODT) dispertab 4 mg 4X/DAY PRN    Or    ondansetron (ZOFRAN) syringe/vial  injection 4 mg 4X/DAY PRN    traZODone (DESYREL) tablet 50 mg QHS PRN    sodium chloride (OCEAN) 0.65 % nasal spray 2 Spray PRN    oxyCODONE immediate-release (ROXICODONE) tablet 2.5 mg Q3HRS PRN    Or    oxyCODONE immediate-release (ROXICODONE) tablet 5 mg Q3HRS PRN    enoxaparin (Lovenox) inj 40 mg DAILY AT 1800       Orders Placed This Encounter   Procedures    Diet Order Diet: Regular     Standing Status:   Standing     Number of Occurrences:   1     Order Specific Question:   Diet:     Answer:   Regular [1]       Assessment:  Active Hospital Problems    Diagnosis     *Intra-abdominal abscess (HCC)     Leukocytosis     Thrombocytosis     Diverticulitis     H/O abdominal abscess     CHEPE (acute kidney injury) (HCC)     Hypokalemia     Postoperative hypoxia     Class 3 severe obesity due to excess calories with serious comorbidity and body mass index (BMI) of 40.0 to 44.9 in adult (HCC)     Hyperglycemia     Diverticulitis of intestine with perforation and abscess     Hypertension        Medical Decision Making and Plan:  Debility status post diverticulitis and intra-abdominal abscess  - CT abdomen at outside facility showed multiple intra-abdominal abscesses and diverticulitis  - Abscesses drained by IR on 12/13  - 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  - Started on Unasyn for increased WBCs after completing Augmentin.  - Stopped lactobacillus, patient has increased gas at ostomy site  - Wound VAC in place, wound care to assist with wound VAC changes and ostomy education, will reach out to wound care team about next change for wound VAC.  - Initiate comprehensive IRF level therapy with PT/OT/SLP  - Follow-up with general surgery      Leukocytosis  - Repeat CT abdomen/pelvis negative for abscess  - WBC is continuing to trend up,  hospitalist consulted  - WBC up to 13.8 on 12/27, to resumed Unasyn , plan to stop date 1/1  - WBC improved to 12 12/28     Hypertension  - Home dose metoprolol  - BP well controlled, 12/20     Hypoxia  - Postoperatively patient has required up to 3 L O2  -Respiratory therapy consulted,  - Has been weaned to room air     Acute blood loss anemia  - Hemoglobin stable around 11-12     Hypocalcemia  -Resolved     Pain  - Scheduled Tylenol oxycodone  - Lidocaine patch to right side muscle spasms     Bowel  - Monitor ostomy output  - PRN stool softeners     Skin - Patient at risk for skin breakdown due to debility in areas including sacrum, achilles, elbows and head in addition to other sites. Nursing to assess skin daily.  - Wound care to follow along changes     GI Ppx - Patient on Prilosec for GERD prophylaxis. Patient on Senna-docusate for constipation prophylaxis.      DVT Ppx - Patient Lovenox on transfer.    Total time:  36  minutes.  I spent greater than 50% of the time for patient care and coordination on this date, including unit/floor time, and face-to-face time with the patient as per assessment and plan above including , discussing muscle spasms with patient.    Crystal Jones D.O.

## 2022-12-29 PROCEDURE — 97530 THERAPEUTIC ACTIVITIES: CPT

## 2022-12-29 PROCEDURE — A9270 NON-COVERED ITEM OR SERVICE: HCPCS | Performed by: PHYSICAL MEDICINE & REHABILITATION

## 2022-12-29 PROCEDURE — 700105 HCHG RX REV CODE 258: Performed by: HOSPITALIST

## 2022-12-29 PROCEDURE — 97535 SELF CARE MNGMENT TRAINING: CPT

## 2022-12-29 PROCEDURE — 700111 HCHG RX REV CODE 636 W/ 250 OVERRIDE (IP): Performed by: PHYSICAL MEDICINE & REHABILITATION

## 2022-12-29 PROCEDURE — 770010 HCHG ROOM/CARE - REHAB SEMI PRIVAT*

## 2022-12-29 PROCEDURE — 700111 HCHG RX REV CODE 636 W/ 250 OVERRIDE (IP): Performed by: HOSPITALIST

## 2022-12-29 PROCEDURE — A9270 NON-COVERED ITEM OR SERVICE: HCPCS | Performed by: HOSPITALIST

## 2022-12-29 PROCEDURE — 99233 SBSQ HOSP IP/OBS HIGH 50: CPT | Performed by: PHYSICAL MEDICINE & REHABILITATION

## 2022-12-29 PROCEDURE — 99232 SBSQ HOSP IP/OBS MODERATE 35: CPT | Performed by: HOSPITALIST

## 2022-12-29 PROCEDURE — 700102 HCHG RX REV CODE 250 W/ 637 OVERRIDE(OP): Performed by: HOSPITALIST

## 2022-12-29 PROCEDURE — 700102 HCHG RX REV CODE 250 W/ 637 OVERRIDE(OP): Performed by: PHYSICAL MEDICINE & REHABILITATION

## 2022-12-29 PROCEDURE — 97116 GAIT TRAINING THERAPY: CPT

## 2022-12-29 PROCEDURE — 700101 HCHG RX REV CODE 250: Performed by: PHYSICAL MEDICINE & REHABILITATION

## 2022-12-29 RX ADMIN — SENNOSIDES AND DOCUSATE SODIUM 2 TABLET: 50; 8.6 TABLET ORAL at 09:24

## 2022-12-29 RX ADMIN — ENOXAPARIN SODIUM 40 MG: 40 INJECTION SUBCUTANEOUS at 18:13

## 2022-12-29 RX ADMIN — SODIUM CHLORIDE 1.5 G: 900 INJECTION INTRAVENOUS at 00:24

## 2022-12-29 RX ADMIN — SODIUM CHLORIDE 1.5 G: 900 INJECTION INTRAVENOUS at 05:38

## 2022-12-29 RX ADMIN — LIDOCAINE 1 PATCH: 700 PATCH TOPICAL at 11:59

## 2022-12-29 RX ADMIN — SODIUM CHLORIDE 1.5 G: 900 INJECTION INTRAVENOUS at 18:08

## 2022-12-29 RX ADMIN — OMEPRAZOLE 20 MG: 20 CAPSULE, DELAYED RELEASE ORAL at 09:24

## 2022-12-29 RX ADMIN — METOPROLOL SUCCINATE 37.5 MG: 25 TABLET, EXTENDED RELEASE ORAL at 05:34

## 2022-12-29 RX ADMIN — SENNOSIDES AND DOCUSATE SODIUM 2 TABLET: 50; 8.6 TABLET ORAL at 19:53

## 2022-12-29 RX ADMIN — SODIUM CHLORIDE 1.5 G: 900 INJECTION INTRAVENOUS at 11:52

## 2022-12-29 ASSESSMENT — GAIT ASSESSMENTS
DISTANCE (FEET): 1500
GAIT LEVEL OF ASSIST: SUPERVISED
ASSISTIVE DEVICE: 4 WHEEL WALKER

## 2022-12-29 ASSESSMENT — ACTIVITIES OF DAILY LIVING (ADL)
TOILETING_LEVEL_OF_ASSIST_DESCRIPTION: ASSIST FOR HYGIENE;GRAB BAR;INCREASED TIME;SUPERVISION FOR SAFETY
BED_CHAIR_WHEELCHAIR_TRANSFER_DESCRIPTION: ADAPTIVE EQUIPMENT;INCREASED TIME
BED_CHAIR_WHEELCHAIR_TRANSFER_DESCRIPTION: INCREASED TIME
TOILET_TRANSFER_DESCRIPTION: INCREASED TIME
TOILET_TRANSFER_DESCRIPTION: ADAPTIVE EQUIPMENT;GRAB BAR;INCREASED TIME

## 2022-12-29 ASSESSMENT — ENCOUNTER SYMPTOMS
BLURRED VISION: 0
SHORTNESS OF BREATH: 0
PALPITATIONS: 0
DIZZINESS: 0
NAUSEA: 0
FEVER: 0
HALLUCINATIONS: 0
HEADACHES: 0
VOMITING: 0

## 2022-12-29 NOTE — THERAPY
Occupational Therapy  Daily Treatment     Patient Name: Maira Johnson  Age:  77 y.o., Sex:  female  Medical Record #: [id number]  Today's Date: 12/28/2022     Precautions  Precautions: Fall Risk  Comments: Abdominal wound vac, new ostomy, hx of mastectomy limb alert RUE         Subjective    Pt seen 2x this day, once at 1100 and again at 1230. Pt agreeable to both sessions.     Objective       12/28/22 1101 12/28/22 1231   OT Charge Group   OT Self Care / ADL (Units) 1 1   OT Therapy Activity (Units) 1 3   OT Total Time Spent   OT Individual Total Time Spent (Mins) 30 60   Pain   Intervention Heat Applied  --    Pain 0 - 10 Group   Location Flank  --    Location Orientation Right  --    Description Cramping  --    Comfort Goal Comfort with Movement;Perform Activity  --    Therapist Pain Assessment During Activity  --    Cognition    Level of Consciousness Alert Alert   Functional Level of Assist   Grooming Modified Independent;Seated Modified Independent;Seated   Toileting Supervision Minimal Assist   Toileting Description Grab bar;Increased time;Supervision for safety  (SPV for anterior pericare) Assist for hygiene;Grab bar;Increased time;Supervision for safety  (Assist with posterior pericare following scant BM)   Bed, Chair, Wheelchair Transfer Supervised  --    Bed Chair Wheelchair Transfer Description Adaptive equipment;Increased time  --    Toilet Transfers Supervised Supervised   Toilet Transfer Description Grab bar;Adaptive equipment;Increased time;Supervision for safety;Verbal cueing  (Cues for wound vac management, w/c placement, and sequencing. Pt placed w/c too close initially, then unlocked 1 break prior to transfer.) Grab bar;Adaptive equipment;Increased time;Supervision for safety;Verbal cueing  (Pt completed with good w/c placement, but challenged to find a place for wound vac. Good locking of breaks.)   Interdisciplinary Plan of Care Collaboration   IDT Collaboration with  Physician  Physician;Nursing   Patient Position at End of Therapy Seated;Tray Table within Reach;Call Light within Reach Seated;Tray Table within Reach;Call Light within Reach   Collaboration Comments re: R-flank spasm re: emotional state and requesting nursing to disconnect IV     1100 - Education on thoracic splinting with crossing arms over torso when bracing for sneezing and cough with reports of R-flank spasm during both events. Applied heat for comfort, and discussed bed positioning to improve overall posture.    1230 - Continued discussion of toilet hygiene products with samples, and pt reporting that she plans to order a portable bidet. Made a list of DME recommendations with pt's plan to have family procure prior to DC home including tub transfer bench, GB for shower and near toilet, safety bars for toilet, and portable bidet.  Initiated discussion of ostomy care with pt becoming emotionally labile with tears, reporting previous family members passing due to colon cancer. OTR utilized therapeutic use of self, and active listening to sooth pt with increased time for effect, but able to calm pt and redirect. Discussed writing out each step of ostomy care before beginning to complete hands on portion, which pt was amendable to complete, though not during session.      Assessment    Pt with good physical tolerance to tx, but poor emotional tolerance due to previous traumas in her life. Pt will benefit from neuropsychic consult to assist with processing ostomy care going forward. Pt completed all in room mobility at Mod I level with w/c, but continues to require SPV for functional transfers with wound vac management.  Strengths: Able to follow instructions, Alert and oriented, Effective communication skills, Good balance, Good carryover of learning, Good insight into deficits/needs, Independent prior level of function, Making steady progress towards goals, Motivated for self care and independence, Pleasant and cooperative,  Supportive family, Willingly participates in therapeutic activities  Barriers: Fatigue, Generalized weakness, Impaired activity tolerance, Limited mobility, Pain    Plan    ADLs with AE training, ostomy care/management with writing out each step of care. IADLs, overall endurance and strengthening, Kitchen mobility    Occupational Therapy Goals (Active)       Problem: Bathing       Dates: Start: 12/24/22         Goal: STG-Within one week, patient will bathe with CGA using DME/AE PRN.       Dates: Start: 12/24/22               Problem: Dressing       Dates: Start: 12/24/22         Goal: STG-Within one week, patient will dress LB with Mod A using DME/AE PRN.       Dates: Start: 12/24/22               Problem: Functional Transfers       Dates: Start: 12/24/22         Goal: STG-Within one week, patient will transfer to toilet with SBA.       Dates: Start: 12/24/22               Problem: OT Long Term Goals       Dates: Start: 12/24/22         Goal: LTG-By discharge, patient will complete basic self care tasks with SPV-Mod I using DME/AE PRN.       Dates: Start: 12/24/22            Goal: LTG-By discharge, patient will perform bathroom transfers with SPV-Mod I using DME/AE PRN.       Dates: Start: 12/24/22            Goal: LTG-By discharge, patient will complete basic home management with SPV-Mod I using DME/AE PRN.       Dates: Start: 12/24/22               Problem: Toileting       Dates: Start: 12/24/22         Goal: STG-Within one week, patient will complete toileting tasks with Mod A using DME/AE PRN.       Dates: Start: 12/24/22

## 2022-12-29 NOTE — THERAPY
Physical Therapy   Daily Treatment     Patient Name: Maira Johnson  Age:  77 y.o., Sex:  female  Medical Record #: [id number]  Today's Date: 12/29/2022     Precautions  Precautions: (P) Fall Risk  Comments: (P) Abdominal wound vac, new ostomy, hx of mastectomy limb alert RUE    Subjective    Patient seated in w/c and agreeable to therapy.     Objective       12/29/22 0901   PT Charge Group   PT Gait Training (Units) 3   PT Therapeutic Activities (Units) 1   PT Total Time Spent   PT Individual Total Time Spent (Mins) 60   Precautions   Precautions Fall Risk   Comments Abdominal wound vac, new ostomy, hx of mastectomy limb alert RUE   Gait Functional Level of Assist    Gait Level Of Assist Supervised   Assistive Device 4 Wheel Walker   Distance (Feet) 1500  (indoors and outdoors)   # of Times Distance was Traveled 1   Deviation   (slow hardy)   Transfer Functional Level of Assist   Bed, Chair, Wheelchair Transfer Supervised   Bed Chair Wheelchair Transfer Description Adaptive equipment;Increased time  (4WW)   Toilet Transfers Supervised   Toilet Transfer Description Adaptive equipment;Grab bar;Increased time  (4WW approach)   Bed Mobility    Sit to Stand Supervised   Scooting Modified Independent   Rolling Modified Independent   Interdisciplinary Plan of Care Collaboration   IDT Collaboration with  Nursing;Occupational Therapist   Patient Position at End of Therapy Edge of Bed;Call Light within Reach;Tray Table within Reach;Phone within Reach   Collaboration Comments CLOF     Education and discussion about ostomy management and patient's improved mentality toward it. Emotional support and encouragement provided.    Donning of R breast prosthesis seated in w/c with supervision.    Outdoor ambulation with 4WW and supervision over uneven, varied terrain. Patient able to negotiate safely with modifications without cuing/prompting. Able to reach outside MAVIS with single UE support on 4WW to reach for pinecones with SBA.  Required one cue for brake engagement for seated rest on 4WW prior to sitting.     Assessment    Patient tolerated session well, improving activity tolerance/endurance today. Biggest barrier continues to be ostomy management.    Strengths: Able to follow instructions, Adequate strength, Effective communication skills, Independent prior level of function, Making steady progress towards goals, Pleasant and cooperative, Supportive family, Willingly participates in therapeutic activities  Barriers: Decreased endurance, Fatigue, Generalized weakness, Impaired activity tolerance    Plan    Set goal c/ pt to eat all meals OOB. Gait endurance with 4WW. Standing tolerance, general strengthening.     Passport items to be completed:  Get in/out of bed safely, in/out of a vehicle, safely use mobility device, walk or wheel around home/community, navigate up and down stairs, show how to get up/down from the ground, ensure home is accessible, demonstrate HEP, complete caregiver training      Physical Therapy Problems (Active)       Problem: Mobility       Dates: Start: 12/24/22         Goal: STG-Within one week, patient will ambulate 150 ft with FWW and SBA.        Dates: Start: 12/24/22               Problem: Mobility Transfers       Dates: Start: 12/24/22         Goal: STG-Within one week, patient will transfer bed to chair with spv-SBA.        Dates: Start: 12/24/22            Goal: STG-Within one week, patient will move supine to sit with CGA using bed functions.        Dates: Start: 12/24/22               Problem: PT-Long Term Goals       Dates: Start: 12/24/22         Goal: LTG-By discharge, patient will ambulate 100+ ft indoors/outdoors with LRD and mod I.        Dates: Start: 12/24/22            Goal: LTG-By discharge, patient will transfer one surface to another using LRD and mod I.        Dates: Start: 12/24/22            Goal: LTG-By discharge, patient will transfer in/out of a car with SPV.        Dates: Start:  12/24/22            Goal: LTG-By discharge, patient will ambulate up/down ramp with LRD and mod I.       Dates: Start: 12/24/22

## 2022-12-29 NOTE — PROGRESS NOTES
"Rehab Progress Note     Encounter Date: 12/29/2022    CC: s/p intra abdominal abscess     Interval Events (Subjective)  Vitals reviewed:  WNL, intermittent HTN , remains afebrile   Labs reviewed: WBC improved to 12 12/28   Patient seen and examined at bedside. Patient tells me that her house is being renovated, and will not be done soon. Patient was tearful when discussing not being able to go home to her house, she will need to go to her neighbors house with 4 MURALI. Patient willing to dc to neighbors [place]. Wound vac changed yesterday, planning for changes on Friday. Reports lidocaine patch helps with rib pain. No other complaints.        Objective:  Physical Exam:  Vitals: BP (!) 140/75   Pulse 87   Temp 36.7 °C (98.1 °F) (Oral)   Resp 18   Ht 1.6 m (5' 3\")   Wt 108 kg (238 lb 1.6 oz)   SpO2 94%   Gen: NAD, seated comfortably in manual wheelchair   Mood: tearful when discussing housing set up   Head:  NC/AT  Eyes/ Nose/ Mouth: PERRLA, moist mucous membranes  Cardio: RRR, good distal perfusion, warm extremities  Pulm: normal respiratory effort, no cyanosis   Abd: Soft NTND, ostomy bag just recently emptied.  No erythema at abdominal incision  Ext: No peripheral edema. No calf tenderness. No clubbing.      Mental status:  A&Ox4 (person, place, date, situation) answers questions appropriately follows commands  Speech: fluent, no aphasia or dysarthria        Recent Results (from the past 72 hour(s))   Cdiff By PCR Rflx Toxin    Collection Time: 12/26/22 10:15 AM   Result Value Ref Range    C Diff by PCR See Toxin Negative    [id number] Presumptive Negative Negative   C Diff Toxin    Collection Time: 12/26/22 10:15 AM   Result Value Ref Range    C.Diff Toxin A&B Negative    URINALYSIS    Collection Time: 12/26/22 11:45 AM    Specimen: Urine, Clean Catch   Result Value Ref Range    Color Yellow     Character Clear     Specific Gravity 1.024 <1.035    Ph 7.0 5.0 - 8.0    Glucose Negative Negative mg/dL    Ketones " Negative Negative mg/dL    Protein Negative Negative mg/dL    Bilirubin Negative Negative    Urobilinogen, Urine 0.2 Negative    Nitrite Negative Negative    Leukocyte Esterase Negative Negative    Occult Blood Negative Negative    Micro Urine Req see below    CBC WITHOUT DIFFERENTIAL    Collection Time: 12/27/22  5:40 AM   Result Value Ref Range    WBC 13.8 (H) 4.8 - 10.8 K/uL    RBC 4.09 (L) 4.20 - 5.40 M/uL    Hemoglobin 11.7 (L) 12.0 - 16.0 g/dL    Hematocrit 36.2 (L) 37.0 - 47.0 %    MCV 88.5 81.4 - 97.8 fL    MCH 28.6 27.0 - 33.0 pg    MCHC 32.3 (L) 33.6 - 35.0 g/dL    RDW 45.2 35.9 - 50.0 fL    Platelet Count 466 (H) 164 - 446 K/uL    MPV 9.6 9.0 - 12.9 fL   MAGNESIUM    Collection Time: 12/27/22  5:40 AM   Result Value Ref Range    Magnesium 2.1 1.5 - 2.5 mg/dL   PHOSPHORUS    Collection Time: 12/27/22  5:40 AM   Result Value Ref Range    Phosphorus 2.9 2.5 - 4.5 mg/dL   CBC WITHOUT DIFFERENTIAL    Collection Time: 12/28/22  5:28 AM   Result Value Ref Range    WBC 12.0 (H) 4.8 - 10.8 K/uL    RBC 4.07 (L) 4.20 - 5.40 M/uL    Hemoglobin 11.6 (L) 12.0 - 16.0 g/dL    Hematocrit 36.4 (L) 37.0 - 47.0 %    MCV 89.4 81.4 - 97.8 fL    MCH 28.5 27.0 - 33.0 pg    MCHC 31.9 (L) 33.6 - 35.0 g/dL    RDW 45.9 35.9 - 50.0 fL    Platelet Count 464 (H) 164 - 446 K/uL    MPV 9.5 9.0 - 12.9 fL   Basic Metabolic Panel    Collection Time: 12/28/22  5:28 AM   Result Value Ref Range    Sodium 140 135 - 145 mmol/L    Potassium 4.0 3.6 - 5.5 mmol/L    Chloride 107 96 - 112 mmol/L    Co2 23 20 - 33 mmol/L    Glucose 90 65 - 99 mg/dL    Bun 15 8 - 22 mg/dL    Creatinine 0.80 0.50 - 1.40 mg/dL    Calcium 8.9 8.5 - 10.5 mg/dL    Anion Gap 10.0 7.0 - 16.0   ESTIMATED GFR    Collection Time: 12/28/22  5:28 AM   Result Value Ref Range    GFR (CKD-EPI) 75 >60 mL/min/1.73 m 2       Current Facility-Administered Medications   Medication Frequency    lidocaine (LIDODERM) 5 % 1 Patch Q24HR    ampicillin/sulbactam (UNASYN) 1.5 g in  mL IVPB  Q6HRS    metoprolol SR (TOPROL XL) tablet 37.5 mg Q DAY    Pharmacy Consult Request PHARMACY TO DOSE    Respiratory Therapy Consult Continuous RT    Pharmacy Consult Request ...Pain Management Review 1 Each PHARMACY TO DOSE    senna-docusate (PERICOLACE or SENOKOT S) 8.6-50 MG per tablet 2 Tablet BID    And    polyethylene glycol/lytes (MIRALAX) PACKET 1 Packet QDAY PRN    And    magnesium hydroxide (MILK OF MAGNESIA) suspension 30 mL QDAY PRN    And    bisacodyl (DULCOLAX) suppository 10 mg QDAY PRN    omeprazole (PRILOSEC) capsule 20 mg DAILY    mag hydrox-al hydrox-simeth (MAALOX PLUS ES or MYLANTA DS) suspension 20 mL Q2HRS PRN    ondansetron (ZOFRAN ODT) dispertab 4 mg 4X/DAY PRN    Or    ondansetron (ZOFRAN) syringe/vial injection 4 mg 4X/DAY PRN    traZODone (DESYREL) tablet 50 mg QHS PRN    sodium chloride (OCEAN) 0.65 % nasal spray 2 Spray PRN    oxyCODONE immediate-release (ROXICODONE) tablet 2.5 mg Q3HRS PRN    Or    oxyCODONE immediate-release (ROXICODONE) tablet 5 mg Q3HRS PRN    enoxaparin (Lovenox) inj 40 mg DAILY AT 1800       Orders Placed This Encounter   Procedures    Diet Order Diet: Regular     Standing Status:   Standing     Number of Occurrences:   1     Order Specific Question:   Diet:     Answer:   Regular [1]     _____________________________________  Interdisciplinary Team Conference   Most recent IDT on 12/29/2022    ICrystal D.O. was present and led the interdisciplinary team conference on 12/29/2022.  I led the IDT conference and agree with the IDT conference documentation and plan of care as noted below.     Nursing:  Diet Current Diet Order   Procedures    Diet Order Diet: Regular       Eating ADL Independent      % of Last Meal  Oral Nutrition: Dinner, Between % Consumed   Sleep    Bowel Last BM: 12/27/22   Bladder Ostomy care    Barriers to Discharge Home: wound vac,       Physical Therapy:  Bed Mobility    Transfers Supervised  Adaptive equipment, Increased time    Mobility Supervised   Stairs    Barriers to Discharge Home: stair training to DC to [facility]       Occupational Therapy:  Grooming Modified Independent, Seated   Bathing Moderate Assist   UB Dressing Supervision   LB Dressing Standby Assist   Toileting Minimal Assist   Shower & Transfer    Barriers to Discharge Home: ability to be less tearful with ostomy and ADLs, education for ostomy and step by step training with wound care       Respiratory Therapy:  O2 (LPM): 0  O2 Delivery Device: None - Room Air    Case Management:  Continues to work on disposition and DME needs.      Discharge Date/Disposition:  To home on 1/4 to [facility] with home health and wound care   _____________________________________        Assessment:  Active Hospital Problems    Diagnosis     *Intra-abdominal abscess ([facility])     Leukocytosis     Thrombocytosis     Diverticulitis     H/O abdominal abscess     CHEPE (acute kidney injury) ([facility])     Hypokalemia     Postoperative hypoxia     Class 3 severe obesity due to excess calories with serious comorbidity and body mass index (BMI) of 40.0 to 44.9 in adult ([facility])     Hyperglycemia     Diverticulitis of intestine with perforation and abscess     Hypertension        Medical Decision Making and Plan:  Debility status post diverticulitis and intra-abdominal abscess  - CT abdomen at outside facility showed multiple intra-abdominal abscesses and diverticulitis  - Abscesses drained by IR on 12/13  - 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  - Started on Unasyn for increased WBCs after completing Augmentin.  - Stopped lactobacillus, patient has increased gas at ostomy site  - Wound VAC in place, wound care to assist with wound VAC changes and ostomy education, will reach out to wound care team about next change for  wound VAC.  - Initiate comprehensive IRF level therapy with PT/OT/SLP  - Follow-up with general surgery      Leukocytosis  - Repeat CT abdomen/pelvis negative for abscess  - WBC is continuing to trend up, hospitalist consulted  - WBC up to 13.8 on 12/27, to resumed Unasyn , plan to stop date 1/1  - WBC improved to 12 12/28     Hypertension  - Home dose metoprolol  - BP well controlled, 12/20     Hypoxia  - Postoperatively patient has required up to 3 L O2  -Respiratory therapy consulted,  - Has been weaned to room air     Acute blood loss anemia  - Hemoglobin stable around 11-12     Hypocalcemia  -Resolved     Pain  - Scheduled Tylenol oxycodone  - Lidocaine patch to right side muscle spasms     Bowel  - Monitor ostomy output  - PRN stool softeners     Skin - Patient at risk for skin breakdown due to debility in areas including sacrum, achilles, elbows and head in addition to other sites. Nursing to assess skin daily.  - Wound care to follow along changes, wound vac changes 3 x per week      GI Ppx - Patient on Prilosec for GERD prophylaxis. Patient on Senna-docusate for constipation prophylaxis.      DVT Ppx - Patient Lovenox on transfer.    Total time:  45  minutes.  I spent greater than 50% of the time for patient care and coordination on this date, including unit/floor time, and face-to-face time with the patient as per assessment and plan above including  leading team conference.     Crystal Jones D.O.

## 2022-12-29 NOTE — CARE PLAN
Problem: Pain - Standard  Goal: Alleviation of pain or a reduction in pain to the patient’s comfort goal  Outcome: Progressing  Note: Assessed for pain and discomfort . Pain under control, needs anticipated and attended.     Problem: Skin Integrity  Goal: Skin integrity is maintained or improved  Outcome: Progressing  Note: Abdominal incision line with wound vac at 125 mm/hg suction, Colostomy site intact, cont monitored.   The patient is Stable - Low risk of patient condition declining or worsening    Shift Goals  Clinical Goals: Pain management  Patient Goals: safety/sleep    Progress made toward(s) clinical / shift goals:  Pt pain under contro, able to sleep well.

## 2022-12-29 NOTE — CARE PLAN
Problem: Mobility  Goal: STG-Within one week, patient will ambulate 150 ft with FWW and SBA.   Outcome: Met     Problem: Mobility Transfers  Goal: STG-Within one week, patient will transfer bed to chair with spv-SBA.   Outcome: Met  Goal: STG-Within one week, patient will move supine to sit with CGA using bed functions.   Outcome: Met

## 2022-12-29 NOTE — THERAPY
Occupational Therapy  Daily Treatment     Patient Name: Maira Johnson  Age:  77 y.o., Sex:  female  Medical Record #: [id number]  Today's Date: 12/29/2022     Precautions  Precautions: Fall Risk  Comments: Abdominal wound vac, new ostomy, hx of mastectomy limb alert RUE         Subjective    Pt intermittently tearful during both sessions attempted this day when discussing ostomy and DC planning. Able to redirect with therapeutic use of self and active listening. Pt calm by end of each session.     Objective       12/29/22 1031 12/29/22 1401   OT Charge Group   OT Self Care / ADL (Units) 1 1   OT Therapy Activity (Units) 3 1   OT Total Time Spent   OT Individual Total Time Spent (Mins) 60 30   Functional Level of Assist   Toileting Minimal Assist Minimal Assist   Toileting Description Assist for hygiene;Grab bar;Increased time;Supervision for safety  (Assist with posterior pericare following scant BM) Assist for hygiene;Adaptive equipment;Increased time;Supervision for safety;Verbal cueing;Set-up of equipment;Initial preparation for task  (Min A for empty of ostomy into container. educated on each step with pt moderatly tearful, but agreeable to attempt.)   Bed, Chair, Wheelchair Transfer Supervised Modified Independent   Bed Chair Wheelchair Transfer Description Increased time  (SPV-Mod I with w/c only. pt able to manage wound vac)   (for stand pivot with w/c>EOB)   Toilet Transfers Supervised  --    Toilet Transfer Description Increased time  (SPV-Mod I with w/c only. pt able to manage wound vac)  --    Bed Mobility    Supine to Sit Modified Independent  --    Sit to Supine Modified Independent  --    Scooting Modified Independent  --    Rolling Modified Independent  --    IDT Conference   IDT Conference  --  I have reviewed and discussed the POC and barriers to discharge for this patient.  I am knowledgeable of the patient's needs and have attended the IDT Conference.   Interdisciplinary Plan of Care Collaboration    IDT Collaboration with  Nursing;Physical Therapist Nursing;Physical Therapist   Patient Position at End of Therapy Seated;Call Light within Reach;Tray Table within Reach In Bed;Call Light within Reach   Collaboration Comments re: CLOF re: empty of ostomy     1030 - Pt completed order of DME (tub transfer bench, portable bidet, and 4WW. Pt plans to order bed rail as well. Pt provided pictures of her already delivered toilet bars, which are appropriate and adequate. Discussed pt's ability to complete ostomy care and wrote out each step with nursing approval as listed below.    1400 - informed pt of her DC date following conference of 1/4. [place] that pt's son is renovating her house. She will now be staying at her neighbors who have 4 steps to enter, an extra bed, and a tub shower. She will have DME that can accommodate that setup.   Pt completed folding of ostomy and then empty with emotional support. Pt was proud to complete following, but exhausted.  Pt is now Mod I in room at w/c level only.    Assessment    Pt with variable emotions this day. Overwhelmed at times, but agreeable to all tasks attempted. She completed all transfers at Mod I level, managing wound vac well. She overcame her primary fear of emptying ostomy, though through tears.   Strengths: Able to follow instructions, Alert and oriented, Effective communication skills, Good balance, Good carryover of learning, Good insight into deficits/needs, Independent prior level of function, Making steady progress towards goals, Motivated for self care and independence, Pleasant and cooperative, Supportive family, Willingly participates in therapeutic activities  Barriers: Fatigue, Generalized weakness, Impaired activity tolerance, Limited mobility, Pain    Plan    Shower tomorrow.     Ostomy care/management. Continue to have pt complete empty with therapist support to overcome mental block.    ADLs with AE training, IADLs, overall endurance and  strengthening, Kitchen mobility    Occupational Therapy Goals (Active)       Problem: Bathing       Dates: Start: 12/24/22         Goal: STG-Within one week, patient will bathe with CGA using DME/AE PRN.       Dates: Start: 12/24/22               Problem: Dressing       Dates: Start: 12/29/22         Goal: STG-Within one week, patient will dress LB with SBA using DME/AE PRN       Dates: Start: 12/29/22               Problem: Functional Transfers       Dates: Start: 12/29/22         Goal: STG-Within one week, patient will transfer to toilet with Mod I using 4WW.       Dates: Start: 12/29/22               Problem: OT Long Term Goals       Dates: Start: 12/24/22         Goal: LTG-By discharge, patient will complete basic self care tasks with SPV-Mod I using DME/AE PRN.       Dates: Start: 12/24/22            Goal: LTG-By discharge, patient will perform bathroom transfers with SPV-Mod I using DME/AE PRN.       Dates: Start: 12/24/22            Goal: LTG-By discharge, patient will complete basic home management with SPV-Mod I using DME/AE PRN.       Dates: Start: 12/24/22               Problem: Toileting       Dates: Start: 12/29/22         Goal: STG-Within one week, patient will complete toileting tasks with SPV using DME/AE PRN       Dates: Start: 12/29/22

## 2022-12-29 NOTE — THERAPY
"Physical Therapy   Daily Treatment     Patient Name: Maira Johnson  Age:  77 y.o., Sex:  female  Medical Record #: [id number]  Today's Date: 12/29/2022     Precautions  Precautions: (P) Fall Risk  Comments: (P) Abdominal wound vac, new ostomy, hx of mastectomy limb alert RUE    Subjective    Patient seated in w/c and tearful. \"Someone said that I'm going home on Monday and my house is being renovated and isn't liveable.\"     Objective       12/29/22 1301   PT Charge Group   PT Therapeutic Activities (Units) 2   PT Total Time Spent   PT Individual Total Time Spent (Mins) 30   Precautions   Precautions Fall Risk   Comments Abdominal wound vac, new ostomy, hx of mastectomy limb alert RUE   Interdisciplinary Plan of Care Collaboration   IDT Collaboration with  Nursing;Occupational Therapist   Patient Position at End of Therapy Seated;Self Releasing Lap Belt Applied;Call Light within Reach;Tray Table within Reach;Phone within Reach   Collaboration Comments CLOF, change in d/c disposition     Emotional support and education provided due to patient's unplanned change in discharge disposition, which is very upsetting to patient. Patient reports she will be able to stay with neighbors ([place] and [place]) however there are stairs to access home (~4). Patient in agreement to trial stairs tomorrow.    Assessment    Patient tolerated session poorly, very anxious and upset about discharge plan changes. Improved emotional disposition at end of session and verbalized motivation to trial stairs tomorrow.    Strengths: Able to follow instructions, Adequate strength, Effective communication skills, Independent prior level of function, Making steady progress towards goals, Pleasant and cooperative, Supportive family, Willingly participates in therapeutic activities  Barriers: Decreased endurance, Fatigue, Generalized weakness, Impaired activity tolerance    Plan    Gait endurance with 4WW. Standing tolerance, general strengthening, stair " negotiation.     Passport items to be completed:  Get in/out of bed safely, in/out of a vehicle, safely use mobility device, walk or wheel around home/community, navigate up and down stairs, show how to get up/down from the ground, ensure home is accessible, demonstrate HEP, complete caregiver training        Physical Therapy Problems (Active)       Problem: PT-Long Term Goals       Dates: Start: 12/24/22         Goal: LTG-By discharge, patient will ambulate 100+ ft indoors/outdoors with LRD and mod I.        Dates: Start: 12/24/22            Goal: LTG-By discharge, patient will transfer one surface to another using LRD and mod I.        Dates: Start: 12/24/22            Goal: LTG-By discharge, patient will transfer in/out of a car with SPV.        Dates: Start: 12/24/22            Goal: LTG-By discharge, patient will ambulate up/down ramp with LRD and mod I.       Dates: Start: 12/24/22

## 2022-12-29 NOTE — CARE PLAN
Problem: Bathing  Goal: STG-Within one week, patient will bathe with CGA using DME/AE PRN.  Outcome: Progressing     Problem: Dressing  Goal: STG-Within one week, patient will dress LB with Mod A using DME/AE PRN.  Outcome: Met     Problem: Toileting  Goal: STG-Within one week, patient will complete toileting tasks with Mod A using DME/AE PRN.  Outcome: Met     Problem: Functional Transfers  Goal: STG-Within one week, patient will transfer to toilet with SBA.  Outcome: Met

## 2022-12-29 NOTE — CARE PLAN
Problem: Pain - Standard  Goal: Alleviation of pain or a reduction in pain to the patient’s comfort goal  Outcome: Progressing  Patient is able to rate pain on a scale of 1-10.            Problem: Infection  Goal: Patient will remain free from infection  Outcome: Progressing   Patient verbalized understanding infection prevention education.

## 2022-12-29 NOTE — PROGRESS NOTES
Hospital Medicine Daily Progress Note      Chief Complaint  Hypertension  Leukocytosis    Interval Problem Update  Pain is better after getting Lidocaine patch.    Review of Systems  Review of Systems   Constitutional:  Negative for fever.   Eyes:  Negative for blurred vision.   Respiratory:  Negative for shortness of breath.    Cardiovascular:  Negative for palpitations.   Gastrointestinal:  Negative for nausea and vomiting.   Neurological:  Negative for dizziness and headaches.   Psychiatric/Behavioral:  Negative for hallucinations.       Physical Exam  Temp:  [36.7 °C (98.1 °F)-36.9 °C (98.5 °F)] 36.7 °C (98.1 °F)  Pulse:  [] 87  Resp:  [18] 18  BP: (101-140)/(54-75) 140/75  SpO2:  [92 %-94 %] 94 %    Physical Exam  Vitals and nursing note reviewed.   Constitutional:       General: She is not in acute distress.  HENT:      Mouth/Throat:      Mouth: Mucous membranes are moist.      Pharynx: Oropharynx is clear.   Eyes:      General: No scleral icterus.  Cardiovascular:      Rate and Rhythm: Normal rate and regular rhythm.      Heart sounds: No murmur heard.  Pulmonary:      Effort: Pulmonary effort is normal.      Breath sounds: Normal breath sounds. No stridor.   Abdominal:      General: There is no distension.      Palpations: Abdomen is soft.      Tenderness: There is no abdominal tenderness.      Comments: Lower midline abdominal incision with wound vac   Musculoskeletal:      Cervical back: No rigidity.      Right lower leg: No edema.      Left lower leg: No edema.   Skin:     General: Skin is warm and dry.      Findings: No rash.   Neurological:      Mental Status: She is alert and oriented to person, place, and time.   Psychiatric:         Mood and Affect: Mood normal.         Behavior: Behavior normal.       Fluids    Intake/Output Summary (Last 24 hours) at 12/29/2022 0864  Last data filed at 12/29/2022 4533  Gross per 24 hour   Intake 800 ml   Output 100 ml   Net 700 ml         Laboratory  Recent  Labs     12/27/22  0540 12/28/22  0528   WBC 13.8* 12.0*   RBC 4.09* 4.07*   HEMOGLOBIN 11.7* 11.6*   HEMATOCRIT 36.2* 36.4*   MCV 88.5 89.4   MCH 28.6 28.5   MCHC 32.3* 31.9*   RDW 45.2 45.9   PLATELETCT 466* 464*   MPV 9.6 9.5       Recent Labs     12/28/22  0528   SODIUM 140   POTASSIUM 4.0   CHLORIDE 107   CO2 23   GLUCOSE 90   BUN 15   CREATININE 0.80   CALCIUM 8.9                     Assessment/Plan  Leukocytosis  Assessment & Plan  WBC's: 13.8 --> 12.0 (12/28)  Afebrile  BC (12/25): neg  U/A (12/26): neg  Procalcitonin normal  C. Diff: neg  CXR: shows atelectasis  CT Abd/Pelvis: negative for abscess  See other assessment for abx for perf diverticulitis    Diverticulitis of intestine with perforation and abscess- (present on admission)  Assessment & Plan  Had intra-abd abscess -- s/p IR drainage on 12/14/22  Complicated by perf viscus --> s/p ex lap w/ colostomy creation on 12/18/22  Intra-op cx polymicrobial  S/P Zosyn   On Unasyn (thru 1/1)   Note: was to follow with Augmentin per ID            per Dr. Gong: will resume Unasyn given escalating WBC with no new infectious source found    Hypertension- (present on admission)  Assessment & Plan  BP ok, occ rises up a little  On Toprol XL -- dose increased 12/28  Monitor

## 2022-12-29 NOTE — WOUND TEAM
[facility]  Inpatient Services  Initial Wound and Skin Care Evaluation    Admission Date: 12/23/2022     Last order of IP CONSULT TO WOUND CARE was found on 12/23/2022 from Hospital Encounter on 12/21/2022     HPI, PMH, SH: Reviewed    Past Surgical History:   Procedure Laterality Date    [place] EXPLORATORY OF ABDOMEN  12/18/2022    Procedure: LAPAROTOMY, EXPLORATORY WITH OSTOMY CREATION;  Surgeon: Huseyin Asher M.D.;  Location: SURGERY [facility];  Service: General    GYN SURGERY Right 11/01/2013    Right mastecomy    TUBAL LIGATION  01/01/1975     Social History     Tobacco Use    Smoking status: Never     Passive exposure: Never    Smokeless tobacco: Never   Substance Use Topics    Alcohol use: Never     No chief complaint on file.    Diagnosis: Diverticulitis [K57.92]  Intra-abdominal abscess (HCC) [K65.1]  H/O abdominal abscess [Z87.898]    Unit where seen by Wound Team: [facility]06/02     WOUND CONSULT/FOLLOW UP RELATED TO:  VAC change abd and ostomy education     WOUND HISTORY:  Pt is a 77yr old female with history of HTN and diverticulitis who presented with abdominal pain. Pt initially presented to [facility]. Pt has multiple episodes of diverticulitis which was treated with IV ABX. Pt was found to have perforated sigmoid diverticulitis with pelvic abscess and pneumoperitoneum. Pt underwent surgical intervention. Fascia was closed but skin was left open and wound was requested to place vac and begin ostomy education.    WOUND ASSESSMENT/LDA       Negative Pressure Wound Therapy 12/19/22 Surgical Abdomen (Active)   NPWT Pump Mode / Pressure Setting [place];Continuous;125 mmHg 12/28/22 1700   Dressing Type Black Foam (Regular);Medium 12/28/22 1700   Number of Foam Pieces Used 2 12/28/22 1700   Canister Changed No 12/28/22 1700   Output (mL) 0 mL 12/23/22 1919   NEXT Dressing Change/Treatment Date 12/30/22 12/28/22 1700   WOUND NURSE ONLY - Time Spent with Patient (mins) 60 12/28/22 1700            Wound 12/18/22 Full Thickness Wound Abdomen Mid (Active)   Wound Image   12/28/22 1700   Site Assessment Red 12/28/22 1700   Periwound Assessment Intact 12/28/22 1700   Margins Defined edges;Unattached edges 12/28/22 1700   Closure Secondary intention 12/28/22 1700   Drainage Amount Small 12/28/22 1700   Drainage Description Serosanguineous 12/28/22 1700   Treatments Cleansed;Site care 12/28/22 1700   Wound Cleansing Approved Wound Cleanser 12/28/22 1700   Periwound Protectant Skin Protectant Wipes to Periwound;Drape 12/28/22 1700   Dressing Cleansing/Solutions Not Applicable 12/28/22 1700   Dressing Options Wound Vac 12/28/22 1700   Dressing Changed Changed 12/28/22 1700   Dressing Status Clean;Dry;Intact 12/28/22 1700   Dressing Change/Treatment Frequency Monday, Wednesday, Friday, and As Needed 12/28/22 1700   NEXT Dressing Change/Treatment Date 12/30/22 12/28/22 1700   NEXT Weekly Photo (Inpatient Only) 01/04/23 12/28/22 1700   Non-staged Wound Description Full thickness 12/28/22 1700   Wound Length (cm) 15 cm 12/28/22 1700   Wound Width (cm) 3.8 cm 12/28/22 1700   Wound Depth (cm) 4 cm 12/28/22 1700   Wound Surface Area (cm^2) 57 cm^2 12/28/22 1700   Wound Volume (cm^3) 228 cm^3 12/28/22 1700   Wound Healing % 42 12/28/22 1700   Shape linear 12/28/22 1700   Wound Odor None 12/28/22 1700   Exposed Structures None 12/28/22 1700   WOUND NURSE ONLY - Time Spent with Patient (mins) 60 12/28/22 1700         Vascular:    MEME:   No results found.    Lab Values:    Lab Results   Component Value Date/Time    WBC 12.0 (H) 12/28/2022 05:28 AM    RBC 4.07 (L) 12/28/2022 05:28 AM    HEMOGLOBIN 11.6 (L) 12/28/2022 05:28 AM    HEMATOCRIT 36.4 (L) 12/28/2022 05:28 AM    CREACTPROT 10.84 (H) 12/14/2022 02:24 AM    HBA1C 6.0 (H) 12/24/2022 05:25 AM        Culture Results show:  Recent Results (from the past 720 hour(s))   CULTURE WOUND W/ GRAM STAIN    Collection Time: 12/18/22  2:59 PM    Specimen: Wound   Result Value  Ref Range    Significant Indicator POS (POS)     Source WND     Site Pelvic Abscess     Culture Result - (A)     Gram Stain Result       Many WBCs.  Many Gram positive cocci.  Rare Gram positive rods.      Culture Result Escherichia coli  Moderate growth   (A)     Culture Result Enterococcus avium  Moderate growth   (A)     Culture Result Streptococcus anginosus  Moderate growth   (A)        Susceptibility    Enterococcus avium - JOSE     Ampicillin <=2 Sensitive mcg/mL     Vancomycin 0.5 Sensitive mcg/mL     Daptomycin <=0.5 Sensitive mcg/mL     Gent Synergy <=500 Sensitive mcg/mL     Penicillin 1 Sensitive mcg/mL    Escherichia coli - JOSE     Ampicillin <=8 Sensitive mcg/mL     Ceftriaxone <=1 Sensitive mcg/mL     Cefazolin <=2 Sensitive mcg/mL     Ciprofloxacin <=0.25 Sensitive mcg/mL     Cefepime <=2 Sensitive mcg/mL     Cefuroxime <=4 Sensitive mcg/mL     Ertapenem <=0.5 Sensitive mcg/mL     Gentamicin <=2 Sensitive mcg/mL     Ampicillin/sulbactam <=4/2 Sensitive mcg/mL     Minocycline <=4 Sensitive mcg/mL     Moxifloxacin <=2 Sensitive mcg/mL     Pip/Tazobactam <=8 Sensitive mcg/mL     Trimeth/Sulfa 2/38 Sensitive mcg/mL     Tigecycline <=2 Sensitive mcg/mL     Tobramycin <=2 Sensitive mcg/mL       Pain Level/Medicated:  PO pain meds; still grimacing in pain during dressing change     INTERVENTIONS BY WOUND TEAM:  Chart and images reviewed. Discussed with bedside RN. All areas of concern (based on picture review, LDA review and discussion with bedside RN) have been thoroughly assessed. Documentation of areas based on significant findings. This RN in to assess patient. Performed standard wound care which includes appropriate positioning, dressing removal and non-selective debridement. Pictures and measurements obtained weekly if/when required.    Preparation for Dressing removal: Dressing soaked with Saline and Adhesive remover wipes  Non-selectively Debrided with:  Wound cleanser, Normal Saline, and Gauze    Sharp debridement: NA  Radha wound: Cleansed with Normal Saline, Prepped with No Sting and drape  Primary Dressing: Regular Black foam and Vac Drape  Secondary (Outer) Dressing:  NA    Interdisciplinary consultation: Patient, Bedside RN ,     EVALUATION / RATIONALE FOR TREATMENT:  Most Recent Date:    12/28/22: Wound bed remains clean and red, continues to granulate thus wound depth decreasing with each change. Continue POC.     12/26: Pt's wound appears smaller than previous picture. Ostomy appliance intact and overlapping vAC drsg. Wound bed with some granulation. Crease at umbilicus needs to be built up for VAC and appliance.      Goals: Steady decrease in wound area and depth weekly.    WOUND TEAM PLAN OF CARE ([X] for frequency of wound follow up,):   Nursing to follow dressing orders written for wound care. Contact wound team if area fails to progress, deteriorates or with any questions/concerns if something comes up before next scheduled follow up (See below as to whether wound is following and frequency of wound follow up)  Dressing changes by wound team:                   Follow up 3 times weekly:                NPWT change 3 times weekly:   x  Follow up 1-2 times weekly:      Follow up Bi-Monthly:           Follow up Monthly (High Risk):                        Follow up as needed:     Other (explain):     NURSING PLAN OF CARE ORDERS (X):  Dressing changes: See Dressing Care orders: x  Skin care: See Skin Care orders:   RN Prevention Protocol:   Rectal tube care: See Rectal Tube Care orders:   Other orders:    RSKIN:   CURRENTLY IN PLACE (X), APPLIED THIS VISIT (A), ORDERED (O):   Q shift Justo:  X  Q shift pressure point assessments:  X    Surface/Positioning   Pressure redistribution mattress  x          Low Airloss          ICU Low Airloss   Bariatric EVERT     Waffle cushion        Waffle Overlay          Reposition q 2 hours    remind and assist  TAPs Turning system     Z Franklin Pillow      Offloading/Redistribution Not assessed  Sacral Mepilex (Silicone dressing)     Heel Mepilex (Silicone dressing)         Heel float boots (Prevalon boot)             Float Heels off Bed with Pillows           Respiratory RA  Silicone O2 tubing         Gray Foam Ear protectors     Cannula fixation Device (Tender )          High flow offloading Clip    Elastic head band offloading device      Anchorfast                                                         Trach with Optifoam split foam             Containment/Moisture Prevention colostomy, bathroom    Rectal tube or BMS    Purwick/Condom Cath        [place] Catheter    Barrier wipes           Barrier paste       Antifungal tx      Interdry        Mobilization       Up to chair   x     Ambulate    with FWW  PT/OT  x    Nutrition       Dietician        Diabetes Education      PO  x   TF     TPN     NPO   # days     Other        Anticipated discharge plans: VAc and supplies, HH  LTACH:        SNF/Rehab:                  Home Health Care:           Outpatient Wound Center:            Self/Family Care:        Other:                  Vac Discharge Needs:   Not Applicable Pt not on a wound vac:       Regular Vac while inpatient, alternative dressing at DC:        Regular Vac in use and continued at DC:      x      Reg. Vac w/ Skin Sub/Biologic in use. Will need to be changed 2x wkly:      Veraflo Vac while inpatient, ok to transition to Regular Vac on Discharge:           Veraflo Vac while inpatient, will need to remain on Veraflo Vac upon discharge:                              [facility]  Inpatient Services  New Ostomy Management & Teaching    HPI:  Reviewed  PMH: Reviewed   SH: Reviewed    Past Surgical History:   Procedure Laterality Date    [place] EXPLORATORY OF ABDOMEN  12/18/2022    Procedure: LAPAROTOMY, EXPLORATORY WITH OSTOMY CREATION;  Surgeon: Huseyin Asher M.D.;  Location: SURGERY [facility];  Service: General    GYN SURGERY Right  "11/01/2013    Right mastecomy    TUBAL LIGATION  01/01/1975       Surgery Date: 12/18/22    Surgeon(s):  Huseyin Asher M.D.     Procedure(s):  LAPAROTOMY, EXPLORATORY      Permanence: unknown    Pertinent History: see above           Colostomy 12/18/22 (Active)   Wound Image   12/26/22 1700   Stomal Appliance Assessment Changed 12/28/22 1700   Stoma Assessment [place] 12/28/22 1700   Stoma Shape Irregular;Oval 12/28/22 1700   Stoma Size (in) 2 12/28/22 1700   Peristomal Assessment Clean;Dry;Intact 12/28/22 1700   Mucocutaneous Junction Intact 12/28/22 1700   Treatment Appliance Changed;Cleansed with water/washcloth;Site care 12/28/22 1700   Peristomal Protectant No Sting Skin Prep;Paste Ring 12/28/22 1700   Stomal Appliance 2 1/2\" 1-piece Fecal;Paste Ring, 2\" 12/28/22 1700   Output (mL) 100 mL 12/28/22 1700   Output Color Brown 12/28/22 1700   WOUND RN ONLY - Stomal Appliance  2 Piece;Paste Ring, 2\";2 1/2\" (64mm) CTF 12/28/22 1700   Appliance (Pouch) # 8531, [place] 7805 12/26/22 1700   Appliance Brand Miguel Ángel 12/28/22 1700   Appliance Supplier Prism 12/28/22 1700   Secure Start completed No 12/28/22 1700   WOUND NURSE ONLY - Time Spent with Patient (mins) 60 12/28/22 1700     Ostomy Appliance (type and size): One piece flat and 2\" Paste Ring    Interventions: Removed previous appliance, cleansed peristomal skin with warm water/washcloth, patted dry, made template and traced onto back of barrier. Barrier CTF, checked and applied paste ring  to barrier.  Appliance applied to skin and end closed    Pt education: Questions and concerns addressed    Needs for next visit:   Pt to perform more hands on--cut barrier and apply paste ring  Secure start   Verify if prism form submitted     Evaluation:     12/28/22: Pt unable to do hands on education today due to pain from VAC change. Close proximity of abdominal wound to stoma may cause difficulty for patient.     Stoma with output and flatus. Barely protuding above skin. Body " "habitus needs one piece r/t crease.     Flatus: Present  Stool Output: small, brown, and soft  Urine Output: NA, Fecal Ostomy  Diet: Regular  Mobility: Up to chair    Plan: Ostomy nurses to continue to follow for ostomy needs and teaching until discharge    Anticipated discharge needs: Supplies, supplier information, possible HH, outpatient ostomy clinic, Skilled Nursing/Rehab     Secure Start Signed Yes  Outpatient Referral Placed Declined  5 Sets of appliances in Ostomy bag for discharge Ordered    INSURANCE OPTIONS:                 [employer]         x     MediCARE/MEDICAID & All other Private Insurance companies (Prism Form)              MediCAID & Fee for Service (Care Chest Paperwork + Prism Form)                            Form signed/Catalog Marked and Copy left with patient OR medicaid paperwork given to patient      Anticipated Discharge Plans:  Home Health Care, Family Care, and Self Care    Ostomy Supplies for DC:  12in Lock n' Roll ONE piece with Filter 2\" (Miguel Ángel 31973), Skin Prep Wipes (3M [place] 3344) - 2 box per month, Adapt No-Sting Universal Remover Wipes (Miguel Ángel 6602) - 2 box per month, and Adapt Flat paste ring 2\" ([place] 3059) - 15 per month    "

## 2022-12-29 NOTE — CARE PLAN
Problem: Bowel Elimination  Goal: Patient will participate in bowel management program  Outcome: Not Progressing   Patient's last bowel movement was two days ago on 12/27/22; scheduled senna given this morning. Patient educated on the importance of drinking extra fluids.         Problem: Knowledge Deficit - Standard  Goal: Patient and family/care givers will demonstrate understanding of plan of care, disease process/condition, diagnostic tests and medications  Outcome: Progressing   Patient verbalized understanding plan of care.

## 2022-12-30 LAB
ANION GAP SERPL CALC-SCNC: 11 MMOL/L (ref 7–16)
BACTERIA BLD CULT: NORMAL
BUN SERPL-MCNC: 16 MG/DL (ref 8–22)
CALCIUM SERPL-MCNC: 8.8 MG/DL (ref 8.5–10.5)
CHLORIDE SERPL-SCNC: 104 MMOL/L (ref 96–112)
CO2 SERPL-SCNC: 22 MMOL/L (ref 20–33)
CREAT SERPL-MCNC: 0.72 MG/DL (ref 0.5–1.4)
ERYTHROCYTE [DISTWIDTH] IN BLOOD BY AUTOMATED COUNT: 46 FL (ref 35.9–50)
GFR SERPLBLD CREATININE-BSD FMLA CKD-EPI: 86 ML/MIN/1.73 M 2
GLUCOSE SERPL-MCNC: 96 MG/DL (ref 65–99)
HCT VFR BLD AUTO: 35.3 % (ref 37–47)
HGB BLD-MCNC: 11.1 G/DL (ref 12–16)
MCH RBC QN AUTO: 28.2 PG (ref 27–33)
MCHC RBC AUTO-ENTMCNC: 31.4 G/DL (ref 33.6–35)
MCV RBC AUTO: 89.6 FL (ref 81.4–97.8)
PLATELET # BLD AUTO: 428 K/UL (ref 164–446)
PMV BLD AUTO: 9.7 FL (ref 9–12.9)
POTASSIUM SERPL-SCNC: 4.3 MMOL/L (ref 3.6–5.5)
RBC # BLD AUTO: 3.94 M/UL (ref 4.2–5.4)
SIGNIFICANT IND 70042: NORMAL
SITE SITE: NORMAL
SODIUM SERPL-SCNC: 137 MMOL/L (ref 135–145)
SOURCE SOURCE: NORMAL
WBC # BLD AUTO: 11.3 K/UL (ref 4.8–10.8)

## 2022-12-30 PROCEDURE — 85027 COMPLETE CBC AUTOMATED: CPT

## 2022-12-30 PROCEDURE — 99232 SBSQ HOSP IP/OBS MODERATE 35: CPT | Performed by: HOSPITALIST

## 2022-12-30 PROCEDURE — 700102 HCHG RX REV CODE 250 W/ 637 OVERRIDE(OP): Performed by: HOSPITALIST

## 2022-12-30 PROCEDURE — 80048 BASIC METABOLIC PNL TOTAL CA: CPT

## 2022-12-30 PROCEDURE — 97530 THERAPEUTIC ACTIVITIES: CPT

## 2022-12-30 PROCEDURE — 700111 HCHG RX REV CODE 636 W/ 250 OVERRIDE (IP): Performed by: PHYSICAL MEDICINE & REHABILITATION

## 2022-12-30 PROCEDURE — 700102 HCHG RX REV CODE 250 W/ 637 OVERRIDE(OP): Performed by: PHYSICAL MEDICINE & REHABILITATION

## 2022-12-30 PROCEDURE — 99231 SBSQ HOSP IP/OBS SF/LOW 25: CPT | Performed by: PHYSICAL MEDICINE & REHABILITATION

## 2022-12-30 PROCEDURE — 97535 SELF CARE MNGMENT TRAINING: CPT

## 2022-12-30 PROCEDURE — A9270 NON-COVERED ITEM OR SERVICE: HCPCS | Performed by: PHYSICAL MEDICINE & REHABILITATION

## 2022-12-30 PROCEDURE — 36415 COLL VENOUS BLD VENIPUNCTURE: CPT

## 2022-12-30 PROCEDURE — A9270 NON-COVERED ITEM OR SERVICE: HCPCS | Performed by: HOSPITALIST

## 2022-12-30 PROCEDURE — 770010 HCHG ROOM/CARE - REHAB SEMI PRIVAT*

## 2022-12-30 PROCEDURE — 97116 GAIT TRAINING THERAPY: CPT

## 2022-12-30 PROCEDURE — 700101 HCHG RX REV CODE 250: Performed by: PHYSICAL MEDICINE & REHABILITATION

## 2022-12-30 PROCEDURE — 97605 NEG PRS WND THER DME<=50SQCM: CPT

## 2022-12-30 PROCEDURE — 700105 HCHG RX REV CODE 258: Performed by: HOSPITALIST

## 2022-12-30 PROCEDURE — 700111 HCHG RX REV CODE 636 W/ 250 OVERRIDE (IP): Performed by: HOSPITALIST

## 2022-12-30 RX ADMIN — SODIUM CHLORIDE 1.5 G: 900 INJECTION INTRAVENOUS at 17:44

## 2022-12-30 RX ADMIN — OXYCODONE 5 MG: 5 TABLET ORAL at 17:03

## 2022-12-30 RX ADMIN — SODIUM CHLORIDE 1.5 G: 900 INJECTION INTRAVENOUS at 05:46

## 2022-12-30 RX ADMIN — METOPROLOL SUCCINATE 37.5 MG: 25 TABLET, EXTENDED RELEASE ORAL at 05:44

## 2022-12-30 RX ADMIN — SENNOSIDES AND DOCUSATE SODIUM 2 TABLET: 50; 8.6 TABLET ORAL at 20:48

## 2022-12-30 RX ADMIN — LIDOCAINE 1 PATCH: 700 PATCH TOPICAL at 12:37

## 2022-12-30 RX ADMIN — SODIUM CHLORIDE 1.5 G: 900 INJECTION INTRAVENOUS at 00:01

## 2022-12-30 RX ADMIN — SENNOSIDES AND DOCUSATE SODIUM 2 TABLET: 50; 8.6 TABLET ORAL at 08:10

## 2022-12-30 RX ADMIN — SODIUM CHLORIDE 1.5 G: 900 INJECTION INTRAVENOUS at 23:53

## 2022-12-30 RX ADMIN — OMEPRAZOLE 20 MG: 20 CAPSULE, DELAYED RELEASE ORAL at 08:10

## 2022-12-30 RX ADMIN — SODIUM CHLORIDE 1.5 G: 900 INJECTION INTRAVENOUS at 12:33

## 2022-12-30 RX ADMIN — ENOXAPARIN SODIUM 40 MG: 40 INJECTION SUBCUTANEOUS at 17:36

## 2022-12-30 ASSESSMENT — GAIT ASSESSMENTS
GAIT LEVEL OF ASSIST: MODIFIED INDEPENDENT
DISTANCE (FEET): 200
DISTANCE (FEET): 150
GAIT LEVEL OF ASSIST: MODIFIED INDEPENDENT
ASSISTIVE DEVICE: 4 WHEEL WALKER
ASSISTIVE DEVICE: 4 WHEEL WALKER

## 2022-12-30 ASSESSMENT — ACTIVITIES OF DAILY LIVING (ADL)
BED_CHAIR_WHEELCHAIR_TRANSFER_DESCRIPTION: ADAPTIVE EQUIPMENT;INCREASED TIME
TOILET_TRANSFER_DESCRIPTION: ADAPTIVE EQUIPMENT;GRAB BAR
BED_CHAIR_WHEELCHAIR_TRANSFER_DESCRIPTION: ADAPTIVE EQUIPMENT
TOILETING_LEVEL_OF_ASSIST_DESCRIPTION: GRAB BAR;INCREASED TIME;SUPERVISION FOR SAFETY
BED_CHAIR_WHEELCHAIR_TRANSFER_DESCRIPTION: ADAPTIVE EQUIPMENT
BED_CHAIR_WHEELCHAIR_TRANSFER_DESCRIPTION: ADAPTIVE EQUIPMENT
TUB_SHOWER_TRANSFER_DESCRIPTION: GRAB BAR;SHOWER BENCH;SUPERVISION FOR SAFETY
TOILET_TRANSFER_DESCRIPTION: GRAB BAR;INCREASED TIME

## 2022-12-30 ASSESSMENT — PAIN DESCRIPTION - PAIN TYPE
TYPE: SURGICAL PAIN
TYPE: SURGICAL PAIN;ACUTE PAIN

## 2022-12-30 ASSESSMENT — ENCOUNTER SYMPTOMS
FEVER: 0
SHORTNESS OF BREATH: 0
NAUSEA: 0
VOMITING: 0
DIARRHEA: 0
NERVOUS/ANXIOUS: 0
ABDOMINAL PAIN: 0
CHILLS: 0

## 2022-12-30 NOTE — THERAPY
"Physical Therapy   Daily Treatment     Patient Name: Maira Johnson  Age:  77 y.o., Sex:  female  Medical Record #: [id number]  Today's Date: 12/30/2022     Precautions  Precautions: Fall Risk  Comments: Abdominal wound vac, new ostomy, hx of mastectomy limb alert RUE    Subjective    Pt agreeable to PT     Objective       12/30/22 1001   PT Charge Group   PT Gait Training (Units) 1   PT Therapeutic Activities (Units) 3   Supervising Physical Therapist Isabel Brown   PT Total Time Spent   PT Individual Total Time Spent (Mins) 60   Vitals   Pulse (!) 101   Patient BP Position Sitting   Blood Pressure  124/81   Gait Functional Level of Assist    Gait Level Of Assist Modified Independent  (SPV outdoor surfaces)   Assistive Device 4 Wheel Walker   Distance (Feet) 200   # of Times Distance was Traveled 2   Stairs Functional Level of Assist   Level of Assist with Stairs Contact Guard Assist   # of Stairs Climbed 4  (6\" stairs 1st bout B HR 2nd bout side stepping using L side of the HR. reciprocal up/step to down)   Stairs Description Extra time;Hand rails;Limited by fatigue;Supervision for safety;Verbal cueing   Transfer Functional Level of Assist   Bed, Chair, Wheelchair Transfer Modified Independent   Bed Chair Wheelchair Transfer Description Adaptive equipment;Increased time  (stand step transfer with 4WW)   Toilet Transfers Modified Independent   Toilet Transfer Description Grab bar;Increased time  (from walker level)   Bed Mobility    Sit to Stand Modified Independent   Interdisciplinary Plan of Care Collaboration   IDT Collaboration with  Physical Therapist   Patient Position at End of Therapy Seated;Edge of Bed;Call Light within Reach;Tray Table within Reach;Phone within Reach   Collaboration Comments POC, CLOF         Assessment    Pt able to navigate stairs with GCA/incidental touching assistance. Pt presents with dec activity tolerance, required extended seated rest break between bouts of stair training this " session. HR and RR increased after stairs as well as some diaphoresis. Pt attributes this to having had the wound vac around her shoulder/neck and she usually doesn't carry it this way. Pt also self reports some anxiety which is present at baseline. Pt able to navigate stairs with CGA and will safely be able to enter/exit her friends house upon dc.      Strengths: Able to follow instructions, Adequate strength, Effective communication skills, Independent prior level of function, Making steady progress towards goals, Pleasant and cooperative, Supportive family, Willingly participates in therapeutic activities  Barriers: Decreased endurance, Fatigue, Generalized weakness, Impaired activity tolerance    Plan    Gait endurance with 4WW. Standing tolerance, general strengthening, stair negotiation.     Passport items to be completed:  Get in/out of bed safely, in/out of a vehicle, safely use mobility device, walk or wheel around home/community, navigate up and down stairs, show how to get up/down from the ground, ensure home is accessible, demonstrate HEP, complete caregiver training    Physical Therapy Problems (Active)       Problem: PT-Long Term Goals       Dates: Start: 12/24/22         Goal: LTG-By discharge, patient will ambulate 100+ ft indoors/outdoors with LRD and mod I.        Dates: Start: 12/24/22            Goal: LTG-By discharge, patient will transfer one surface to another using LRD and mod I.        Dates: Start: 12/24/22            Goal: LTG-By discharge, patient will transfer in/out of a car with SPV.        Dates: Start: 12/24/22            Goal: LTG-By discharge, patient will ambulate up/down ramp with LRD and mod I.       Dates: Start: 12/24/22

## 2022-12-30 NOTE — CARE PLAN
The patient is Stable - Low risk of patient condition declining or worsening    Shift Goals  Clinical Goals: Safety  Patient Goals: Safety    Progress made toward(s) clinical / shift goals:      Problem: Bladder / Voiding  Goal: Patient will establish and maintain regular urinary output  Outcome: Progressing  Note: Pt continent of bladder. Voiding adequately in the BR. She denies dysuria.     Problem: Fall Risk - Rehab  Goal: Patient will remain free from falls  Outcome: Progressing  Note: Liza Meng Fall risk Assessment Score: 8      Low fall risk interventions   - Call light within reach   - Yellow  socks   - Belongings within reach   - Bed in the lowest position     Pt transfers mod I via wheelchair in the room She calls appropriately when in need of assistance.             Patient is not progressing towards the following goals:

## 2022-12-30 NOTE — PROGRESS NOTES
"Rehab Progress Note     Encounter Date: 12/30/2022    CC: s/p intra abdominal abscess     Interval Events (Subjective)  Vitals reviewed:  WNL, remains afebrile   Labs reviewed: WBC improved to 11.3 12/30   Patient seen and examined at bedside.  Discussed improvement in WBC with patient.  Patient reports that she was able to successfully empty her own ostomy bag today.  Patient appears to be in better spirits today.  Minimal anxiety about going home next week.  Wound care has not been by yet today for wound VAC change.  No reported events overnight      Objective:  Physical Exam:  Vitals: /73   Pulse 85   Temp 37.1 °C (98.8 °F) (Oral)   Resp 16   Ht 1.6 m (5' 3\")   Wt 108 kg (238 lb 1.6 oz)   SpO2 93%   Gen: NAD, lying comfortably in bed  Mood: In good spirits  Head:  NC/AT  Eyes/ Nose/ Mouth: PERRLA, moist mucous membranes  Cardio: RRR, good distal perfusion, warm extremities  Pulm: normal respiratory effort, no cyanosis   Abd: Soft NTND, ostomy bag just recently emptied.  No erythema at abdominal incision, wound VAC in place  Ext: No peripheral edema. No calf tenderness. No clubbing.      Mental status:  A&Ox4 (person, place, date, situation) answers questions appropriately follows commands  Speech: fluent, no aphasia or dysarthria        Recent Results (from the past 72 hour(s))   CBC WITHOUT DIFFERENTIAL    Collection Time: 12/28/22  5:28 AM   Result Value Ref Range    WBC 12.0 (H) 4.8 - 10.8 K/uL    RBC 4.07 (L) 4.20 - 5.40 M/uL    Hemoglobin 11.6 (L) 12.0 - 16.0 g/dL    Hematocrit 36.4 (L) 37.0 - 47.0 %    MCV 89.4 81.4 - 97.8 fL    MCH 28.5 27.0 - 33.0 pg    MCHC 31.9 (L) 33.6 - 35.0 g/dL    RDW 45.9 35.9 - 50.0 fL    Platelet Count 464 (H) 164 - 446 K/uL    MPV 9.5 9.0 - 12.9 fL   Basic Metabolic Panel    Collection Time: 12/28/22  5:28 AM   Result Value Ref Range    Sodium 140 135 - 145 mmol/L    Potassium 4.0 3.6 - 5.5 mmol/L    Chloride 107 96 - 112 mmol/L    Co2 23 20 - 33 mmol/L    Glucose 90 " 65 - 99 mg/dL    Bun 15 8 - 22 mg/dL    Creatinine 0.80 0.50 - 1.40 mg/dL    Calcium 8.9 8.5 - 10.5 mg/dL    Anion Gap 10.0 7.0 - 16.0   ESTIMATED GFR    Collection Time: 12/28/22  5:28 AM   Result Value Ref Range    GFR (CKD-EPI) 75 >60 mL/min/1.73 m 2   CBC WITHOUT DIFFERENTIAL    Collection Time: 12/30/22  5:11 AM   Result Value Ref Range    WBC 11.3 (H) 4.8 - 10.8 K/uL    RBC 3.94 (L) 4.20 - 5.40 M/uL    Hemoglobin 11.1 (L) 12.0 - 16.0 g/dL    Hematocrit 35.3 (L) 37.0 - 47.0 %    MCV 89.6 81.4 - 97.8 fL    MCH 28.2 27.0 - 33.0 pg    MCHC 31.4 (L) 33.6 - 35.0 g/dL    RDW 46.0 35.9 - 50.0 fL    Platelet Count 428 164 - 446 K/uL    MPV 9.7 9.0 - 12.9 fL   Basic Metabolic Panel    Collection Time: 12/30/22  5:11 AM   Result Value Ref Range    Sodium 137 135 - 145 mmol/L    Potassium 4.3 3.6 - 5.5 mmol/L    Chloride 104 96 - 112 mmol/L    Co2 22 20 - 33 mmol/L    Glucose 96 65 - 99 mg/dL    Bun 16 8 - 22 mg/dL    Creatinine 0.72 0.50 - 1.40 mg/dL    Calcium 8.8 8.5 - 10.5 mg/dL    Anion Gap 11.0 7.0 - 16.0   ESTIMATED GFR    Collection Time: 12/30/22  5:11 AM   Result Value Ref Range    GFR (CKD-EPI) 86 >60 mL/min/1.73 m 2       Current Facility-Administered Medications   Medication Frequency    lidocaine (LIDODERM) 5 % 1 Patch Q24HR    ampicillin/sulbactam (UNASYN) 1.5 g in  mL IVPB Q6HRS    metoprolol SR (TOPROL XL) tablet 37.5 mg Q DAY    Pharmacy Consult Request PHARMACY TO DOSE    Respiratory Therapy Consult Continuous RT    Pharmacy Consult Request ...Pain Management Review 1 Each PHARMACY TO DOSE    senna-docusate (PERICOLACE or SENOKOT S) 8.6-50 MG per tablet 2 Tablet BID    And    polyethylene glycol/lytes (MIRALAX) PACKET 1 Packet QDAY PRN    And    magnesium hydroxide (MILK OF MAGNESIA) suspension 30 mL QDAY PRN    And    bisacodyl (DULCOLAX) suppository 10 mg QDAY PRN    omeprazole (PRILOSEC) capsule 20 mg DAILY    mag hydrox-al hydrox-simeth (MAALOX PLUS ES or MYLANTA DS) suspension 20 mL Q2HRS PRN     ondansetron (ZOFRAN ODT) dispertab 4 mg 4X/DAY PRN    Or    ondansetron (ZOFRAN) syringe/vial injection 4 mg 4X/DAY PRN    traZODone (DESYREL) tablet 50 mg QHS PRN    sodium chloride (OCEAN) 0.65 % nasal spray 2 Spray PRN    oxyCODONE immediate-release (ROXICODONE) tablet 2.5 mg Q3HRS PRN    Or    oxyCODONE immediate-release (ROXICODONE) tablet 5 mg Q3HRS PRN    enoxaparin (Lovenox) inj 40 mg DAILY AT 1800       Orders Placed This Encounter   Procedures    Diet Order Diet: Regular     Standing Status:   Standing     Number of Occurrences:   1     Order Specific Question:   Diet:     Answer:   Regular [1]     _____________________________________  Interdisciplinary Team Conference   Most recent IDT on 12/29/2022     Discharge Date/Disposition:  To home on 1/4 to [facility] house with home health and wound care   _____________________________________        Assessment:  Active Hospital Problems    Diagnosis     *Intra-abdominal abscess (HCC)     Leukocytosis     Thrombocytosis     Diverticulitis     H/O abdominal abscess     CHEPE (acute kidney injury) (HCC)     Hypokalemia     Postoperative hypoxia     Class 3 severe obesity due to excess calories with serious comorbidity and body mass index (BMI) of 40.0 to 44.9 in adult (HCC)     Hyperglycemia     Diverticulitis of intestine with perforation and abscess     Hypertension        Medical Decision Making and Plan:  Debility status post diverticulitis and intra-abdominal abscess  - CT abdomen at outside facility showed multiple intra-abdominal abscesses and diverticulitis  - Abscesses drained by IR on 12/13  - 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  - Started on Unasyn for increased WBCs after completing Augmentin.  Stop date is 1/1  - Stopped lactobacillus, patient has increased gas at  ostomy site  - Wound VAC in place, wound care to assist with wound VAC changes and ostomy education, will reach out to wound care team about next change for wound VAC.  - Initiate comprehensive IRF level therapy with PT/OT/SLP  - Follow-up with general surgery      Leukocytosis  - Repeat CT abdomen/pelvis negative for abscess  - WBC is continuing to trend up, hospitalist consulted  - WBC up to 13.8 on 12/27, to resumed Unasyn , plan to stop date 1/1  - WBC improved to 11.3 12/30      Hypertension  - Home dose metoprolol  - BP well controlled, 12/30     Hypoxia  - Postoperatively patient has required up to 3 L O2  -Respiratory therapy consulted,  - Has been weaned to room air     Acute blood loss anemia  - Hemoglobin stable around 11 12/30     Hypocalcemia  -Resolved     Pain  - Scheduled Tylenol oxycodone  - Lidocaine patch to right side muscle spasms, improved      Bowel  - Monitor ostomy output  - PRN stool softeners     Skin - Patient at risk for skin breakdown due to debility in areas including sacrum, achilles, elbows and head in addition to other sites. Nursing to assess skin daily.  - Wound care to follow along changes, wound vac changes 3 x per week      GI Ppx - Patient on Prilosec for GERD prophylaxis. Patient on Senna-docusate for constipation prophylaxis.      DVT Ppx - Patient Lovenox on transfer.    Total time:  19  minutes.  I spent greater than 50% of the time for patient care and coordination on this date, including unit/floor time, and face-to-face time with the patient as per assessment and plan above.     Crystal Jones D.O.

## 2022-12-30 NOTE — THERAPY
Occupational Therapy  Daily Treatment     Patient Name: Maira Johnson  Age:  77 y.o., Sex:  female  Medical Record #: [id number]  Today's Date: 12/30/2022     Precautions  Precautions: Fall Risk  Comments: Abdominal wound vac, new ostomy, hx of mastectomy limb alert RUE         Subjective    Pt agreeable to OT tx.     Objective       12/30/22 1301   OT Charge Group   OT Self Care / ADL (Units) 4   OT Total Time Spent   OT Individual Total Time Spent (Mins) 60   Pain   Intervention Declines   Cognition    Level of Consciousness Alert   Functional Level of Assist   Bathing Supervision   Bathing Description Adaptive equipment;Grab bar;Hand held shower;Tub bench;Long handled bath tool;Increased time;Set up for wound protection;Set-up of equipment;Set up for shower sleeve   Upper Body Dressing Supervision   Upper Body Dressing Description Set-up of equipment;Supervision for safety   Lower Body Dressing Minimal Assist   Lower Body Dressing Description Sock aid;Increased time;Initial preparation for task;Supervision for safety  (assist with socks only due to wet feet)   Toileting Standby Assist   Toileting Description Grab bar;Increased time;Supervision for safety   Bed, Chair, Wheelchair Transfer Modified Independent   Bed Chair Wheelchair Transfer Description Adaptive equipment   Toilet Transfers Modified Independent   Toilet Transfer Description Adaptive equipment;Grab bar   Tub / Shower Transfers Supervised   Tub Shower Transfer Description Grab bar;Shower bench;Supervision for safety   Interdisciplinary Plan of Care Collaboration   Patient Position at End of Therapy Seated  (Pass off to PT at end of day.)     Pt completed all mobility at SPV level using 4WW    Assessment    Pt making good progress towards self-care goals this session. She demonstrated good use of AE with increased time.  Strengths: Able to follow instructions, Alert and oriented, Effective communication skills, Good balance, Good carryover of  learning, Good insight into deficits/needs, Independent prior level of function, Making steady progress towards goals, Motivated for self care and independence, Pleasant and cooperative, Supportive family, Willingly participates in therapeutic activities  Barriers: Fatigue, Generalized weakness, Impaired activity tolerance, Limited mobility, Pain    Plan    Ostomy care/management. Continue to have pt complete empty with therapist support to overcome mental block. ADLs with AE training, IADLs, overall endurance and strengthening    Occupational Therapy Goals (Active)       Problem: Bathing       Dates: Start: 12/24/22         Goal: STG-Within one week, patient will bathe with CGA using DME/AE PRN.       Dates: Start: 12/24/22               Problem: Dressing       Dates: Start: 12/29/22         Goal: STG-Within one week, patient will dress LB with SBA using DME/AE PRN       Dates: Start: 12/29/22               Problem: Functional Transfers       Dates: Start: 12/29/22         Goal: STG-Within one week, patient will transfer to toilet with Mod I using 4WW.       Dates: Start: 12/29/22               Problem: OT Long Term Goals       Dates: Start: 12/24/22         Goal: LTG-By discharge, patient will complete basic self care tasks with SPV-Mod I using DME/AE PRN.       Dates: Start: 12/24/22            Goal: LTG-By discharge, patient will perform bathroom transfers with SPV-Mod I using DME/AE PRN.       Dates: Start: 12/24/22            Goal: LTG-By discharge, patient will complete basic home management with SPV-Mod I using DME/AE PRN.       Dates: Start: 12/24/22               Problem: Toileting       Dates: Start: 12/29/22         Goal: STG-Within one week, patient will complete toileting tasks with SPV using DME/AE PRN       Dates: Start: 12/29/22

## 2022-12-30 NOTE — THERAPY
Occupational Therapy  Daily Treatment     Patient Name: Maira Johnson  Age:  77 y.o., Sex:  female  Medical Record #: [id number]  Today's Date: 12/30/2022     Precautions  Precautions: (P) Fall Risk  Comments: (P) Abdominal wound vac, new ostomy, hx of mastectomy limb alert RUE         Subjective    Patient was supine in bed and ready for OT.  She stated she changed her ostomy bag independently for the first time today.     Objective       12/30/22 0931   OT Charge Group   OT Self Care / ADL (Units) 1   OT Therapy Activity (Units) 1   OT Total Time Spent   OT Individual Total Time Spent (Mins) 30   Precautions   Precautions Fall Risk   Comments Abdominal wound vac, new ostomy, hx of mastectomy limb alert RUE   Functional Level of Assist   Grooming Independent;Modified Independent   Grooming Description Seated in wheelchair at sink   Lower Body Dressing Independent   Lower Body Dressing Description   (indep to retrieve and don shoes)   Bed, Chair, Wheelchair Transfer Independent   Bed Chair Wheelchair Transfer Description Adaptive equipment  (SPT bed to w/c)   IADL Treatments   IADL Treatments Kitchen mobility education   Kitchen Mobility Education Completed kitchen mobility using counter for support independently while retrieving items from high/low cupboards, counter and various appliances.   Bed Mobility    Supine to Sit Modified Independent   Scooting Modified Independent   Interdisciplinary Plan of Care Collaboration   IDT Collaboration with  Occupational Therapist   Patient Position at End of Therapy Seated   Collaboration Comments tx which was completed     Functional mobility with 4ww supervised from room to gym.    Assessment    Patient demonstrated good safety and balance during kitchen mobility.  She was very happy to have changed her bag this morning by herself.  Strengths: Able to follow instructions, Alert and oriented, Effective communication skills, Good balance, Good carryover of learning, Good  insight into deficits/needs, Independent prior level of function, Making steady progress towards goals, Motivated for self care and independence, Pleasant and cooperative, Supportive family, Willingly participates in therapeutic activities  Barriers: Fatigue, Generalized weakness, Impaired activity tolerance, Limited mobility, Pain    Plan    Ostomy care/management. Continue to have pt complete empty with therapist support to overcome mental block.    Occupational Therapy Goals (Active)       Problem: Bathing       Dates: Start: 12/24/22         Goal: STG-Within one week, patient will bathe with CGA using DME/AE PRN.       Dates: Start: 12/24/22               Problem: Dressing       Dates: Start: 12/29/22         Goal: STG-Within one week, patient will dress LB with SBA using DME/AE PRN       Dates: Start: 12/29/22               Problem: Functional Transfers       Dates: Start: 12/29/22         Goal: STG-Within one week, patient will transfer to toilet with Mod I using 4WW.       Dates: Start: 12/29/22               Problem: OT Long Term Goals       Dates: Start: 12/24/22         Goal: LTG-By discharge, patient will complete basic self care tasks with SPV-Mod I using DME/AE PRN.       Dates: Start: 12/24/22            Goal: LTG-By discharge, patient will perform bathroom transfers with SPV-Mod I using DME/AE PRN.       Dates: Start: 12/24/22            Goal: LTG-By discharge, patient will complete basic home management with SPV-Mod I using DME/AE PRN.       Dates: Start: 12/24/22               Problem: Toileting       Dates: Start: 12/29/22         Goal: STG-Within one week, patient will complete toileting tasks with SPV using DME/AE PRN       Dates: Start: 12/29/22

## 2022-12-30 NOTE — DISCHARGE PLANNING
CM attempted to meet with patient to update on IDT and DC date of 1/4/23.  CM left business card on BS table.  CM to try another time to meet with patient.

## 2022-12-30 NOTE — THERAPY
Physical Therapy   Daily Treatment     Patient Name: Maira Johnson  Age:  77 y.o., Sex:  female  Medical Record #: [id number]  Today's Date: 12/30/2022     Precautions  Precautions: Fall Risk  Comments: Abdominal wound vac, new ostomy, hx of mastectomy limb alert RUE    Subjective    Pt just completed shower with OT, willing to participate after short rest 2* shortness of breath/ fatigue.     Objective       12/30/22 1401   PT Charge Group   PT Gait Training (Units) 1   PT Therapeutic Activities (Units) 1   PT Total Time Spent   PT Individual Total Time Spent (Mins) 30   Gait Functional Level of Assist    Gait Level Of Assist Modified Independent   Assistive Device 4 Wheel Walker   Distance (Feet) 150   # of Times Distance was Traveled 2   Deviation   (slow hardy)   Transfer Functional Level of Assist   Bed, Chair, Wheelchair Transfer Modified Independent   Bed Chair Wheelchair Transfer Description Adaptive equipment     Pt completed short distance ambulation in ADL bedroom without device and SPV, cues for wound vac management. Pt able to safely ambulate without device, intermittent UE support at bed/ wall/ chair etc. Pt reports having ordered a walker for d/c but won't arrive until ~ 3-4 days post d/c.     Assessment    Pt progressing well with all mobility skills, continues to fatigue quickly with minimal exertion.  Strengths: Able to follow instructions, Adequate strength, Effective communication skills, Independent prior level of function, Making steady progress towards goals, Pleasant and cooperative, Supportive family, Willingly participates in therapeutic activities  Barriers: Decreased endurance, Fatigue, Generalized weakness, Impaired activity tolerance    Plan    Gait endurance with 4WW. Standing tolerance, general strengthening, stair negotiation.     Passport items to be completed:  Get in/out of bed safely, in/out of a vehicle, safely use mobility device, walk or wheel around home/community, navigate  up and down stairs, show how to get up/down from the ground, ensure home is accessible, demonstrate HEP, complete caregiver training      Physical Therapy Problems (Active)       Problem: PT-Long Term Goals       Dates: Start: 12/24/22         Goal: LTG-By discharge, patient will ambulate 100+ ft indoors/outdoors with LRD and mod I.        Dates: Start: 12/24/22            Goal: LTG-By discharge, patient will transfer one surface to another using LRD and mod I.        Dates: Start: 12/24/22            Goal: LTG-By discharge, patient will transfer in/out of a car with SPV.        Dates: Start: 12/24/22            Goal: LTG-By discharge, patient will ambulate up/down ramp with LRD and mod I.       Dates: Start: 12/24/22

## 2022-12-30 NOTE — CARE PLAN
Problem: Knowledge Deficit - Standard  Goal: Patient and family/care givers will demonstrate understanding of plan of care, disease process/condition, diagnostic tests and medications  Outcome: Progressing     Problem: Skin Integrity  Goal: Skin integrity is maintained or improved  Note: Patient's skin is maintained and free from new or accidental injury this shift.  Will continue to monitor.    The patient is Stable - Low risk of patient condition declining or worsening    Shift Goals  Clinical Goals: Safety  Patient Goals: Safety

## 2022-12-30 NOTE — PROGRESS NOTES
Hospital Medicine Daily Progress Note      Chief Complaint  Hypertension  Leukocytosis    Interval Problem Update  Pt happy about her wbc's coming down.  Doing ok.    Review of Systems  Review of Systems   Constitutional:  Negative for chills and fever.   Respiratory:  Negative for shortness of breath.    Cardiovascular:  Negative for chest pain.   Gastrointestinal:  Negative for abdominal pain, diarrhea, nausea and vomiting.   Psychiatric/Behavioral:  The patient is not nervous/anxious.       Physical Exam  Temp:  [36.9 °C (98.4 °F)-37.1 °C (98.8 °F)] 37.1 °C (98.8 °F)  Pulse:  [] 85  Resp:  [16-18] 16  BP: (133-137)/(67-74) 137/73  SpO2:  [93 %] 93 %    Physical Exam  Vitals and nursing note reviewed.   Constitutional:       Appearance: Normal appearance.   HENT:      Head: Atraumatic.   Eyes:      Conjunctiva/sclera: Conjunctivae normal.      Pupils: Pupils are equal, round, and reactive to light.   Cardiovascular:      Rate and Rhythm: Normal rate and regular rhythm.   Pulmonary:      Effort: Pulmonary effort is normal.      Breath sounds: Normal breath sounds.   Abdominal:      General: Bowel sounds are normal.      Palpations: Abdomen is soft.      Comments: Lower midline abdominal incision with wound vac   Musculoskeletal:      Cervical back: Normal range of motion and neck supple.      Right lower leg: No edema.      Left lower leg: No edema.   Skin:     General: Skin is warm and dry.   Neurological:      Mental Status: She is alert and oriented to person, place, and time.   Psychiatric:         Mood and Affect: Mood normal.         Behavior: Behavior normal.       Fluids    Intake/Output Summary (Last 24 hours) at 12/30/2022 0845  Last data filed at 12/30/2022 0552  Gross per 24 hour   Intake 760 ml   Output --   Net 760 ml         Laboratory  Recent Labs     12/28/22  0528 12/30/22  0511   WBC 12.0* 11.3*   RBC 4.07* 3.94*   HEMOGLOBIN 11.6* 11.1*   HEMATOCRIT 36.4* 35.3*   MCV 89.4 89.6   MCH 28.5  28.2   MCHC 31.9* 31.4*   RDW 45.9 46.0   PLATELETCT 464* 428   MPV 9.5 9.7       Recent Labs     12/28/22  0528 12/30/22  0511   SODIUM 140 137   POTASSIUM 4.0 4.3   CHLORIDE 107 104   CO2 23 22   GLUCOSE 90 96   BUN 15 16   CREATININE 0.80 0.72   CALCIUM 8.9 8.8                     Assessment/Plan  Leukocytosis  Assessment & Plan  WBC's: 13.8 --> 12.0 (12/28) --> 11.3 (12/30)  Afebrile  BC (12/25): neg  U/A (12/26): neg  Procalcitonin normal  C. Diff: neg  CXR: shows atelectasis  CT Abd/Pelvis: negative for abscess  See other assessment for abx for perf diverticulitis    Diverticulitis of intestine with perforation and abscess- (present on admission)  Assessment & Plan  Had intra-abd abscess -- s/p IR drainage on 12/14/22  Complicated by perf viscus --> s/p ex lap w/ colostomy creation on 12/18/22  Intra-op cx polymicrobial  S/P Zosyn   On Unasyn (thru 1/1)   Note: was to follow with Augmentin per ID            per Dr. Gong: will resume Unasyn given escalating WBC with no new infectious source found    Hypertension- (present on admission)  Assessment & Plan  BP ok  On Toprol XL   Monitor

## 2022-12-31 LAB
BACTERIA BLD CULT: ABNORMAL
BACTERIA BLD CULT: ABNORMAL
SIGNIFICANT IND 70042: ABNORMAL
SITE SITE: ABNORMAL
SOURCE SOURCE: ABNORMAL

## 2022-12-31 PROCEDURE — 700111 HCHG RX REV CODE 636 W/ 250 OVERRIDE (IP): Performed by: HOSPITALIST

## 2022-12-31 PROCEDURE — A9270 NON-COVERED ITEM OR SERVICE: HCPCS | Performed by: PHYSICAL MEDICINE & REHABILITATION

## 2022-12-31 PROCEDURE — 700101 HCHG RX REV CODE 250: Performed by: PHYSICAL MEDICINE & REHABILITATION

## 2022-12-31 PROCEDURE — 700102 HCHG RX REV CODE 250 W/ 637 OVERRIDE(OP): Performed by: PHYSICAL MEDICINE & REHABILITATION

## 2022-12-31 PROCEDURE — 700105 HCHG RX REV CODE 258: Performed by: HOSPITALIST

## 2022-12-31 PROCEDURE — 700111 HCHG RX REV CODE 636 W/ 250 OVERRIDE (IP): Performed by: PHYSICAL MEDICINE & REHABILITATION

## 2022-12-31 PROCEDURE — A9270 NON-COVERED ITEM OR SERVICE: HCPCS | Performed by: HOSPITALIST

## 2022-12-31 PROCEDURE — 99232 SBSQ HOSP IP/OBS MODERATE 35: CPT | Performed by: HOSPITALIST

## 2022-12-31 PROCEDURE — 770010 HCHG ROOM/CARE - REHAB SEMI PRIVAT*

## 2022-12-31 PROCEDURE — 700102 HCHG RX REV CODE 250 W/ 637 OVERRIDE(OP): Performed by: HOSPITALIST

## 2022-12-31 RX ORDER — METOPROLOL SUCCINATE 25 MG/1
50 TABLET, EXTENDED RELEASE ORAL
Status: DISCONTINUED | OUTPATIENT
Start: 2023-01-01 | End: 2023-01-04 | Stop reason: HOSPADM

## 2022-12-31 RX ORDER — METOPROLOL SUCCINATE 25 MG/1
12.5 TABLET, EXTENDED RELEASE ORAL ONCE
Status: COMPLETED | OUTPATIENT
Start: 2022-12-31 | End: 2022-12-31

## 2022-12-31 RX ADMIN — LIDOCAINE 1 PATCH: 700 PATCH TOPICAL at 11:28

## 2022-12-31 RX ADMIN — SODIUM CHLORIDE 1.5 G: 900 INJECTION INTRAVENOUS at 16:57

## 2022-12-31 RX ADMIN — METOPROLOL SUCCINATE 37.5 MG: 25 TABLET, EXTENDED RELEASE ORAL at 06:03

## 2022-12-31 RX ADMIN — SENNOSIDES AND DOCUSATE SODIUM 2 TABLET: 50; 8.6 TABLET ORAL at 08:08

## 2022-12-31 RX ADMIN — METOPROLOL SUCCINATE 12.5 MG: 25 TABLET, EXTENDED RELEASE ORAL at 10:28

## 2022-12-31 RX ADMIN — SENNOSIDES AND DOCUSATE SODIUM 2 TABLET: 50; 8.6 TABLET ORAL at 20:57

## 2022-12-31 RX ADMIN — SODIUM CHLORIDE 1.5 G: 900 INJECTION INTRAVENOUS at 06:04

## 2022-12-31 RX ADMIN — SODIUM CHLORIDE 1.5 G: 900 INJECTION INTRAVENOUS at 11:34

## 2022-12-31 RX ADMIN — OMEPRAZOLE 20 MG: 20 CAPSULE, DELAYED RELEASE ORAL at 08:08

## 2022-12-31 RX ADMIN — ENOXAPARIN SODIUM 40 MG: 40 INJECTION SUBCUTANEOUS at 16:52

## 2022-12-31 ASSESSMENT — ENCOUNTER SYMPTOMS
BLURRED VISION: 0
DIZZINESS: 0
PALPITATIONS: 0
FEVER: 0
VOMITING: 0
HALLUCINATIONS: 0
NAUSEA: 0
HEADACHES: 0
SHORTNESS OF BREATH: 0

## 2022-12-31 NOTE — PROGRESS NOTES
Patient care assumed. Report received from [facility] PRATIK Fritz. Patient is alert and calm, resting in bed. Call light and bedside table within reach. Will continue to monitor.

## 2022-12-31 NOTE — PROGRESS NOTES
Hospital Medicine Daily Progress Note      Chief Complaint  Hypertension  Leukocytosis    Interval Problem Update  Discussed about he BP and HR a little elevated and have increased her Toprol dose.    Review of Systems  Review of Systems   Constitutional:  Negative for fever.   Eyes:  Negative for blurred vision.   Respiratory:  Negative for shortness of breath.    Cardiovascular:  Negative for palpitations.   Gastrointestinal:  Negative for nausea and vomiting.   Neurological:  Negative for dizziness and headaches.   Psychiatric/Behavioral:  Negative for hallucinations.       Physical Exam  Temp:  [37 °C (98.6 °F)] 37 °C (98.6 °F)  Pulse:  [] 104  Resp:  [16-18] 18  BP: (124-151)/(70-81) 148/70  SpO2:  [92 %] 92 %    Physical Exam  Vitals and nursing note reviewed.   Constitutional:       General: She is not in acute distress.  HENT:      Mouth/Throat:      Mouth: Mucous membranes are moist.      Pharynx: Oropharynx is clear.   Eyes:      General: No scleral icterus.  Cardiovascular:      Rate and Rhythm: Normal rate and regular rhythm.   Pulmonary:      Effort: Pulmonary effort is normal.      Breath sounds: No wheezing or rales.   Abdominal:      General: Bowel sounds are normal.      Palpations: Abdomen is soft.      Comments: Lower midline abdominal incision with wound vac   Musculoskeletal:      Cervical back: Normal range of motion. No rigidity.      Right lower leg: No edema.      Left lower leg: No edema.   Skin:     General: Skin is warm and dry.   Neurological:      Mental Status: She is alert and oriented to person, place, and time.   Psychiatric:         Mood and Affect: Mood normal.         Behavior: Behavior normal.       Fluids    Intake/Output Summary (Last 24 hours) at 12/31/2022 0853  Last data filed at 12/31/2022 0511  Gross per 24 hour   Intake 840 ml   Output --   Net 840 ml         Laboratory  Recent Labs     12/30/22  0511   WBC 11.3*   RBC 3.94*   HEMOGLOBIN 11.1*   HEMATOCRIT 35.3*    MCV 89.6   MCH 28.2   MCHC 31.4*   RDW 46.0   PLATELETCT 428   MPV 9.7       Recent Labs     12/30/22  0511   SODIUM 137   POTASSIUM 4.3   CHLORIDE 104   CO2 22   GLUCOSE 96   BUN 16   CREATININE 0.72   CALCIUM 8.8                     Assessment/Plan  Leukocytosis  Assessment & Plan  WBC's: 13.8 --> 12.0 (12/28) --> 11.3 (12/30)  Afebrile  BC (12/25): neg  U/A (12/26): neg  Procalcitonin normal  C. Diff: neg  CXR: shows atelectasis  CT Abd/Pelvis: negative for abscess  See other assessment for abx for perf diverticulitis    Diverticulitis of intestine with perforation and abscess- (present on admission)  Assessment & Plan  Had intra-abd abscess -- s/p IR drainage on 12/14/22  Complicated by perf viscus --> s/p ex lap w/ colostomy creation on 12/18/22  Intra-op cx polymicrobial  S/P Zosyn   On Unasyn (thru 1/1)   Note: was to follow with Augmentin per ID            per Dr. Gong: will resume Unasyn given escalating WBC with no new infectious source found    Hypertension- (present on admission)  Assessment & Plan  BP a little more elevated recently  HR   On Toprol XL --> will increase dose  Monitor

## 2022-12-31 NOTE — CARE PLAN
The patient is Watcher - Medium risk of patient condition declining or worsening    Shift Goals  Clinical Goals: Safety  Patient Goals: Safety    Progress made toward(s) clinical / shift goals:  pain control    Patient is not progressing towards the following goals:      Problem: Pain - Standard  Goal: Alleviation of pain or a reduction in pain to the patient’s comfort goal  Outcome: Progressing     Problem: Skin Integrity  Goal: Skin integrity is maintained or improved  Outcome: Progressing

## 2022-12-31 NOTE — CARE PLAN
The patient is Stable - Low risk of patient condition declining or worsening    Shift Goals  Clinical Goals: safety  Patient Goals: safety    Problem: Pain - Standard  Goal: Alleviation of pain or a reduction in pain to the patient’s comfort goal  Outcome: Progressing Patient able to verbalize pain level and verbalize an acceptable level of pain.      Problem: Fall Risk - Rehab  Goal: Patient will remain free from falls  Outcome: Progressing Patient has good safety awareness and is Mod I in room at w/c level.

## 2022-12-31 NOTE — WOUND TEAM
[facility]  Inpatient Services  Wound and Skin Care Evaluation    Admission Date: 12/23/2022     Last order of IP CONSULT TO WOUND CARE was found on 12/23/2022 from Hospital Encounter on 12/21/2022     HPI, PMH, SH: Reviewed    Past Surgical History:   Procedure Laterality Date    DE EXPLORATORY OF ABDOMEN  12/18/2022    Procedure: LAPAROTOMY, EXPLORATORY WITH OSTOMY CREATION;  Surgeon: Huseyin Asher M.D.;  Location: SURGERY [facility];  Service: General    GYN SURGERY Right 11/01/2013    Right mastecomy    TUBAL LIGATION  01/01/1975     Social History     Tobacco Use    Smoking status: Never     Passive exposure: Never    Smokeless tobacco: Never   Substance Use Topics    Alcohol use: Never     No chief complaint on file.    Diagnosis: Diverticulitis [K57.92]  Intra-abdominal abscess (HCC) [K65.1]  H/O abdominal abscess [Z87.898]    Unit where seen by Wound Team: 06/02     WOUND CONSULT/FOLLOW UP RELATED TO:  VAC change abd and ostomy education     WOUND HISTORY:  Pt is a 77yr old female with history of HTN and diverticulitis who presented with abdominal pain. Pt initially presented to [facility]. Pt has multiple episodes of diverticulitis which was treated with IV ABX. Pt was found to have perforated sigmoid diverticulitis with pelvic abscess and pneumoperitoneum. Pt underwent surgical intervention. Fascia was closed but skin was left open and wound was requested to place vac and begin ostomy education.    WOUND ASSESSMENT/LDA       Negative Pressure Wound Therapy 12/19/22 Surgical Abdomen (Active)   NPWT Pump Mode / Pressure Setting Ulta;Continuous;125 mmHg 12/30/22 1730   Dressing Type Black Foam (Regular);Medium 12/30/22 1730   Number of Foam Pieces Used 2 12/30/22 1730   Canister Changed No 12/30/22 1730   Output (mL) 0 mL 12/23/22 1919   NEXT Dressing Change/Treatment Date 01/02/23 12/30/22 1730   WOUND NURSE ONLY - Time Spent with Patient (mins) 60 12/30/22 1730            Wound 12/18/22 Full Thickness Wound Abdomen Mid (Active)   Wound Image   12/28/22 1700   Site Assessment Red 12/30/22 1730   Periwound Assessment Intact 12/30/22 1730   Margins Defined edges;Unattached edges 12/30/22 1730   Closure Secondary intention 12/30/22 1730   Drainage Amount Small 12/30/22 1730   Drainage Description Serosanguineous 12/30/22 1730   Treatments Cleansed;Site care 12/30/22 1730   Wound Cleansing Approved Wound Cleanser 12/30/22 1730   Periwound Protectant Skin Protectant Wipes to Periwound;Drape;Paste Ring 12/30/22 1730   Dressing Cleansing/Solutions Not Applicable 12/30/22 1730   Dressing Options Wound Vac 12/30/22 1730   Dressing Changed Changed 12/30/22 1730   Dressing Status Clean;Dry;Intact 12/30/22 1730   Dressing Change/Treatment Frequency Monday, Wednesday, Friday, and As Needed 12/30/22 1730   NEXT Dressing Change/Treatment Date 01/02/23 12/30/22 1730   NEXT Weekly Photo (Inpatient Only) 01/04/23 12/30/22 1730   Non-staged Wound Description Full thickness 12/30/22 1730   Wound Length (cm) 15 cm 12/28/22 1700   Wound Width (cm) 3.8 cm 12/28/22 1700   Wound Depth (cm) 4 cm 12/28/22 1700   Wound Surface Area (cm^2) 57 cm^2 12/28/22 1700   Wound Volume (cm^3) 228 cm^3 12/28/22 1700   Wound Healing % 42 12/28/22 1700   Shape linear 12/30/22 1730   Wound Odor None 12/28/22 1700   Exposed Structures None 12/30/22 1730   WOUND NURSE ONLY - Time Spent with Patient (mins) 60 12/30/22 1730       Vascular:    MEME:   No results found.    Lab Values:    Lab Results   Component Value Date/Time    WBC 11.3 (H) 12/30/2022 05:11 AM    RBC 3.94 (L) 12/30/2022 05:11 AM    HEMOGLOBIN 11.1 (L) 12/30/2022 05:11 AM    HEMATOCRIT 35.3 (L) 12/30/2022 05:11 AM    CREACTPROT 10.84 (H) 12/14/2022 02:24 AM    HBA1C 6.0 (H) 12/24/2022 05:25 AM        Culture Results show:  Recent Results (from the past 720 hour(s))   CULTURE WOUND W/ GRAM STAIN    Collection Time: 12/18/22  2:59 PM    Specimen: Wound   Result  Value Ref Range    Significant Indicator POS (POS)     Source WND     Site Pelvic Abscess     Culture Result - (A)     Gram Stain Result       Many WBCs.  Many Gram positive cocci.  Rare Gram positive rods.      Culture Result Escherichia coli  Moderate growth   (A)     Culture Result Enterococcus avium  Moderate growth   (A)     Culture Result Streptococcus anginosus  Moderate growth   (A)        Susceptibility    Enterococcus avium - JOSE     Ampicillin <=2 Sensitive mcg/mL     Vancomycin 0.5 Sensitive mcg/mL     Daptomycin <=0.5 Sensitive mcg/mL     Gent Synergy <=500 Sensitive mcg/mL     Penicillin 1 Sensitive mcg/mL    Escherichia coli - JOSE     Ampicillin <=8 Sensitive mcg/mL     Ceftriaxone <=1 Sensitive mcg/mL     Cefazolin <=2 Sensitive mcg/mL     Ciprofloxacin <=0.25 Sensitive mcg/mL     Cefepime <=2 Sensitive mcg/mL     Cefuroxime <=4 Sensitive mcg/mL     Ertapenem <=0.5 Sensitive mcg/mL     Gentamicin <=2 Sensitive mcg/mL     Ampicillin/sulbactam <=4/2 Sensitive mcg/mL     Minocycline <=4 Sensitive mcg/mL     Moxifloxacin <=2 Sensitive mcg/mL     Pip/Tazobactam <=8 Sensitive mcg/mL     Trimeth/Sulfa 2/38 Sensitive mcg/mL     Tigecycline <=2 Sensitive mcg/mL     Tobramycin <=2 Sensitive mcg/mL       Pain Level/Medicated:  PO pain meds; still grimacing in pain during dressing change     INTERVENTIONS BY WOUND TEAM:  Chart and images reviewed. Discussed with bedside RN. All areas of concern (based on picture review, LDA review and discussion with bedside RN) have been thoroughly assessed. Documentation of areas based on significant findings. This RN in to assess patient. Performed standard wound care which includes appropriate positioning, dressing removal and non-selective debridement. Pictures and measurements obtained weekly if/when required.    Preparation for Dressing removal: Dressing soaked with Saline and Adhesive remover wipes  Non-selectively Debrided with:  Wound cleanser, Normal Saline, and Gauze    Sharp debridement: NA  Radha wound: Cleansed with wound cleanser, Prepped with No Sting and drape  Primary Dressing: Regular Black foam and Vac Drape  Secondary (Outer) Dressing:  NA    Interdisciplinary consultation: Patient, Bedside RN ,  Wound RN Radha     EVALUATION / RATIONALE FOR TREATMENT:  Most Recent Date:    12/30/22: Wound bed continues to granulate. May benefit from gale at next assessment. Continue current POC. Per patient, she may discharge on Wed 1/4/23. Pt will need wound vac and home health for discharge.     12/28/22: Wound bed remains clean and red, continues to granulate thus wound depth decreasing with each change. Continue POC.     12/26: Pt's wound appears smaller than previous picture. Ostomy appliance intact and overlapping vAC drsg. Wound bed with some granulation. Crease at umbilicus needs to be built up for VAC and appliance.      Goals: Steady decrease in wound area and depth weekly.    WOUND TEAM PLAN OF CARE ([X] for frequency of wound follow up,):   Nursing to follow dressing orders written for wound care. Contact wound team if area fails to progress, deteriorates or with any questions/concerns if something comes up before next scheduled follow up (See below as to whether wound is following and frequency of wound follow up)  Dressing changes by wound team:                   Follow up 3 times weekly:                NPWT change 3 times weekly:   x  Follow up 1-2 times weekly:      Follow up Bi-Monthly:           Follow up Monthly (High Risk):                        Follow up as needed:     Other (explain):     NURSING PLAN OF CARE ORDERS (X):  Dressing changes: See Dressing Care orders: x  Skin care: See Skin Care orders:   RN Prevention Protocol:   Rectal tube care: See Rectal Tube Care orders:   Other orders:    RSKIN:   CURRENTLY IN PLACE (X), APPLIED THIS VISIT (A), ORDERED (O):   Q shift Justo:  X  Q shift pressure point assessments:  X    Surface/Positioning   Pressure  redistribution mattress  x          Low Airloss          ICU Low Airloss   Bariatric EVERT     Waffle cushion        Waffle Overlay          Reposition q 2 hours    remind and assist  TAPs Turning system     Z Franklin Pillow     Offloading/Redistribution Not assessed  Sacral Mepilex (Silicone dressing)     Heel Mepilex (Silicone dressing)         Heel float boots (Prevalon boot)             Float Heels off Bed with Pillows           Respiratory RA  Silicone O2 tubing         Gray Foam Ear protectors     Cannula fixation Device (Tender )          High flow offloading Clip    Elastic head band offloading device      Anchorfast                                                         Trach with Optifoam split foam             Containment/Moisture Prevention colostomy, bathroom    Rectal tube or BMS    Purwick/Condom Cath        Zaragoza Catheter    Barrier wipes           Barrier paste       Antifungal tx      Interdry        Mobilization       Up to chair   x     Ambulate    with FWW  PT/OT  x    Nutrition       Dietician        Diabetes Education      PO  x   TF     TPN     NPO   # days     Other        Anticipated discharge plans: VAc and supplies, HH  LTACH:        SNF/Rehab:                  Home Health Care:           Outpatient Wound Center:            Self/Family Care:        Other:                  Vac Discharge Needs:   Not Applicable Pt not on a wound vac:       Regular Vac while inpatient, alternative dressing at DC:        Regular Vac in use and continued at DC:      x      Reg. Vac w/ Skin Sub/Biologic in use. Will need to be changed 2x wkly:      Veraflo Vac while inpatient, ok to transition to Regular Vac on Discharge:           Veraflo Vac while inpatient, will need to remain on Veraflo Vac upon discharge:                              [facility]  Inpatient Services  New Ostomy Management & Teaching    HPI:  Reviewed  PMH: Reviewed   SH: Reviewed    Past Surgical History:   Procedure  "Laterality Date    [place] EXPLORATORY OF ABDOMEN  12/18/2022    Procedure: LAPAROTOMY, EXPLORATORY WITH OSTOMY CREATION;  Surgeon: Huseyin Ahser M.D.;  Location: SURGERY [facility];  Service: General    GYN SURGERY Right 11/01/2013    Right mastecomy    TUBAL LIGATION  01/01/1975       Surgery Date: 12/18/22    Surgeon(s):  Huseyin Asher M.D.     Procedure(s):  LAPAROTOMY, EXPLORATORY      Permanence: unknown    Pertinent History: see above    Colostomy 12/18/22 (Active)   Wound Image   12/26/22 1700   Stomal Appliance Assessment Changed 12/30/22 1730   Stoma Assessment Intact 12/30/22 1730   Stoma Shape Irregular;Oval 12/30/22 1730   Stoma Size (in) 2 12/30/22 1730   Peristomal Assessment Intact 12/30/22 1730   Mucocutaneous Junction Intact 12/30/22 1730   Treatment Appliance Changed;Cleansed with water/washcloth;Site care 12/30/22 1730   Peristomal Protectant Paste Ring;No Sting Skin Prep 12/30/22 1730   Stomal Appliance 2 1/2\" 1-piece Fecal;Paste Ring, 2\" 12/28/22 1700   Output (mL) 100 mL 12/28/22 1700   Output Color Brown 12/30/22 1730   WOUND RN ONLY - Stomal Appliance  2 Piece;Paste Ring, 2\";2 1/2\" 1-piece Fecal 12/30/22 1730   Appliance (Pouch) # [id number], [place] 7805 12/26/22 1700   Appliance Brand Miguel Ángel 12/30/22 1730   Appliance Supplier [employer] 12/30/22 1730   Secure Start completed No 12/30/22 1730   WOUND NURSE ONLY - Time Spent with Patient (mins) 60 12/30/22 1730       Ostomy Appliance (type and size): One piece flat and 2\" Paste Ring    Interventions: Removed previous appliance, cleansed peristomal skin with warm water/washcloth, patted dry, made template and traced onto back of barrier. Barrier CTF, checked and applied paste ring  to barrier.  Appliance applied to skin and end closed    Pt education: Questions and concerns addressed    Needs for next visit:   Pt to perform more hands on--cut barrier and apply paste ring  Secure start     Evaluation:     12/30/22: Pt emptying and cleaning bag independently. " "Plan for hands on education on Monday. Pt would like to try to change her ostomy in front of a mirror in the bathroom as that will be how she would change her appliance at home.     12/28/22: Pt unable to do hands on education today due to pain from VAC change. Close proximity of abdominal wound to stoma may cause difficulty for patient.     Stoma with output and flatus. Barely protuding above skin. Body habitus needs one piece r/t crease.     Flatus: Present  Stool Output: small, brown, and soft  Urine Output: NA, Fecal Ostomy  Diet: Regular  Mobility: Up to chair    Plan: Ostomy nurses to continue to follow for ostomy needs and teaching until discharge    Anticipated discharge needs: Supplies, supplier information, possible HH, outpatient ostomy clinic, Skilled Nursing/Rehab     Secure Start Signed Yes  Outpatient Referral Placed Declined  5 Sets of appliances in Ostomy bag for discharge Ordered    INSURANCE OPTIONS:                 [employer] Att[employer] ([employer])         x     MediCARE/MEDICAID & All other Private Insurance companies (Prism Form)              MediCAID & Fee for Service (Care Chest Paperwork + Prism Form)                            Form signed/Catalog Marked and Copy left with patient OR medicaid paperwork given to patient      Anticipated Discharge Plans:  Home Health Care, Family Care, and Self Care    Ostomy Supplies for DC:  12in Lock n' Roll ONE piece with Filter 2\" ([place] [id number]), Skin Prep Wipes (3M Cav[place] 3344) - 2 box per month, Adapt No-Sting Universal Remover Wipes (Miguel Ángel 8099) - 2 box per month, and Adapt Flat paste ring 2\" (Miguel Ángel 8258) - 15 per month    "

## 2023-01-01 PROCEDURE — 700101 HCHG RX REV CODE 250: Performed by: PHYSICAL MEDICINE & REHABILITATION

## 2023-01-01 PROCEDURE — A9270 NON-COVERED ITEM OR SERVICE: HCPCS | Performed by: HOSPITALIST

## 2023-01-01 PROCEDURE — 700102 HCHG RX REV CODE 250 W/ 637 OVERRIDE(OP): Performed by: PHYSICAL MEDICINE & REHABILITATION

## 2023-01-01 PROCEDURE — 99232 SBSQ HOSP IP/OBS MODERATE 35: CPT | Performed by: HOSPITALIST

## 2023-01-01 PROCEDURE — A9270 NON-COVERED ITEM OR SERVICE: HCPCS | Performed by: PHYSICAL MEDICINE & REHABILITATION

## 2023-01-01 PROCEDURE — 700102 HCHG RX REV CODE 250 W/ 637 OVERRIDE(OP): Performed by: HOSPITALIST

## 2023-01-01 PROCEDURE — 700111 HCHG RX REV CODE 636 W/ 250 OVERRIDE (IP): Performed by: PHYSICAL MEDICINE & REHABILITATION

## 2023-01-01 PROCEDURE — 700105 HCHG RX REV CODE 258: Performed by: HOSPITALIST

## 2023-01-01 PROCEDURE — 770010 HCHG ROOM/CARE - REHAB SEMI PRIVAT*

## 2023-01-01 PROCEDURE — 700111 HCHG RX REV CODE 636 W/ 250 OVERRIDE (IP): Performed by: HOSPITALIST

## 2023-01-01 RX ADMIN — OMEPRAZOLE 20 MG: 20 CAPSULE, DELAYED RELEASE ORAL at 08:16

## 2023-01-01 RX ADMIN — SENNOSIDES AND DOCUSATE SODIUM 2 TABLET: 50; 8.6 TABLET ORAL at 08:16

## 2023-01-01 RX ADMIN — SODIUM CHLORIDE 1.5 G: 900 INJECTION INTRAVENOUS at 05:04

## 2023-01-01 RX ADMIN — METOPROLOL SUCCINATE 50 MG: 25 TABLET, EXTENDED RELEASE ORAL at 05:09

## 2023-01-01 RX ADMIN — SODIUM CHLORIDE 1.5 G: 900 INJECTION INTRAVENOUS at 00:06

## 2023-01-01 RX ADMIN — LIDOCAINE 1 PATCH: 700 PATCH TOPICAL at 11:28

## 2023-01-01 RX ADMIN — SENNOSIDES AND DOCUSATE SODIUM 2 TABLET: 50; 8.6 TABLET ORAL at 20:50

## 2023-01-01 RX ADMIN — ENOXAPARIN SODIUM 40 MG: 40 INJECTION SUBCUTANEOUS at 16:57

## 2023-01-01 ASSESSMENT — ENCOUNTER SYMPTOMS
FEVER: 0
COUGH: 0
DIZZINESS: 0
BLURRED VISION: 0
NERVOUS/ANXIOUS: 0
DIARRHEA: 0

## 2023-01-01 ASSESSMENT — FIBROSIS 4 INDEX: FIB4 SCORE: 0.67

## 2023-01-01 NOTE — CARE PLAN
The patient is Stable - Low risk of patient condition declining or worsening    Shift Goals  Clinical Goals: safety  Patient Goals: safety    Progress made toward(s) clinical / shift goals:  progressing    Patient is not progressing towards the following goals:

## 2023-01-01 NOTE — CARE PLAN
The patient is Stable - Low risk of patient condition declining or worsening    Shift Goals  Clinical Goals: safety  Patient Goals: safety    Problem: Pain - Standard  Goal: Alleviation of pain or a reduction in pain to the patient’s comfort goal  Outcome: Progressing Patient able to verbalize pain level and verbalize an acceptable level of pain.      Problem: Fall Risk - Rehab  Goal: Patient will remain free from falls  Outcome: Progressing patient has good safety awareness and is Mod I in the room at w/c level.

## 2023-01-01 NOTE — PROGRESS NOTES
Patient care assumed. Report received from [facility] PRATIK Chang. Patient is alert and calm, resting in bed. Call light and bedside table within reach. Will continue to monitor.

## 2023-01-01 NOTE — PROGRESS NOTES
Hospital Medicine Daily Progress Note      Chief Complaint  Hypertension  Leukocytosis    Interval Problem Update  No complaints.  Doing ok.    Review of Systems  Review of Systems   Constitutional:  Negative for fever.   Eyes:  Negative for blurred vision.   Respiratory:  Negative for cough.    Cardiovascular:  Negative for chest pain.   Gastrointestinal:  Negative for diarrhea.   Musculoskeletal:  Negative for joint pain.   Neurological:  Negative for dizziness.   Psychiatric/Behavioral:  The patient is not nervous/anxious.       Physical Exam  Temp:  [36.6 °C (97.9 °F)-36.9 °C (98.5 °F)] 36.9 °C (98.5 °F)  Pulse:  [] 79  Resp:  [18] 18  BP: (128-148)/(58-83) 145/83  SpO2:  [93 %-94 %] 93 %    Physical Exam  Vitals and nursing note reviewed.   Constitutional:       Appearance: She is not diaphoretic.   HENT:      Mouth/Throat:      Pharynx: No oropharyngeal exudate or posterior oropharyngeal erythema.   Eyes:      Extraocular Movements: Extraocular movements intact.   Neck:      Vascular: No carotid bruit.   Cardiovascular:      Rate and Rhythm: Normal rate and regular rhythm.   Pulmonary:      Effort: Pulmonary effort is normal.      Breath sounds: No wheezing or rales.   Abdominal:      General: There is no distension.      Palpations: Abdomen is soft.      Tenderness: There is no abdominal tenderness.      Comments: Lower midline abdominal incision with wound vac   Musculoskeletal:      Cervical back: Normal range of motion.      Right lower leg: No edema.      Left lower leg: No edema.   Skin:     General: Skin is warm and dry.   Neurological:      Mental Status: She is alert and oriented to person, place, and time.   Psychiatric:         Mood and Affect: Mood normal.         Behavior: Behavior normal.       Fluids    Intake/Output Summary (Last 24 hours) at 1/1/2023 0946  Last data filed at 1/1/2023 0800  Gross per 24 hour   Intake 600 ml   Output --   Net 600 ml         Laboratory  Recent Labs      12/30/22  0511   WBC 11.3*   RBC 3.94*   HEMOGLOBIN 11.1*   HEMATOCRIT 35.3*   MCV 89.6   MCH 28.2   MCHC 31.4*   RDW 46.0   PLATELETCT 428   MPV 9.7       Recent Labs     12/30/22  0511   SODIUM 137   POTASSIUM 4.3   CHLORIDE 104   CO2 22   GLUCOSE 96   BUN 16   CREATININE 0.72   CALCIUM 8.8                     Assessment/Plan  Leukocytosis  Assessment & Plan  WBC's: 13.8 --> 12.0 (12/28) --> 11.3 (12/30)  Afebrile  BC (12/25): neg  U/A (12/26): neg  Procalcitonin normal  C. Diff: neg  CXR: shows atelectasis  CT Abd/Pelvis: negative for abscess  See other assessment for abx for perf diverticulitis    Diverticulitis of intestine with perforation and abscess- (present on admission)  Assessment & Plan  Had intra-abd abscess -- s/p IR drainage on 12/14/22  Complicated by perf viscus --> s/p ex lap w/ colostomy creation on 12/18/22  Intra-op cx polymicrobial  S/P Zosyn   S/P Unasyn (1/1)     Hypertension- (present on admission)  Assessment & Plan  BP a little more elevated recently  HR better:   On Toprol XL --> dose increased recently  Will monitor another day since meds were recently adjusted

## 2023-01-02 PROCEDURE — 770010 HCHG ROOM/CARE - REHAB SEMI PRIVAT*

## 2023-01-02 PROCEDURE — 97110 THERAPEUTIC EXERCISES: CPT

## 2023-01-02 PROCEDURE — 99231 SBSQ HOSP IP/OBS SF/LOW 25: CPT | Performed by: PHYSICAL MEDICINE & REHABILITATION

## 2023-01-02 PROCEDURE — 700102 HCHG RX REV CODE 250 W/ 637 OVERRIDE(OP): Performed by: HOSPITALIST

## 2023-01-02 PROCEDURE — 97605 NEG PRS WND THER DME<=50SQCM: CPT

## 2023-01-02 PROCEDURE — 97530 THERAPEUTIC ACTIVITIES: CPT

## 2023-01-02 PROCEDURE — 700102 HCHG RX REV CODE 250 W/ 637 OVERRIDE(OP): Performed by: PHYSICAL MEDICINE & REHABILITATION

## 2023-01-02 PROCEDURE — A9270 NON-COVERED ITEM OR SERVICE: HCPCS | Performed by: PHYSICAL MEDICINE & REHABILITATION

## 2023-01-02 PROCEDURE — 700111 HCHG RX REV CODE 636 W/ 250 OVERRIDE (IP): Performed by: PHYSICAL MEDICINE & REHABILITATION

## 2023-01-02 PROCEDURE — A9270 NON-COVERED ITEM OR SERVICE: HCPCS | Performed by: HOSPITALIST

## 2023-01-02 PROCEDURE — 99232 SBSQ HOSP IP/OBS MODERATE 35: CPT | Performed by: HOSPITALIST

## 2023-01-02 PROCEDURE — 700101 HCHG RX REV CODE 250: Performed by: PHYSICAL MEDICINE & REHABILITATION

## 2023-01-02 PROCEDURE — 97116 GAIT TRAINING THERAPY: CPT

## 2023-01-02 RX ADMIN — LIDOCAINE 1 PATCH: 700 PATCH TOPICAL at 12:26

## 2023-01-02 RX ADMIN — METOPROLOL SUCCINATE 50 MG: 25 TABLET, EXTENDED RELEASE ORAL at 05:21

## 2023-01-02 RX ADMIN — ENOXAPARIN SODIUM 40 MG: 40 INJECTION SUBCUTANEOUS at 17:02

## 2023-01-02 RX ADMIN — SENNOSIDES AND DOCUSATE SODIUM 2 TABLET: 50; 8.6 TABLET ORAL at 19:51

## 2023-01-02 RX ADMIN — SENNOSIDES AND DOCUSATE SODIUM 2 TABLET: 50; 8.6 TABLET ORAL at 08:38

## 2023-01-02 RX ADMIN — OXYCODONE 5 MG: 5 TABLET ORAL at 17:01

## 2023-01-02 RX ADMIN — OMEPRAZOLE 20 MG: 20 CAPSULE, DELAYED RELEASE ORAL at 08:38

## 2023-01-02 ASSESSMENT — GAIT ASSESSMENTS
DISTANCE (FEET): 200
GAIT LEVEL OF ASSIST: INDEPENDENT
GAIT LEVEL OF ASSIST: MODIFIED INDEPENDENT
ASSISTIVE DEVICE: 4 WHEEL WALKER
DISTANCE (FEET): 1500
ASSISTIVE DEVICE: 4 WHEEL WALKER
ASSISTIVE DEVICE: 4 WHEEL WALKER
DEVIATION: BRADYKINETIC
GAIT LEVEL OF ASSIST: MODIFIED INDEPENDENT

## 2023-01-02 ASSESSMENT — ENCOUNTER SYMPTOMS
ABDOMINAL PAIN: 0
DIARRHEA: 0
VOMITING: 0
NERVOUS/ANXIOUS: 0
CHILLS: 0
NAUSEA: 0
FEVER: 0
SHORTNESS OF BREATH: 0

## 2023-01-02 ASSESSMENT — ACTIVITIES OF DAILY LIVING (ADL)
BED_CHAIR_WHEELCHAIR_TRANSFER_DESCRIPTION: ADAPTIVE EQUIPMENT
BED_CHAIR_WHEELCHAIR_TRANSFER_DESCRIPTION: ADAPTIVE EQUIPMENT;INCREASED TIME
BED_CHAIR_WHEELCHAIR_TRANSFER_DESCRIPTION: ADAPTIVE EQUIPMENT;INCREASED TIME
BED_CHAIR_WHEELCHAIR_TRANSFER_DESCRIPTION: ADAPTIVE EQUIPMENT

## 2023-01-02 NOTE — THERAPY
"Physical Therapy   Daily Treatment     Patient Name: Maira Johnson  Age:  77 y.o., Sex:  female  Medical Record #: [id number]  Today's Date: 1/2/2023     Precautions  Precautions: Fall Risk  Comments: (P) Mod I on unit with 4WW, Abdominal wound vac, new ostomy, no BP on RUE    Subjective    Patient in bed and agreeable to therapy.     Objective       01/02/23 1401   PT Charge Group   PT Gait Training (Units) 1   PT Therapeutic Activities (Units) 1   PT Total Time Spent   PT Individual Total Time Spent (Mins) 30   Precautions   Comments Mod I on unit with 4WW, Abdominal wound vac, new ostomy, no BP on RUE   Gait Functional Level of Assist    Gait Level Of Assist Modified Independent   Assistive Device 4 Wheel Walker   Distance (Feet) 200   # of Times Distance was Traveled 2   Deviation   (slow hardy)   Stairs Functional Level of Assist   Level of Assist with Stairs Supervised   # of Stairs Climbed 8  (6\" steps with BUE support on single rail)   Stairs Description Extra time;Hand rails;Supervision for safety  (step to pattern ascending/descending sideways to simulate d/c home set up)   Transfer Functional Level of Assist   Bed, Chair, Wheelchair Transfer Modified Independent   Bed Chair Wheelchair Transfer Description Adaptive equipment  (4WW)   Bed Mobility    Supine to Sit Independent   Sit to Supine Independent   Sit to Stand Independent   Scooting Independent   Rolling Independent   Interdisciplinary Plan of Care Collaboration   Patient Position at End of Therapy Edge of Bed;Call Light within Reach;Phone within Reach;Tray Table within Reach     Reviewed passport items in preparation for d/c.     Provided supine, seated, standing HEP for patient to read tonight and review tomorrow during therapy.    Assessment    Patient tolerated session well, making progress and demonstrating good safety with mobility. On target for d/c Wednesday.    Strengths: Able to follow instructions, Adequate strength, Effective " communication skills, Independent prior level of function, Making steady progress towards goals, Pleasant and cooperative, Supportive family, Willingly participates in therapeutic activities  Barriers: Decreased endurance, Fatigue, Generalized weakness, Impaired activity tolerance    Plan    Complete d/c [facility]; pt scheduled for d/c on 1/4/23 to neighbor's home with family support, no follow up PT services.    Passport items to be completed:  completed    Physical Therapy Problems (Active)       Problem: PT-Long Term Goals       Dates: Start: 12/24/22         Goal: LTG-By discharge, patient will ambulate 100+ ft indoors/outdoors with LRD and mod I.        Dates: Start: 12/24/22            Goal: LTG-By discharge, patient will transfer one surface to another using LRD and mod I.        Dates: Start: 12/24/22            Goal: LTG-By discharge, patient will transfer in/out of a car with SPV.        Dates: Start: 12/24/22            Goal: LTG-By discharge, patient will ambulate up/down ramp with LRD and mod I.       Dates: Start: 12/24/22

## 2023-01-02 NOTE — WOUND TEAM
[facility]  Inpatient Services  Wound and Skin Care Progress Note    Admission Date: 12/23/2022     Last order of IP CONSULT TO WOUND CARE was found on 12/23/2022 from Hospital Encounter on 12/21/2022     HPI, PMH, SH: Reviewed    Past Surgical History:   Procedure Laterality Date    ME EXPLORATORY OF ABDOMEN  12/18/2022    Procedure: LAPAROTOMY, EXPLORATORY WITH OSTOMY CREATION;  Surgeon: Huseyin Asher M.D.;  Location: SURGERY [facility];  Service: General    GYN SURGERY Right 11/01/2013    Right mastecomy    TUBAL LIGATION  01/01/1975     Social History     Tobacco Use    Smoking status: Never     Passive exposure: Never    Smokeless tobacco: Never   Substance Use Topics    Alcohol use: Never     No chief complaint on file.    Diagnosis: Diverticulitis [K57.92]  Intra-abdominal abscess (HCC) [K65.1]  H/O abdominal abscess [Z87.898]    Unit where seen by Wound Team: 06/02     WOUND CONSULT/FOLLOW UP RELATED TO:  VAC change abd and ostomy education     WOUND HISTORY:  Pt is a 77yr old female with history of HTN and diverticulitis who presented with abdominal pain. Pt initially presented to [facility]. Pt has multiple episodes of diverticulitis which was treated with IV ABX. Pt was found to have perforated sigmoid diverticulitis with pelvic abscess and pneumoperitoneum. Pt underwent surgical intervention. Fascia was closed but skin was left open and wound was requested to place vac and begin ostomy education.    WOUND ASSESSMENT/LDA     Negative Pressure Wound Therapy 12/19/22 Surgical Abdomen (Active)   NPWT Pump Mode / Pressure Setting Ulta;Continuous;125 mmHg 01/02/23 1700   Dressing Type Medium;Black Foam (Regular) 01/02/23 1700   Number of Foam Pieces Used 2 01/02/23 1700   Canister Changed No 01/02/23 1035   Output (mL) 0 mL 12/23/22 1919   NEXT Dressing Change/Treatment Date 01/04/23 01/02/23 1700   WOUND NURSE ONLY - Time Spent with Patient (mins) 45 01/02/23 1700         Wound 12/18/22 Full Thickness Wound Abdomen Mid (Active)   Wound Image   01/02/23 1700   Site Assessment Red;Fully granulated 01/02/23 1700   Periwound Assessment Clean;Dry;Intact 01/02/23 1700   Margins Defined edges 01/02/23 1700   Closure Secondary intention 01/02/23 1700   Drainage Amount Small 01/02/23 1700   Drainage Description Serosanguineous 01/02/23 1700   Treatments Cleansed;Irrigation;Site care 01/02/23 1700   Wound Cleansing Approved Wound Cleanser 01/02/23 1700   Periwound Protectant Skin Protectant Wipes to Periwound;Drape;Paste Ring 01/02/23 1700   Dressing Cleansing/Solutions Not Applicable 01/02/23 1700   Dressing Options Collagen Dressing;Wound Vac 01/02/23 1700   Dressing Changed Changed 01/02/23 1700   Dressing Status Clean;Dry;Intact 01/02/23 1700   Dressing Change/Treatment Frequency Monday, Wednesday, Friday, and As Needed 01/02/23 1700   NEXT Dressing Change/Treatment Date 01/04/23 01/02/23 1700   NEXT Weekly Photo (Inpatient Only) 01/04/23 01/02/23 1700   Non-staged Wound Description Full thickness 01/02/23 1700   Wound Length (cm) 15 cm 12/28/22 1700   Wound Width (cm) 3.8 cm 12/28/22 1700   Wound Depth (cm) 4 cm 12/28/22 1700   Wound Surface Area (cm^2) 57 cm^2 12/28/22 1700   Wound Volume (cm^3) 228 cm^3 12/28/22 1700   Wound Healing % 42 12/28/22 1700   Shape linear 01/02/23 1700   Wound Odor None 01/02/23 1700   Exposed Structures None 01/02/23 1700   WOUND NURSE ONLY - Time Spent with Patient (mins) 45 01/02/23 1700             Vascular:    MEME:   No results found.    Lab Values:    Lab Results   Component Value Date/Time    WBC 11.3 (H) 12/30/2022 05:11 AM    RBC 3.94 (L) 12/30/2022 05:11 AM    HEMOGLOBIN 11.1 (L) 12/30/2022 05:11 AM    HEMATOCRIT 35.3 (L) 12/30/2022 05:11 AM    CREACTPROT 10.84 (H) 12/14/2022 02:24 AM    HBA1C 6.0 (H) 12/24/2022 05:25 AM        Culture Results show:  Recent Results (from the past 720 hour(s))   CULTURE WOUND W/ GRAM STAIN    Collection Time:  12/18/22  2:59 PM    Specimen: Wound   Result Value Ref Range    Significant Indicator POS (POS)     Source WND     Site Pelvic Abscess     Culture Result - (A)     Gram Stain Result       Many WBCs.  Many Gram positive cocci.  Rare Gram positive rods.      Culture Result Escherichia coli  Moderate growth   (A)     Culture Result Enterococcus avium  Moderate growth   (A)     Culture Result Streptococcus anginosus  Moderate growth   (A)        Susceptibility    Enterococcus avium - JOSE     Ampicillin <=2 Sensitive mcg/mL     Vancomycin 0.5 Sensitive mcg/mL     Daptomycin <=0.5 Sensitive mcg/mL     Gent Synergy <=500 Sensitive mcg/mL     Penicillin 1 Sensitive mcg/mL    Escherichia coli - JOSE     Ampicillin <=8 Sensitive mcg/mL     Ceftriaxone <=1 Sensitive mcg/mL     Cefazolin <=2 Sensitive mcg/mL     Ciprofloxacin <=0.25 Sensitive mcg/mL     Cefepime <=2 Sensitive mcg/mL     Cefuroxime <=4 Sensitive mcg/mL     Ertapenem <=0.5 Sensitive mcg/mL     Gentamicin <=2 Sensitive mcg/mL     Ampicillin/sulbactam <=4/2 Sensitive mcg/mL     Minocycline <=4 Sensitive mcg/mL     Moxifloxacin <=2 Sensitive mcg/mL     Pip/Tazobactam <=8 Sensitive mcg/mL     Trimeth/Sulfa 2/38 Sensitive mcg/mL     Tigecycline <=2 Sensitive mcg/mL     Tobramycin <=2 Sensitive mcg/mL       Pain Level/Medicated:  PO pain meds; still grimacing in pain during dressing change     INTERVENTIONS BY WOUND TEAM:  Chart and images reviewed. Discussed with bedside RN. All areas of concern (based on picture review, LDA review and discussion with bedside RN) have been thoroughly assessed. Documentation of areas based on significant findings. This RN in to assess patient. Performed standard wound care which includes appropriate positioning, dressing removal and non-selective debridement. Pictures and measurements obtained weekly if/when required.    Preparation for Dressing removal: Dressing soaked with Saline and Adhesive remover wipes  Non-selectively Debrided  with:  Wound cleanser, Normal Saline, and Gauze   Sharp debridement: NA  Radha wound: Cleansed with wound cleanser, Prepped with No Sting and drape  Primary Dressing: collagen, 1 piece of Regular Black foam and Vac Drape, paste ring to the belly button with drape over.  Secondary (Outer) Dressing: drape, TRAC pad.  NPWT resumed at 125mmHg, no leaks noted.     Interdisciplinary consultation: Patient, Bedside RN     EVALUATION / RATIONALE FOR TREATMENT:  Most Recent Date:  1/2/23: Wound bed bryan well, collagen added to help with granulation and contraction of tissue. Continue with NPWT when patient discharges. Pending discharge date is Wednesday 1/4/23.   12/30/22: Wound bed continues to granulate. May benefit from gale at next assessment. Continue current POC. Per patient, she may discharge on Wed 1/4/23. Pt will need wound vac and home health for discharge.   12/28/22: Wound bed remains clean and red, continues to granulate thus wound depth decreasing with each change. Continue POC.   12/26: Pt's wound appears smaller than previous picture. Ostomy appliance intact and overlapping vAC drsg. Wound bed with some granulation. Crease at umbilicus needs to be built up for VAC and appliance.      Goals: Steady decrease in wound area and depth weekly.    WOUND TEAM PLAN OF CARE ([X] for frequency of wound follow up,):   Nursing to follow dressing orders written for wound care. Contact wound team if area fails to progress, deteriorates or with any questions/concerns if something comes up before next scheduled follow up (See below as to whether wound is following and frequency of wound follow up)  Dressing changes by wound team:                   Follow up 3 times weekly:                NPWT change 3 times weekly:   x  Follow up 1-2 times weekly:      Follow up Bi-Monthly:           Follow up Monthly (High Risk):                        Follow up as needed:     Other (explain):     NURSING PLAN OF CARE ORDERS  (X):  Dressing changes: See Dressing Care orders: x  Skin care: See Skin Care orders:   RN Prevention Protocol:   Rectal tube care: See Rectal Tube Care orders:   Other orders:    RSKIN:   CURRENTLY IN PLACE (X), APPLIED THIS VISIT (A), ORDERED (O):   Q shift Justo:  X  Q shift pressure point assessments:  X    Surface/Positioning   Pressure redistribution mattress  x          Low Airloss          ICU Low Airloss   Bariatric EVERT     Waffle cushion        Waffle Overlay          Reposition q 2 hours    remind and assist  TAPs Turning system     Z Franklin Pillow     Offloading/Redistribution Not assessed  Sacral Mepilex (Silicone dressing)     Heel Mepilex (Silicone dressing)         Heel float boots (Prevalon boot)             Float Heels off Bed with Pillows           Respiratory RA  Silicone O2 tubing         Gray Foam Ear protectors     Cannula fixation Device (Tender )          High flow offloading Clip    Elastic head band offloading device      Anchorfast                                                         Trach with Optifoam split foam             Containment/Moisture Prevention colostomy, bathroom    Rectal tube or BMS    Purwick/Condom Cath        Zaragoza Catheter    Barrier wipes           Barrier paste       Antifungal tx      Interdry        Mobilization       Up to chair   x     Ambulate    with FWW  PT/OT  x    Nutrition       Dietician        Diabetes Education      PO  x   TF     TPN     NPO   # days     Other        Anticipated discharge plans: VAc and supplies, HH  LTACH:        SNF/Rehab:                  Home Health Care:           Outpatient Wound Center:            Self/Family Care:        Other:                  Vac Discharge Needs:   Not Applicable Pt not on a wound vac:       Regular Vac while inpatient, alternative dressing at DC:        Regular Vac in use and continued at DC:      x      Reg. Vac w/ Skin Sub/Biologic in use. Will need to be changed 2x wkly:      Veraflo Vac while  "inpatient, ok to transition to Regular Vac on Discharge:           Veraflo Vac while inpatient, will need to remain on Veraflo Vac upon discharge:                              [facility]  Inpatient Services  New Ostomy Management & Teaching    HPI:  Reviewed  PMH: Reviewed   SH: Reviewed    Past Surgical History:   Procedure Laterality Date    [place] EXPLORATORY OF ABDOMEN  12/18/2022    Procedure: LAPAROTOMY, EXPLORATORY WITH OSTOMY CREATION;  Surgeon: Huseyin Asher M.D.;  Location: SURGERY [facility];  Service: General    GYN SURGERY Right 11/01/2013    Right mastecomy    TUBAL LIGATION  01/01/1975       Surgery Date: 12/18/22    Surgeon(s):  Huseyin Asher M.D.     Procedure(s):  LAPAROTOMY, EXPLORATORY      Permanence: unknown    Pertinent History: see above  Colostomy 12/18/22 (Active)   Wound Image   12/26/22 1700   Stomal Appliance Assessment Changed 01/02/23 1700   Stoma Assessment Pink;Red 01/02/23 1700   Stoma Shape Budded Less Than One Inch;Flush;Irregular;Oval 01/02/23 1700   Stoma Size (in) 2 01/02/23 1035   Peristomal Assessment Clean;Dry;Intact 01/02/23 1700   Mucocutaneous Junction Intact 01/02/23 1700   Treatment Appliance Changed;Bag Change;Site care 01/02/23 1700   Peristomal Protectant Paste Ring 01/02/23 1700   Stomal Appliance 2 1/2\" 1-piece Fecal;Paste Ring, 2\" 01/02/23 1700   Output (mL) 100 mL 12/28/22 1700   Output Color Brown 01/02/23 1700   WOUND RN ONLY - Stomal Appliance  1 Piece;Paste Ring, 2\";Flat;2 1/2\" 1-piece Fecal 01/02/23 1700   Appliance (Pouch) # 8531, [place] 7805 12/26/22 1700   Appliance Brand [place] 01/02/23 1700   Appliance Supplier [employer] 01/02/23 1700   Secure Start completed Yes 01/02/23 1700   WOUND NURSE ONLY - Time Spent with Patient (mins) 60 01/02/23 1700                  Ostomy Appliance (type and size): One piece flat and 2\" Paste Ring    Interventions: Removed previous appliance, cleansed peristomal skin with warm water/washcloth, patted dry, made " template and traced onto back of barrier. Barrier CTF, checked and applied paste ring  to barrier.  Appliance applied to skin and end closed    Pt education: Questions and concerns addressed    Needs for next visit:   Pt to perform more hands on--cut barrier and apply paste ring, closing pouch and warming up barrier after placement.   AM change and education, patient discharging home.     Evaluation:   1/2/23:  Patient declined getting up to bathroom for ostomy change at this visit.  Patient wanted to do it in bed.  States that she will have home health after discharge from rehab center.  Patient able to teach back ostomy, and doing more of the hands-on care.   12/30/22: Pt emptying and cleaning bag independently. Plan for hands on education on Monday. Pt would like to try to change her ostomy in front of a mirror in the bathroom as that will be how she would change her appliance at home.   12/28/22: Pt unable to do hands on education today due to pain from VAC change. Close proximity of abdominal wound to stoma may cause difficulty for patient.     Stoma with output and flatus. Barely protuding above skin. Body habitus needs one piece r/t crease.     Flatus: Present  Stool Output: small, brown, and soft  Urine Output: NA, Fecal Ostomy  Diet: Regular  Mobility: Up to chair    Plan: Ostomy nurses to continue to follow for ostomy needs and teaching until discharge    Anticipated discharge needs: Supplies, supplier information, possible HH, outpatient ostomy clinic, Skilled Nursing/Rehab     Secure Start Signed Yes  Outpatient Referral Placed Declined  5 Sets of appliances in Ostomy bag for discharge Ordered    INSURANCE OPTIONS:                 xoompark & SET ([employer])         x     MediCARE/MEDICAID & All other Private Insurance companies (Prism Form)              MediCAID & Fee for Service (Care Chest Paperwork + Prism Form)                            Form signed/Catalog Marked and Copy left  "with patient OR medicaid paperwork given to patient      Anticipated Discharge Plans:  Home Health Care, Family Care, and Self Care    Ostomy Supplies for DC:  12in Lock n' Roll ONE piece with Filter 2\" (Mora [id number]), Skin Prep Wipes (3M Cavilon 3344) - 2 box per month, Adapt No-Sting Universal Remover Wipes (Mora 0160) - 2 box per month, and Adapt Flat paste ring 2\" (Mora 8601) - 15 per month    "

## 2023-01-02 NOTE — PROGRESS NOTES
"Rehab Progress Note     Date of Service: 1/2/2023  Chief Complaint: follow up intra-abdominal abscess     Interval Events (Subjective)    Patient seen and examined for Dr. Pruett.    Patient reports she's nervous about her ability to care for her ostomy at home, as she is scheduled to leave rehab in 2 days. Wound care nursing to come by today for her wound vac dressing change, and advised patient to ask for teaching around changing her ostomy bag. Patient reports some pain associated with the wound vac changes, otherwise denies any pain.    Patient has no other new questions, concerns, or complaints today.       ROS: No changes to bladder, pain, mood, or sleep.       Objective:  VITAL SIGNS: /70   Pulse 89   Temp 36.7 °C (98 °F) (Oral)   Resp 18   Ht 1.6 m (5' 3\")   Wt 105 kg (231 lb 9.6 oz)   LMP  (LMP Unknown)   SpO2 95%   BMI 41.03 kg/m²   Gen: alert, no apparent distress  CV: Regular rate, regular rhythm  Resp: Clear to auscultation bilaterally  GI: wound vac on right mid abdomen, ostomy stoma/bag with brown soft stool in left abdomen       No results found for this or any previous visit (from the past 72 hour(s)).    Current Facility-Administered Medications   Medication Frequency    metoprolol SR (TOPROL XL) tablet 50 mg Q DAY    lidocaine (LIDODERM) 5 % 1 Patch Q24HR    Pharmacy Consult Request PHARMACY TO DOSE    Respiratory Therapy Consult Continuous RT    Pharmacy Consult Request ...Pain Management Review 1 Each PHARMACY TO DOSE    senna-docusate (PERICOLACE or SENOKOT S) 8.6-50 MG per tablet 2 Tablet BID    And    polyethylene glycol/lytes (MIRALAX) PACKET 1 Packet QDAY PRN    And    magnesium hydroxide (MILK OF MAGNESIA) suspension 30 mL QDAY PRN    And    bisacodyl (DULCOLAX) suppository 10 mg QDAY PRN    omeprazole (PRILOSEC) capsule 20 mg DAILY    mag hydrox-al hydrox-simeth (MAALOX PLUS ES or MYLANTA DS) suspension 20 mL Q2HRS PRN    ondansetron (ZOFRAN ODT) dispertab 4 mg 4X/DAY PRN "    Or    ondansetron (ZOFRAN) syringe/vial injection 4 mg 4X/DAY PRN    traZODone (DESYREL) tablet 50 mg QHS PRN    sodium chloride (OCEAN) 0.65 % nasal spray 2 Spray PRN    oxyCODONE immediate-release (ROXICODONE) tablet 2.5 mg Q3HRS PRN    Or    oxyCODONE immediate-release (ROXICODONE) tablet 5 mg Q3HRS PRN    enoxaparin (Lovenox) inj 40 mg DAILY AT 1800       Orders Placed This Encounter   Procedures    Diet Order Diet: Regular     Standing Status:   Standing     Number of Occurrences:   1     Order Specific Question:   Diet:     Answer:   Regular [1]       Assessment:  Active Hospital Problems    Diagnosis     *Intra-abdominal abscess ([facility])     Leukocytosis     Thrombocytosis     Diverticulitis     H/O abdominal abscess     CHEPE (acute kidney injury) ([facility])     Hypokalemia     Postoperative hypoxia     Class 3 severe obesity due to excess calories with serious comorbidity and body mass index (BMI) of 40.0 to 44.9 in adult ([facility])     Hyperglycemia     Diverticulitis of intestine with perforation and abscess     Hypertension        Medical Decision Making and Plan:    Debility  S/p abdominal abscess  Continue full rehab program    Wound care nursing for wound vac and ostomy care - train patient in preparation for discharge home this week.    Pain: As needed oxycodone for wound vac changes.    Bowel/ostomy: draining soft brown stool.     Bladder: No issues.    Sleep: No issues.    Mood: Slightly anxious about discharge/ostomy/wound vac.     DVT prophylaxis: Lovenox.    GI prophylaxis: Omeprazole        Ann Young M.D.  Physical Medicine and Rehabilitation

## 2023-01-02 NOTE — THERAPY
Physical Therapy   Daily Treatment     Patient Name: Maira Johnson  Age:  77 y.o., Sex:  female  Medical Record #: [id number]  Today's Date: 1/2/2023     Precautions  Precautions: (P) Fall Risk  Comments: (P) Abdominal wound vac, new ostomy, hx of mastectomy limb alert RUE    Subjective    Patient seated in w/c and agreeable to therapy.     Objective       01/02/23 0831   PT Charge Group   PT Gait Training (Units) 3   PT Therapeutic Activities (Units) 1   PT Total Time Spent   PT Individual Total Time Spent (Mins) 60   Precautions   Precautions Fall Risk   Comments Abdominal wound vac, new ostomy, hx of mastectomy limb alert RUE   Gait Functional Level of Assist    Gait Level Of Assist Modified Independent  (supervised outdoors)   Assistive Device 4 Wheel Walker   Distance (Feet) 1500   # of Times Distance was Traveled 1   Deviation   (slow hardy)   Transfer Functional Level of Assist   Bed, Chair, Wheelchair Transfer Modified Independent   Bed Chair Wheelchair Transfer Description Adaptive equipment;Increased time   Bed Mobility    Supine to Sit Modified Independent   Sit to Supine Modified Independent   Sit to Stand Modified Independent   Scooting Modified Independent   Rolling Modified Independent   Interdisciplinary Plan of Care Collaboration   IDT Collaboration with  Nursing;Occupational Therapist   Patient Position at End of Therapy Edge of Bed;Call Light within Reach;Phone within Reach;Tray Table within Reach   Collaboration Comments mod I on unit with 4WW     Patient made mod I on unit with 4WW, reviewed safety recommendations and provided orange band. Removed w/c from room to facilitate ease of mobility.    Outdoor ambulation with 4WW and supervision,SBA over icy parts of sidewalk. Patient reporting she has several icy areas around her property and verbalized understanding of recommendations for guarding provided by son/neighbors.    Verbally reviewed guarding position for stair negotiation in order to  access neighbor's home where she will be staying upon d/c; verbalized understanding of recommendations and able to recall positioning without cues.    Assessment    Patient tolerated session well, reporting feeling weaker/less competent than last week due to inactivity over weekend, however did not affect mobility today. Demonstrates good safety and insight into deficits and verbalized ability to direct care at home.    Strengths: Able to follow instructions, Adequate strength, Effective communication skills, Independent prior level of function, Making steady progress towards goals, Pleasant and cooperative, Supportive family, Willingly participates in therapeutic activities  Barriers: Decreased endurance, Fatigue, Generalized weakness, Impaired activity tolerance    Plan    Gait endurance with 4WW. Standing tolerance, general strengthening, stair negotiation.     Passport items to be completed:  Get in/out of bed safely, in/out of a vehicle, safely use mobility device, walk or wheel around home/community, navigate up and down stairs, show how to get up/down from the ground, ensure home is accessible, demonstrate HEP, complete caregiver training    Physical Therapy Problems (Active)       Problem: PT-Long Term Goals       Dates: Start: 12/24/22         Goal: LTG-By discharge, patient will ambulate 100+ ft indoors/outdoors with LRD and mod I.        Dates: Start: 12/24/22            Goal: LTG-By discharge, patient will transfer one surface to another using LRD and mod I.        Dates: Start: 12/24/22            Goal: LTG-By discharge, patient will transfer in/out of a car with SPV.        Dates: Start: 12/24/22            Goal: LTG-By discharge, patient will ambulate up/down ramp with LRD and mod I.       Dates: Start: 12/24/22

## 2023-01-02 NOTE — THERAPY
"Occupational Therapy  Daily Treatment     Patient Name: Maira Johnson  Age:  77 y.o., Sex:  female  Medical Record #: [id number]  Today's Date: 1/2/2023     Precautions  Precautions: Fall Risk  Comments: Mod I on unit with 4WW, Abdominal wound vac, new ostomy, no BP on RUE         Subjective    \"I feel so comfortable dealing with my ostomy now! I emptied it every 2 hours yesterday for practice.\"     Objective       01/02/23 1231   OT Charge Group   OT Therapy Activity (Units) 2   OT Therapeutic Exercise (Units) 2   OT Total Time Spent   OT Individual Total Time Spent (Mins) 60   Pain   Intervention Declines   Cognition    Level of Consciousness Alert   Functional Level of Assist   Bed, Chair, Wheelchair Transfer Modified Independent   Bed Chair Wheelchair Transfer Description Adaptive equipment  (4WW)   Tub / Shower Transfers Supervised   Tub Shower Transfer Description Adaptive equipment;Shower bench;Supervision for safety  (4WW for stand step to tub transfer bench, pt able to manage wound vac during transfer.)   Sitting Upper Body Exercises   Front Arm Raise Bilateral;Light Resistance Theraband  (2x 5 reps)   Shoulder Press Bilateral;Light Resistance Theraband  (2x 5 reps)   Internal Shoulder Rotation Bilateral;Light Resistance Theraband  (2x 5 reps)   External Shoulder Rotation 1 set of 10;Bilateral;Light Resistance Theraband   Bicep Curls 1 set of 10;Bilateral;Light Resistance Theraband   Comments Handout provided for HEP with review. Pt able to complete at ind level.   IADL Treatments   IADL Treatments Home management   Home Management Reviewed steps for laundry task, should pt need to complete at neighbors house, using reacher and 4WW for stabilization. Pt reports that her son completes laundry at home.   Interdisciplinary Plan of Care Collaboration   Patient Position at End of Therapy Seated  (Pt Mod I on unit with 4WW, left seated in main gym.)     New Passport to [place] procured, updated, and attached " to 4WW. Pt completed functional mobility at Mod I level on unit without LOB.    Assessment    Pt tolerated session well and demonstrated good carryover with HEP and 4WW use on unit. Pt has all DME needed.  Strengths: Able to follow instructions, Alert and oriented, Effective communication skills, Good balance, Good carryover of learning, Good insight into deficits/needs, Independent prior level of function, Making steady progress towards goals, Motivated for self care and independence, Pleasant and cooperative, Supportive family, Willingly participates in therapeutic activities  Barriers: Fatigue, Generalized weakness, Impaired activity tolerance, Limited mobility, Pain    Plan    DC shower tomorrow  Ostomy care/management. Continue to have pt complete empty with therapist support to overcome mental block. ADLs with AE training, IADLs, overall endurance and strengthening    Occupational Therapy Goals (Active)       Problem: Bathing       Dates: Start: 12/24/22         Goal: STG-Within one week, patient will bathe with CGA using DME/AE PRN.       Dates: Start: 12/24/22               Problem: Dressing       Dates: Start: 12/29/22         Goal: STG-Within one week, patient will dress LB with SBA using DME/AE PRN       Dates: Start: 12/29/22               Problem: Functional Transfers       Dates: Start: 12/29/22         Goal: STG-Within one week, patient will transfer to toilet with Mod I using 4WW.       Dates: Start: 12/29/22               Problem: OT Long Term Goals       Dates: Start: 12/24/22         Goal: LTG-By discharge, patient will complete basic self care tasks with SPV-Mod I using DME/AE PRN.       Dates: Start: 12/24/22            Goal: LTG-By discharge, patient will perform bathroom transfers with SPV-Mod I using DME/AE PRN.       Dates: Start: 12/24/22            Goal: LTG-By discharge, patient will complete basic home management with SPV-Mod I using DME/AE PRN.       Dates: Start: 12/24/22                Problem: Toileting       Dates: Start: 12/29/22         Goal: STG-Within one week, patient will complete toileting tasks with SPV using DME/AE PRN       Dates: Start: 12/29/22

## 2023-01-02 NOTE — THERAPY
Physical Therapy   Daily Treatment     Patient Name: Maira Johnson  Age:  77 y.o., Sex:  female  Medical Record #: [id number]  Today's Date: 1/2/2023     Precautions  Precautions: Fall Risk  Comments: Abdominal wound vac, new ostomy, no BP on RUE    Subjective    Patient agreeable to PT; reports was able to ambulate outdoors and complete stairs earlier today; reports is now IND on unit with 4WW.     Objective       01/02/23 1031   PT Charge Group   PT Gait Training (Units) 1   PT Therapeutic Activities (Units) 1   PT Total Time Spent   PT Individual Total Time Spent (Mins) 30   Precautions   Precautions Fall Risk   Comments Abdominal wound vac, new ostomy, no BP on RUE   Vitals   O2 (LPM) 0   O2 Delivery Device None - Room Air   Gait Functional Level of Assist    Gait Level Of Assist Independent   Assistive Device 4 Wheel Walker   Distance (Feet)   (500 ft and 1000 ft; indoors)   # of Times Distance was Traveled 1   Deviation Bradykinetic  (occasional increased time with path finding; patient is able to safely self-manage 4WW and wound vac)   Wheelchair Functional Level of Assist   Wheelchair Assist   (n/a due to ambulatory status)   Distance Wheelchair (Feet or Distance)   (n/a)   Wheelchair Description   (n/a)   Transfer Functional Level of Assist   Bed, Chair, Wheelchair Transfer Independent   Bed Chair Wheelchair Transfer Description Adaptive equipment;Increased time   Bed Mobility    Supine to Sit Modified Independent   Sit to Supine Modified Independent   Sit to Stand Modified Independent   Scooting Modified Independent   Rolling Modified Independent   Interdisciplinary Plan of Care Collaboration   Patient Position at End of Therapy In Bed;Call Light within Reach;Tray Table within Reach;Phone within Reach       Did not locate patient passport but Discussed floor recovery (pt politely declines performance this session but verbalizes understanding), POC, 4WW safety, and call light usage. X10  min.    Assessment    Patient demonstrates safe mobility and bed transfers this session; verbalizes safe strategies; [id number] wound vac and 4WW; no LOB; pleasant; mild antalgia throughout.    Strengths: Able to follow instructions, Adequate strength, Effective communication skills, Independent prior level of function, Making steady progress towards goals, Pleasant and cooperative, Supportive family, Willingly participates in therapeutic activities  Barriers: Decreased endurance, Fatigue, Generalized weakness, Impaired activity tolerance    Plan    Complete d/c [facility]; pt scheduled for d/c on 1/4/23      Physical Therapy Problems (Active)       Problem: PT-Long Term Goals       Dates: Start: 12/24/22         Goal: LTG-By discharge, patient will ambulate 100+ ft indoors/outdoors with LRD and mod I.        Dates: Start: 12/24/22            Goal: LTG-By discharge, patient will transfer one surface to another using LRD and mod I.        Dates: Start: 12/24/22            Goal: LTG-By discharge, patient will transfer in/out of a car with SPV.        Dates: Start: 12/24/22            Goal: LTG-By discharge, patient will ambulate up/down ramp with LRD and mod I.       Dates: Start: 12/24/22

## 2023-01-02 NOTE — PROGRESS NOTES
Hospital Medicine Daily Progress Note      Chief Complaint  Hypertension  Leukocytosis    Interval Problem Update  No complaints.  Doing ok.     Review of Systems  Review of Systems   Constitutional:  Negative for chills and fever.   Respiratory:  Negative for shortness of breath.    Cardiovascular:  Negative for chest pain.   Gastrointestinal:  Negative for abdominal pain, diarrhea, nausea and vomiting.   Psychiatric/Behavioral:  The patient is not nervous/anxious.       Physical Exam  Temp:  [36.7 °C (98 °F)-37 °C (98.6 °F)] 36.7 °C (98 °F)  Pulse:  [89-96] 89  Resp:  [16-18] 18  BP: (109-124)/(67-78) 109/70  SpO2:  [93 %-95 %] 95 %    Physical Exam  Vitals and nursing note reviewed.   Constitutional:       Appearance: Normal appearance.   HENT:      Head: Atraumatic.   Eyes:      Conjunctiva/sclera: Conjunctivae normal.      Pupils: Pupils are equal, round, and reactive to light.   Cardiovascular:      Rate and Rhythm: Normal rate and regular rhythm.      Heart sounds: No murmur heard.  Pulmonary:      Effort: Pulmonary effort is normal.      Breath sounds: No stridor. No wheezing or rales.   Abdominal:      General: There is no distension.      Palpations: Abdomen is soft.      Tenderness: There is no abdominal tenderness.      Comments: Lower midline abdominal incision with wound vac   Musculoskeletal:      Cervical back: Normal range of motion and neck supple.      Right lower leg: No edema.      Left lower leg: No edema.   Skin:     General: Skin is warm and dry.      Findings: No rash.   Neurological:      Mental Status: She is alert and oriented to person, place, and time.   Psychiatric:         Mood and Affect: Mood normal.         Behavior: Behavior normal.       Fluids    Intake/Output Summary (Last 24 hours) at 1/2/2023 1002  Last data filed at 1/1/2023 1848  Gross per 24 hour   Intake 480 ml   Output --   Net 480 ml         Laboratory                            Assessment/Plan  Leukocytosis  Assessment  & Plan  WBC's: 13.8 --> 12.0 (12/28) --> 11.3 (12/30)  Afebrile  BC (12/25): showed 1 bottle positive for Cutibacterium species after 5 days -- contaminant  U/A (12/26): neg  Procalcitonin normal  C. Diff: neg  CXR: shows atelectasis  CT Abd/Pelvis: negative for abscess  See other assessment for abx for perf diverticulitis    Diverticulitis of intestine with perforation and abscess- (present on admission)  Assessment & Plan  Had intra-abd abscess -- s/p IR drainage on 12/14/22  Complicated by perf viscus --> s/p ex lap w/ colostomy creation on 12/18/22  Intra-op cx polymicrobial  S/P Zosyn   S/P Unasyn (1/1)     Hypertension- (present on admission)  Assessment & Plan  BP better recently  HR better and ok  On Toprol XL --> dose increased recently  Cont to monitor

## 2023-01-02 NOTE — CARE PLAN
Problem: Skin Integrity  Goal: Patient's skin integrity will be maintained or improve  Note: Wound vac is continuously running at 125 mmHg. No clamps or leaks noted. Battery is charged. Periwound is CDI.      Problem: Mobility  Goal: Patient's capacity to carry out activities will improve  Note: Patient is ambulatory this shift with 4ww. Patient is mod I on the unit.    The patient is Stable - Low risk of patient condition declining or worsening

## 2023-01-03 PROBLEM — R09.02 POSTOPERATIVE HYPOXIA: Status: RESOLVED | Noted: 2022-12-20 | Resolved: 2023-01-03

## 2023-01-03 PROBLEM — N17.9 AKI (ACUTE KIDNEY INJURY) (HCC): Status: RESOLVED | Noted: 2022-12-20 | Resolved: 2023-01-03

## 2023-01-03 PROBLEM — D72.829 LEUKOCYTOSIS: Status: RESOLVED | Noted: 2022-12-26 | Resolved: 2023-01-03

## 2023-01-03 PROBLEM — R73.9 HYPERGLYCEMIA: Status: RESOLVED | Noted: 2022-12-19 | Resolved: 2023-01-03

## 2023-01-03 PROBLEM — K65.1 INTRA-ABDOMINAL ABSCESS (HCC): Status: RESOLVED | Noted: 2022-12-23 | Resolved: 2023-01-03

## 2023-01-03 PROBLEM — Z98.890 POSTOPERATIVE HYPOXIA: Status: RESOLVED | Noted: 2022-12-20 | Resolved: 2023-01-03

## 2023-01-03 PROBLEM — E87.6 HYPOKALEMIA: Status: RESOLVED | Noted: 2022-12-20 | Resolved: 2023-01-03

## 2023-01-03 LAB
BASOPHILS # BLD AUTO: 1.1 % (ref 0–1.8)
BASOPHILS # BLD: 0.09 K/UL (ref 0–0.12)
EOSINOPHIL # BLD AUTO: 0.26 K/UL (ref 0–0.51)
EOSINOPHIL NFR BLD: 3.1 % (ref 0–6.9)
ERYTHROCYTE [DISTWIDTH] IN BLOOD BY AUTOMATED COUNT: 45.3 FL (ref 35.9–50)
HCT VFR BLD AUTO: 39.7 % (ref 37–47)
HGB BLD-MCNC: 12.4 G/DL (ref 12–16)
IMM GRANULOCYTES # BLD AUTO: 0.06 K/UL (ref 0–0.11)
IMM GRANULOCYTES NFR BLD AUTO: 0.7 % (ref 0–0.9)
LYMPHOCYTES # BLD AUTO: 1.35 K/UL (ref 1–4.8)
LYMPHOCYTES NFR BLD: 16.1 % (ref 22–41)
MCH RBC QN AUTO: 27.9 PG (ref 27–33)
MCHC RBC AUTO-ENTMCNC: 31.2 G/DL (ref 33.6–35)
MCV RBC AUTO: 89.2 FL (ref 81.4–97.8)
MONOCYTES # BLD AUTO: 0.82 K/UL (ref 0–0.85)
MONOCYTES NFR BLD AUTO: 9.8 % (ref 0–13.4)
NEUTROPHILS # BLD AUTO: 5.8 K/UL (ref 2–7.15)
NEUTROPHILS NFR BLD: 69.2 % (ref 44–72)
NRBC # BLD AUTO: 0 K/UL
NRBC BLD-RTO: 0 /100 WBC
PLATELET # BLD AUTO: 367 K/UL (ref 164–446)
PMV BLD AUTO: 9.4 FL (ref 9–12.9)
RBC # BLD AUTO: 4.45 M/UL (ref 4.2–5.4)
WBC # BLD AUTO: 8.4 K/UL (ref 4.8–10.8)

## 2023-01-03 PROCEDURE — 700102 HCHG RX REV CODE 250 W/ 637 OVERRIDE(OP): Performed by: HOSPITALIST

## 2023-01-03 PROCEDURE — 700102 HCHG RX REV CODE 250 W/ 637 OVERRIDE(OP): Performed by: PHYSICAL MEDICINE & REHABILITATION

## 2023-01-03 PROCEDURE — A9270 NON-COVERED ITEM OR SERVICE: HCPCS | Performed by: HOSPITALIST

## 2023-01-03 PROCEDURE — 770010 HCHG ROOM/CARE - REHAB SEMI PRIVAT*

## 2023-01-03 PROCEDURE — A9270 NON-COVERED ITEM OR SERVICE: HCPCS | Performed by: PHYSICAL MEDICINE & REHABILITATION

## 2023-01-03 PROCEDURE — 97535 SELF CARE MNGMENT TRAINING: CPT

## 2023-01-03 PROCEDURE — 97110 THERAPEUTIC EXERCISES: CPT

## 2023-01-03 PROCEDURE — 36415 COLL VENOUS BLD VENIPUNCTURE: CPT

## 2023-01-03 PROCEDURE — 97116 GAIT TRAINING THERAPY: CPT

## 2023-01-03 PROCEDURE — 700101 HCHG RX REV CODE 250: Performed by: PHYSICAL MEDICINE & REHABILITATION

## 2023-01-03 PROCEDURE — 700111 HCHG RX REV CODE 636 W/ 250 OVERRIDE (IP): Performed by: PHYSICAL MEDICINE & REHABILITATION

## 2023-01-03 PROCEDURE — 85025 COMPLETE CBC W/AUTO DIFF WBC: CPT

## 2023-01-03 PROCEDURE — 97530 THERAPEUTIC ACTIVITIES: CPT

## 2023-01-03 PROCEDURE — 99232 SBSQ HOSP IP/OBS MODERATE 35: CPT | Performed by: PHYSICAL MEDICINE & REHABILITATION

## 2023-01-03 RX ORDER — SIMVASTATIN 10 MG
10 TABLET ORAL DAILY
Qty: 90 TABLET | Refills: 2 | Status: SHIPPED
Start: 2023-01-03 | End: 2023-08-01

## 2023-01-03 RX ORDER — ATENOLOL 50 MG/1
50 TABLET ORAL DAILY
Qty: 90 TABLET | Refills: 2 | Status: SHIPPED | OUTPATIENT
Start: 2023-01-03

## 2023-01-03 RX ORDER — OXYCODONE HYDROCHLORIDE 5 MG/1
5 TABLET ORAL EVERY 6 HOURS PRN
Qty: 15 TABLET | Refills: 0 | Status: SHIPPED | OUTPATIENT
Start: 2023-01-03 | End: 2023-01-10

## 2023-01-03 RX ADMIN — ENOXAPARIN SODIUM 40 MG: 40 INJECTION SUBCUTANEOUS at 18:04

## 2023-01-03 RX ADMIN — SENNOSIDES AND DOCUSATE SODIUM 2 TABLET: 50; 8.6 TABLET ORAL at 08:40

## 2023-01-03 RX ADMIN — LIDOCAINE 1 PATCH: 700 PATCH TOPICAL at 12:45

## 2023-01-03 RX ADMIN — OMEPRAZOLE 20 MG: 20 CAPSULE, DELAYED RELEASE ORAL at 08:40

## 2023-01-03 RX ADMIN — METOPROLOL SUCCINATE 50 MG: 25 TABLET, EXTENDED RELEASE ORAL at 05:33

## 2023-01-03 RX ADMIN — SENNOSIDES AND DOCUSATE SODIUM 2 TABLET: 50; 8.6 TABLET ORAL at 19:39

## 2023-01-03 ASSESSMENT — ACTIVITIES OF DAILY LIVING (ADL)
TOILETING_LEVEL_OF_ASSIST: ABLE TO COMPLETE TOILETING WITHOUT ASSIST
TOILET_TRANSFER_LEVEL_OF_ASSIST: ABLE TO COMPLETE TOILET TRANSFER WITHOUT ASSIST
TUB_SHOWER_TRANSFER_DESCRIPTION: ADAPTIVE EQUIPMENT;SHOWER BENCH
BED_CHAIR_WHEELCHAIR_TRANSFER_DESCRIPTION: ADAPTIVE EQUIPMENT
SHOWER_TRANSFER_LEVEL_OF_ASSIST: REQUIRES SUPERVISION WITH SHOWER TRANSFER

## 2023-01-03 ASSESSMENT — GAIT ASSESSMENTS
DEVIATION: BRADYKINETIC;INCREASED BASE OF SUPPORT
DISTANCE (FEET): 1500
DEVIATION: INCREASED BASE OF SUPPORT;BRADYKINETIC
GAIT LEVEL OF ASSIST: MODIFIED INDEPENDENT
ASSISTIVE DEVICE: 4 WHEEL WALKER
ASSISTIVE DEVICE: 4 WHEEL WALKER
GAIT LEVEL OF ASSIST: MODIFIED INDEPENDENT
DISTANCE (FEET): 200

## 2023-01-03 ASSESSMENT — BRIEF INTERVIEW FOR MENTAL STATUS (BIMS)
WHAT MONTH IS IT: ACCURATE WITHIN 5 DAYS
INITIAL REPETITION OF BED BLUE SOCK - FIRST ATTEMPT: 3
ASKED TO RECALL BED: YES, NO CUE REQUIRED
BIMS SUMMARY SCORE: 15
ASKED TO RECALL SOCK: YES, NO CUE REQUIRED
WHAT DAY OF THE WEEK IS IT: CORRECT
ASKED TO RECALL BLUE: YES, NO CUE REQUIRED
WHAT YEAR IS IT: CORRECT

## 2023-01-03 NOTE — DISCHARGE INSTRUCTIONS
[facility] NURSING DISCHARGE INSTRUCTIONS    Blood Pressure : 122/71  Weight: 105 kg (231 lb 9.6 oz)  Nursing recommendations for Maira Johnson at time of discharge are as follows:  Client verbalized understanding of all discharge instructions and prescriptions.     Review all your home medications and newly ordered medications with your doctor and/or pharmacist. Follow medication instructions as directed by your doctor and/or pharmacist.    Pain Management:   Discharge Pain Medication Instructions:  Comfort Goal: Comfort with Movement, Sleep Comfortably  Notify your primary care provider if pain is unrelieved with these measures, if the pain is new, or increased in intensity.    Discharge Skin Characteristics: Warm, Dry  Discharge Skin Exam: Clear  Wound 12/18/22 Full Thickness Wound Abdomen Mid (Active)   Wound Image   01/02/23 1700   Site Assessment ALDEN 01/03/23 1938   Periwound Assessment ALDEN 01/03/23 1938   Margins Defined edges 01/02/23 1700   Closure Secondary intention 01/02/23 1700   Drainage Amount Small 01/02/23 1700   Drainage Description Serosanguineous 01/02/23 1700   Treatments Cleansed;Irrigation;Site care 01/02/23 1700   Wound Cleansing Approved Wound Cleanser 01/02/23 1700   Periwound Protectant Skin Protectant Wipes to Periwound;Drape;Paste Ring 01/02/23 1700   Dressing Cleansing/Solutions Not Applicable 01/03/23 0705   Dressing Options Wound Vac 01/03/23 1938   Dressing Changed Observed 01/03/23 1938   Dressing Status Clean;Dry;Intact 01/03/23 1938   Dressing Change/Treatment Frequency Monday, Wednesday, Friday, and As Needed 01/03/23 1938   NEXT Dressing Change/Treatment Date 01/04/23 01/03/23 1938   NEXT Weekly Photo (Inpatient Only) 01/04/23 01/03/23 0705   Non-staged Wound Description Full thickness 01/02/23 1700   Wound Length (cm) 15 cm 12/28/22 1700   Wound Width (cm) 3.8 cm 12/28/22 1700   Wound Depth (cm) 4 cm 12/28/22 1700   Wound Surface Area (cm^2) 57 cm^2  "12/28/22 1700   Wound Volume (cm^3) 228 cm^3 12/28/22 1700   Wound Healing % 42 12/28/22 1700   Shape linear 01/02/23 1700   Wound Odor None 01/02/23 1700   Exposed Structures None 01/02/23 1700   WOUND NURSE ONLY - Time Spent with Patient (mins) 45 01/02/23 1700     Skin / Wound Care Instructions: Please contact your primary care physician for any change in skin integrity.     If You Have Surgical Incisions / Wounds:  Monitor surgical site(s) for signs of increased swelling, redness or symptoms of drainage from the site or fever as this could indicate signs and symptoms of infection. If these symptoms are noted, notifiy your primary care provider.      Discharge Safety Instructions: No Supervision Needed     Discharge Safety Concerns: Weakness  The interdisciplinary team has made recommendation that you do not require supervision in the house due to demonstration of safety with ADL's and IADLS and problem solving skills  Anti-embolic stockings are not required to increase circulation to the lower extremities.    Discharge Diet: Regular     Discharge Liquids: Thin  Discharge Bowel Function:    Please contact your primary care physician for any changes in bowel habits.  Discharge Bowel Program: colostomy  Discharge Bladder Function: Continent  Discharge Urinary Devices: None      Nursing Discharge Plan:   Influenza Vaccine Indication: Indicated: 65 years and older    Case Management Discharge Instructions:   Discharge Location:    Agency Name/Address/Phone:    Home Health:    Outpatient Services:    DME Provider/Phone:    Medical Equipment Ordered:    Prescription Faxed to:        Discharge Medication Instructions:  Below are the medications your physician expects you to take upon discharge:      Prevent Falls in Your Home    \"Falling once doubles your chance of falling again\"        -Center for Disease Control and Prevention    Falls in the home can lead to serious injury (fractures, brain injuries), " "hospitalizations, increased medical costs, and could even be fatal.  The good news is, there are many precautions you can take to avoid falls in your home and help keep you safe:     If prescribed an assistive device (walker, crutches), use as instructed by the healthcare provider\"   Remove any tripping hazards from your home, including loose cords, throw rugs and clutter  Keep a nightlight on in dark (hallways, bathrooms, etc)   Get up slowly, to make sure you feel okay before getting up  Be aware of any side effects of your medications: some medications may make you dizzy  Place a non-skid rubber mat in your shower or tub-consider a shower bench or chair if unsteady on your feet  Wear supportive shoes or non-skid socks when moving around  Start an exercise program once approved by your provider.  If you are feeling weak following a hospital stay, talk to your doctor about home health or outpatient therapy programs designed to help rebuild your strength and endurance            Physical Therapy Discharge Instructions for Maira Johnson    1/3/2023    Level of Assist Required for Ambulation: No Assist on Flat Surfaces, Supervision on Curbs, Supervision on Stairs  Distance Patient May Ambulate: as much as tolerated  Device Recommended for Ambulation: 4-Wheeled Walker  Level of Assist Required for Transfers: Requires No Assist  Device Recommended for Transfers: 4-Wheeled Walker  Home Exercise Program: Refer to Home Exercise Program Handout for Details  Jo, it was a privilege to have been able to work with you and see all of you physical, mental, and emotional growth throughout your time here. I will always look at [facility] and think of you! Be safe! -[email or web address]    Occupational Therapy Discharge Instructions for Maira Johnson    1/4/2023    Level of Assist Required for Eating: Able to Complete Eating without Assist  Level of Assist Required for Grooming: Able to Complete Grooming without " Assist  Level of Assist Required for Dressing: Able to Complete Dressing without Assist  Level of Assist Required for Toileting: Able to Complete Toileting without Assist  Level of Assist Required for Toilet Transfer: Able to Complete Toilet Transfer without Assist  Level of Assist Required for Bathing: Requires Supervision with Bathing  Equipment for Bathing: Tub Transfer Bench, Hand Held Shower Head, Long Handled Sponge  Level of Assist Required for Shower Transfer: Requires Supervision with Shower Transfer  Equipment for Shower Transfer: Tub Transfer Bench  Level of Assist Required for Home Mgmt: Able to Complete Home Management without Assist  Level of Assist Required for Meal Prep: Requires Supervision with Meal Preparation  Driving: May not Drive, Please Contact Physician for Further Information  Home Exercise Program: Refer to Home Exercise Program Handout for Details  Jo, it has been an honor working with you. I can't believe how far you have come. I'm just so proud of you!!! Please keep us up to date on how you're doing and come back to visit when you can. Send us the pictures we took if you can :) -[email or web address]

## 2023-01-03 NOTE — DISCHARGE SUMMARY
Physical Medicine & Rehabilitation Discharge Summary    Admission Date: 12/23/2022    Discharge Date: 1/4/2023    Attending Provider: Quinten Pruett MD/PhD    Admission Diagnosis:   Active Hospital Problems    Diagnosis     *Intra-abdominal abscess (HCC)     Leukocytosis     Thrombocytosis     Diverticulitis     H/O abdominal abscess     CHEPE (acute kidney injury) (HCC)     Hypokalemia     Postoperative hypoxia     Class 3 severe obesity due to excess calories with serious comorbidity and body mass index (BMI) of 40.0 to 44.9 in adult (HCC)     Hyperglycemia     Diverticulitis of intestine with perforation and abscess     Hypertension        Discharge Diagnosis:  Active Hospital Problems    Diagnosis     *Intra-abdominal abscess (HCC)     Leukocytosis     Thrombocytosis     Diverticulitis     H/O abdominal abscess     CHEPE (acute kidney injury) (HCC)     Hypokalemia     Postoperative hypoxia     Class 3 severe obesity due to excess calories with serious comorbidity and body mass index (BMI) of 40.0 to 44.9 in adult (HCC)     Hyperglycemia     Diverticulitis of intestine with perforation and abscess     Hypertension        HPI per Admission History & Physical:  The patient is a 77 y.o.female with a past medical history of hypertension and history of multiple episodes of diverticulitis previously requiring IV antibiotics;  who presented on 12/13/2022  7:45 PM as a transfer from an outside hospital with concern for an intra-abdominal abscess and diverticulitis.  Per documentation, patient was originally seen at [facility] with abdominal pain.  Reportedly she had diarrhea, abdominal pain and worsening cramping and fevers prior to her presenting to the emergency department.  Upon evaluation at the outside hospital, CT abdomen showed diverticulitis with multiple 3 cm abscesses.  Patient received IV Zosyn and IV fluids.  Patient was transferred to [facility] for higher level of care.   Orthopedic surgery was consulted and patient was taken to the OR on 12/18 for ex lap and drainage of pelvic abscesses with a Chávez's colectomy performed by Dr. Asher.  Postoperatively, patient continues to have a MIGUE drain in place.  She is on supplemental oxygen at 4 L.  Patient's leukocytosis has improved from 17.512/19-9.5 on 12/21.  Infectious diseases was consulted, fluid cultures from 12/18 are growing E coli ampicillin susceptible Enterococcus AVM, Bacteroides fragilis and Streptococcus angina gnosis.  Blood cultures have been negative.  Infectious diseases is recommending to discontinue IV Zosyn and transition to IV Unasyn 3 A every 6 hours with an anticipated stop date of 12/25/2022,  With transition to Augmentin 875 g twice daily at time of discharge home.  Functionally, patient has been willing to participate with therapy, she has been limited by a deconditioned surgical abdomen.    Patient was admitted to [facility] on 12/23/2022.     Hospital Course by Problem List:  Debility status post diverticulitis and intra-abdominal abscess  - CT abdomen at outside facility showed multiple intra-abdominal abscesses and diverticulitis  - Abscesses drained by IR on 12/13  - 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  - Started on Unasyn for increased WBCs after completing Augmentin.  Stop date is 1/1  - Stopped lactobacillus, patient has increased gas at ostomy site  - Wound VAC in place, wound care to assist with wound VAC changes and ostomy education  - Initiate comprehensive IRF level therapy with PT/OT/SLP  - Follow-up with general surgery        Leukocytosis  - Repeat CT abdomen/pelvis negative for abscess  - WBC is continuing to trend up, hospitalist consulted  - WBC up to 13.8 on 12/27, to resumed Unasyn , plan  to stop date 1/1  - WBC improved to 11.3 12/30      Hypertension  - Home dose -> switch to Atenolol home med  - BP well controlled, 12/30     Hypoxia  - Postoperatively patient has required up to 3 L O2  -Respiratory therapy consulted,  - Has been weaned to room air     Acute blood loss anemia  - Hemoglobin stable around 11 12/30     Hypocalcemia  -Resolved     Pain  - Scheduled Tylenol oxycodone  - Lidocaine patch to right side muscle spasms, improved   I discussed the risks and benefits of using opiate medications for pain control.  I discussed the risk of addiction, potential for overdose, and respiratory depression (and the potential need for opiate antagonist therapy if this occurs).  I encouraged the patient to take this medication sparingly with the expressed goal of weaning off the medication as soon as is clinically appropriate.  I informed the patient that we are only able to provide up to a 14 day supply of these medications at discharge and that they will be responsible for requesting any refills needed from their primary care provider or their surgeon.  We discussed the need to safely secure these medications to prevent theft, inadvertent ingestion, or misuse.  Any unused medication should be immediately disposed of through a sanctioned medication disposal program.  We discussed adjunctive pain medications and conservative therapies at length.I answered the patient's questions regarding this treatment, and the patient indicated understanding and willingness to proceed.     Bowel  - Monitor ostomy output  - PRN stool softeners     Morbid obesity due to Excess calories  -BMI of 41.03 on admission, meets medical criteria.  -Dietitian to consult.      Skin - Patient at risk for skin breakdown due to debility in areas including sacrum, achilles, elbows and head in addition to other sites. Nursing to assess skin daily.  - Wound care to follow along changes, wound vac changes 3 x per week      GI Ppx - Patient  on Prilosec for GERD prophylaxis. Patient on Senna-docusate for constipation prophylaxis.      DVT Ppx - Patient Lovenox on transfer. Discontinue on discharge    Functional Status at Discharge  Eating:  Independent  Eating Description:     Grooming:  Independent  Grooming Description:  Standing at sink (4WW for support)  Bathing:  Supervision  Bathing Description:  Set up for wound protection, Supervision for safety, Grab bar, Hand held shower, Long handled bath tool (assist for waterproofing only)  Upper Body Dressing:  Independent  Upper Body Dressing Description:  Set-up of equipment, Supervision for safety  Lower Body Dressing:  Modified Independent  Lower Body Dressing Description:  Reacher, Increased time     Walk:  Modified Independent  Distance Walked:  1500  Number of Times Distance Was Traveled:  1  Assistive Device:  4 Wheel Walker  Gait Deviation:  Increased Base Of Support, Bradykinetic  Wheelchair:   (n/a due to ambulatory status)  Distance Propelled:   (n/a)   Wheelchair Description:   (n/a)  Stairs Supervised  Stairs Description Extra time, Hand rails, Supervision for safety (step to pattern ascending/descending sideways to simulate d/c home set up)  Discharge Location: Relative / Friend's Home  Patient Discharging with Assist of: Family;Friend  Level of Supervision Required Upon Discharge: Intermittent Supervision  Recommended Equipment for Discharge: 4-Wheeled Walker  Recommeded Services Upon Discharge: No Follow Up Services Recommended  Long Term Goals Met: 4  Long Term Goals Not Met: 0  Reason(s) for Goals Not Met: n/a  Criteria for Termination of Services: Maximum Function Achieved for Inpatient Rehabilitation  Comprehension:  Independent  Comprehension Description:     Expression:  Independent  Expression Description:     Social Interaction:     Social Interaction Description:     Problem Solving:  Supervision  Problem Solving Description:     Memory:  Independent  Memory Description:           Quinten VELÁZQUEZ M.D., personally performed a complete drug regimen review and no potential clinically significant medication issues were identified.   Discharge Medication:     Medication List        START taking these medications        Instructions   atenolol 50 MG Tabs  Commonly known as: TENORMIN   Take 1 Tablet by mouth every day.  Dose: 50 mg            CONTINUE taking these medications        Instructions   acetaminophen 325 MG Tabs  Commonly known as: Tylenol   Take 2 Tablets by mouth every 6 hours as needed for Mild Pain, Moderate Pain or Fever.  Dose: 650 mg     oxyCODONE immediate-release 5 MG Tabs  Commonly known as: ROXICODONE   Take 1 Tablet by mouth every 6 hours as needed for Severe Pain for up to 7 days.  Dose: 5 mg     simvastatin 10 MG Tabs  Commonly known as: ZOCOR   Take 1 Tablet by mouth every day.  Dose: 10 mg            STOP taking these medications      amoxicillin-clavulanate 875-125 MG Tabs  Commonly known as: AUGMENTIN     enoxaparin 40 MG/0.4ML Sosy inj  Commonly known as: Lovenox     metoprolol SR 25 MG Tb24  Commonly known as: TOPROL XL     ondansetron 4 MG Tbdp  Commonly known as: ZOFRAN ODT     polyethylene glycol/lytes 17 g Pack  Commonly known as: MIRALAX     senna-docusate 8.6-50 MG Tabs  Commonly known as: PERICOLACE or SENOKOT S              Discharge Diet:  Current Diet Order   Procedures    Diet Order Diet: Regular       Discharge Activity:  As tolerated     Disposition:  Patient to discharge home with family support and community resources.    Equipment:  4WW    Follow-up & Discharge Instructions:  Follow up with your primary care provider (PCP) within 7-10 days of discharge to review your medications and take over your care.     If you develop chest pain, fever, chills, change in neurologic function (weakness, sensation changes, vision changes), or other concerning sxs, seek immediate medical attention or call 911.      No future appointments.    Condition on  Discharge:  Good    More than 33 minutes was spent on discharging this patient, including face-to-face time, prescription management, and the dictation of this note.    Quinten Pruett M.D.    Date of Service: 1/4/2023

## 2023-01-03 NOTE — DISCHARGE PLANNING
Case management  Reviewed signed copy of IMM and answered all questions.    Dc date /disposition: Home with home health on 1/4/23.

## 2023-01-03 NOTE — CARE PLAN
Problem: Bowel Elimination  Goal: Patient will participate in bowel management program  Note: Pt is able to cleansed and empty own colostomy.Will continue to monitor.     Problem: Fall Risk - Rehab  Goal: Patient will remain free from falls  Note: Liza Meng Fall risk Assessment Score: 7    Low fall risk interventions   - Call light within reach   - Yellow  socks   - Belongings within reach   - Bed in the lowest position

## 2023-01-03 NOTE — THERAPY
Physical Therapy   Daily Treatment     Patient Name: Maira Johnson  Age:  77 y.o., Sex:  female  Medical Record #: [id number]  Today's Date: 1/3/2023     Precautions  Precautions: Fall Risk  Comments: (P) Mod I on unit with 4WW, Abdominal wound vac, new ostomy, no BP on RUE    Subjective    Patient seated edge of bed and agreeable to therapy.     Objective       01/03/23 1401   PT Charge Group   PT Gait Training (Units) 2   PT Total Time Spent   PT Individual Total Time Spent (Mins) 30   Precautions   Comments Mod I on unit with 4WW, Abdominal wound vac, new ostomy, no BP on RUE   Gait Functional Level of Assist    Gait Level Of Assist Modified Independent   Assistive Device 4 Wheel Walker   Distance (Feet) 1500   # of Times Distance was Traveled 1   Deviation Increased Base Of Support;Bradykinetic   Transfer Functional Level of Assist   Bed, Chair, Wheelchair Transfer Modified Independent   Bed Chair Wheelchair Transfer Description Adaptive equipment  (4WW)   Bed Mobility    Supine to Sit Independent   Sit to Supine Independent   Sit to Stand Independent   Scooting Independent   Rolling Independent   Interdisciplinary Plan of Care Collaboration   IDT Collaboration with  Occupational Therapist;Physical Therapist   Patient Position at End of Therapy Edge of Bed;Call Light within Reach;Tray Table within Reach;Phone within Reach   Collaboration Comments CLOF, POC     Outdoor ambulation with 4WW, reviewed d/c recommendations and any barriers to discharge. Patient reporting feeling prepared for d/c with no further questions for therapy. Verbally reviewed car transfer using step as patient's neighbor will be picking patient up tomorrow in a slightly higher vehicle than anticipated.    Assessment    Patient tolerated session well, on target for d/c.     Strengths: Able to follow instructions, Adequate strength, Effective communication skills, Independent prior level of function, Making steady progress towards goals,  Pleasant and cooperative, Supportive family, Willingly participates in therapeutic activities  Barriers: Decreased endurance, Fatigue, Generalized weakness, Impaired activity tolerance    Plan    Anticipated d/c tomorrow to home with initial supervision and 4WW, no follow up PT services.    Passport items to be completed:  completed    Physical Therapy Problems (Active)       Problem: PT-Long Term Goals       Dates: Start: 12/24/22         Goal: LTG-By discharge, patient will ambulate 100+ ft indoors/outdoors with LRD and mod I.        Dates: Start: 12/24/22            Goal: LTG-By discharge, patient will transfer one surface to another using LRD and mod I.        Dates: Start: 12/24/22            Goal: LTG-By discharge, patient will transfer in/out of a car with SPV.        Dates: Start: 12/24/22            Goal: LTG-By discharge, patient will ambulate up/down ramp with LRD and mod I.       Dates: Start: 12/24/22

## 2023-01-03 NOTE — THERAPY
Occupational Therapy  Daily Treatment     Patient Name: Maira Johnson  Age:  77 y.o., Sex:  female  Medical Record #: [id number]  Today's Date: 1/3/2023     Precautions  Precautions: Fall Risk  Comments: Mod I on unit with 4WW, Abdominal wound vac, new ostomy, no BP on RUE         Subjective    Pt seen 2x this day as listed and detailed below. Pt agreeable to both sessions.     Objective       01/03/23 0831 01/03/23 1101   OT Charge Group   OT Self Care / ADL (Units) 3 1   OT Therapy Activity (Units) 1 1   OT Total Time Spent   OT Individual Total Time Spent (Mins) 60 30   Functional Level of Assist   Eating Independent  --    Grooming  --  Independent   Grooming Description  --  Standing at sink  (4WW for support)   Bathing Supervision  --    Bathing Description Set up for wound protection;Supervision for safety;Grab bar;Hand held shower;Long handled bath tool  (assist for waterproofing only)  --    Upper Body Dressing Independent  --    Lower Body Dressing Modified Independent  --    Lower Body Dressing Description Reacher;Increased time  --    Toileting Independent  --    Bed, Chair, Wheelchair Transfer Modified Independent Modified Independent   Bed Chair Wheelchair Transfer Description  --    (4WW)   Toilet Transfers Modified Independent  --    Tub / Shower Transfers Supervised  --    Tub Shower Transfer Description Adaptive equipment;Shower bench  --    Interdisciplinary Plan of Care Collaboration   IDT Collaboration with  Physical Therapist  --    Patient Position at End of Therapy Seated Edge of Bed;Call Light within Reach   Collaboration Comments pass off to PT with pt seated on toilet.  --      1100 - Reviewed DC planning. Pt completed in room mobility at Mod I level completing packing for pending DC. No LOB, but required seated rest break due to fatigue. BIMS completed with score of 15/15.    Assessment    Pt has reached max potential with ADLs and functional transfers using 4WW, completing all ADLs at  Mod I level, with the exception of bathing/shower transfer at SPV level for safety. All questions answered, pt ready for DC home tomorrow with neighbors to assist.  Strengths: Able to follow instructions, Alert and oriented, Effective communication skills, Good balance, Good carryover of learning, Good insight into deficits/needs, Independent prior level of function, Making steady progress towards goals, Motivated for self care and independence, Pleasant and cooperative, Supportive family, Willingly participates in therapeutic activities  Barriers: Fatigue, Generalized weakness, Impaired activity tolerance, Limited mobility, Pain    Plan    DC to neighbors home with intermittent assist and HHOT.    Occupational Therapy Goals (Active)       Problem: Bathing       Dates: Start: 12/24/22         Goal: STG-Within one week, patient will bathe with CGA using DME/AE PRN.       Dates: Start: 12/24/22               Problem: Dressing       Dates: Start: 12/29/22         Goal: STG-Within one week, patient will dress LB with SBA using DME/AE PRN       Dates: Start: 12/29/22               Problem: Functional Transfers       Dates: Start: 12/29/22         Goal: STG-Within one week, patient will transfer to toilet with Mod I using 4WW.       Dates: Start: 12/29/22               Problem: OT Long Term Goals       Dates: Start: 12/24/22         Goal: LTG-By discharge, patient will complete basic self care tasks with SPV-Mod I using DME/AE PRN.       Dates: Start: 12/24/22            Goal: LTG-By discharge, patient will perform bathroom transfers with SPV-Mod I using DME/AE PRN.       Dates: Start: 12/24/22            Goal: LTG-By discharge, patient will complete basic home management with SPV-Mod I using DME/AE PRN.       Dates: Start: 12/24/22               Problem: Toileting       Dates: Start: 12/29/22         Goal: STG-Within one week, patient will complete toileting tasks with SPV using DME/AE PRN       Dates: Start: 12/29/22

## 2023-01-03 NOTE — CARE PLAN
Problem: Knowledge Deficit - Standard  Goal: Patient and family/care givers will demonstrate understanding of plan of care, disease process/condition, diagnostic tests and medications  Outcome: Progressing  Patient verbalized understanding plan of care.            Problem: Pain - Standard  Goal: Alleviation of pain or a reduction in pain to the patient’s comfort goal  Outcome: Progressing   Patient is able to rate pain on a scale of 1-10.

## 2023-01-03 NOTE — THERAPY
Physical Therapy   Daily Treatment     Patient Name: Maira Johnson  Age:  77 y.o., Sex:  female  Medical Record #: [id number]  Today's Date: 1/3/2023     Precautions  Precautions: Fall Risk  Comments: Mod I on unit with 4WW, Abdominal wound vac, new ostomy, no BP on RUE    Subjective    Patient is just finishing up in the bathroom, is agreeable to participate. Just finished a shower with OT.      Objective       01/03/23 0931   PT Charge Group   PT Therapeutic Exercise (Units) 4   PT Total Time Spent   PT Individual Total Time Spent (Mins) 60   Gait Functional Level of Assist    Gait Level Of Assist Modified Independent   Assistive Device 4 Wheel Walker   Distance (Feet) 200   # of Times Distance was Traveled 2   Deviation Bradykinetic;Increased Base Of Support  (leans on walker during second bout due to fatigue)   Sitting Lower Body Exercises   Ankle Pumps 2 sets of 10;Bilateral   Hip Flexion 1 set of 10;Bilateral   Hip Abduction 1 set of 10;Bilateral;Light Resistance Theraband   Hamstring Curl 1 set of 10;Bilateral   Sit to Stand 1 set of 10   Standing Lower Body Exercises   Hamstring Curl 1 set of 10;Bilateral    Hip Extension 1 set of 10;Bilateral    Hip Abduction 1 set of 10;Bilateral   Marching 1 set of 10   Heel Rise 1 set of 10;Bilateral   Bed Mobility    Sit to Supine Independent   Sit to Stand Supervised   Interdisciplinary Plan of Care Collaboration   IDT Collaboration with  Physical Therapist   Patient Position at End of Therapy In Bed;Call Light within Reach;Tray Table within Reach;Phone within Reach   Collaboration Comments coordinate with primary PT re: POC, review HEP         Assessment    Patient demonstrates good understanding of HEP through review of handout and teach back. She is able to demonstrate exercises correctly, takes appropriate rest breaks, demonstrates good safety with four wheeled walker brakes and transfer safety. We discussed car transfers re: sitting down on seat first before  swinging legs in, patient verbalizes understanding.     Strengths: Able to follow instructions, Adequate strength, Effective communication skills, Independent prior level of function, Making steady progress towards goals, Pleasant and cooperative, Supportive family, Willingly participates in therapeutic activities  Barriers: Decreased endurance, Fatigue, Generalized weakness, Impaired activity tolerance    Plan    D/c irfpai for d/c 1/4/23    Passport items to be completed:  Get in/out of bed safely, in/out of a vehicle, safely use mobility device, walk or wheel around home/community, navigate up and down stairs, show how to get up/down from the ground, ensure home is accessible, demonstrate HEP, complete caregiver training    Physical Therapy Problems (Active)       Problem: PT-Long Term Goals       Dates: Start: 12/24/22         Goal: LTG-By discharge, patient will ambulate 100+ ft indoors/outdoors with LRD and mod I.        Dates: Start: 12/24/22            Goal: LTG-By discharge, patient will transfer one surface to another using LRD and mod I.        Dates: Start: 12/24/22            Goal: LTG-By discharge, patient will transfer in/out of a car with SPV.        Dates: Start: 12/24/22            Goal: LTG-By discharge, patient will ambulate up/down ramp with LRD and mod I.       Dates: Start: 12/24/22

## 2023-01-04 VITALS
TEMPERATURE: 97.9 F | WEIGHT: 231.6 LBS | BODY MASS INDEX: 41.04 KG/M2 | SYSTOLIC BLOOD PRESSURE: 125 MMHG | OXYGEN SATURATION: 92 % | HEART RATE: 87 BPM | DIASTOLIC BLOOD PRESSURE: 77 MMHG | HEIGHT: 63 IN | RESPIRATION RATE: 18 BRPM

## 2023-01-04 PROCEDURE — A9270 NON-COVERED ITEM OR SERVICE: HCPCS | Performed by: HOSPITALIST

## 2023-01-04 PROCEDURE — 700102 HCHG RX REV CODE 250 W/ 637 OVERRIDE(OP): Performed by: PHYSICAL MEDICINE & REHABILITATION

## 2023-01-04 PROCEDURE — A9270 NON-COVERED ITEM OR SERVICE: HCPCS | Performed by: PHYSICAL MEDICINE & REHABILITATION

## 2023-01-04 PROCEDURE — 700102 HCHG RX REV CODE 250 W/ 637 OVERRIDE(OP): Performed by: HOSPITALIST

## 2023-01-04 PROCEDURE — 99239 HOSP IP/OBS DSCHRG MGMT >30: CPT | Performed by: PHYSICAL MEDICINE & REHABILITATION

## 2023-01-04 PROCEDURE — 97605 NEG PRS WND THER DME<=50SQCM: CPT

## 2023-01-04 RX ADMIN — OMEPRAZOLE 20 MG: 20 CAPSULE, DELAYED RELEASE ORAL at 08:47

## 2023-01-04 RX ADMIN — OXYCODONE 5 MG: 5 TABLET ORAL at 08:46

## 2023-01-04 RX ADMIN — SENNOSIDES AND DOCUSATE SODIUM 2 TABLET: 50; 8.6 TABLET ORAL at 08:48

## 2023-01-04 RX ADMIN — METOPROLOL SUCCINATE 50 MG: 25 TABLET, EXTENDED RELEASE ORAL at 05:24

## 2023-01-04 ASSESSMENT — PATIENT HEALTH QUESTIONNAIRE - PHQ9
SUM OF ALL RESPONSES TO PHQ9 QUESTIONS 1 AND 2: 0
1. LITTLE INTEREST OR PLEASURE IN DOING THINGS: NOT AT ALL
2. FEELING DOWN, DEPRESSED, IRRITABLE, OR HOPELESS: NOT AT ALL

## 2023-01-04 NOTE — DISCHARGE PLANNING
CM met with patient to discuss DC needs.  CM ordered home wound vac and it is in patients room.  CM let BS nurse know.  Answered all questions.  CM will continue to monitor for DC needs.

## 2023-01-04 NOTE — CARE PLAN
Problem: Bathing  Goal: STG-Within one week, patient will bathe with CGA using DME/AE PRN.  Outcome: Met     Problem: OT Long Term Goals  Goal: LTG-By discharge, patient will complete basic self care tasks with SPV-Mod I using DME/AE PRN.  Outcome: Met  Goal: LTG-By discharge, patient will perform bathroom transfers with SPV-Mod I using DME/AE PRN.  Outcome: Met  Goal: LTG-By discharge, patient will complete basic home management with SPV-Mod I using DME/AE PRN.  Outcome: Met     Problem: Dressing  Goal: STG-Within one week, patient will dress LB with SBA using DME/AE PRN  Outcome: Met     Problem: Toileting  Goal: STG-Within one week, patient will complete toileting tasks with SPV using DME/AE PRN  Outcome: Met     Problem: Functional Transfers  Goal: STG-Within one week, patient will transfer to toilet with Mod I using 4WW.  Outcome: Met

## 2023-01-04 NOTE — PROGRESS NOTES
"  Physical Medicine & Rehabilitation Progress Note    Encounter Date: 1/3/2023    Chief Complaint: Decreased mobility, abdominal wound    Interval Events (Subjective):  Patient sitting up in room. She reports some anxiety about going home and all of the arrangements for the wound. She reports she will ask many questions to the nurses who change the vac. Discussed would have HH. She reports she normally gets her medications in [place]. Discussed would send in today to be ready for tomorrow. Denies NVD.     Objective:  VITAL SIGNS: /71   Pulse (!) 102   Temp 36.9 °C (98.4 °F) (Oral)   Resp 20   Ht 1.6 m (5' 3\")   Wt 105 kg (231 lb 9.6 oz)   LMP  (LMP Unknown)   SpO2 93%   BMI 41.03 kg/m²   Gen: NAD  Psych: Mood and affect appropriate  CV: RRR, 1+ edema  Resp: CTAB, no upper airway sounds  Abd: NTND  Neuro: AOx4, following commands    Laboratory Values:  Recent Results (from the past 72 hour(s))   CBC WITH DIFFERENTIAL    Collection Time: 01/03/23  5:18 AM   Result Value Ref Range    WBC 8.4 4.8 - 10.8 K/uL    RBC 4.45 4.20 - 5.40 M/uL    Hemoglobin 12.4 12.0 - 16.0 g/dL    Hematocrit 39.7 37.0 - 47.0 %    MCV 89.2 81.4 - 97.8 fL    MCH 27.9 27.0 - 33.0 pg    MCHC 31.2 (L) 33.6 - 35.0 g/dL    RDW 45.3 35.9 - 50.0 fL    Platelet Count 367 164 - 446 K/uL    MPV 9.4 9.0 - 12.9 fL    Neutrophils-Polys 69.20 44.00 - 72.00 %    Lymphocytes 16.10 (L) 22.00 - 41.00 %    Monocytes 9.80 0.00 - 13.40 %    Eosinophils 3.10 0.00 - 6.90 %    Basophils 1.10 0.00 - 1.80 %    Immature Granulocytes 0.70 0.00 - 0.90 %    Nucleated RBC 0.00 /100 WBC    Neutrophils (Absolute) 5.80 2.00 - 7.15 K/uL    Lymphs (Absolute) 1.35 1.00 - 4.80 K/uL    Monos (Absolute) 0.82 0.00 - 0.85 K/uL    Eos (Absolute) 0.26 0.00 - 0.51 K/uL    Baso (Absolute) 0.09 0.00 - 0.12 K/uL    Immature Granulocytes (abs) 0.06 0.00 - 0.11 K/uL    NRBC (Absolute) 0.00 K/uL       Medications:  Scheduled Medications   Medication Dose Frequency    metoprolol " SR  50 mg Q DAY    lidocaine  1 Patch Q24HR    Pharmacy  1 Each PHARMACY TO DOSE    Pharmacy Consult Request  1 Each PHARMACY TO DOSE    senna-docusate  2 Tablet BID    omeprazole  20 mg DAILY    enoxaparin (LOVENOX) injection  40 mg DAILY AT 1800     PRN medications: Respiratory Therapy Consult, senna-docusate **AND** polyethylene glycol/lytes **AND** magnesium hydroxide **AND** bisacodyl, mag hydrox-al hydrox-simeth, ondansetron **OR** ondansetron, traZODone, sodium chloride, oxyCODONE immediate-release **OR** oxyCODONE immediate-release    Diet:  Current Diet Order   Procedures    Diet Order Diet: Regular       Assessment:  Active Hospital Problems    Diagnosis     Thrombocytosis     Diverticulitis     H/O abdominal abscess     Class 3 severe obesity due to excess calories with serious comorbidity and body mass index (BMI) of 40.0 to 44.9 in adult (HCC)     Diverticulitis of intestine with perforation and abscess     Hypertension        Medical Decision Making and Plan:  Adapted from Dr. Jones' A&P  Debility status post diverticulitis and intra-abdominal abscess  - CT abdomen at outside facility showed multiple intra-abdominal abscesses and diverticulitis  - Abscesses drained by IR on 12/13  - 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  - Started on Unasyn for increased WBCs after completing Augmentin.  Stop date is 1/1  - Stopped lactobacillus, patient has increased gas at ostomy site  - Wound VAC in place, wound care to assist with wound VAC changes and ostomy education  - Initiate comprehensive IRF level therapy with PT/OT/SLP  - Follow-up with general surgery        Leukocytosis  - Repeat CT abdomen/pelvis negative for abscess  - WBC is continuing to trend up, hospitalist consulted  - WBC up to 13.8 on 12/27, to resumed Unasyn ,  plan to stop date 1/1  - WBC improved to 11.3 12/30      Hypertension  - Home dose -> switch to Atenolol home med  - BP well controlled, 12/30     Hypoxia  - Postoperatively patient has required up to 3 L O2  -Respiratory therapy consulted,  - Has been weaned to room air     Acute blood loss anemia  - Hemoglobin stable around 11 12/30     Hypocalcemia  -Resolved     Pain  - Scheduled Tylenol oxycodone  - Lidocaine patch to right side muscle spasms, improved   I discussed the risks and benefits of using opiate medications for pain control.  I discussed the risk of addiction, potential for overdose, and respiratory depression (and the potential need for opiate antagonist therapy if this occurs).  I encouraged the patient to take this medication sparingly with the expressed goal of weaning off the medication as soon as is clinically appropriate.  I informed the patient that we are only able to provide up to a 14 day supply of these medications at discharge and that they will be responsible for requesting any refills needed from their primary care provider or their surgeon.  We discussed the need to safely secure these medications to prevent theft, inadvertent ingestion, or misuse.  Any unused medication should be immediately disposed of through a sanctioned medication disposal program.  We discussed adjunctive pain medications and conservative therapies at length.I answered the patient's questions regarding this treatment, and the patient indicated understanding and willingness to proceed.     Bowel  - Monitor ostomy output  - PRN stool softeners     Morbid obesity due to Excess calories  -BMI of 41.03 on admission, meets medical criteria.  -Dietitian to consult.     Skin - Patient at risk for skin breakdown due to debility in areas including sacrum, achilles, elbows and head in addition to other sites. Nursing to assess skin daily.  - Wound care to follow along changes, wound vac changes 3 x per week      GI Ppx -  Patient on Prilosec for GERD prophylaxis. Patient on Senna-docusate for constipation prophylaxis.      DVT Ppx - Patient Lovenox on transfer. Discontinue on discharge  ____________________________________    T. Brandon Pruett MD./PhD.  [facility] - Physical Medicine & Rehabilitation   [facility] - Brain Injury Medicine   ____________________________________    Total time:  36 minutes.  I spent greater than 50% of the time for patient care and coordination on this date, including unit/floor time, and face-to-face time with the patient as per assessment and plan above.  Discussion included discharge planning, switch to atenolol, discharge medications, and opiate counseling.

## 2023-01-04 NOTE — WOUND TEAM
[facility]  Inpatient Services  Wound and Skin Care Progress Note    Admission Date: 12/23/2022     Last order of IP CONSULT TO WOUND CARE was found on 12/23/2022 from Hospital Encounter on 12/21/2022     HPI, PMH, SH: Reviewed    Past Surgical History:   Procedure Laterality Date    OK EXPLORATORY OF ABDOMEN  12/18/2022    Procedure: LAPAROTOMY, EXPLORATORY WITH OSTOMY CREATION;  Surgeon: Huseyin Asher M.D.;  Location: SURGERY [facility];  Service: General    GYN SURGERY Right 11/01/2013    Right mastecomy    TUBAL LIGATION  01/01/1975     Social History     Tobacco Use    Smoking status: Never     Passive exposure: Never    Smokeless tobacco: Never   Substance Use Topics    Alcohol use: Never     No chief complaint on file.    Diagnosis: Diverticulitis [K57.92]  Intra-abdominal abscess (HCC) [K65.1]  H/O abdominal abscess [Z87.898]    Unit where seen by Wound Team: 06/02     WOUND CONSULT/FOLLOW UP RELATED TO:  VAC change abd and ostomy education     WOUND HISTORY:  Pt is a 77yr old female with history of HTN and diverticulitis who presented with abdominal pain. [place] [facility] has multiple episodes of diverticulitis which was treated with IV ABX. Pt was found to have perforated sigmoid diverticulitis with pelvic abscess and pneumoperitoneum. Pt underwent surgical intervention. Fascia was closed but skin was left open and wound was requested to place vac and begin ostomy education.    WOUND ASSESSMENT/LDA       Negative Pressure Wound Therapy 12/19/22 Surgical Abdomen (Active)   NPWT Pump Mode / Pressure Setting Continuous;125 mmHg 01/04/23 1000   Dressing Type Small;Black Foam (Regular) 01/04/23 1000   Number of Foam Pieces Used 2 01/04/23 1000   Canister Changed No 01/02/23 1955   Output (mL) 0 mL 12/23/22 1919   NEXT Dressing Change/Treatment Date 01/06/23 01/04/23 1000   WOUND NURSE ONLY - Time Spent with Patient (mins) 45 01/04/23 1000            Wound 12/18/22 Full Thickness Wound Abdomen Mid (Active)   Wound Image   01/02/23 1700   Site Assessment Red 01/04/23 1000   Periwound Assessment Clean;Dry;Intact 01/04/23 1000   Margins Defined edges 01/04/23 1000   Closure Secondary intention 01/04/23 1000   Drainage Amount Small 01/04/23 1000   Drainage Description Serosanguineous 01/04/23 1000   Treatments Cleansed;Irrigation;Site care 01/04/23 1000   Wound Cleansing Approved Wound Cleanser 01/04/23 1000   Periwound Protectant Skin Protectant Wipes to Periwound;Paste Ring 01/04/23 1000   Dressing Cleansing/Solutions Not Applicable 01/04/23 1000   Dressing Options Collagen Dressing;Wound Vac 01/04/23 1000   Dressing Changed Changed 01/04/23 1000   Dressing Status Clean;Dry;Intact 01/04/23 1000   Dressing Change/Treatment Frequency Monday, Wednesday, Friday, and As Needed 01/04/23 1000   NEXT Dressing Change/Treatment Date 01/06/23 01/04/23 1000   NEXT Weekly Photo (Inpatient Only) 01/11/23 01/04/23 1000   Non-staged Wound Description Full thickness 01/04/23 1000   Wound Length (cm) 15 cm 12/28/22 1700   Wound Width (cm) 3.8 cm 12/28/22 1700   Wound Depth (cm) 4 cm 12/28/22 1700   Wound Surface Area (cm^2) 57 cm^2 12/28/22 1700   Wound Volume (cm^3) 228 cm^3 12/28/22 1700   Wound Healing % 42 12/28/22 1700   Shape linear 01/04/23 1000   Wound Odor None 01/04/23 1000   Exposed Structures None 01/04/23 1000   WOUND NURSE ONLY - Time Spent with Patient (mins) 45 01/04/23 1000               Vascular:    MEME:   No results found.    Lab Values:    Lab Results   Component Value Date/Time    WBC 8.4 01/03/2023 05:18 AM    RBC 4.45 01/03/2023 05:18 AM    HEMOGLOBIN 12.4 01/03/2023 05:18 AM    HEMATOCRIT 39.7 01/03/2023 05:18 AM    CREACTPROT 10.84 (H) 12/14/2022 02:24 AM    HBA1C 6.0 (H) 12/24/2022 05:25 AM        Culture Results show:  Recent Results (from the past 720 hour(s))   CULTURE WOUND W/ GRAM STAIN    Collection Time: 12/18/22  2:59 PM    Specimen: Wound    Result Value Ref Range    Significant Indicator POS (POS)     Source WND     Site Pelvic Abscess     Culture Result - (A)     Gram Stain Result       Many WBCs.  Many Gram positive cocci.  Rare Gram positive rods.      Culture Result Escherichia coli  Moderate growth   (A)     Culture Result Enterococcus avium  Moderate growth   (A)     Culture Result Streptococcus anginosus  Moderate growth   (A)        Susceptibility    Enterococcus avium - JOSE     Ampicillin <=2 Sensitive mcg/mL     Vancomycin 0.5 Sensitive mcg/mL     Daptomycin <=0.5 Sensitive mcg/mL     Gent Synergy <=500 Sensitive mcg/mL     Penicillin 1 Sensitive mcg/mL    Escherichia coli - JOSE     Ampicillin <=8 Sensitive mcg/mL     Ceftriaxone <=1 Sensitive mcg/mL     Cefazolin <=2 Sensitive mcg/mL     Ciprofloxacin <=0.25 Sensitive mcg/mL     Cefepime <=2 Sensitive mcg/mL     Cefuroxime <=4 Sensitive mcg/mL     Ertapenem <=0.5 Sensitive mcg/mL     Gentamicin <=2 Sensitive mcg/mL     Ampicillin/sulbactam <=4/2 Sensitive mcg/mL     Minocycline <=4 Sensitive mcg/mL     Moxifloxacin <=2 Sensitive mcg/mL     Pip/Tazobactam <=8 Sensitive mcg/mL     Trimeth/Sulfa 2/38 Sensitive mcg/mL     Tigecycline <=2 Sensitive mcg/mL     Tobramycin <=2 Sensitive mcg/mL       Pain Level/Medicated:  PO pain meds; still grimacing in pain during dressing change     INTERVENTIONS BY WOUND TEAM:  Chart and images reviewed. Discussed with bedside RN. All areas of concern (based on picture review, LDA review and discussion with bedside RN) have been thoroughly assessed. Documentation of areas based on significant findings. This RN in to assess patient. Performed standard wound care which includes appropriate positioning, dressing removal and non-selective debridement. Pictures and measurements obtained weekly if/when required.    Preparation for Dressing removal: Dressing soaked with Saline and Adhesive remover wipes  Non-selectively Debrided with:  Wound cleanser, Normal Saline,  and Gauze   Sharp debridement: NA  Radha wound: Cleansed with wound cleanser, Prepped with No Sting and drape  Primary Dressing: collagen, Total 2 piece of Regular Black foam and Vac Drape, paste ring to the belly button with drape over.  Secondary (Outer) Dressing: drape, TRAC pad.  NPWT resumed at 125mmHg, no leaks noted.     Interdisciplinary consultation: Patient, Bedside RN     EVALUATION / RATIONALE FOR TREATMENT:  Most Recent Date:  1/4/23: Woundbed is moist red with fully granulated buds, continuing with collagen and NPWT for optimal healing.  Colostomy will have to be changed at the same time as the NPWT change as it is still too close to the wound bed.  Ostomy is doing well.   1/2/23: Wound bed bryan well, collagen added to help with granulation and contraction of tissue. Continue with NPWT when patient discharges. Pending discharge date is Wednesday 1/4/23.   12/30/22: Wound bed continues to granulate. May benefit from gale at next assessment. Continue current POC. Per patient, she may discharge on Wed 1/4/23. Pt will need wound vac and home health for discharge.   12/28/22: Wound bed remains clean and red, continues to granulate thus wound depth decreasing with each change. Continue POC.   12/26: Pt's wound appears smaller than previous picture. Ostomy appliance intact and overlapping vAC drsg. Wound bed with some granulation. Crease at umbilicus needs to be built up for VAC and appliance.      Goals: Steady decrease in wound area and depth weekly.    WOUND TEAM PLAN OF CARE ([X] for frequency of wound follow up,):   Nursing to follow dressing orders written for wound care. Contact wound team if area fails to progress, deteriorates or with any questions/concerns if something comes up before next scheduled follow up (See below as to whether wound is following and frequency of wound follow up)  Dressing changes by wound team:                   Follow up 3 times weekly:                NPWT change 3  times weekly:   x, Abdomen  Follow up 1-2 times weekly:      Follow up Bi-Monthly:           Follow up Monthly (High Risk):                        Follow up as needed:     Other (explain):     NURSING PLAN OF CARE ORDERS (X):  Dressing changes: See Dressing Care orders: x  Skin care: See Skin Care orders:   RN Prevention Protocol:   Rectal tube care: See Rectal Tube Care orders:   Other orders:    RSKIN:   CURRENTLY IN PLACE (X), APPLIED THIS VISIT (A), ORDERED (O):   Q shift Justo:  X  Q shift pressure point assessments:  X    Surface/Positioning   Pressure redistribution mattress  x          Low Airloss          ICU Low Airloss   Bariatric EVERT     Waffle cushion        Waffle Overlay          Reposition q 2 hours    remind and assist  TAPs Turning system     Z Franklin Pillow     Offloading/Redistribution Not assessed  Sacral Mepilex (Silicone dressing)     Heel Mepilex (Silicone dressing)         Heel float boots (Prevalon boot)             Float Heels off Bed with Pillows           Respiratory RA  Silicone O2 tubing         Gray Foam Ear protectors     Cannula fixation Device (Tender )          High flow offloading Clip    Elastic head band offloading device      Anchorfast                                                         Trach with Optifoam split foam             Containment/Moisture Prevention colostomy, bathroom    Rectal tube or BMS    Purwick/Condom Cath        Zar[place]za Catheter    Barrier wipes           Barrier paste       Antifungal tx      Interdry        Mobilization       Up to chair   x     Ambulate    with FWW  PT/OT  x    Nutrition       Dietician        Diabetes Education      PO  x   TF     TPN     NPO   # days     Other        Anticipated discharge plans: VAc and supplies, HH  LTACH:        SNF/Rehab:                  Home Health Care:           Outpatient Wound Center:            Self/Family Care:        Other:                  Vac Discharge Needs:   Not Applicable Pt not on a wound vac:     "   Regular Vac while inpatient, alternative dressing at DC:        Regular Vac in use and continued at DC:      x      Reg. Vac w/ Skin Sub/Biologic in use. Will need to be changed 2x wkly:      Veraflo Vac while inpatient, ok to transition to Regular Vac on Discharge:           Veraflo Vac while inpatient, will need to remain on Veraflo Vac upon discharge:                              [facility]  Inpatient Services  New Ostomy Management & Teaching    HPI:  Reviewed  PMH: Reviewed   SH: Reviewed    Past Surgical History:   Procedure Laterality Date    NM EXPLORATORY OF ABDOMEN  12/18/2022    Procedure: LAPAROTOMY, EXPLORATORY WITH OSTOMY CREATION;  Surgeon: Huseyin Asher M.D.;  Location: SURGERY [facility];  Service: General    GYN SURGERY Right 11/01/2013    Right mastecomy    TUBAL LIGATION  01/01/1975       Surgery Date: 12/18/22    Surgeon(s):  Huseyin Asher M.D.     Procedure(s):  LAPAROTOMY, EXPLORATORY      Permanence: no    Pertinent History: see above  Colostomy 12/18/22 (Active)   Wound Image   12/26/22 1700   Stomal Appliance Assessment Leaking 01/04/23 1000   Stoma Assessment [place];Red 01/04/23 1000   Stoma Shape Budded Less Than One Inch;Oval;Flush 01/04/23 1000   Stoma Size (in) 2 01/04/23 1000   Peristomal Assessment Clean;Dry;Intact 01/04/23 1000   Mucocutaneous Junction Intact 01/04/23 1000   Treatment Appliance Changed;Bag Change;Cleansed with water/washcloth;Site care 01/04/23 1000   Peristomal Protectant Paste Ring;No Sting Skin Prep 01/04/23 1000   Stomal Appliance 2 1/2\" 1-piece Fecal;Paste Ring, 2\" 01/04/23 1000   Output (mL) 100 mL 12/28/22 1700   Output Color Brown 01/04/23 1000   WOUND RN ONLY - Stomal Appliance  1 Piece;Paste Ring, 2\";Flat;2 1/2\" 1-piece Fecal 01/02/23 1700   Appliance (Pouch) # 8531, [place] 7805 12/26/22 1700   Appliance Brand [place] 01/04/23 1000   Appliance Supplier Prism 01/02/23 1700   Secure Start completed Yes 01/02/23 1700   WOUND NURSE ONLY - " "Time Spent with Patient (mins) 60 01/04/23 1000           Ostomy Appliance (type and size): One piece flat and 2\" Paste Ring    Interventions: Patient assisted with ostomy change.  Removed previous appliance, cleansed peristomal skin with warm water/washcloth, patted dry, made template and traced onto back of barrier. Barrier CTF, checked and applied paste ring  to barrier.  Deficit filled with wedge of 2'' paste ring at 3 o'clock.  Appliance applied to skin and end closed.  PINC tape applied to border.     Pt education: Questions and concerns addressed    Needs for next visit:   Pt to perform more hands on--cut barrier and apply paste ring, closing pouch and warming up barrier after placement.   AM change and education, patient discharging home.     Evaluation:   1/4/23: Patient will be continuing education with home health agency with NPWT changes.  Patient does feel comfortable with her ostomy.  Continuing with education and teach back.  Patient able to help perform steps of the ostomy change.   1/2/23:  Patient declined getting up to bathroom for ostomy change at this visit.  Patient wanted to do it in bed.  States that she will have home health after discharge from rehab center.  Patient able to teach back ostomy, and doing more of the hands-on care.   12/30/22: Pt emptying and cleaning bag independently. Plan for hands on education on Monday. Pt would like to try to change her ostomy in front of a mirror in the bathroom as that will be how she would change her appliance at home.   12/28/22: Pt unable to do hands on education today due to pain from VAC change. Close proximity of abdominal wound to stoma may cause difficulty for patient.     Stoma with output and flatus. Barely protuding above skin. Body habitus needs one piece r/t crease.     Flatus: Present  Stool Output: small, brown, and soft  Urine Output: NA, Fecal Ostomy  Diet: Regular  Mobility: Up to chair and Ambulating at Baseline    Plan: Ostomy " "nurses to continue to follow for ostomy needs and teaching until discharge    Anticipated discharge needs: Supplies, supplier information, possible HH, outpatient ostomy clinic, Skilled Nursing/Rehab     Secure Start Signed Yes  Outpatient Referral Placed Declined  5 Sets of appliances in Ostomy bag for discharge Yes    INSURANCE OPTIONS:                 [employer] ATE ([employer])         x     MediCARE/MEDICAID & All other Private Insurance companies (Prism Form)              MediCAID & Fee for Service (Care Chest Paperwork + Prism Form)                            Form signed/Catalog Marked and Copy left with patient OR medicaid paperwork given to patient      Anticipated Discharge Plans:  Home Health Care, Family Care, and Self Care    Ostomy Supplies for DC:  12in Lock n' Roll ONE piece with Filter 2\" (Miguel [place]ngel [id number]), Skin Prep Wipes (3M [place] 3344) - 2 box per month, Adapt No-Sting Universal Remover Wipes ([place] 5088) - 2 box per month, and Adapt Flat paste ring 2\" (Miguel Ángel 4900) - 15 per month    "

## 2023-01-04 NOTE — PROGRESS NOTES
0735   Received shift report and assumed care of patient.  Patient awake, calm and stable, currently positioned in bed for comfort and safety; call light within reach.  Denies pain or discomfort at this time.  Patient plans to discharge today to son's best friend's house. Will continue to monitor.

## 2023-01-04 NOTE — PROGRESS NOTES
This patient, and family with patient permission, received individualized medication counseling regarding the current regimen they are receiving in this facility. Potential adverse effects, monitoring parameters, and proper administration were covered in preparation for their discharge. This patient is being treated for hypertension and it is recommended that they monitor and record with a blood pressure device. The patient has been instructed to contact their primary care physician if the HR or blood pressure is abnormal. The patient will likely be taking pain medications upon discharge. The patient was warned regarding sedation, impairment, and constipation as side effects to such regimens. Further warning regarding operating motor vehicles, or dangerous equipment was delivered as well. The patient asked relevant questions regarding the medications for which answers were provided. Our pharmacy hours and phone number were provided for follow up questions should they arise.  Kenroy Ramsey  [facility]

## 2023-01-04 NOTE — CARE PLAN
The patient is Stable - Low risk of patient condition declining or worsening    Shift Goals  Clinical Goals: Safety  Patient Goals: Safety    Progress made toward(s) clinical / shift goals:      Problem: Pain - Standard  Goal: Alleviation of pain or a reduction in pain to the patient’s comfort goal  Outcome: Met  Note: No reports of pain at this time. Will continue to monitor through shift with hourly rounds.      Problem: Bladder / Voiding  Goal: Patient will establish and maintain regular urinary output  Outcome: Met  Note: Patient is continent of bladder and Mod I for toileting     Problem: VTE Prevention  Goal: Patient will remain free from venous thromboembolism (VTE)  Outcome: Met  Note: Pt receives Lovenox SC injections for DVT prophylaxis      Problem: Fall Risk - Rehab  Goal: Patient will remain free from falls  Outcome: Met  Note: Patient is a LOW FALL RISK and Mod I with orange wristband in place

## 2023-01-04 NOTE — PROGRESS NOTES
Patient discharged to home per order.  Discharge instructions reviewed with patient and Son; they verbalize understanding and signed copies placed in chart.  Patient has all belongings; signed copy of form in chart.  Patient left facility at 1300 via wheelchair accompanied by rehab staff and Son.  Have enjoyed working with this pleasant patient.

## 2023-01-06 NOTE — DISCHARGE PLANNING
CM rec'd call from patient stating no foam supplies for wound vac changes.  Home health nurse called [facility].  This CM also called [facility] and spoke with Allison.  She will call patient and try to get needed supplies out to patient.  CM called patient to update.

## 2023-01-17 LAB
FUNGUS SPEC CULT: NORMAL
FUNGUS SPEC FUNGUS STN: NORMAL
SIGNIFICANT IND 70042: NORMAL
SITE SITE: NORMAL
SOURCE SOURCE: NORMAL

## 2023-03-02 ENCOUNTER — HOSPITAL ENCOUNTER (OUTPATIENT)
Facility: MEDICAL CENTER | Age: 78
End: 2023-03-02
Attending: NURSE PRACTITIONER
Payer: MEDICARE

## 2023-03-02 PROCEDURE — 87086 URINE CULTURE/COLONY COUNT: CPT

## 2023-03-06 LAB
BACTERIA UR CULT: NORMAL
SIGNIFICANT IND 70042: NORMAL
SITE SITE: NORMAL
SOURCE SOURCE: NORMAL

## 2023-05-04 ENCOUNTER — HOSPITAL ENCOUNTER (OUTPATIENT)
Facility: MEDICAL CENTER | Age: 78
End: 2023-05-04
Attending: STUDENT IN AN ORGANIZED HEALTH CARE EDUCATION/TRAINING PROGRAM
Payer: MEDICARE

## 2023-05-04 PROCEDURE — 87077 CULTURE AEROBIC IDENTIFY: CPT

## 2023-05-04 PROCEDURE — 82365 CALCULUS SPECTROSCOPY: CPT

## 2023-05-04 PROCEDURE — 87186 SC STD MICRODIL/AGAR DIL: CPT

## 2023-05-04 PROCEDURE — 87086 URINE CULTURE/COLONY COUNT: CPT

## 2023-05-07 LAB
APPEARANCE STONE: NORMAL
BACTERIA UR CULT: ABNORMAL
COMPN STONE: NORMAL
SIGNIFICANT IND 70042: ABNORMAL
SITE SITE: ABNORMAL
SOURCE SOURCE: ABNORMAL
SPECIMEN WT: 342 MG

## 2023-05-08 NOTE — PROGRESS NOTES
Airway    Performed by: Kaveh Albarado MD  Authorized by: Kaveh Albarado MD    Final Airway Type:  Endotracheal airway  Final Endotracheal Airway*:  ETT  ETT Size (mm)*:  7.0  Cuff*:  Regular  Technique Used for Successful ETT Placement:  Direct laryngoscopy  Devices/Methods Used in Placement*:  Mask  Intubation Procedure*:  Preoxygenation, ETCO2, Atraumatic, Dentition Unchanged and Pharynx Clear  Insertion Site:  Oral  Blade Type*:  MAC  Blade Size*:  3  Measured from*:  Teeth  Secured at (cm)*:  20  Placement Verified by: auscultation and capnometry    Glottic View*:  1 - full view of glottis  Attempts*:  1   Patient Identified, Procedure confirmed, Emergency equipment available and Safety protocols followed  Location:  OR  Urgency:  Elective  Difficult Airway: No    Indications for Airway Management:  Anesthesia  Spontaneous Ventilation: absent    Sedation Level:  Deep  Mask Difficulty Assessment:  1 - vent by mask  Start Time: 5/8/2023 7:39 AM       Hospital Medicine Daily Progress Note    Date of Service  12/19/2022    Chief Complaint  Maira Johnson is a 77 y.o. female admitted 12/13/2022 with abdominal pain    Hospital Course  This is a 77-year-old female with past medical history of hypertension, recurrent diverticulitis and morbid obesity with BMI of 41 who was transferred from an outside facility on 12/13/2022 due to sepsis secondary to diverticulitis with multiple abscesses, complicated by perforation.    Repeat CT imaging noted 4.6 x 3.3cm abscess.  IR was consulted, drain was placed on 12/14.  Blood cultures negative to date.  Wound cultures growing E. coli.  Patient currently on Zosyn.  Leukocytosis worsened, abdominal pain worsened, repeat CT imaging performed on 12/18 noted worsening pericolic abscess and now free air with perforation.  Surgery was consulted, patient is for laparotomy with likely Chávez's colectomy and abscess drainage.    Incidentally on CT imaging noted left-sided 7 mm nephrolithiasis, nonobstructing, patient with no urinary complaints follow-up with urology outpatient..     Interval Problem Update  Perforated diverticulitis-s/p surgery, POD#1. Pain is better today. Tolerating SOme CLD. No gas in ostomy bag yet. Feels immense pain when getting out of bed. WBC down-trending.     Hypoxia-remains on 4-6 L NC. Does not use oxygen at home. Minimal coughing.    I have discussed this patient's plan of care and discharge plan at IDT rounds today with Case Management, Nursing, Nursing leadership, and other members of the IDT team.    Consultants/Specialty  general surgery and IR    Code Status  Full Code    Disposition  Patient is not medically cleared for discharge.   Anticipate discharge to  TBD .  I have placed the appropriate orders for post-discharge needs.    Review of Systems  Review of Systems   Gastrointestinal:  Positive for abdominal pain (improved somewhat). Negative for nausea and vomiting.   All other systems reviewed and  are negative.     Physical Exam  Temp:  [36.2 °C (97.2 °F)-36.5 °C (97.7 °F)] 36.3 °C (97.4 °F)  Pulse:  [80-95] 82  Resp:  [17-20] 17  BP: (124-143)/(50-71) 128/50  SpO2:  [92 %-93 %] 93 %    Physical Exam  Vitals and nursing note reviewed.   Constitutional:       General: She is not in acute distress.     Appearance: She is obese.   HENT:      Head: Normocephalic and atraumatic.      Mouth/Throat:      Mouth: Mucous membranes are moist.      Pharynx: No oropharyngeal exudate.   Eyes:      Extraocular Movements: Extraocular movements intact.      Pupils: Pupils are equal, round, and reactive to light.   Cardiovascular:      Rate and Rhythm: Normal rate and regular rhythm.      Pulses: Normal pulses.      Heart sounds: No murmur heard.    No friction rub. No gallop.   Pulmonary:      Effort: Pulmonary effort is normal. No respiratory distress.      Breath sounds: No wheezing, rhonchi or rales.   Abdominal:      General: Bowel sounds are normal. There is distension (mild).      Palpations: Abdomen is soft. There is no mass.      Tenderness: There is abdominal tenderness (diffuse). There is no guarding.      Comments: Midline vertical abdominal incision C/D/I  RLQ colostomy, some small bloody output, minimal gas noted   Musculoskeletal:         General: No swelling or tenderness. Normal range of motion.      Cervical back: Normal range of motion. No rigidity. No muscular tenderness.      Right lower leg: No edema.      Left lower leg: No edema.   Skin:     General: Skin is warm and dry.      Capillary Refill: Capillary refill takes less than 2 seconds.      Findings: No erythema or rash.   Neurological:      General: No focal deficit present.      Mental Status: She is alert and oriented to person, place, and time.      Motor: No weakness.      Gait: Gait normal.       Fluids    Intake/Output Summary (Last 24 hours) at 12/19/2022 1728  Last data filed at 12/19/2022 1230  Gross per 24 hour   Intake --   Output 140 ml    Net -140 ml       Laboratory  Recent Labs     12/17/22  0117 12/18/22  0544 12/19/22  0314   WBC 18.2* 18.2* 17.5*   RBC 4.70 4.29 4.56   HEMOGLOBIN 13.5 12.3 12.9   HEMATOCRIT 40.5 37.2 39.6   MCV 86.2 86.7 86.8   MCH 28.7 28.7 28.3   MCHC 33.3* 33.1* 32.6*   RDW 40.0 41.1 42.6   PLATELETCT 292 267 312   MPV 9.7 9.7 9.8     Recent Labs     12/18/22  0544 12/19/22  0314   SODIUM 135 139   POTASSIUM 3.2* 4.3   CHLORIDE 101 103   CO2 24 24   GLUCOSE 118* 153*   BUN 11 18   CREATININE 0.93 1.08   CALCIUM 8.7 8.7                       Imaging  CT-ABDOMEN-PELVIS WITH   Final Result      1.  New peritoneal air consistent with perforation and likely the sigmoid colon diverticulitis      2.  Multilocular paracolic abscess anterior to the sigmoid colon with the largest component measuring 6.4 x 5.4 cm and a smaller air component present left lateral      3.  Left lower quadrant drainage catheter and appears to have retracted.      4.  Findings were discussed with FABY STUART on 12/18/2022 12:40 PM.      IR-US GUIDED PIV   Final Result    Ultrasound-guided PERIPHERAL IV INSERTION performed by    qualified nursing staff as above.      CT-DRAIN-PERITONEAL   Final Result      1.  CT guided pelvic diverticular abscess catheter drainage.   2.  The current plan is to obtain a follow-up CT in 5-7 days..      CT-ABDOMEN-PELVIS WITH   Final Result      1.  Acute sigmoid diverticulitis with adjacent 4.6 x 3.3 cm abscess.   2.  Gallbladder sludge or stones.   3.  2.1 cm hypodense left hepatic lobe lesion, likely a hemangioma, though incompletely characterized on this CT. If there is clinical concern, consider further evaluation with nonemergent MRI hepatic protocol.   4.  Nonobstructing 6 mm inferior pole left renal stone.      Findings conveyed to Dr. Cordova at 1124 via Voalte messaging on 12/14/2022.           Assessment/Plan  * Diverticulitis of colon with perforation- (present on admission)  Assessment & Plan  S/p exploratory  laparotomy with drainage of pelvic abscesses and Chávez's colectomy with mobilization of the splenic flexure 12/18  Doing ok  Pain control  CLD, advance slowly  IV fluids  No clear ostomy output  Continue empiric IV abx's, F/U cultures  WBC down-trending slowly    Hyperglycemia- (present on admission)  Assessment & Plan  Persistent  Prior a1c normal  Recheck    Class 3 severe obesity due to excess calories with serious comorbidity and body mass index (BMI) of 40.0 to 44.9 in adult ([facility])- (present on admission)  Assessment & Plan  Encourage outpatient nutritional counseling    Colonic diverticular abscess- (present on admission)  Assessment & Plan  See above  Cultures are polymicrobial with enteric organisms, continue IV zosyn    Nephrolithiasis- (present on admission)  Assessment & Plan  CT scan showed kidney stone and ureteral on the left side around 7 mm   no urinary symptoms  Follow-up with urology as outpatient    Sepsis ([facility])- (present on admission)  Assessment & Plan  Resolved, see below    Hypertension- (present on admission)  Assessment & Plan  Patient is taking atenolol at home  Holding medications and close monitoring  Remains normotensive at this time       VTE prophylaxis: enoxaparin ppx    I have performed a physical exam and reviewed and updated ROS and Plan today (12/19/2022). In review of yesterday's note (12/18/2022), there are no changes except as documented above.

## 2023-05-25 ENCOUNTER — HOSPITAL ENCOUNTER (OUTPATIENT)
Facility: MEDICAL CENTER | Age: 78
End: 2023-05-25
Attending: SURGERY | Admitting: SURGERY
Payer: MEDICARE

## 2023-06-19 NOTE — DISCHARGE PLANNING
note:  Will from [facility] called and said that they need Dr. Asher to sign orders for them. CM asked Will to email the order set and will forward to MD and JAZZ.

## 2023-06-20 NOTE — DISCHARGE PLANNING
note:  Received an email from [facility] a form that they are requesting to be signed by Dr. Asher. They will not be reimbursed by insurance if not signed by MD.     This email was forwarded to Dr. Asher and to JAZZ Hernandez.

## 2023-06-23 NOTE — DISCHARGE PLANNING
note:  [facility] order for ostomy supplies has been signed by Dr. Asher and CM forwarded to Will @ [facility].

## 2023-07-31 ENCOUNTER — APPOINTMENT (OUTPATIENT)
Dept: ADMISSIONS | Facility: MEDICAL CENTER | Age: 78
DRG: 345 | End: 2023-07-31
Attending: SURGERY
Payer: MEDICARE

## 2023-08-01 ENCOUNTER — ANESTHESIA EVENT (OUTPATIENT)
Dept: SURGERY | Facility: MEDICAL CENTER | Age: 78
DRG: 345 | End: 2023-08-01
Payer: MEDICARE

## 2023-08-01 ENCOUNTER — PRE-ADMISSION TESTING (OUTPATIENT)
Dept: ADMISSIONS | Facility: MEDICAL CENTER | Age: 78
End: 2023-08-01
Payer: MEDICARE

## 2023-08-01 RX ORDER — ACETAMINOPHEN 160 MG
1 TABLET,DISINTEGRATING ORAL DAILY
COMMUNITY

## 2023-08-02 ENCOUNTER — HOSPITAL ENCOUNTER (INPATIENT)
Facility: MEDICAL CENTER | Age: 78
LOS: 8 days | DRG: 345 | End: 2023-08-10
Attending: SURGERY | Admitting: SURGERY
Payer: MEDICARE

## 2023-08-02 ENCOUNTER — ANESTHESIA (OUTPATIENT)
Dept: SURGERY | Facility: MEDICAL CENTER | Age: 78
DRG: 345 | End: 2023-08-02
Payer: MEDICARE

## 2023-08-02 DIAGNOSIS — K94.09 COLOSTOMY HERNIA (HCC): Primary | ICD-10-CM

## 2023-08-02 DIAGNOSIS — R53.81 DEBILITY: ICD-10-CM

## 2023-08-02 DIAGNOSIS — Z98.890 POST-OPERATIVE NAUSEA AND VOMITING: ICD-10-CM

## 2023-08-02 DIAGNOSIS — G89.18 ACUTE POST-OPERATIVE PAIN: ICD-10-CM

## 2023-08-02 DIAGNOSIS — R11.2 POST-OPERATIVE NAUSEA AND VOMITING: ICD-10-CM

## 2023-08-02 LAB
ANION GAP SERPL CALC-SCNC: 15 MMOL/L (ref 7–16)
BUN SERPL-MCNC: 17 MG/DL (ref 8–22)
CALCIUM SERPL-MCNC: 10.2 MG/DL (ref 8.5–10.5)
CHLORIDE SERPL-SCNC: 103 MMOL/L (ref 96–112)
CO2 SERPL-SCNC: 21 MMOL/L (ref 20–33)
CREAT SERPL-MCNC: 1.15 MG/DL (ref 0.5–1.4)
EKG IMPRESSION: NORMAL
ERYTHROCYTE [DISTWIDTH] IN BLOOD BY AUTOMATED COUNT: 44.6 FL (ref 35.9–50)
GFR SERPLBLD CREATININE-BSD FMLA CKD-EPI: 49 ML/MIN/1.73 M 2
GLUCOSE SERPL-MCNC: 96 MG/DL (ref 65–99)
HCT VFR BLD AUTO: 46.9 % (ref 37–47)
HGB BLD-MCNC: 15.1 G/DL (ref 12–16)
MCH RBC QN AUTO: 27.5 PG (ref 27–33)
MCHC RBC AUTO-ENTMCNC: 32.2 G/DL (ref 32.2–35.5)
MCV RBC AUTO: 85.4 FL (ref 81.4–97.8)
PLATELET # BLD AUTO: 266 K/UL (ref 164–446)
PMV BLD AUTO: 9.8 FL (ref 9–12.9)
POTASSIUM SERPL-SCNC: 4.1 MMOL/L (ref 3.6–5.5)
RBC # BLD AUTO: 5.49 M/UL (ref 4.2–5.4)
SODIUM SERPL-SCNC: 139 MMOL/L (ref 135–145)
WBC # BLD AUTO: 8.1 K/UL (ref 4.8–10.8)

## 2023-08-02 PROCEDURE — 160042 HCHG SURGERY MINUTES - EA ADDL 1 MIN LEVEL 5: Performed by: SURGERY

## 2023-08-02 PROCEDURE — 700111 HCHG RX REV CODE 636 W/ 250 OVERRIDE (IP): Performed by: ANESTHESIOLOGY

## 2023-08-02 PROCEDURE — 0WQF0ZZ REPAIR ABDOMINAL WALL, OPEN APPROACH: ICD-10-PCS | Performed by: SURGERY

## 2023-08-02 PROCEDURE — 160035 HCHG PACU - 1ST 60 MINS PHASE I: Performed by: SURGERY

## 2023-08-02 PROCEDURE — 85027 COMPLETE CBC AUTOMATED: CPT

## 2023-08-02 PROCEDURE — 160036 HCHG PACU - EA ADDL 30 MINS PHASE I: Performed by: SURGERY

## 2023-08-02 PROCEDURE — 88304 TISSUE EXAM BY PATHOLOGIST: CPT

## 2023-08-02 PROCEDURE — 700105 HCHG RX REV CODE 258: Mod: JZ | Performed by: SURGERY

## 2023-08-02 PROCEDURE — 0DSM0ZZ REPOSITION DESCENDING COLON, OPEN APPROACH: ICD-10-PCS | Performed by: SURGERY

## 2023-08-02 PROCEDURE — 88307 TISSUE EXAM BY PATHOLOGIST: CPT | Mod: 59

## 2023-08-02 PROCEDURE — A9270 NON-COVERED ITEM OR SERVICE: HCPCS | Performed by: SURGERY

## 2023-08-02 PROCEDURE — 64488 TAP BLOCK BI INJECTION: CPT | Performed by: SURGERY

## 2023-08-02 PROCEDURE — 700102 HCHG RX REV CODE 250 W/ 637 OVERRIDE(OP): Performed by: SURGERY

## 2023-08-02 PROCEDURE — 80048 BASIC METABOLIC PNL TOTAL CA: CPT

## 2023-08-02 PROCEDURE — 700101 HCHG RX REV CODE 250: Performed by: SURGERY

## 2023-08-02 PROCEDURE — 3E0T3BZ INTRODUCTION OF ANESTHETIC AGENT INTO PERIPHERAL NERVES AND PLEXI, PERCUTANEOUS APPROACH: ICD-10-PCS | Performed by: ANESTHESIOLOGY

## 2023-08-02 PROCEDURE — 770001 HCHG ROOM/CARE - MED/SURG/GYN PRIV*

## 2023-08-02 PROCEDURE — 160009 HCHG ANES TIME/MIN: Performed by: SURGERY

## 2023-08-02 PROCEDURE — 93005 ELECTROCARDIOGRAM TRACING: CPT | Performed by: SURGERY

## 2023-08-02 PROCEDURE — A9270 NON-COVERED ITEM OR SERVICE: HCPCS | Performed by: ANESTHESIOLOGY

## 2023-08-02 PROCEDURE — 160048 HCHG OR STATISTICAL LEVEL 1-5: Performed by: SURGERY

## 2023-08-02 PROCEDURE — 160031 HCHG SURGERY MINUTES - 1ST 30 MINS LEVEL 5: Performed by: SURGERY

## 2023-08-02 PROCEDURE — 8E0W4CZ ROBOTIC ASSISTED PROCEDURE OF TRUNK REGION, PERCUTANEOUS ENDOSCOPIC APPROACH: ICD-10-PCS | Performed by: SURGERY

## 2023-08-02 PROCEDURE — 110371 HCHG SHELL REV 272: Performed by: SURGERY

## 2023-08-02 PROCEDURE — 502714 HCHG ROBOTIC SURGERY SERVICES: Performed by: SURGERY

## 2023-08-02 PROCEDURE — 93010 ELECTROCARDIOGRAM REPORT: CPT | Performed by: INTERNAL MEDICINE

## 2023-08-02 PROCEDURE — 160002 HCHG RECOVERY MINUTES (STAT): Performed by: SURGERY

## 2023-08-02 PROCEDURE — 700101 HCHG RX REV CODE 250: Performed by: ANESTHESIOLOGY

## 2023-08-02 PROCEDURE — 700102 HCHG RX REV CODE 250 W/ 637 OVERRIDE(OP): Performed by: ANESTHESIOLOGY

## 2023-08-02 RX ORDER — OXYCODONE HYDROCHLORIDE 5 MG/1
5 TABLET ORAL
Status: DISCONTINUED | OUTPATIENT
Start: 2023-08-02 | End: 2023-08-10 | Stop reason: HOSPADM

## 2023-08-02 RX ORDER — HYDRALAZINE HYDROCHLORIDE 20 MG/ML
5 INJECTION INTRAMUSCULAR; INTRAVENOUS
Status: DISCONTINUED | OUTPATIENT
Start: 2023-08-02 | End: 2023-08-02 | Stop reason: HOSPADM

## 2023-08-02 RX ORDER — HYDROMORPHONE HYDROCHLORIDE 1 MG/ML
0.5 INJECTION, SOLUTION INTRAMUSCULAR; INTRAVENOUS; SUBCUTANEOUS
Status: DISCONTINUED | OUTPATIENT
Start: 2023-08-02 | End: 2023-08-10 | Stop reason: HOSPADM

## 2023-08-02 RX ORDER — ONDANSETRON 4 MG/1
4 TABLET, ORALLY DISINTEGRATING ORAL EVERY 4 HOURS PRN
Status: DISCONTINUED | OUTPATIENT
Start: 2023-08-02 | End: 2023-08-10 | Stop reason: HOSPADM

## 2023-08-02 RX ORDER — ONDANSETRON 2 MG/ML
4 INJECTION INTRAMUSCULAR; INTRAVENOUS EVERY 4 HOURS PRN
Status: DISCONTINUED | OUTPATIENT
Start: 2023-08-02 | End: 2023-08-10 | Stop reason: HOSPADM

## 2023-08-02 RX ORDER — ACETAMINOPHEN 500 MG
1000 TABLET ORAL ONCE
Status: COMPLETED | OUTPATIENT
Start: 2023-08-02 | End: 2023-08-02

## 2023-08-02 RX ORDER — ACETAMINOPHEN 325 MG/1
650 TABLET ORAL EVERY 6 HOURS PRN
Status: DISCONTINUED | OUTPATIENT
Start: 2023-08-07 | End: 2023-08-10 | Stop reason: HOSPADM

## 2023-08-02 RX ORDER — MAGNESIUM SULFATE HEPTAHYDRATE 40 MG/ML
INJECTION, SOLUTION INTRAVENOUS PRN
Status: DISCONTINUED | OUTPATIENT
Start: 2023-08-02 | End: 2023-08-02 | Stop reason: SURG

## 2023-08-02 RX ORDER — DIPHENHYDRAMINE HYDROCHLORIDE 50 MG/ML
12.5 INJECTION INTRAMUSCULAR; INTRAVENOUS
Status: DISCONTINUED | OUTPATIENT
Start: 2023-08-02 | End: 2023-08-02 | Stop reason: HOSPADM

## 2023-08-02 RX ORDER — OXYCODONE HCL 5 MG/5 ML
10 SOLUTION, ORAL ORAL
Status: DISCONTINUED | OUTPATIENT
Start: 2023-08-02 | End: 2023-08-02 | Stop reason: HOSPADM

## 2023-08-02 RX ORDER — OXYCODONE HYDROCHLORIDE 10 MG/1
10 TABLET ORAL
Status: DISCONTINUED | OUTPATIENT
Start: 2023-08-02 | End: 2023-08-10 | Stop reason: HOSPADM

## 2023-08-02 RX ORDER — BUPIVACAINE HYDROCHLORIDE 2.5 MG/ML
INJECTION, SOLUTION EPIDURAL; INFILTRATION; INTRACAUDAL
Status: COMPLETED | OUTPATIENT
Start: 2023-08-02 | End: 2023-08-02

## 2023-08-02 RX ORDER — EPHEDRINE SULFATE 50 MG/ML
5 INJECTION, SOLUTION INTRAVENOUS
Status: DISCONTINUED | OUTPATIENT
Start: 2023-08-02 | End: 2023-08-02 | Stop reason: HOSPADM

## 2023-08-02 RX ORDER — ACETAMINOPHEN 325 MG/1
650 TABLET ORAL EVERY 6 HOURS
Status: DISPENSED | OUTPATIENT
Start: 2023-08-02 | End: 2023-08-07

## 2023-08-02 RX ORDER — ONDANSETRON 2 MG/ML
INJECTION INTRAMUSCULAR; INTRAVENOUS PRN
Status: DISCONTINUED | OUTPATIENT
Start: 2023-08-02 | End: 2023-08-02 | Stop reason: SURG

## 2023-08-02 RX ORDER — SODIUM CHLORIDE, SODIUM LACTATE, POTASSIUM CHLORIDE, CALCIUM CHLORIDE 600; 310; 30; 20 MG/100ML; MG/100ML; MG/100ML; MG/100ML
INJECTION, SOLUTION INTRAVENOUS CONTINUOUS
Status: ACTIVE | OUTPATIENT
Start: 2023-08-02 | End: 2023-08-02

## 2023-08-02 RX ORDER — HYDROMORPHONE HYDROCHLORIDE 1 MG/ML
0.1 INJECTION, SOLUTION INTRAMUSCULAR; INTRAVENOUS; SUBCUTANEOUS
Status: DISCONTINUED | OUTPATIENT
Start: 2023-08-02 | End: 2023-08-02 | Stop reason: HOSPADM

## 2023-08-02 RX ORDER — HYDROMORPHONE HYDROCHLORIDE 1 MG/ML
0.2 INJECTION, SOLUTION INTRAMUSCULAR; INTRAVENOUS; SUBCUTANEOUS
Status: DISCONTINUED | OUTPATIENT
Start: 2023-08-02 | End: 2023-08-02 | Stop reason: HOSPADM

## 2023-08-02 RX ORDER — LIDOCAINE HYDROCHLORIDE 20 MG/ML
INJECTION, SOLUTION EPIDURAL; INFILTRATION; INTRACAUDAL; PERINEURAL PRN
Status: DISCONTINUED | OUTPATIENT
Start: 2023-08-02 | End: 2023-08-02 | Stop reason: SURG

## 2023-08-02 RX ORDER — CEFAZOLIN SODIUM 1 G/3ML
INJECTION, POWDER, FOR SOLUTION INTRAMUSCULAR; INTRAVENOUS PRN
Status: DISCONTINUED | OUTPATIENT
Start: 2023-08-02 | End: 2023-08-02 | Stop reason: SURG

## 2023-08-02 RX ORDER — ENOXAPARIN SODIUM 100 MG/ML
30 INJECTION SUBCUTANEOUS EVERY 12 HOURS
Status: DISCONTINUED | OUTPATIENT
Start: 2023-08-03 | End: 2023-08-10 | Stop reason: HOSPADM

## 2023-08-02 RX ORDER — IPRATROPIUM BROMIDE AND ALBUTEROL SULFATE 2.5; .5 MG/3ML; MG/3ML
3 SOLUTION RESPIRATORY (INHALATION)
Status: DISCONTINUED | OUTPATIENT
Start: 2023-08-02 | End: 2023-08-02 | Stop reason: HOSPADM

## 2023-08-02 RX ORDER — SODIUM CHLORIDE, SODIUM LACTATE, POTASSIUM CHLORIDE, CALCIUM CHLORIDE 600; 310; 30; 20 MG/100ML; MG/100ML; MG/100ML; MG/100ML
INJECTION, SOLUTION INTRAVENOUS CONTINUOUS
Status: DISCONTINUED | OUTPATIENT
Start: 2023-08-02 | End: 2023-08-02 | Stop reason: HOSPADM

## 2023-08-02 RX ORDER — DOCUSATE SODIUM 100 MG/1
100 CAPSULE, LIQUID FILLED ORAL 2 TIMES DAILY
Status: DISCONTINUED | OUTPATIENT
Start: 2023-08-02 | End: 2023-08-10 | Stop reason: HOSPADM

## 2023-08-02 RX ORDER — SODIUM CHLORIDE 9 MG/ML
INJECTION, SOLUTION INTRAVENOUS CONTINUOUS
Status: DISCONTINUED | OUTPATIENT
Start: 2023-08-02 | End: 2023-08-10 | Stop reason: HOSPADM

## 2023-08-02 RX ORDER — DEXMEDETOMIDINE HYDROCHLORIDE 100 UG/ML
INJECTION, SOLUTION INTRAVENOUS PRN
Status: DISCONTINUED | OUTPATIENT
Start: 2023-08-02 | End: 2023-08-02 | Stop reason: SURG

## 2023-08-02 RX ORDER — HYDROMORPHONE HYDROCHLORIDE 1 MG/ML
0.4 INJECTION, SOLUTION INTRAMUSCULAR; INTRAVENOUS; SUBCUTANEOUS
Status: DISCONTINUED | OUTPATIENT
Start: 2023-08-02 | End: 2023-08-02 | Stop reason: HOSPADM

## 2023-08-02 RX ORDER — OXYCODONE HCL 5 MG/5 ML
5 SOLUTION, ORAL ORAL
Status: DISCONTINUED | OUTPATIENT
Start: 2023-08-02 | End: 2023-08-02 | Stop reason: HOSPADM

## 2023-08-02 RX ORDER — BUPIVACAINE HYDROCHLORIDE AND EPINEPHRINE 5; 5 MG/ML; UG/ML
INJECTION, SOLUTION EPIDURAL; INTRACAUDAL; PERINEURAL
Status: DISCONTINUED | OUTPATIENT
Start: 2023-08-02 | End: 2023-08-02 | Stop reason: HOSPADM

## 2023-08-02 RX ORDER — ATENOLOL 25 MG/1
50 TABLET ORAL
Status: DISCONTINUED | OUTPATIENT
Start: 2023-08-03 | End: 2023-08-10 | Stop reason: HOSPADM

## 2023-08-02 RX ORDER — HALOPERIDOL 5 MG/ML
1 INJECTION INTRAMUSCULAR
Status: DISCONTINUED | OUTPATIENT
Start: 2023-08-02 | End: 2023-08-02 | Stop reason: HOSPADM

## 2023-08-02 RX ORDER — ONDANSETRON 2 MG/ML
4 INJECTION INTRAMUSCULAR; INTRAVENOUS
Status: DISCONTINUED | OUTPATIENT
Start: 2023-08-02 | End: 2023-08-02 | Stop reason: HOSPADM

## 2023-08-02 RX ORDER — MEPERIDINE HYDROCHLORIDE 25 MG/ML
12.5 INJECTION INTRAMUSCULAR; INTRAVENOUS; SUBCUTANEOUS
Status: DISCONTINUED | OUTPATIENT
Start: 2023-08-02 | End: 2023-08-02 | Stop reason: HOSPADM

## 2023-08-02 RX ORDER — CEFOTETAN DISODIUM 2 G/20ML
INJECTION, POWDER, FOR SOLUTION INTRAMUSCULAR; INTRAVENOUS PRN
Status: DISCONTINUED | OUTPATIENT
Start: 2023-08-02 | End: 2023-08-02 | Stop reason: SURG

## 2023-08-02 RX ADMIN — EPHEDRINE SULFATE 15 MG: 50 INJECTION, SOLUTION INTRAVENOUS at 17:12

## 2023-08-02 RX ADMIN — SUGAMMADEX 200 MG: 100 INJECTION, SOLUTION INTRAVENOUS at 19:05

## 2023-08-02 RX ADMIN — ROCURONIUM BROMIDE 10 MG: 10 INJECTION, SOLUTION INTRAVENOUS at 17:39

## 2023-08-02 RX ADMIN — ROCURONIUM BROMIDE 20 MG: 10 INJECTION, SOLUTION INTRAVENOUS at 17:05

## 2023-08-02 RX ADMIN — EPHEDRINE SULFATE 10 MG: 50 INJECTION, SOLUTION INTRAVENOUS at 18:17

## 2023-08-02 RX ADMIN — PROPOFOL 200 MG: 10 INJECTION, EMULSION INTRAVENOUS at 15:29

## 2023-08-02 RX ADMIN — FENTANYL CITRATE 100 MCG: 50 INJECTION, SOLUTION INTRAMUSCULAR; INTRAVENOUS at 16:41

## 2023-08-02 RX ADMIN — FENTANYL CITRATE 50 MCG: 50 INJECTION, SOLUTION INTRAMUSCULAR; INTRAVENOUS at 17:39

## 2023-08-02 RX ADMIN — EPHEDRINE SULFATE 10 MG: 50 INJECTION, SOLUTION INTRAVENOUS at 16:22

## 2023-08-02 RX ADMIN — SODIUM CHLORIDE: 9 INJECTION, SOLUTION INTRAVENOUS at 22:01

## 2023-08-02 RX ADMIN — EPHEDRINE SULFATE 10 MG: 50 INJECTION, SOLUTION INTRAVENOUS at 15:48

## 2023-08-02 RX ADMIN — MAGNESIUM SULFATE HEPTAHYDRATE 4 G: 4 INJECTION, SOLUTION INTRAVENOUS at 16:45

## 2023-08-02 RX ADMIN — CEFAZOLIN 2 G: 1 INJECTION, POWDER, FOR SOLUTION INTRAMUSCULAR; INTRAVENOUS at 15:29

## 2023-08-02 RX ADMIN — ACETAMINOPHEN 650 MG: 325 TABLET, FILM COATED ORAL at 21:58

## 2023-08-02 RX ADMIN — FENTANYL CITRATE 50 MCG: 50 INJECTION, SOLUTION INTRAMUSCULAR; INTRAVENOUS at 19:11

## 2023-08-02 RX ADMIN — CEFOTETAN DISODIUM 2 G: 2 INJECTION, POWDER, FOR SOLUTION INTRAMUSCULAR; INTRAVENOUS at 15:29

## 2023-08-02 RX ADMIN — ACETAMINOPHEN 1000 MG: 500 TABLET, FILM COATED ORAL at 14:20

## 2023-08-02 RX ADMIN — FENTANYL CITRATE 50 MCG: 50 INJECTION, SOLUTION INTRAMUSCULAR; INTRAVENOUS at 15:26

## 2023-08-02 RX ADMIN — ROCURONIUM BROMIDE 20 MG: 10 INJECTION, SOLUTION INTRAVENOUS at 16:09

## 2023-08-02 RX ADMIN — ROCURONIUM BROMIDE 60 MG: 10 INJECTION, SOLUTION INTRAVENOUS at 15:29

## 2023-08-02 RX ADMIN — SODIUM CHLORIDE, POTASSIUM CHLORIDE, SODIUM LACTATE AND CALCIUM CHLORIDE: 600; 310; 30; 20 INJECTION, SOLUTION INTRAVENOUS at 17:34

## 2023-08-02 RX ADMIN — BUPIVACAINE HYDROCHLORIDE 30 ML: 2.5 INJECTION, SOLUTION EPIDURAL; INFILTRATION; INTRACAUDAL at 15:38

## 2023-08-02 RX ADMIN — EPHEDRINE SULFATE 5 MG: 50 INJECTION, SOLUTION INTRAVENOUS at 18:55

## 2023-08-02 RX ADMIN — SODIUM CHLORIDE, POTASSIUM CHLORIDE, SODIUM LACTATE AND CALCIUM CHLORIDE: 600; 310; 30; 20 INJECTION, SOLUTION INTRAVENOUS at 14:27

## 2023-08-02 RX ADMIN — LIDOCAINE HYDROCHLORIDE 80 MG: 20 INJECTION, SOLUTION EPIDURAL; INFILTRATION; INTRACAUDAL at 15:29

## 2023-08-02 RX ADMIN — ROCURONIUM BROMIDE 10 MG: 10 INJECTION, SOLUTION INTRAVENOUS at 18:17

## 2023-08-02 RX ADMIN — OXYCODONE HYDROCHLORIDE 5 MG: 5 TABLET ORAL at 21:58

## 2023-08-02 RX ADMIN — FENTANYL CITRATE 50 MCG: 50 INJECTION, SOLUTION INTRAMUSCULAR; INTRAVENOUS at 18:43

## 2023-08-02 RX ADMIN — ONDANSETRON 4 MG: 2 INJECTION INTRAMUSCULAR; INTRAVENOUS at 19:06

## 2023-08-02 RX ADMIN — DEXMEDETOMIDINE 25 MCG: 100 INJECTION, SOLUTION INTRAVENOUS at 15:46

## 2023-08-02 ASSESSMENT — PAIN DESCRIPTION - PAIN TYPE: TYPE: SURGICAL PAIN

## 2023-08-02 ASSESSMENT — FIBROSIS 4 INDEX: FIB4 SCORE: 0.79

## 2023-08-02 ASSESSMENT — PAIN SCALES - GENERAL: PAIN_LEVEL: 6

## 2023-08-02 NOTE — ANESTHESIA PROCEDURE NOTES
Peripheral Block    Date/Time: 8/2/2023 3:38 PM    Performed by: Roxanna Cuellar M.D.  Authorized by: Roxanna Cuellar M.D.    Start Time:  8/2/2023 3:38 PM  End Time:  8/2/2023 3:44 PM  Reason for Block: at surgeon's request and post-op pain management ONLY    patient identified, IV checked, site marked, risks and benefits discussed, surgical consent, monitors and equipment checked, pre-op evaluation and timeout performed    Patient Position:  Supine  Prep: ChloraPrep    Monitoring:  Heart rate, continuous pulse ox and cardiac monitor  Block Region:  Trunk  Trunk - Block Type:  Abdominal plane block - TAP block    Laterality:  Bilateral  Procedures: ultrasound guided  Image captured, interpreted and electronically stored.  Local Infiltration:  Lidocaine  Strength:  1 %  Dose:  3 ml  Block Type:  Single-shot  Needle Length:  100mm  Needle Gauge:  21 G  Needle Localization:  Ultrasound guidance  Injection Assessment:  Negative aspiration for heme, no paresthesia on injection, incremental injection and local visualized surrounding nerve on ultrasound  Evidence of intravascular injection: No     15 mL local injected on each side after negative aspiration.

## 2023-08-02 NOTE — ANESTHESIA PREPROCEDURE EVALUATION
Case: [id number] Date/Time: 08/02/23 1415    Procedure: ROBOTIC LOW ANTERIOR RESECTION WITH CLOSURE OF ENTEROSTOMY, WITH RESECTION AND COLORECTAL ANASTOMOSIS    Pre-op diagnosis: ATTENTION TO COLOSTOMY    Location: [facility] OR 11 / SURGERY [facility]    Surgeons: Huseyin Asher M.D.          Relevant Problems   CARDIAC   (positive) Hypertension      Other   (positive) Class 3 severe obesity due to excess calories with serious comorbidity and body mass index (BMI) of 40.0 to 44.9 in adult (HCC)   (positive) Diverticulitis of intestine with perforation and abscess       Physical Exam    Airway   Mallampati: II  TM distance: >3 FB  Neck ROM: full       Cardiovascular - normal exam  Rhythm: regular  Rate: normal  (-) murmur     Dental - normal exam           Pulmonary - normal exam  Breath sounds clear to auscultation     Abdominal    Neurological - normal exam                 Anesthesia Plan    ASA 3   ASA physical status 3 criteria: morbid obesity - BMI greater than or equal to 40    Plan - general       Airway plan will be ETT          Induction: intravenous    Postoperative Plan: Postoperative administration of opioids is intended.    Pertinent diagnostic labs and testing reviewed    Informed Consent:    Anesthetic plan and risks discussed with patient.    Use of blood products discussed with: patient whom consented to blood products.

## 2023-08-02 NOTE — ANESTHESIA PROCEDURE NOTES
Airway    Date/Time: 8/2/2023 3:30 PM    Performed by: Roxanna Cuellar M.D.  Authorized by: Roxanna Cuellar M.D.    Location:  OR  Urgency:  Elective  Difficult Airway: No    Indications for Airway Management:  Anesthesia      Spontaneous Ventilation: absent    Sedation Level:  Deep  Preoxygenated: Yes    Patient Position:  Sniffing  Mask Difficulty Assessment:  1 - vent by mask  Final Airway Type:  Endotracheal airway  Final Endotracheal Airway:  ETT  Cuffed: Yes    Technique Used for Successful ETT Placement:  Direct laryngoscopy    Insertion Site:  Oral  Blade Type:  Alexsandra  Laryngoscope Blade/Videolaryngoscope Blade Size:  3  ETT Size (mm):  7.0  Measured from:  Teeth  ETT to Teeth (cm):  21  Placement Verified by: auscultation and capnometry    Cormack-Lehane Classification:  Grade I - full view of glottis  Number of Attempts at Approach:  1

## 2023-08-03 LAB
ANION GAP SERPL CALC-SCNC: 11 MMOL/L (ref 7–16)
BASOPHILS # BLD AUTO: 0.4 % (ref 0–1.8)
BASOPHILS # BLD: 0.07 K/UL (ref 0–0.12)
BUN SERPL-MCNC: 19 MG/DL (ref 8–22)
CALCIUM SERPL-MCNC: 8.9 MG/DL (ref 8.5–10.5)
CHLORIDE SERPL-SCNC: 106 MMOL/L (ref 96–112)
CO2 SERPL-SCNC: 22 MMOL/L (ref 20–33)
CREAT SERPL-MCNC: 1.23 MG/DL (ref 0.5–1.4)
EOSINOPHIL # BLD AUTO: 0 K/UL (ref 0–0.51)
EOSINOPHIL NFR BLD: 0 % (ref 0–6.9)
ERYTHROCYTE [DISTWIDTH] IN BLOOD BY AUTOMATED COUNT: 47 FL (ref 35.9–50)
GFR SERPLBLD CREATININE-BSD FMLA CKD-EPI: 45 ML/MIN/1.73 M 2
GLUCOSE SERPL-MCNC: 173 MG/DL (ref 65–99)
HCT VFR BLD AUTO: 44.3 % (ref 37–47)
HGB BLD-MCNC: 13.4 G/DL (ref 12–16)
IMM GRANULOCYTES # BLD AUTO: 0.12 K/UL (ref 0–0.11)
IMM GRANULOCYTES NFR BLD AUTO: 0.6 % (ref 0–0.9)
LYMPHOCYTES # BLD AUTO: 0.55 K/UL (ref 1–4.8)
LYMPHOCYTES NFR BLD: 2.9 % (ref 22–41)
MAGNESIUM SERPL-MCNC: 2.4 MG/DL (ref 1.5–2.5)
MCH RBC QN AUTO: 27.1 PG (ref 27–33)
MCHC RBC AUTO-ENTMCNC: 30.2 G/DL (ref 32.2–35.5)
MCV RBC AUTO: 89.5 FL (ref 81.4–97.8)
MONOCYTES # BLD AUTO: 1.05 K/UL (ref 0–0.85)
MONOCYTES NFR BLD AUTO: 5.6 % (ref 0–13.4)
NEUTROPHILS # BLD AUTO: 16.91 K/UL (ref 1.82–7.42)
NEUTROPHILS NFR BLD: 90.5 % (ref 44–72)
NRBC # BLD AUTO: 0 K/UL
NRBC BLD-RTO: 0 /100 WBC (ref 0–0.2)
PATHOLOGY CONSULT NOTE: NORMAL
PHOSPHATE SERPL-MCNC: 4.4 MG/DL (ref 2.5–4.5)
PLATELET # BLD AUTO: 230 K/UL (ref 164–446)
PMV BLD AUTO: 9.5 FL (ref 9–12.9)
POTASSIUM SERPL-SCNC: 4.6 MMOL/L (ref 3.6–5.5)
RBC # BLD AUTO: 4.95 M/UL (ref 4.2–5.4)
SODIUM SERPL-SCNC: 139 MMOL/L (ref 135–145)
WBC # BLD AUTO: 18.7 K/UL (ref 4.8–10.8)

## 2023-08-03 PROCEDURE — 700111 HCHG RX REV CODE 636 W/ 250 OVERRIDE (IP): Performed by: SURGERY

## 2023-08-03 PROCEDURE — A9270 NON-COVERED ITEM OR SERVICE: HCPCS | Performed by: SURGERY

## 2023-08-03 PROCEDURE — 770001 HCHG ROOM/CARE - MED/SURG/GYN PRIV*

## 2023-08-03 PROCEDURE — 700102 HCHG RX REV CODE 250 W/ 637 OVERRIDE(OP): Performed by: SURGERY

## 2023-08-03 PROCEDURE — 84100 ASSAY OF PHOSPHORUS: CPT

## 2023-08-03 PROCEDURE — 36415 COLL VENOUS BLD VENIPUNCTURE: CPT

## 2023-08-03 PROCEDURE — 700105 HCHG RX REV CODE 258: Performed by: SURGERY

## 2023-08-03 PROCEDURE — 85025 COMPLETE CBC W/AUTO DIFF WBC: CPT

## 2023-08-03 PROCEDURE — 80048 BASIC METABOLIC PNL TOTAL CA: CPT

## 2023-08-03 PROCEDURE — 83735 ASSAY OF MAGNESIUM: CPT

## 2023-08-03 RX ADMIN — ACETAMINOPHEN 650 MG: 325 TABLET, FILM COATED ORAL at 21:49

## 2023-08-03 RX ADMIN — OXYCODONE HYDROCHLORIDE 10 MG: 10 TABLET ORAL at 11:06

## 2023-08-03 RX ADMIN — ACETAMINOPHEN 650 MG: 325 TABLET, FILM COATED ORAL at 11:06

## 2023-08-03 RX ADMIN — SODIUM CHLORIDE: 9 INJECTION, SOLUTION INTRAVENOUS at 13:46

## 2023-08-03 RX ADMIN — ENOXAPARIN SODIUM 30 MG: 100 INJECTION SUBCUTANEOUS at 18:45

## 2023-08-03 RX ADMIN — ACETAMINOPHEN 650 MG: 325 TABLET, FILM COATED ORAL at 04:46

## 2023-08-03 RX ADMIN — ATENOLOL 50 MG: 25 TABLET ORAL at 06:25

## 2023-08-03 RX ADMIN — OXYCODONE HYDROCHLORIDE 10 MG: 10 TABLET ORAL at 14:14

## 2023-08-03 RX ADMIN — DOCUSATE SODIUM 100 MG: 100 CAPSULE, LIQUID FILLED ORAL at 18:45

## 2023-08-03 RX ADMIN — OXYCODONE HYDROCHLORIDE 10 MG: 10 TABLET ORAL at 18:43

## 2023-08-03 RX ADMIN — OXYCODONE HYDROCHLORIDE 5 MG: 5 TABLET ORAL at 04:47

## 2023-08-03 RX ADMIN — OXYCODONE HYDROCHLORIDE 10 MG: 10 TABLET ORAL at 21:50

## 2023-08-03 ASSESSMENT — LIFESTYLE VARIABLES
HAVE PEOPLE ANNOYED YOU BY CRITICIZING YOUR DRINKING: NO
AVERAGE NUMBER OF DAYS PER WEEK YOU HAVE A DRINK CONTAINING ALCOHOL: 0
HOW MANY TIMES IN THE PAST YEAR HAVE YOU HAD 5 OR MORE DRINKS IN A DAY: 0
EVER HAD A DRINK FIRST THING IN THE MORNING TO STEADY YOUR NERVES TO GET RID OF A HANGOVER: NO
ALCOHOL_USE: NO
ON A TYPICAL DAY WHEN YOU DRINK ALCOHOL HOW MANY DRINKS DO YOU HAVE: 0
DOES PATIENT WANT TO STOP DRINKING: NO
TOTAL SCORE: 0
TOTAL SCORE: 0
HAVE YOU EVER FELT YOU SHOULD CUT DOWN ON YOUR DRINKING: NO
CONSUMPTION TOTAL: NEGATIVE
EVER FELT BAD OR GUILTY ABOUT YOUR DRINKING: NO
TOTAL SCORE: 0

## 2023-08-03 ASSESSMENT — PATIENT HEALTH QUESTIONNAIRE - PHQ9
SUM OF ALL RESPONSES TO PHQ9 QUESTIONS 1 AND 2: 0
2. FEELING DOWN, DEPRESSED, IRRITABLE, OR HOPELESS: NOT AT ALL
1. LITTLE INTEREST OR PLEASURE IN DOING THINGS: NOT AT ALL

## 2023-08-03 ASSESSMENT — PAIN DESCRIPTION - PAIN TYPE
TYPE: ACUTE PAIN;SURGICAL PAIN
TYPE: SURGICAL PAIN
TYPE: ACUTE PAIN
TYPE: ACUTE PAIN

## 2023-08-03 NOTE — CARE PLAN
The patient is Stable - Low risk of patient condition declining or worsening    Shift Goals  Clinical Goals: pain control, ambulation, rest  Patient Goals: Rest, pain control    Progress made toward(s) clinical / shift goals:  pt pain medicated per MAR. Resting at this time    Problem: Pain - Standard  Goal: Alleviation of pain or a reduction in pain to the patient’s comfort goal  Description: Target End Date:  Prior to discharge or change in level of care    Document on Vitals flowsheet    1.  Document pain using the appropriate pain scale per order or unit policy  2.  Educate and implement non-pharmacologic comfort measures (i.e. relaxation, distraction, massage, cold/heat therapy, etc.)  3.  Pain management medications as ordered  4.  Reassess pain after pain med administration per policy  5.  If opiods administered assess patient's response to pain medication is appropriate per POSS sedation scale  6.  Follow pain management plan developed in collaboration with patient and interdisciplinary team (including palliative care or pain specialists if applicable)  Outcome: Progressing     Problem: Knowledge Deficit - Standard  Goal: Patient and family/care givers will demonstrate understanding of plan of care, disease process/condition, diagnostic tests and medications  Description: Target End Date:  1-3 days or as soon as patient condition allows    Document in Patient Education    1.  Patient and family/caregiver oriented to unit, equipment, visitation policy and means for communicating concern  2.  Complete/review Learning Assessment  3.  Assess knowledge level of disease process/condition, treatment plan, diagnostic tests and medications  4.  Explain disease process/condition, treatment plan, diagnostic tests and medications  Outcome: Progressing     Problem: Fall Risk  Goal: Patient will remain free from falls  Description: Target End Date:  Prior to discharge or change in level of care    Document interventions  on the [employer] Fall Risk Assessment    1.  Assess for fall risk factors  2.  Implement fall precautions  Outcome: Progressing       Patient is not progressing towards the following goals:  Pt did not ambulate. Pt states she is unable to open eyes. States she will ambulate when eyes can open. Eye drops provided to aid in easing eye discomfort

## 2023-08-03 NOTE — OR NURSING
1925 Patient arrived from OR via rohan sarabiaails up, brake locked. Maintaining own airway. Received report from Dr. Cuellar and Young BECKER RN, assumed care. VSS. Three abdominal sites with staples, CDI. Customized Previna in place. Ice pack applied. Patient appears comfortable, no s/s of pain or nausea. Orders reviewed. 1950 Rouses to voice, denies pain or nausea. 2000 Oriented x 4, remains drowsy. 2016 Family updated. 2020 Patient more alert,tolerating ice chips. 2025 Reports 4/10 tolerable pain. 2030 VSS, recovery complete. 2040 report to Cindy HOLDEN RN. 2120 Discharged to room with oxygen and belongings.

## 2023-08-03 NOTE — ANESTHESIA TIME REPORT
Anesthesia Start and Stop Event Times     Date Time Event    8/2/2023 1456 Ready for Procedure     1523 Anesthesia Start     1928 Anesthesia Stop        Responsible Staff  08/02/23    Name Role Begin End    Roxanna Cuellar M.D. Anesth 1523 1928        Overtime Reason:  no overtime (within assigned shift)    Comments:

## 2023-08-03 NOTE — PROGRESS NOTES
"4 Eyes Skin Assessment Completed by Cindy RN and CONNER Sanchez.    Head WDL  Ears WDL  Nose WDL  Mouth WDL  Neck WDL  Breast/Chest Redness and Scar to R breast   Shoulder Blades WDL  Spine WDL  (R) Arm/Elbow/Hand WDL  (L) Arm/Elbow/Hand WDL  Abdomen Incision, x3 lap sites with staples, upside down \"L\" shaped incision with prevena plus wound vac, RLQ MIGUE drain  Groin WDL  Scrotum/Coccyx/Buttocks Redness and Blanching  (R) Leg WDL  (L) Leg WDL  (R) Heel/Foot/Toe WDL  (L) Heel/Foot/Toe WDL          Devices In Places Pulse Ox, Zaragoza, SCD's, and Oxy Mask      Interventions In Place Waffle Overlay, Pillows, and Pressure Redistribution Mattress    Possible Skin Injury No    Pictures Uploaded Into Epic N/A  Wound Consult Placed N/A  RN Wound Prevention Protocol Ordered No    "

## 2023-08-03 NOTE — PROGRESS NOTES
"Pt arrived to T422 from PACU on gurRound O. Transfer to bed, skin check complete. Daughter at bedside.    Pt is A&O 4  Pain + 8/10 medicated per MAR and ice pack provided    - nausea  Tolerating a NPO w/ sips diet   Incision + x3 stab sits with staples, MLI \"L\" shap incision with prevena plus  RLQ MIGUE Drain  + Voids via kothari  - flatus  - BM  Up x2 FWW  SCD's on  Bed alarm on, pt moderate fall risk per elder bullock  Reviewed plan of care with patient, bed in lowest position and locked, pt resting comfortably now, call light within reach, all needs met at this time. Interventions will be executed per plan of care   "

## 2023-08-03 NOTE — CARE PLAN
The patient is Stable - Low risk of patient condition declining or worsening    Shift Goals  Clinical Goals: pain control, ambulation, rest, maintain skin integrity.   Patient Goals: Rest, pain control    Progress made toward(s) clinical / shift goals:          Problem: Knowledge Deficit - Standard  Goal: Patient and family/care givers will demonstrate understanding of plan of care, disease process/condition, diagnostic tests and medications  Outcome: Progressing   POC discussed with patient. All questions answered at bedside. Communication board updated.     Problem: Fall Risk  Goal: Patient will remain free from falls  Outcome: Progressing   Strip bed alarm in place. Pt to call before getting OOB. Pt verbalizes understanding.   Problem: Skin Integrity  Goal: Skin integrity is maintained or improved  Outcome: Progressing   Q2 turning while in bed. Pt unable to get OOB. Edge of bed for this shift. Skin intact, floating BLE and BUE on pillows.     Patient is not progressing towards the following goals:      Problem: Pain - Standard  Goal: Alleviation of pain or a reduction in pain to the patient’s comfort goal  Outcome: Not Met   Higher dose of PRN medication administered. Pt reporting pain 8/10. Providing pt with non pharm options as well.

## 2023-08-03 NOTE — DIETARY
NUTRITION SERVICES: BMI - Pt with BMI >40 (=Body mass index is 40.89 kg/m².), Class III obesity. Weight loss counseling not appropriate in acute care setting. RECOMMEND - If appropriate at DC please refer to outpatient nutrition services for weight management.

## 2023-08-03 NOTE — OP REPORT
Preoperative diagnosis; anterior abdominal wall hernia, colostomy status post Chávez's colectomy  Postoperative diagnosis same  Operation; robotic assisted laparoscopic converted to open repair of anterior abdominal wall hernia with colostomy takedown low anterior resection and anastomosis with mobilization of splenic flexure and small bowel resection with enteroenterostomy  Surgeon; SHARONDA CASTILLO  Assistant; Keysha Ramirez  An assistant was required for the safe completion of the surgical case.  The assistant provided retraction, exposure, performed wound closure and assisted with instrumentation promoting the efficiency of the surgery performed.  I felt an assistant was necessary in the interest of safety and efficiency.  My request for assistance is based on my training and experience and should be patently obvious to the most casual observer as necessary.  Anesthesia; Dr. Cuellar, general anesthesia  Wound classification clean contaminated  Specimens rectosigmoid  Estimated blood loss 300 cc  Counts correct x2  MIGUE drain  Operative note this 78-year-old woman underwent a Chávez's colectomy for perforated sigmoid diverticulitis.  She is sufficiently recovered to consider closure of her colostomy.  She has developed an anterior abdominal wall hernia around the colostomy.  She was felt to be a candidate for its repair.  The risk possible complications of operation were carefully explained to patient in detail.  She understood and accepted these risks wished to proceed.  She had a satisfactory preoperative evaluation.  She did bowel prep.  This included enteral antibiotics.  She received prophylactic antibiotics.  She had sequential stockings applied as antiembolism prophylaxis she signed consents for surgery and anesthesia.  She was taken the operating room and placed under anesthesia.  Once anesthetized colostomy was provisionally closed with silk suture.  The patient underwent irrigation of the rectum.  She was  placed in low lithotomy position.  The abdomen was prepped with ChloraPrep solution and sterile drapes were applied the perineum was prepped with Betadine solutions.  Patient had a timeout affected.  The colostomy had a elliptical incision made around it.  It was elevated and resected down through skin subcutaneous tissues through the hernia sac to the fascial layers.  Here was circumferentially mobilized from the peritoneum.  The patient had a relatively free space in the upper abdomen free of adhesions.  The colon was relatively mobile.  It was transected and the segment which was in the subcutaneous tissues with electrocautery.  A pursestring of 2-0 Prolene suture was placed.  It was gently dilated and a 29 mm anvil was placed for an EEA into through the end of the colon and this was tied securely using the pursestring suture.  This was dropped back in the abdomen.  Adhesions were taken down around the aperture of the hernia sac.  The fascia was defined.  In the free space an 8 mm trocar was placed for the da Mp robot in the left upper quadrant.  The hernia was then closed using running oh strata fix suture in 2 layers.  The subcutaneous tissues were packed with Betadine moistened gauze.  Pneumoperitoneum was established carbon oxide gas video laparoscopy ensued.  There were adhesions from the previous surgery.  Patient had a free space in the right upper quadrant and right abdomen 3 additional trocars were placed.  The patient had the da Mp X I robot brought in and docked instrumentation and the camera introduced surgeon retired to the console.  Small bowel was generalized from the anterior abdominal wall.  The hernia repair appeared to be satisfactory.  Attention was turned to the descending colon.  This was mobilized by incising the line of Toldt up through and around the splenic flexure this provided adequate length for pelvic anastomosis.  Patient had attention turned to the pelvis.  Here there were  dense adhesions of the small bowel into the pelvis.  These were gradually mobilized in the course of dissection a likely ovarian cyst was ruptured.  The patient's fallopian tubes and uterus were identified.  There were densely adherent to the anterior aspect of the rectum and these were mobilized.  The small bowel itself was densely adherent to the presacral area and the remaining rectum.  This was gradually mobilized and the course of dissection several fenestrations were made in the small bowel.  The rectum itself was identified.  It was difficult to mobilize there was a presumably a sterile abscess that was drained.  A small opening was made in the rectum the rectal mucosa could be visualized.  At this point I realized that there was too much scar tissue really to proceed safely with minimally invasive instrumentation.  Therefore the robot was undocked and the trocars removed.  The patient's old midline hypogastric incision was opened using electrocautery and the abdomen was entered.  The small bowel was brought up after being more enteral lysed and freed from the pelvis.  The fenestrated segment was resected with an [place] stapler on either end of the bowel with 3.5 mm loads and then the mesentery itself was transected using the [place] stapler with 2.5 mm loads.  This was anastomosed anatomically side-to-side functional end-to-end with the [place] stapler.  The forks were closed using running and interrupted 3-0 Vicryl suture in 2 layers.  The mesentery was closed using running 3-0 Vicryl suture.  Attention was turned return to the pelvis.  Here the rectum was further mobilized down the presacral area.  It was this allowed it to be brought out to length.  There appeared to be some inherent remaining sigmoid colon to the rectum this was mobilized down onto the rectum below the peritoneal reflection and this was then transected using a curvilinear stapler.  The remaining rectosigmoid was submitted to pathology  for permanent section the colon was long enough to prevent of the allowing anastomosis.  The assistant went below and gently dilated the rectum with sizers.  We were able to move the sizers up to where we needed to make the anastomosis.  The patient then had the EEA introduced.  This was utilized to make the anastomosis.  This was a powered tight EEA.  Once the anastomosis was completed it was tested.  There was an air leak.  Some repairs were made on the anterior aspect of the anastomosis with interrupted 3-0 Vicryl suture but there was a continued air leak.  We found that there was in fact a perforation of the anterior wall of the rectum below the anastomosis at the angle of the rectum coming up into the pelvis.  This was repaired with interrupted 3-0 and 2-0 Vicryl sutures.  This proved to be somewhat vexing but ultimately a good repair was obtained.  This was now air and Betadine tied for irrigation.  The pelvis was irrigated hemostasis was secured.  The rectum was gently dilated with a small sizer and there was no resistance to passage.  The rectum was again normal insufflated and there was no evidence of ongoing leak.  Were not satisfied the technical aspect of the ring anastomosis.  A #10 flat fluted drain was placed through one of the right lower quadrant trocar sites into the pelvis and sutured in place using nylon suture.  The omentum was brought down over the anterior aspect of the small bowel the abdomen is irrigated and decompressed.  The fascia was closed using interrupted Hiead Barroso #1 Vicryl sutures.  The subcutaneous tissues were irrigated the skin was approximated in all wounds using automatic stapling device a customized Prevena wound dressing was applied.  A dressing was applied at the drain site.  Patient's urine was inspected and was not bloody.  The drain was connected to bulb suction.  The procedure was completed then with awakening the patient taken recovery in stable satisfactory  condition.  Estimated blood loss was around 300 cc sponge needle counts reported as correct.  Operation was technically challenging due to residual inflammation but I think safely completed the patient be admitted for postoperative care

## 2023-08-03 NOTE — PROGRESS NOTES
Postoperative day #1  Doing fairly well.  Expected abdominal tenderness  No flatus or stool  White count elevation noted  Hemoglobin good  Renal function normal urine clear  Okay to remove Zaragoza  MIGUE drain old blood  Okay for Lovenox  Sips of clears no tray

## 2023-08-03 NOTE — ANESTHESIA POSTPROCEDURE EVALUATION
Patient: Maira Johnson    Procedure Summary     Date: 08/02/23 Room / Location: [place] [address] / SURGERY [facility]    Anesthesia Start: 1523 Anesthesia Stop: 1928    Procedure: ROBOTIC CONVERTED TO OPEN LOW ANTERIOR RESECTION WITH CLOSURE OF ENTEROSTOMY, WITH RESECTION AND COLORECTAL ANASTOMOSIS (Abdomen) Diagnosis: (ATTENTION TO COLOSTOMY)    Surgeons: Huseyin Asher M.D. Responsible Provider: Roxanna Cuellar M.D.    Anesthesia Type: general ASA Status: 3          Final Anesthesia Type: general  Last vitals  BP   Blood Pressure : 108/56    Temp   35.8 °C (96.5 °F)    Pulse   62   Resp   17    SpO2   94 %      Anesthesia Post Evaluation    Patient location during evaluation: PACU  Patient participation: complete - patient participated  Level of consciousness: awake and alert  Pain score: 6    Airway patency: patent  Anesthetic complications: no  Cardiovascular status: hemodynamically stable  Respiratory status: acceptable  Hydration status: euvolemic    PONV: none          No notable events documented.     Nurse Pain Score: 8 (NPRS)

## 2023-08-04 LAB
ANION GAP SERPL CALC-SCNC: 7 MMOL/L (ref 7–16)
BASOPHILS # BLD AUTO: 0.2 % (ref 0–1.8)
BASOPHILS # BLD: 0.03 K/UL (ref 0–0.12)
BUN SERPL-MCNC: 28 MG/DL (ref 8–22)
CALCIUM SERPL-MCNC: 8.5 MG/DL (ref 8.5–10.5)
CHLORIDE SERPL-SCNC: 107 MMOL/L (ref 96–112)
CO2 SERPL-SCNC: 21 MMOL/L (ref 20–33)
CREAT SERPL-MCNC: 1.33 MG/DL (ref 0.5–1.4)
EOSINOPHIL # BLD AUTO: 0.03 K/UL (ref 0–0.51)
EOSINOPHIL NFR BLD: 0.2 % (ref 0–6.9)
ERYTHROCYTE [DISTWIDTH] IN BLOOD BY AUTOMATED COUNT: 47 FL (ref 35.9–50)
GFR SERPLBLD CREATININE-BSD FMLA CKD-EPI: 41 ML/MIN/1.73 M 2
GLUCOSE SERPL-MCNC: 115 MG/DL (ref 65–99)
HCT VFR BLD AUTO: 33.2 % (ref 37–47)
HGB BLD-MCNC: 10.4 G/DL (ref 12–16)
IMM GRANULOCYTES # BLD AUTO: 0.1 K/UL (ref 0–0.11)
IMM GRANULOCYTES NFR BLD AUTO: 0.6 % (ref 0–0.9)
LYMPHOCYTES # BLD AUTO: 1.04 K/UL (ref 1–4.8)
LYMPHOCYTES NFR BLD: 6.2 % (ref 22–41)
MCH RBC QN AUTO: 27.5 PG (ref 27–33)
MCHC RBC AUTO-ENTMCNC: 31.3 G/DL (ref 32.2–35.5)
MCV RBC AUTO: 87.8 FL (ref 81.4–97.8)
MONOCYTES # BLD AUTO: 1.08 K/UL (ref 0–0.85)
MONOCYTES NFR BLD AUTO: 6.5 % (ref 0–13.4)
NEUTROPHILS # BLD AUTO: 14.38 K/UL (ref 1.82–7.42)
NEUTROPHILS NFR BLD: 86.3 % (ref 44–72)
NRBC # BLD AUTO: 0 K/UL
NRBC BLD-RTO: 0 /100 WBC (ref 0–0.2)
PLATELET # BLD AUTO: 194 K/UL (ref 164–446)
PMV BLD AUTO: 9.7 FL (ref 9–12.9)
POTASSIUM SERPL-SCNC: 4.6 MMOL/L (ref 3.6–5.5)
RBC # BLD AUTO: 3.78 M/UL (ref 4.2–5.4)
SODIUM SERPL-SCNC: 135 MMOL/L (ref 135–145)
WBC # BLD AUTO: 16.7 K/UL (ref 4.8–10.8)

## 2023-08-04 PROCEDURE — 94669 MECHANICAL CHEST WALL OSCILL: CPT

## 2023-08-04 PROCEDURE — 770001 HCHG ROOM/CARE - MED/SURG/GYN PRIV*

## 2023-08-04 PROCEDURE — 700105 HCHG RX REV CODE 258: Performed by: SURGERY

## 2023-08-04 PROCEDURE — 80048 BASIC METABOLIC PNL TOTAL CA: CPT

## 2023-08-04 PROCEDURE — 36415 COLL VENOUS BLD VENIPUNCTURE: CPT

## 2023-08-04 PROCEDURE — 700111 HCHG RX REV CODE 636 W/ 250 OVERRIDE (IP): Performed by: SURGERY

## 2023-08-04 PROCEDURE — 85025 COMPLETE CBC W/AUTO DIFF WBC: CPT

## 2023-08-04 PROCEDURE — 700102 HCHG RX REV CODE 250 W/ 637 OVERRIDE(OP): Performed by: SURGERY

## 2023-08-04 PROCEDURE — 51798 US URINE CAPACITY MEASURE: CPT

## 2023-08-04 PROCEDURE — A9270 NON-COVERED ITEM OR SERVICE: HCPCS | Performed by: SURGERY

## 2023-08-04 RX ADMIN — DOCUSATE SODIUM 100 MG: 100 CAPSULE, LIQUID FILLED ORAL at 17:41

## 2023-08-04 RX ADMIN — SODIUM CHLORIDE: 9 INJECTION, SOLUTION INTRAVENOUS at 01:56

## 2023-08-04 RX ADMIN — ENOXAPARIN SODIUM 30 MG: 100 INJECTION SUBCUTANEOUS at 04:12

## 2023-08-04 RX ADMIN — ACETAMINOPHEN 650 MG: 325 TABLET, FILM COATED ORAL at 04:11

## 2023-08-04 RX ADMIN — ACETAMINOPHEN 650 MG: 325 TABLET, FILM COATED ORAL at 15:57

## 2023-08-04 RX ADMIN — ACETAMINOPHEN 650 MG: 325 TABLET, FILM COATED ORAL at 21:33

## 2023-08-04 RX ADMIN — OXYCODONE HYDROCHLORIDE 10 MG: 10 TABLET ORAL at 10:23

## 2023-08-04 RX ADMIN — ACETAMINOPHEN 650 MG: 325 TABLET, FILM COATED ORAL at 10:23

## 2023-08-04 RX ADMIN — OXYCODONE HYDROCHLORIDE 10 MG: 10 TABLET ORAL at 04:11

## 2023-08-04 RX ADMIN — DOCUSATE SODIUM 100 MG: 100 CAPSULE, LIQUID FILLED ORAL at 04:11

## 2023-08-04 RX ADMIN — ENOXAPARIN SODIUM 30 MG: 100 INJECTION SUBCUTANEOUS at 17:41

## 2023-08-04 RX ADMIN — ATENOLOL 50 MG: 25 TABLET ORAL at 08:18

## 2023-08-04 ASSESSMENT — PAIN DESCRIPTION - PAIN TYPE
TYPE: ACUTE PAIN

## 2023-08-04 ASSESSMENT — COGNITIVE AND FUNCTIONAL STATUS - GENERAL
DRESSING REGULAR LOWER BODY CLOTHING: A LITTLE
CLIMB 3 TO 5 STEPS WITH RAILING: A LOT
STANDING UP FROM CHAIR USING ARMS: A LOT
HELP NEEDED FOR BATHING: A LITTLE
TOILETING: A LOT
MOBILITY SCORE: 13
SUGGESTED CMS G CODE MODIFIER DAILY ACTIVITY: CJ
WALKING IN HOSPITAL ROOM: A LOT
DAILY ACTIVITIY SCORE: 20
SUGGESTED CMS G CODE MODIFIER MOBILITY: CL
MOVING TO AND FROM BED TO CHAIR: A LOT
MOVING FROM LYING ON BACK TO SITTING ON SIDE OF FLAT BED: A LOT
TURNING FROM BACK TO SIDE WHILE IN FLAT BAD: A LITTLE

## 2023-08-04 ASSESSMENT — COPD QUESTIONNAIRES
COPD SCREENING SCORE: 2
DURING THE PAST 4 WEEKS HOW MUCH DID YOU FEEL SHORT OF BREATH: NONE/LITTLE OF THE TIME
DO YOU EVER COUGH UP ANY MUCUS OR PHLEGM?: NO/ONLY WITH OCCASIONAL COLDS OR INFECTIONS
HAVE YOU SMOKED AT LEAST 100 CIGARETTES IN YOUR ENTIRE LIFE: NO/DON'T KNOW

## 2023-08-04 NOTE — PROGRESS NOTES
POD#2  AVSS  No flatus ,no nausea  Abdominal exam improved   Less tender   Moe bloody but less   Hgb drop llkely due to intraoperative losses and equilibration ,hold lovenox tonight trend   Slight azotemia , running on dry side   Try clear liquids

## 2023-08-04 NOTE — CARE PLAN
The patient is Stable - Low risk of patient condition declining or worsening    Shift Goals  Clinical Goals: Pain control, monitor drains, and tolerate diet  Patient Goals: pain control and rest    Progress made toward(s) clinical / shift goals:      Problem: Pain - Standard  Goal: Alleviation of pain or a reduction in pain to the patient’s comfort goal  Description: Target End Date:  Prior to discharge or change in level of care    Document on Vitals flowsheet    1.  Document pain using the appropriate pain scale per order or unit policy  2.  Educate and implement non-pharmacologic comfort measures (i.e. relaxation, distraction, massage, cold/heat therapy, etc.)  3.  Pain management medications as ordered  4.  Reassess pain after pain med administration per policy  5.  If opiods administered assess patient's response to pain medication is appropriate per POSS sedation scale  6.  Follow pain management plan developed in collaboration with patient and interdisciplinary team (including palliative care or pain specialists if applicable)  Outcome: Progressing     Problem: Skin Integrity  Goal: Skin integrity is maintained or improved  Description: Target End Date:  Prior to discharge or change in level of care    Document interventions on Skin Risk/Justo flowsheet groups and corresponding LDA    1.  Assess and monitor skin integrity, appearance and/or temperature  2.  Assess risk factors for impaired skin integrity and/or pressures ulcers  3.  Implement precautions to protect skin integrity in collaboration with interdisciplinary team  4.  Implement pressure ulcer prevention protocol if at risk for skin breakdown  5.  Confirm wound care consult if at risk for skin breakdown  6.  Ensure patient use of pressure relieving devices  (Low air loss bed, waffle overlay, heel protectors, ROHO cushion, etc)  Outcome: Progressing

## 2023-08-04 NOTE — PROGRESS NOTES
Bedside report received, assessment completed    A&O x  4, pt calls appropriately  Mobility: Up with x2 assist  Fall Risk Assessment: Moderate, bed alarm on, door notifications in use  Pain Assessment / Reassessment completed, medication provided per MAR  Diet: NPO, sips of clears ok  LDA:   IV Access: 20 L FA, CDI/ flushed/ Infusing NS at 83 mL  MIGUE: RLQ  Zaragoza: C/D/I  Wound vac: Prevena C/D/I    GI/: + void, - flatus,  PTA BM  DVT Prophylaxis: Lovenox, SCD's on  Skin: per flowsheets    Reviewed plan of care with patient, bed in lowest position and locked, pt resting comfortably now, call light within reach, all needs met at this time. Interventions will be executed per plan of care

## 2023-08-04 NOTE — CARE PLAN
Problem: Pain - Standard  Goal: Alleviation of pain or a reduction in pain to the patient’s comfort goal  Outcome: Progressing     Problem: Knowledge Deficit - Standard  Goal: Patient and family/care givers will demonstrate understanding of plan of care, disease process/condition, diagnostic tests and medications  Outcome: Progressing     Problem: Fall Risk  Goal: Patient will remain free from falls  Outcome: Progressing     Problem: Skin Integrity  Goal: Skin integrity is maintained or improved  Outcome: Progressing   The patient is Stable - Low risk of patient condition declining or worsening    Shift Goals  Clinical Goals: pt safety, tolerate po intake, monitor MIGUE drain  Patient Goals: rest    Progress made toward(s) clinical / shift goals:  patient remained safe from injury. Patient tolerating clear liquid diet. MIGUE drain patent to bulb suction.    Patient is not progressing towards the following goals:

## 2023-08-04 NOTE — PROGRESS NOTES
"4 Eyes Skin Assessment Completed by CONNER Wesley and CONNER Pollock.    Head WDL  Ears WDL  Nose WDL  Mouth WDL  Neck WDL  Breast/Chest Scar to R breast  Shoulder Blades Redness and Blanching  Spine Redness and Blanching  (R) Arm/Elbow/Hand WDL  (L) Arm/Elbow/Hand WDL  Abdomen x3 lap sites with staples GONZALO, upside down \"L\" shaped incision with prevena, and RLQ MIGUE drain  Groin WDL  Scrotum/Coccyx/Buttocks Redness and Blanching  (R) Leg WDL  (L) Leg WDL  (R) Heel/Foot/Toe Swelling  (L) Heel/Foot/Toe Swelling          Devices In Places Blood Pressure Cuff, Pulse Ox, Zaragoza, SCD's, and Oxy Mask      Interventions In Place Heels Loaded W/Pillows, Waffle Overlay    Possible Skin Injury No    Pictures Uploaded Into Epic N/A  Wound Consult Placed N/A  RN Wound Prevention Protocol Ordered No     "

## 2023-08-04 NOTE — PROGRESS NOTES
This RN notified Dr. Asher of pt's 3 point hemoglobin drop. MD requested to hold Lovenox however the medication was already administered. No new orders received.

## 2023-08-04 NOTE — PROGRESS NOTES
"Bedside report received.  Assessment complete.  A&O x 4. Patient calls appropriately.  Patient ambulates with front wheel walker and one assist. Bed alarm on.   Patient has 2-7/10 pain. Pain managed with prescribed medications.  Denies N&V. Tolerating clear liquid diet.  Surgical wound vac CDI.  + void, + flatus, last BM PTA.  Patient denies SOB.  SCD's on.  Patient is pleasant and cooperative with the care plan+-.  Review plan with of care with patient. Call light and personal belongings within reach. Hourly rounding in place. All needs met at this time.   /55   Pulse 74   Temp 36.4 °C (97.5 °F) (Temporal)   Resp 18   Ht 1.6 m (5' 2.99\")   Wt 105 kg (230 lb 13.2 oz)   LMP  (LMP Unknown)   SpO2 95%   BMI 40.90 kg/m²     "

## 2023-08-05 LAB
ANION GAP SERPL CALC-SCNC: 9 MMOL/L (ref 7–16)
BASOPHILS # BLD AUTO: 0.3 % (ref 0–1.8)
BASOPHILS # BLD: 0.04 K/UL (ref 0–0.12)
BUN SERPL-MCNC: 22 MG/DL (ref 8–22)
CALCIUM SERPL-MCNC: 8.4 MG/DL (ref 8.5–10.5)
CHLORIDE SERPL-SCNC: 106 MMOL/L (ref 96–112)
CO2 SERPL-SCNC: 21 MMOL/L (ref 20–33)
CREAT SERPL-MCNC: 1.09 MG/DL (ref 0.5–1.4)
EOSINOPHIL # BLD AUTO: 0.24 K/UL (ref 0–0.51)
EOSINOPHIL NFR BLD: 1.9 % (ref 0–6.9)
ERYTHROCYTE [DISTWIDTH] IN BLOOD BY AUTOMATED COUNT: 47.1 FL (ref 35.9–50)
GFR SERPLBLD CREATININE-BSD FMLA CKD-EPI: 52 ML/MIN/1.73 M 2
GLUCOSE SERPL-MCNC: 94 MG/DL (ref 65–99)
HCT VFR BLD AUTO: 32 % (ref 37–47)
HGB BLD-MCNC: 9.7 G/DL (ref 12–16)
IMM GRANULOCYTES # BLD AUTO: 0.06 K/UL (ref 0–0.11)
IMM GRANULOCYTES NFR BLD AUTO: 0.5 % (ref 0–0.9)
LYMPHOCYTES # BLD AUTO: 0.86 K/UL (ref 1–4.8)
LYMPHOCYTES NFR BLD: 6.8 % (ref 22–41)
MCH RBC QN AUTO: 27.1 PG (ref 27–33)
MCHC RBC AUTO-ENTMCNC: 30.3 G/DL (ref 32.2–35.5)
MCV RBC AUTO: 89.4 FL (ref 81.4–97.8)
MONOCYTES # BLD AUTO: 0.71 K/UL (ref 0–0.85)
MONOCYTES NFR BLD AUTO: 5.6 % (ref 0–13.4)
NEUTROPHILS # BLD AUTO: 10.77 K/UL (ref 1.82–7.42)
NEUTROPHILS NFR BLD: 84.9 % (ref 44–72)
NRBC # BLD AUTO: 0 K/UL
NRBC BLD-RTO: 0 /100 WBC (ref 0–0.2)
PLATELET # BLD AUTO: 178 K/UL (ref 164–446)
PMV BLD AUTO: 9.8 FL (ref 9–12.9)
POTASSIUM SERPL-SCNC: 4.2 MMOL/L (ref 3.6–5.5)
RBC # BLD AUTO: 3.58 M/UL (ref 4.2–5.4)
SODIUM SERPL-SCNC: 136 MMOL/L (ref 135–145)
WBC # BLD AUTO: 12.7 K/UL (ref 4.8–10.8)

## 2023-08-05 PROCEDURE — 80048 BASIC METABOLIC PNL TOTAL CA: CPT

## 2023-08-05 PROCEDURE — 700102 HCHG RX REV CODE 250 W/ 637 OVERRIDE(OP): Performed by: SURGERY

## 2023-08-05 PROCEDURE — 36415 COLL VENOUS BLD VENIPUNCTURE: CPT

## 2023-08-05 PROCEDURE — 770001 HCHG ROOM/CARE - MED/SURG/GYN PRIV*

## 2023-08-05 PROCEDURE — 94669 MECHANICAL CHEST WALL OSCILL: CPT

## 2023-08-05 PROCEDURE — 94760 N-INVAS EAR/PLS OXIMETRY 1: CPT

## 2023-08-05 PROCEDURE — 85025 COMPLETE CBC W/AUTO DIFF WBC: CPT

## 2023-08-05 PROCEDURE — A9270 NON-COVERED ITEM OR SERVICE: HCPCS | Performed by: SURGERY

## 2023-08-05 PROCEDURE — 700105 HCHG RX REV CODE 258: Performed by: SURGERY

## 2023-08-05 PROCEDURE — 700111 HCHG RX REV CODE 636 W/ 250 OVERRIDE (IP): Performed by: SURGERY

## 2023-08-05 RX ADMIN — ACETAMINOPHEN 650 MG: 325 TABLET, FILM COATED ORAL at 09:48

## 2023-08-05 RX ADMIN — OXYCODONE HYDROCHLORIDE 10 MG: 10 TABLET ORAL at 15:34

## 2023-08-05 RX ADMIN — DOCUSATE SODIUM 100 MG: 100 CAPSULE, LIQUID FILLED ORAL at 05:14

## 2023-08-05 RX ADMIN — SODIUM CHLORIDE: 9 INJECTION, SOLUTION INTRAVENOUS at 09:52

## 2023-08-05 RX ADMIN — OXYCODONE HYDROCHLORIDE 10 MG: 10 TABLET ORAL at 12:04

## 2023-08-05 RX ADMIN — ACETAMINOPHEN 650 MG: 325 TABLET, FILM COATED ORAL at 21:50

## 2023-08-05 RX ADMIN — ENOXAPARIN SODIUM 30 MG: 100 INJECTION SUBCUTANEOUS at 05:13

## 2023-08-05 RX ADMIN — ATENOLOL 50 MG: 25 TABLET ORAL at 05:14

## 2023-08-05 RX ADMIN — ENOXAPARIN SODIUM 30 MG: 100 INJECTION SUBCUTANEOUS at 17:07

## 2023-08-05 RX ADMIN — ACETAMINOPHEN 650 MG: 325 TABLET, FILM COATED ORAL at 05:14

## 2023-08-05 RX ADMIN — DOCUSATE SODIUM 100 MG: 100 CAPSULE, LIQUID FILLED ORAL at 17:07

## 2023-08-05 RX ADMIN — ACETAMINOPHEN 650 MG: 325 TABLET, FILM COATED ORAL at 15:32

## 2023-08-05 ASSESSMENT — PAIN DESCRIPTION - PAIN TYPE
TYPE: ACUTE PAIN

## 2023-08-05 NOTE — PROGRESS NOTES
Bedside report received, assessment completed     A&O x  4, pt calls appropriately  Mobility: Up with x1 assist and FWW  Fall Risk Assessment: Moderate, bed alarm on, door notifications in use  Pain Assessment / Reassessment completed, medication provided per MAR  Diet: Clears  LDA:   IV Access: 20 L FA, CDI/ flushed/ Infusing NS at 83 mL  MIGUE: RLQ  Wound vac: Prevena C/D/I     GI/: + void, + flatus,  PTA BM  DVT Prophylaxis: Lovenox, SCD's on  Skin: per flowsheets     Reviewed plan of care with patient, bed in lowest position and locked, pt resting comfortably now, call light within reach, all needs met at this time. Interventions will be executed per plan of care

## 2023-08-05 NOTE — CARE PLAN
Problem: Hyperinflation  Goal: Prevent or improve atelectasis  Description: Target End Date:  3 to 4 days    1. Instruct incentive spirometry usage  2.  Perform hyperinflation therapy as indicated  Outcome: Progressing     PEP QID

## 2023-08-05 NOTE — PROGRESS NOTES
"Bedside report received.  Assessment complete.  A&O x 4. Patient calls appropriately.  Patient ambulates with front wheel walker and one/two assist. Bed alarm on.   Patient has 1-7/10 pain. Pain managed with prescribed medications.  Denies N&V. Tolerating clear liquid diet, advanced to GI Soft.  Surgical prevena wound vac CDI. MIGUE drain patent to bulb suction.  + void, + flatus, last BM PTA.  Patient denies SOB.  SCD's on.  Patient is pleasant and cooperative with the care plan.  Review plan with of care with patient. Call light and personal belongings within reach. Hourly rounding in place. All needs met at this time.   /62   Pulse 70   Temp 36.5 °C (97.7 °F) (Temporal)   Resp 16   Ht 1.6 m (5' 2.99\")   Wt 105 kg (230 lb 13.2 oz)   LMP  (LMP Unknown)   SpO2 92%   BMI 40.90 kg/m²     "

## 2023-08-05 NOTE — CARE PLAN
The patient is Stable - Low risk of patient condition declining or worsening    Shift Goals  Clinical Goals: Monitor drains and skin integrity  Patient Goals: rest    Progress made toward(s) clinical / shift goals:      Problem: Pain - Standard  Goal: Alleviation of pain or a reduction in pain to the patient’s comfort goal  Description: Target End Date:  Prior to discharge or change in level of care    Document on Vitals flowsheet    1.  Document pain using the appropriate pain scale per order or unit policy  2.  Educate and implement non-pharmacologic comfort measures (i.e. relaxation, distraction, massage, cold/heat therapy, etc.)  3.  Pain management medications as ordered  4.  Reassess pain after pain med administration per policy  5.  If opiods administered assess patient's response to pain medication is appropriate per POSS sedation scale  6.  Follow pain management plan developed in collaboration with patient and interdisciplinary team (including palliative care or pain specialists if applicable)  Outcome: Progressing     Problem: Skin Integrity  Goal: Skin integrity is maintained or improved  Description: Target End Date:  Prior to discharge or change in level of care    Document interventions on Skin Risk/Justo flowsheet groups and corresponding LDA    1.  Assess and monitor skin integrity, appearance and/or temperature  2.  Assess risk factors for impaired skin integrity and/or pressures ulcers  3.  Implement precautions to protect skin integrity in collaboration with interdisciplinary team  4.  Implement pressure ulcer prevention protocol if at risk for skin breakdown  5.  Confirm wound care consult if at risk for skin breakdown  6.  Ensure patient use of pressure relieving devices  (Low air loss bed, waffle overlay, heel protectors, ROHO cushion, etc)  Outcome: Progressing

## 2023-08-05 NOTE — CARE PLAN
Problem: Pain - Standard  Goal: Alleviation of pain or a reduction in pain to the patient’s comfort goal  Outcome: Progressing     Problem: Knowledge Deficit - Standard  Goal: Patient and family/care givers will demonstrate understanding of plan of care, disease process/condition, diagnostic tests and medications  Outcome: Progressing     Problem: Fall Risk  Goal: Patient will remain free from falls  Outcome: Progressing     Problem: Skin Integrity  Goal: Skin integrity is maintained or improved  Outcome: Progressing   The patient is Stable - Low risk of patient condition declining or worsening    Shift Goals  Clinical Goals: tolerate PO intake, monitor MIGUE drain, up to chair  Patient Goals: advance diet    Progress made toward(s) clinical / shift goals:  patient tolerating clear liquid diet - advanced to GI soft. MIGUE drain patent to bulb suction. Patient up to chair for lunch/total 3 hours in chair.    Patient is not progressing towards the following goals:

## 2023-08-05 NOTE — PROGRESS NOTES
Avss  Lamesa flatus   Some ,decreasing amount of old blood NH  Abdomen soft  Hgb stable   Lovenox resumed   Low fiber GI soft   Mobilize

## 2023-08-06 LAB
ANION GAP SERPL CALC-SCNC: 6 MMOL/L (ref 7–16)
BASOPHILS # BLD AUTO: 0.4 % (ref 0–1.8)
BASOPHILS # BLD: 0.04 K/UL (ref 0–0.12)
BUN SERPL-MCNC: 16 MG/DL (ref 8–22)
CALCIUM SERPL-MCNC: 8.8 MG/DL (ref 8.5–10.5)
CHLORIDE SERPL-SCNC: 107 MMOL/L (ref 96–112)
CO2 SERPL-SCNC: 24 MMOL/L (ref 20–33)
CREAT SERPL-MCNC: 0.98 MG/DL (ref 0.5–1.4)
EOSINOPHIL # BLD AUTO: 0.24 K/UL (ref 0–0.51)
EOSINOPHIL NFR BLD: 2.4 % (ref 0–6.9)
ERYTHROCYTE [DISTWIDTH] IN BLOOD BY AUTOMATED COUNT: 45.8 FL (ref 35.9–50)
GFR SERPLBLD CREATININE-BSD FMLA CKD-EPI: 59 ML/MIN/1.73 M 2
GLUCOSE SERPL-MCNC: 104 MG/DL (ref 65–99)
HCT VFR BLD AUTO: 31.6 % (ref 37–47)
HGB BLD-MCNC: 10.1 G/DL (ref 12–16)
IMM GRANULOCYTES # BLD AUTO: 0.05 K/UL (ref 0–0.11)
IMM GRANULOCYTES NFR BLD AUTO: 0.5 % (ref 0–0.9)
LYMPHOCYTES # BLD AUTO: 0.72 K/UL (ref 1–4.8)
LYMPHOCYTES NFR BLD: 7.3 % (ref 22–41)
MCH RBC QN AUTO: 27.4 PG (ref 27–33)
MCHC RBC AUTO-ENTMCNC: 32 G/DL (ref 32.2–35.5)
MCV RBC AUTO: 85.6 FL (ref 81.4–97.8)
MONOCYTES # BLD AUTO: 0.64 K/UL (ref 0–0.85)
MONOCYTES NFR BLD AUTO: 6.5 % (ref 0–13.4)
NEUTROPHILS # BLD AUTO: 8.16 K/UL (ref 1.82–7.42)
NEUTROPHILS NFR BLD: 82.9 % (ref 44–72)
NRBC # BLD AUTO: 0 K/UL
NRBC BLD-RTO: 0 /100 WBC (ref 0–0.2)
PLATELET # BLD AUTO: 212 K/UL (ref 164–446)
PMV BLD AUTO: 9.8 FL (ref 9–12.9)
POTASSIUM SERPL-SCNC: 3.8 MMOL/L (ref 3.6–5.5)
RBC # BLD AUTO: 3.69 M/UL (ref 4.2–5.4)
SODIUM SERPL-SCNC: 137 MMOL/L (ref 135–145)
WBC # BLD AUTO: 9.9 K/UL (ref 4.8–10.8)

## 2023-08-06 PROCEDURE — A9270 NON-COVERED ITEM OR SERVICE: HCPCS | Performed by: SURGERY

## 2023-08-06 PROCEDURE — 85025 COMPLETE CBC W/AUTO DIFF WBC: CPT

## 2023-08-06 PROCEDURE — 97535 SELF CARE MNGMENT TRAINING: CPT

## 2023-08-06 PROCEDURE — 80048 BASIC METABOLIC PNL TOTAL CA: CPT

## 2023-08-06 PROCEDURE — 700111 HCHG RX REV CODE 636 W/ 250 OVERRIDE (IP): Performed by: SURGERY

## 2023-08-06 PROCEDURE — 700102 HCHG RX REV CODE 250 W/ 637 OVERRIDE(OP): Performed by: SURGERY

## 2023-08-06 PROCEDURE — 770001 HCHG ROOM/CARE - MED/SURG/GYN PRIV*

## 2023-08-06 PROCEDURE — 97163 PT EVAL HIGH COMPLEX 45 MIN: CPT

## 2023-08-06 PROCEDURE — 36415 COLL VENOUS BLD VENIPUNCTURE: CPT

## 2023-08-06 RX ADMIN — ACETAMINOPHEN 650 MG: 325 TABLET, FILM COATED ORAL at 04:48

## 2023-08-06 RX ADMIN — OXYCODONE HYDROCHLORIDE 10 MG: 10 TABLET ORAL at 21:54

## 2023-08-06 RX ADMIN — ACETAMINOPHEN 650 MG: 325 TABLET, FILM COATED ORAL at 15:37

## 2023-08-06 RX ADMIN — DOCUSATE SODIUM 100 MG: 100 CAPSULE, LIQUID FILLED ORAL at 04:48

## 2023-08-06 RX ADMIN — ATENOLOL 50 MG: 25 TABLET ORAL at 04:48

## 2023-08-06 RX ADMIN — ENOXAPARIN SODIUM 30 MG: 100 INJECTION SUBCUTANEOUS at 04:48

## 2023-08-06 RX ADMIN — ENOXAPARIN SODIUM 30 MG: 100 INJECTION SUBCUTANEOUS at 17:31

## 2023-08-06 RX ADMIN — ACETAMINOPHEN 650 MG: 325 TABLET, FILM COATED ORAL at 21:54

## 2023-08-06 RX ADMIN — DOCUSATE SODIUM 100 MG: 100 CAPSULE, LIQUID FILLED ORAL at 17:31

## 2023-08-06 RX ADMIN — ACETAMINOPHEN 650 MG: 325 TABLET, FILM COATED ORAL at 10:34

## 2023-08-06 ASSESSMENT — GAIT ASSESSMENTS
DISTANCE (FEET): 45
DEVIATION: BRADYKINETIC;SHUFFLED GAIT
GAIT LEVEL OF ASSIST: STANDBY ASSIST
ASSISTIVE DEVICE: FRONT WHEEL WALKER

## 2023-08-06 ASSESSMENT — PAIN DESCRIPTION - PAIN TYPE
TYPE: ACUTE PAIN

## 2023-08-06 ASSESSMENT — COGNITIVE AND FUNCTIONAL STATUS - GENERAL
STANDING UP FROM CHAIR USING ARMS: A LITTLE
MOBILITY SCORE: 18
WALKING IN HOSPITAL ROOM: A LITTLE
MOVING TO AND FROM BED TO CHAIR: A LOT
SUGGESTED CMS G CODE MODIFIER MOBILITY: CK
CLIMB 3 TO 5 STEPS WITH RAILING: A LITTLE
TURNING FROM BACK TO SIDE WHILE IN FLAT BAD: A LITTLE

## 2023-08-06 NOTE — PROGRESS NOTES
Bedside report received, assessment completed     A&O x  4, pt calls appropriately  Mobility: Up with x1 assist and FWW  Fall Risk Assessment: Moderate, bed alarm on, door notifications in use  Pain Assessment / Reassessment completed, medication provided per MAR  Diet: GI soft  LDA:   IV Access: 20 L FA, CDI/ flushed/ Infusing NS at 1 mL  MIGUE: RLQ  Wound vac: Prevena to MLI C/D/I     GI/: + void, + flatus,  PTA BM  DVT Prophylaxis: Lovenox, SCD's on  Skin: per flowsheets     Reviewed plan of care with patient, bed in lowest position and locked, pt resting comfortably now, call light within reach, all needs met at this time. Interventions will be executed per plan of care

## 2023-08-06 NOTE — PROGRESS NOTES
"Bedside report received.  Assessment complete.  A&O x 4. Patient calls appropriately.  Patient ambulates with front wheel walker and one assist. Bed alarm on.   Patient has 3/10 pain. Pain managed with prescribed medications.  Denies N&V. Tolerating GI Soft diet.  Surgical sites CDI. MIGUE drain Dc'd by MD.  + void, + flatus, last BM PTA.  Patient denies SOB.  SCD's on.  Patient is pleasant and cooperative with the care plan.  Review plan with of care with patient. Call light and personal belongings within reach. Hourly rounding in place. All needs met at this time.   /60   Pulse 72   Temp 36.5 °C (97.7 °F) (Temporal)   Resp 20   Ht 1.6 m (5' 2.99\")   Wt 105 kg (230 lb 13.2 oz)   LMP  (LMP Unknown)   SpO2 91%   BMI 40.90 kg/m²     "

## 2023-08-06 NOTE — CARE PLAN
The patient is Stable - Low risk of patient condition declining or worsening    Shift Goals  Clinical Goals: Pulmonary hygiene and monitor drains/ vs  Patient Goals: rest    Progress made toward(s) clinical / shift goals:      Problem: Skin Integrity  Goal: Skin integrity is maintained or improved  Description: Target End Date:  Prior to discharge or change in level of care    Document interventions on Skin Risk/Justo flowsheet groups and corresponding LDA    1.  Assess and monitor skin integrity, appearance and/or temperature  2.  Assess risk factors for impaired skin integrity and/or pressures ulcers  3.  Implement precautions to protect skin integrity in collaboration with interdisciplinary team  4.  Implement pressure ulcer prevention protocol if at risk for skin breakdown  5.  Confirm wound care consult if at risk for skin breakdown  6.  Ensure patient use of pressure relieving devices  (Low air loss bed, waffle overlay, heel protectors, ROHO cushion, etc)  Outcome: Progressing     Problem: Hemodynamics  Goal: Patient's hemodynamics, fluid balance and neurologic status will be stable or improve  Description: Target End Date:  Prior to discharge or change in level of care    Document on Assessment and I/O flowsheet templates    1.  Monitor vital signs, pulse oximetry and cardiac monitor per provider order and/or policy  2.  Maintain blood pressure per provider order  3.  Hemodynamic monitoring per provider order  4.  Manage IV fluids and IV infusions  5.  Monitor intake and output  6.  Daily weights per unit policy or provider order  7.  Assess peripheral pulses and capillary refill  8.  Assess color and body temperature  9.  Position patient for maximum circulation/cardiac output  10. Monitor for signs/symptoms of excessive bleeding  11. Assess mental status, restlessness and changes in level of consciousness  12. Monitor temperature and report fever or hypothermia to provider immediately. Consideration of  targeted temperature management.  Outcome: Progressing

## 2023-08-06 NOTE — PROGRESS NOTES
Preveena removed  wounds ok   Moe out   Hgb stable   Wbc normal tolerating regular diet   Approaching discharge

## 2023-08-06 NOTE — CARE PLAN
Problem: Pain - Standard  Goal: Alleviation of pain or a reduction in pain to the patient’s comfort goal  Outcome: Progressing     Problem: Knowledge Deficit - Standard  Goal: Patient and family/care givers will demonstrate understanding of plan of care, disease process/condition, diagnostic tests and medications  Outcome: Progressing     Problem: Fall Risk  Goal: Patient will remain free from falls  Outcome: Progressing     Problem: Skin Integrity  Goal: Skin integrity is maintained or improved  Outcome: Progressing     Problem: Hemodynamics  Goal: Patient's hemodynamics, fluid balance and neurologic status will be stable or improve  Outcome: Progressing   The patient is Stable - Low risk of patient condition declining or worsening    Shift Goals  Clinical Goals: pt safety, ambulate  Patient Goals: rest    Progress made toward(s) clinical / shift goals:  patient remained safe from injury. Patient ambulated with PT.    Patient is not progressing towards the following goals:

## 2023-08-06 NOTE — THERAPY
Physical Therapy   Initial Evaluation     Patient Name: Maira Johnson  Age:  78 y.o., Sex:  female  Medical Record #: [id number]  Today's Date: 8/6/2023     Precautions  Precautions: Fall Risk    Assessment   On 8/2, patient had surgical intervention for anterior abdominal wall hernia, colostomy status post Chávez's colectomy including robotic assisted laparoscopic converted to open repair of anterior abdominal wall hernia with colostomy takedown low anterior resection and anastomosis with mobilization of splenic flexure and small bowel resection with enteroenterostomy.  Limited PMH in chart.  Patient had another abdominal surgery 8-9 months ago, needing post acute placement for rehab, then [facility] and use of a FWW for 2-3 weeks.    At this time, she needed some assist with bed mobility (this is likely not a barrier as patient has firmer surface at home, and sleeps on several pillows - she is considering purchasing a bed rail.  She has 30 feet to her bathroom, and describes good assist from her son once home.  At this time, she is able to get out of bed (flat bed no features) with Es, performs effortful appearing sit<>stands and slower/shuffling gait with the walker - but requiring only SBA.    Due to reported help once home and receptiveness to FWW, bed rail (education provided regarding a 3-1 bedside commode) patient is likely appropriate for home discharge, but it would be beneficial for patient to have OT evaluation while acute (I did need to help with finishing wipe of [place] area hygiene) and does not appear to want to ask for help for ADL from others - may benefit from AE?  I strongly encourage activity plan of up to chair for meals and ambulate to bathroom with staff - and 1 more visit from PT prior to DC home to ensure progress towards functional goals for home discharge.    Plan    Physical Therapy Initial Treatment Plan   Treatment Plan : Bed Mobility, Gait Training, Therapeutic Activities,  "Therapeutic Exercise, Neuro Re-Education / Balance  Treatment Frequency: 4 Times per Week  Duration: Until Therapy Goals Met    DC Equipment Recommendations: Front-Wheel Walker  Discharge Recommendations: Recommend home health for continued physical therapy services (Pt would benefit from OT evaluation while acute, PT to see one more time prior to DC for gait endurance and transfer safety with walker)       Subjective    Pt agreeable to participate, \"after my last surgery in December, I just needed a little time before I got strong enough, it will get better each time I get up\"     Objective       08/06/23 1353    Services   Is patient using  services for this encounter? No   Initial Contact Note    Initial Contact Note Order Received and Verified, Physical Therapy Evaluation in Progress with Full Report to Follow.   Precautions   Precautions Fall Risk   Pain 0 - 10 Group   Therapist Pain Assessment Post Activity Pain Same as Prior to Activity;Nurse Notified   Prior Living Situation   Prior Services None  (rehab stay and then home health in december 2022)   Housing / Facility [address] [facility]   Steps Into Home 0  (Ramp)   Steps In Home 0   Bathroom Set up Walk In Shower   Equipment Owned None   Lives with - Patient's Self Care Capacity Alone and Able to Care For Self   Comments Son lives a few houses away, does not work, pt states he will be able to help at discharge   Prior Level of Functional Mobility   Bed Mobility Independent   Transfer Status Independent   Ambulation Independent   Ambulation Distance community distances   Assistive Devices Used None   Comments Borrowed a FWW following December Sx and rehab stay, had purchased DME like shower equpment and commode, but returned it due to expense and did not need after rehab stay.   History of Falls   History of Falls No   Cognition    Comments Awake, alert, receptive to activity plan and education   Passive ROM Lower Body   Passive ROM Lower " Body WDL   Active ROM Lower Body    Active ROM Lower Body  WDL   Strength Lower Body   Comments Functional strength for transfers and stand/walk - decreased activity tolerance/fatigues   Other Treatments   Other Treatments Provided Review of activity plan to meet dc goal by Tuesday, including up to chair for all meals, walk to bathroom with FWW and staff, progress thayer distances as tolerated.  Reviewed education on 3-1 bedside commode, patient plans to mainly do sponge bathng until able to take a shower safely - reviewed shower seating and grab bars, assist from family.   Balance Assessment   Sitting Balance (Static) Good   Sitting Balance (Dynamic) Good   Standing Balance (Static) Fair   Standing Balance (Dynamic) Fair   Weight Shift Sitting Good   Weight Shift Standing Fair   Comments Pt able to perform toileting hygiene after urination in toilet   Bed Mobility    Supine to Sit Minimal Assist   Sit to Supine   (up in chair post)   Comments bed flat no bed rails and log roll to her left for comfort   Gait Analysis   Gait Level Of Assist Standby Assist   Assistive Device Front Wheel Walker   Distance (Feet) 45   # of Times Distance was Traveled 1   Deviation Bradykinetic;Shuffled Gait   Functional Mobility   Sit to Stand Standby Assist   Bed, Chair, Wheelchair Transfer Standby Assist   Toilet Transfers Standby Assist  (commode over toilet)   Transfer Method Stand Step   Mobility verbal cues for proper technique with FWW on transfers, then gait with FWW   How much difficulty does the patient currently have...   Turning over in bed (including adjusting bedclothes, sheets and blankets)? 3   Sitting down on and standing up from a chair with arms (e.g., wheelchair, bedside commode, etc.) 4   Moving from lying on back to sitting on the side of the bed? 2   How much help from another person does the patient currently need...   Moving to and from a bed to a chair (including a wheelchair)? 3   Need to walk in a hospital  room? 3   Climbing 3-5 steps with a railing? 3   6 clicks Mobility Score 18   Activity Tolerance   Sitting in Chair 1-3 hours prior to session on previous days   Sitting Edge of Bed x 10 min   Standing 5 min   Patient / Family Goals    Patient / Family Goal #1 To go home with intermittnet support from son   Short Term Goals    Short Term Goal # 1 In 6visits, patient will perform supine<>sit without bed features and SBA to improve functional mobility.   Short Term Goal # 2 In 6 visits, patient will safely sit<>stand with FWW and no verbal cues and SBA, to improve functional mobiltiy.   Short Term Goal # 3 In 6 visits, patient will ambulate with walker and SBA 50 feet to improve functional mobility   Education Group   Education Provided Role of Physical Therapist   Role of Physical Therapist Patient Response Patient;Acceptance;Explanation;Verbal Demonstration   Physical Therapy Initial Treatment Plan    Treatment Plan  Bed Mobility;Gait Training;Therapeutic Activities;Therapeutic Exercise;Neuro Re-Education / Balance   Treatment Frequency 4 Times per Week   Duration Until Therapy Goals Met   Problem List    Problems Impaired Bed Mobility;Impaired Transfers;Impaired Ambulation;Impaired Balance;Decreased Activity Tolerance   Anticipated Discharge Equipment and Recommendations   DC Equipment Recommendations Front-Wheel Walker   Discharge Recommendations Recommend home health for continued physical therapy services  (Pt would benefit from OT evaluation while acute, PT to see one more time prior to DC for gait endurance and transfer safety with walker)   Interdisciplinary Plan of Care Collaboration   IDT Collaboration with  Nursing   Patient Position at End of Therapy Seated;Chair Alarm On;Call Light within Reach;Tray Table within Reach;Phone within Reach;Family / Friend in Room   Collaboration Comments RN aware of session, activity plan and recs   Session Information   Date / Session Number  8/6 - 1 (1/4, 8/12)   Priority  4  (Gait endurance/walker edu before DC anticipated 8/7 or 8/8.)

## 2023-08-07 LAB
ANION GAP SERPL CALC-SCNC: 9 MMOL/L (ref 7–16)
BASOPHILS # BLD AUTO: 0.5 % (ref 0–1.8)
BASOPHILS # BLD: 0.05 K/UL (ref 0–0.12)
BUN SERPL-MCNC: 14 MG/DL (ref 8–22)
CALCIUM SERPL-MCNC: 8.9 MG/DL (ref 8.5–10.5)
CHLORIDE SERPL-SCNC: 107 MMOL/L (ref 96–112)
CO2 SERPL-SCNC: 24 MMOL/L (ref 20–33)
CREAT SERPL-MCNC: 0.81 MG/DL (ref 0.5–1.4)
EOSINOPHIL # BLD AUTO: 0.31 K/UL (ref 0–0.51)
EOSINOPHIL NFR BLD: 2.8 % (ref 0–6.9)
ERYTHROCYTE [DISTWIDTH] IN BLOOD BY AUTOMATED COUNT: 45.6 FL (ref 35.9–50)
GFR SERPLBLD CREATININE-BSD FMLA CKD-EPI: 74 ML/MIN/1.73 M 2
GLUCOSE SERPL-MCNC: 116 MG/DL (ref 65–99)
HCT VFR BLD AUTO: 32.3 % (ref 37–47)
HGB BLD-MCNC: 10.3 G/DL (ref 12–16)
IMM GRANULOCYTES # BLD AUTO: 0.07 K/UL (ref 0–0.11)
IMM GRANULOCYTES NFR BLD AUTO: 0.6 % (ref 0–0.9)
LYMPHOCYTES # BLD AUTO: 1.02 K/UL (ref 1–4.8)
LYMPHOCYTES NFR BLD: 9.3 % (ref 22–41)
MAGNESIUM SERPL-MCNC: 1.9 MG/DL (ref 1.5–2.5)
MCH RBC QN AUTO: 27.2 PG (ref 27–33)
MCHC RBC AUTO-ENTMCNC: 31.9 G/DL (ref 32.2–35.5)
MCV RBC AUTO: 85.4 FL (ref 81.4–97.8)
MONOCYTES # BLD AUTO: 0.73 K/UL (ref 0–0.85)
MONOCYTES NFR BLD AUTO: 6.7 % (ref 0–13.4)
NEUTROPHILS # BLD AUTO: 8.79 K/UL (ref 1.82–7.42)
NEUTROPHILS NFR BLD: 80.1 % (ref 44–72)
NRBC # BLD AUTO: 0 K/UL
NRBC BLD-RTO: 0 /100 WBC (ref 0–0.2)
PHOSPHATE SERPL-MCNC: 2.3 MG/DL (ref 2.5–4.5)
PLATELET # BLD AUTO: 245 K/UL (ref 164–446)
PMV BLD AUTO: 9.5 FL (ref 9–12.9)
POTASSIUM SERPL-SCNC: 3.9 MMOL/L (ref 3.6–5.5)
RBC # BLD AUTO: 3.78 M/UL (ref 4.2–5.4)
SODIUM SERPL-SCNC: 140 MMOL/L (ref 135–145)
WBC # BLD AUTO: 11 K/UL (ref 4.8–10.8)

## 2023-08-07 PROCEDURE — 770001 HCHG ROOM/CARE - MED/SURG/GYN PRIV*

## 2023-08-07 PROCEDURE — 36415 COLL VENOUS BLD VENIPUNCTURE: CPT

## 2023-08-07 PROCEDURE — A9270 NON-COVERED ITEM OR SERVICE: HCPCS | Performed by: SURGERY

## 2023-08-07 PROCEDURE — 80048 BASIC METABOLIC PNL TOTAL CA: CPT

## 2023-08-07 PROCEDURE — 85025 COMPLETE CBC W/AUTO DIFF WBC: CPT

## 2023-08-07 PROCEDURE — 83735 ASSAY OF MAGNESIUM: CPT

## 2023-08-07 PROCEDURE — 700111 HCHG RX REV CODE 636 W/ 250 OVERRIDE (IP): Performed by: SURGERY

## 2023-08-07 PROCEDURE — 84100 ASSAY OF PHOSPHORUS: CPT

## 2023-08-07 PROCEDURE — 700102 HCHG RX REV CODE 250 W/ 637 OVERRIDE(OP): Performed by: SURGERY

## 2023-08-07 RX ADMIN — ATENOLOL 50 MG: 25 TABLET ORAL at 05:42

## 2023-08-07 RX ADMIN — ACETAMINOPHEN 650 MG: 325 TABLET, FILM COATED ORAL at 03:36

## 2023-08-07 RX ADMIN — ENOXAPARIN SODIUM 30 MG: 100 INJECTION SUBCUTANEOUS at 17:47

## 2023-08-07 RX ADMIN — ENOXAPARIN SODIUM 30 MG: 100 INJECTION SUBCUTANEOUS at 05:42

## 2023-08-07 RX ADMIN — OXYCODONE HYDROCHLORIDE 5 MG: 5 TABLET ORAL at 20:26

## 2023-08-07 ASSESSMENT — PAIN DESCRIPTION - PAIN TYPE
TYPE: ACUTE PAIN
TYPE: ACUTE PAIN

## 2023-08-07 NOTE — PROGRESS NOTES
"Report received from AM RN at 1900. Patient sitting up in bed reporting no pain. Later in shift patient reports 7/10 pain to RLQ that feels \"gassy\". Patient provided pain medications and prune juice at this time. Midline incision GONZALO. Sacrum intact; Q2H turns in place. POC discussed with patient. Call light within reach.  "

## 2023-08-07 NOTE — CARE PLAN
The patient is Stable - Low risk of patient condition declining or worsening    Shift Goals  Clinical Goals: pain mgmt; have a BM  Patient Goals: res    Progress made toward(s) clinical / shift goals:    Problem: Pain - Standard  Goal: Alleviation of pain or a reduction in pain to the patient’s comfort goal  Outcome: Progressing     Problem: Knowledge Deficit - Standard  Goal: Patient and family/care givers will demonstrate understanding of plan of care, disease process/condition, diagnostic tests and medications  Outcome: Progressing       Patient is not progressing towards the following goals:

## 2023-08-07 NOTE — PROGRESS NOTES
Bedside report received.  Assessment complete.  A&O x 4. Patient calls appropriately.  Patient ambulates with x1 assist. Bed alarm on   Patient denies pain at this time  Denies N&V. Tolerating GI soft diet.  MLI with staples, approximated, GONZALO. X 4 lap sites with staples, approximated, GONZALO  + void, + flatus, + BM.  Patient denies SOB.  SCD's refused.    Review plan with of care with patient. Call light and personal belongings within reach. Hourly rounding in place. All needs met at this time.

## 2023-08-07 NOTE — DISCHARGE PLANNING
[facility] Transitional Care Coordination    Referral from: Dr. Asher    Insurance Provider on Facesheet: [employer]    Potential Rehab Diagnosis: Debility    Chart review indicates patient may have on going medical management and may have therapy needs to possibly meet inpatient rehab facility criteria with the goal of returning to community.    D/C support will need to be verified: Son    Physiatry consultation denied per protocol.  Maira is not requiring 2 of 3 disciplines.  T[facility] will no longer follow.  Please reach out to myself with any interval changes/questions.       Thank you for the referral.

## 2023-08-07 NOTE — PROGRESS NOTES
Avss   Stooling   Looks good   Hgb stable   Wbc up slightly   Abdomen ok   Rapidly defunctionalized   ? Short term rehab   Consults placed

## 2023-08-08 LAB
ANION GAP SERPL CALC-SCNC: 10 MMOL/L (ref 7–16)
BASOPHILS # BLD AUTO: 0.5 % (ref 0–1.8)
BASOPHILS # BLD: 0.04 K/UL (ref 0–0.12)
BUN SERPL-MCNC: 16 MG/DL (ref 8–22)
CALCIUM SERPL-MCNC: 8.8 MG/DL (ref 8.5–10.5)
CHLORIDE SERPL-SCNC: 105 MMOL/L (ref 96–112)
CO2 SERPL-SCNC: 24 MMOL/L (ref 20–33)
CREAT SERPL-MCNC: 0.89 MG/DL (ref 0.5–1.4)
EOSINOPHIL # BLD AUTO: 0.39 K/UL (ref 0–0.51)
EOSINOPHIL NFR BLD: 4.4 % (ref 0–6.9)
ERYTHROCYTE [DISTWIDTH] IN BLOOD BY AUTOMATED COUNT: 45.1 FL (ref 35.9–50)
GFR SERPLBLD CREATININE-BSD FMLA CKD-EPI: 66 ML/MIN/1.73 M 2
GLUCOSE SERPL-MCNC: 98 MG/DL (ref 65–99)
HCT VFR BLD AUTO: 30.3 % (ref 37–47)
HGB BLD-MCNC: 9.9 G/DL (ref 12–16)
IMM GRANULOCYTES # BLD AUTO: 0.07 K/UL (ref 0–0.11)
IMM GRANULOCYTES NFR BLD AUTO: 0.8 % (ref 0–0.9)
LYMPHOCYTES # BLD AUTO: 1.05 K/UL (ref 1–4.8)
LYMPHOCYTES NFR BLD: 11.9 % (ref 22–41)
MCH RBC QN AUTO: 27.3 PG (ref 27–33)
MCHC RBC AUTO-ENTMCNC: 32.7 G/DL (ref 32.2–35.5)
MCV RBC AUTO: 83.5 FL (ref 81.4–97.8)
MONOCYTES # BLD AUTO: 0.71 K/UL (ref 0–0.85)
MONOCYTES NFR BLD AUTO: 8 % (ref 0–13.4)
NEUTROPHILS # BLD AUTO: 6.6 K/UL (ref 1.82–7.42)
NEUTROPHILS NFR BLD: 74.4 % (ref 44–72)
NRBC # BLD AUTO: 0 K/UL
NRBC BLD-RTO: 0 /100 WBC (ref 0–0.2)
PLATELET # BLD AUTO: 271 K/UL (ref 164–446)
PMV BLD AUTO: 9.7 FL (ref 9–12.9)
POTASSIUM SERPL-SCNC: 3.9 MMOL/L (ref 3.6–5.5)
RBC # BLD AUTO: 3.63 M/UL (ref 4.2–5.4)
SODIUM SERPL-SCNC: 139 MMOL/L (ref 135–145)
WBC # BLD AUTO: 8.9 K/UL (ref 4.8–10.8)

## 2023-08-08 PROCEDURE — A9270 NON-COVERED ITEM OR SERVICE: HCPCS | Performed by: SURGERY

## 2023-08-08 PROCEDURE — 97166 OT EVAL MOD COMPLEX 45 MIN: CPT

## 2023-08-08 PROCEDURE — 97535 SELF CARE MNGMENT TRAINING: CPT

## 2023-08-08 PROCEDURE — 85025 COMPLETE CBC W/AUTO DIFF WBC: CPT

## 2023-08-08 PROCEDURE — 36415 COLL VENOUS BLD VENIPUNCTURE: CPT

## 2023-08-08 PROCEDURE — 700102 HCHG RX REV CODE 250 W/ 637 OVERRIDE(OP): Performed by: SURGERY

## 2023-08-08 PROCEDURE — 770001 HCHG ROOM/CARE - MED/SURG/GYN PRIV*

## 2023-08-08 PROCEDURE — 97530 THERAPEUTIC ACTIVITIES: CPT

## 2023-08-08 PROCEDURE — 700111 HCHG RX REV CODE 636 W/ 250 OVERRIDE (IP): Performed by: SURGERY

## 2023-08-08 PROCEDURE — 80048 BASIC METABOLIC PNL TOTAL CA: CPT

## 2023-08-08 RX ADMIN — ENOXAPARIN SODIUM 30 MG: 100 INJECTION SUBCUTANEOUS at 05:24

## 2023-08-08 RX ADMIN — ATENOLOL 50 MG: 25 TABLET ORAL at 05:24

## 2023-08-08 RX ADMIN — ACETAMINOPHEN 650 MG: 325 TABLET, FILM COATED ORAL at 13:33

## 2023-08-08 ASSESSMENT — COGNITIVE AND FUNCTIONAL STATUS - GENERAL
MOBILITY SCORE: 19
TOILETING: A LOT
SUGGESTED CMS G CODE MODIFIER MOBILITY: CK
TOILETING: A LOT
DRESSING REGULAR UPPER BODY CLOTHING: A LITTLE
DAILY ACTIVITIY SCORE: 16
TURNING FROM BACK TO SIDE WHILE IN FLAT BAD: A LITTLE
CLIMB 3 TO 5 STEPS WITH RAILING: A LITTLE
SUGGESTED CMS G CODE MODIFIER DAILY ACTIVITY: CK
HELP NEEDED FOR BATHING: A LOT
MOVING TO AND FROM BED TO CHAIR: A LOT
DRESSING REGULAR LOWER BODY CLOTHING: A LOT
DRESSING REGULAR LOWER BODY CLOTHING: A LOT
HELP NEEDED FOR BATHING: A LOT
PERSONAL GROOMING: A LITTLE
DAILY ACTIVITIY SCORE: 17
MOVING FROM LYING ON BACK TO SITTING ON SIDE OF FLAT BED: A LITTLE
DRESSING REGULAR UPPER BODY CLOTHING: A LITTLE
SUGGESTED CMS G CODE MODIFIER DAILY ACTIVITY: CK

## 2023-08-08 ASSESSMENT — PAIN DESCRIPTION - PAIN TYPE
TYPE: ACUTE PAIN

## 2023-08-08 ASSESSMENT — GAIT ASSESSMENTS
DEVIATION: BRADYKINETIC;DECREASED HEEL STRIKE;DECREASED TOE OFF
GAIT LEVEL OF ASSIST: SUPERVISED
DISTANCE (FEET): 50
ASSISTIVE DEVICE: FRONT WHEEL WALKER

## 2023-08-08 ASSESSMENT — ACTIVITIES OF DAILY LIVING (ADL): TOILETING: INDEPENDENT

## 2023-08-08 NOTE — CARE PLAN
The patient is Stable - Low risk of patient condition declining or worsening    Problem: Pain - Standard  Goal: Alleviation of pain or a reduction in pain to the patient’s comfort goal  Outcome: Progressing     Problem: Knowledge Deficit - Standard  Goal: Patient and family/care givers will demonstrate understanding of plan of care, disease process/condition, diagnostic tests and medications  Outcome: Progressing     Problem: Skin Integrity  Goal: Skin integrity is maintained or improved  Outcome: Progressing     Shift Goals  Clinical Goals: pain control, rest  Patient Goals: pain control, rest, comfort    Progress made toward(s) clinical / shift goals:  Patient's pain managed per MAR. Patient able to rest during this shift.    Patient is not progressing towards the following goals:

## 2023-08-08 NOTE — PROGRESS NOTES
Bedside report received.  Assessment complete.    A&O x 4. Patient calls appropriately.    Patient ambulates with standby assist with FWW. Bed alarm on.     Patient has 6/10 pain. Pain managed with prescribed medications.    Denies N&V. Tolerating GI soft diet.    MLI with staples GONZALO. 4 lap sites with staples GONZALO.    + void, + flatus, + BM, last BM 8/7.    Patient denies SOB.    SCD's refused.    Review plan with of care with patient. Call light and personal belongings within reach. Hourly rounding in place. All needs met at this time.

## 2023-08-08 NOTE — CARE PLAN
Problem: Pain - Standard  Goal: Alleviation of pain or a reduction in pain to the patient’s comfort goal  Outcome: Progressing     Problem: Knowledge Deficit - Standard  Goal: Patient and family/care givers will demonstrate understanding of plan of care, disease process/condition, diagnostic tests and medications  Outcome: Progressing     Problem: Fall Risk  Goal: Patient will remain free from falls  Outcome: Progressing     Problem: Skin Integrity  Goal: Skin integrity is maintained or improved  Outcome: Progressing   The patient is Stable - Low risk of patient condition declining or worsening    Shift Goals  Clinical Goals: pt safety, chair for meals  Patient Goals: rest    Progress made toward(s) clinical / shift goals:  patient remained safe from injury. Patient up to chair for lunch.    Patient is not progressing towards the following goals:

## 2023-08-08 NOTE — PROGRESS NOTES
Afebrile vital signs stable  Continues to move bowels well.  Wounds so far are healing primarily  Laboratories in order  Still needing assistance with mobility  Denied for inpatient rehab  Refusing skilled nursing  Thinks she should be able to manage at home with current trajectory of recovery with a couple more days of hospitalization  Laboratory stable we will discontinue  Continue supportive care

## 2023-08-08 NOTE — PROGRESS NOTES
"Bedside report received.  Assessment complete.  A&O x 4. Patient calls appropriately.  Patient ambulates with front wheel walker and standby assist. Bed alarm off.   Patient has 2-3/10 pain. Pain managed with prescribed medications.  Denies N&V. Tolerating GI Soft diet.  Surgical site / dressings CDI.  + void, + flatus, + BM.  Patient denies SOB.  SCD's refused.  Patient is pleasant and cooperative with the care plan.  Review plan with of care with patient. Call light and personal belongings within reach. Hourly rounding in place. All needs met at this time.   BP (!) 147/65 Comment: RN notified  Pulse 75   Temp 36.5 °C (97.7 °F) (Temporal)   Resp 16   Ht 1.6 m (5' 2.99\")   Wt 105 kg (230 lb 13.2 oz)   LMP  (LMP Unknown)   SpO2 91%   BMI 40.90 kg/m²     "

## 2023-08-08 NOTE — CARE PLAN
The patient is Stable - Low risk of patient condition declining or worsening    Shift Goals  Clinical Goals: maintain skin integrity, mobilize  Patient Goals: res    Progress made toward(s) clinical / shift goals: RN Wound Prevention Protocol in place; patient mobilized up to bathroom with SBA and fWW frequently     Patient is not progressing towards the following goals:

## 2023-08-08 NOTE — THERAPY
"Physical Therapy   Daily Treatment     Patient Name: Maira Johnson  Age:  78 y.o., Sex:  female  Medical Record #: [id number]  Today's Date: 8/8/2023     Precautions  Precautions: Fall Risk  Comments: abdominal surgery    Assessment    Pt received in bed and agreeable to PT session. Pt was able to progress to supervision with bed mobility when provided with cues and encouragement. Pt initially began to slip off EOB due to the TAPS system underneath her. Discussed how home will be different and safer in her own environment. Pt was then able to ambulate 50ft x2 with FWW and supervision. Pt continues to be hesitant about returning home and may benefit from further intensive therapy to progress independence. Will continue to follow for acute PT.    Plan    Treatment Plan Status: Continue Current Treatment Plan  Type of Treatment: Bed Mobility, Gait Training, Therapeutic Activities, Therapeutic Exercise, Neuro Re-Education / Balance  Treatment Frequency: 4 Times per Week  Treatment Duration: Until Therapy Goals Met    DC Equipment Recommendations: Front-Wheel Walker  Discharge Recommendations: Recommend home health for continued physical therapy services (versus further intensive rehab)      Subjective    \"I just want to be sure I am safe to go home\"     Objective       08/08/23 1055   Precautions   Precautions Fall Risk   Comments abdominal surgery   Pain 0 - 10 Group   Therapist Pain Assessment Post Activity Pain Same as Prior to Activity;Nurse Notified  (pain in abdomen, not rated)   Cognition    Level of Consciousness Alert   Comments Pleasant and cooperative. Able to participate in discussion re: discharge planning   Balance   Sitting Balance (Static) Good   Sitting Balance (Dynamic) Good   Standing Balance (Static) Fair +   Standing Balance (Dynamic) Fair   Weight Shift Sitting Good   Weight Shift Standing Fair   Skilled Intervention Verbal Cuing;Compensatory Strategies   Comments with FWW   Bed Mobility    Supine " to Sit Standby Assist   Scooting Standby Assist   Rolling Standby Assist   Skilled Intervention Verbal Cuing;Facilitation;Compensatory Strategies   Comments bed flat, no rail, difficulty with first attempt due to taps sheet slipping. Provided with edu and encouragement about home being a different setup and pt having the strength to complete bed mobility on her own.   Gait Analysis   Gait Level Of Assist Supervised   Assistive Device Front Wheel Walker   Distance (Feet) 50   # of Times Distance was Traveled 2   Deviation Bradykinetic;Decreased Heel Strike;Decreased Toe Off   Skilled Intervention Verbal Cuing;Compensatory Strategies   Comments with FWW   Functional Mobility   Sit to Stand Supervised   Bed, Chair, Wheelchair Transfer Supervised   Transfer Method Stand Step   Mobility up with FWW   How much difficulty does the patient currently have...   Turning over in bed (including adjusting bedclothes, sheets and blankets)? 3   Sitting down on and standing up from a chair with arms (e.g., wheelchair, bedside commode, etc.) 3   Moving from lying on back to sitting on the side of the bed? 2   How much help from another person does the patient currently need...   Moving to and from a bed to a chair (including a wheelchair)? 4   Need to walk in a hospital room? 4   Climbing 3-5 steps with a railing? 3   6 clicks Mobility Score 19   Short Term Goals    Short Term Goal # 1 In 6visits, patient will perform supine<>sit without bed features and SBA to improve functional mobility.   Goal Outcome # 1 Goal met   Short Term Goal # 2 In 6 visits, patient will safely sit<>stand with FWW and no verbal cues and SBA, to improve functional mobiltiy.   Goal Outcome # 2 Goal met   Short Term Goal # 3 In 6 visits, patient will ambulate with walker and SBA 50 feet to improve functional mobility   Goal Outcome # 3 Goal met, new goal added   Short Term Goal # 3 B Pt will ambulate 150ft with FWW and supervision to progress function in 6  visits.   Physical Therapy Treatment Plan   Physical Therapy Treatment Plan Continue Current Treatment Plan   Anticipated Discharge Equipment and Recommendations   DC Equipment Recommendations Front-Wheel Walker   Discharge Recommendations Recommend home health for continued physical therapy services   Interdisciplinary Plan of Care Collaboration   IDT Collaboration with  Nursing   Patient Position at End of Therapy Seated;Call Light within Reach;Tray Table within Reach;Phone within Reach   Collaboration Comments RN updated

## 2023-08-09 LAB
ANION GAP SERPL CALC-SCNC: 9 MMOL/L (ref 7–16)
BASOPHILS # BLD AUTO: 0.4 % (ref 0–1.8)
BASOPHILS # BLD: 0.04 K/UL (ref 0–0.12)
BUN SERPL-MCNC: 16 MG/DL (ref 8–22)
CALCIUM SERPL-MCNC: 8.5 MG/DL (ref 8.5–10.5)
CHLORIDE SERPL-SCNC: 106 MMOL/L (ref 96–112)
CO2 SERPL-SCNC: 24 MMOL/L (ref 20–33)
CREAT SERPL-MCNC: 0.9 MG/DL (ref 0.5–1.4)
EOSINOPHIL # BLD AUTO: 0.38 K/UL (ref 0–0.51)
EOSINOPHIL NFR BLD: 4.2 % (ref 0–6.9)
ERYTHROCYTE [DISTWIDTH] IN BLOOD BY AUTOMATED COUNT: 45.5 FL (ref 35.9–50)
GFR SERPLBLD CREATININE-BSD FMLA CKD-EPI: 65 ML/MIN/1.73 M 2
GLUCOSE SERPL-MCNC: 98 MG/DL (ref 65–99)
HCT VFR BLD AUTO: 30.8 % (ref 37–47)
HGB BLD-MCNC: 9.7 G/DL (ref 12–16)
IMM GRANULOCYTES # BLD AUTO: 0.11 K/UL (ref 0–0.11)
IMM GRANULOCYTES NFR BLD AUTO: 1.2 % (ref 0–0.9)
LYMPHOCYTES # BLD AUTO: 1.09 K/UL (ref 1–4.8)
LYMPHOCYTES NFR BLD: 11.9 % (ref 22–41)
MCH RBC QN AUTO: 26.9 PG (ref 27–33)
MCHC RBC AUTO-ENTMCNC: 31.5 G/DL (ref 32.2–35.5)
MCV RBC AUTO: 85.3 FL (ref 81.4–97.8)
MONOCYTES # BLD AUTO: 0.85 K/UL (ref 0–0.85)
MONOCYTES NFR BLD AUTO: 9.3 % (ref 0–13.4)
NEUTROPHILS # BLD AUTO: 6.68 K/UL (ref 1.82–7.42)
NEUTROPHILS NFR BLD: 73 % (ref 44–72)
NRBC # BLD AUTO: 0 K/UL
NRBC BLD-RTO: 0 /100 WBC (ref 0–0.2)
PLATELET # BLD AUTO: 277 K/UL (ref 164–446)
PMV BLD AUTO: 9.7 FL (ref 9–12.9)
POTASSIUM SERPL-SCNC: 3.8 MMOL/L (ref 3.6–5.5)
RBC # BLD AUTO: 3.61 M/UL (ref 4.2–5.4)
SODIUM SERPL-SCNC: 139 MMOL/L (ref 135–145)
WBC # BLD AUTO: 9.2 K/UL (ref 4.8–10.8)

## 2023-08-09 PROCEDURE — 700111 HCHG RX REV CODE 636 W/ 250 OVERRIDE (IP): Performed by: SURGERY

## 2023-08-09 PROCEDURE — 700102 HCHG RX REV CODE 250 W/ 637 OVERRIDE(OP): Performed by: SURGERY

## 2023-08-09 PROCEDURE — 80048 BASIC METABOLIC PNL TOTAL CA: CPT

## 2023-08-09 PROCEDURE — A9270 NON-COVERED ITEM OR SERVICE: HCPCS | Performed by: SURGERY

## 2023-08-09 PROCEDURE — 85025 COMPLETE CBC W/AUTO DIFF WBC: CPT

## 2023-08-09 PROCEDURE — 770001 HCHG ROOM/CARE - MED/SURG/GYN PRIV*

## 2023-08-09 RX ADMIN — ENOXAPARIN SODIUM 30 MG: 100 INJECTION SUBCUTANEOUS at 18:27

## 2023-08-09 RX ADMIN — ACETAMINOPHEN 650 MG: 325 TABLET, FILM COATED ORAL at 03:04

## 2023-08-09 RX ADMIN — ATENOLOL 50 MG: 25 TABLET ORAL at 05:04

## 2023-08-09 RX ADMIN — ENOXAPARIN SODIUM 30 MG: 100 INJECTION SUBCUTANEOUS at 05:03

## 2023-08-09 ASSESSMENT — PAIN DESCRIPTION - PAIN TYPE
TYPE: ACUTE PAIN
TYPE: SURGICAL PAIN
TYPE: ACUTE PAIN

## 2023-08-09 NOTE — FACE TO FACE
Face to Face Supporting Documentation - Home Health    The encounter with this patient was in whole or in part the primary reason for home health admission.    Date of encounter:   Patient:                    MRN:                       YOB: 2023  Maira Johnson  [id number]  1945     Home health to see patient for:  Physical Therapy evaluation and treatment and Occupational therapy evaluation and treatment    Skilled need for:  Surgical Aftercare ventral hernia repair with debility        Homebound status evidenced by:  Need the aid of supportive devices such as crutches, canes, wheelchairs or walkers, Require the use of special transportation, or Needs the assistance of another person in order to leave the home. Leaving home requires a considerable and taxing effort. There is a normal inability to leave the home.    Community Physician to provide follow up care: GARCÍA [email or web address].     Optional Interventions? No      I certify the face to face encounter for this home health care referral meets the CMS requirements and the encounter/clinical assessment with the patient was, in whole, or in part, for the medical condition(s) listed above, which is the primary reason for home health care. Based on my clinical findings: the service(s) are medically necessary, support the need for home health care, and the homebound criteria are met.  I certify that this patient has had a face to face encounter by myself.  Santa Martin M.D. - NPI: [id number]

## 2023-08-09 NOTE — PROGRESS NOTES
Bedside report received.  Assessment complete.     A&O x 4. Patient calls appropriately.     Patient ambulates with standby assist with FWW. Bed alarm on.      Patient has 0/10 pain. Patient declines any pharmacological interventions at this time.      Denies N&V. Tolerating GI soft diet.     MLI with staples GONZALO. 4 lap sites with staples, GONZALO.     + void, + flatus, + BM, last BM 8/8.     Patient denies SOB.     SCD's refused.     Review plan with of care with patient. Call light and personal belongings within reach. Hourly rounding in place. All needs met at this time.

## 2023-08-09 NOTE — CARE PLAN
The patient is Stable - Low risk of patient condition declining or worsening    Problem: Pain - Standard  Goal: Alleviation of pain or a reduction in pain to the patient’s comfort goal  Outcome: Progressing     Problem: Knowledge Deficit - Standard  Goal: Patient and family/care givers will demonstrate understanding of plan of care, disease process/condition, diagnostic tests and medications  Outcome: Progressing     Shift Goals  Clinical Goals: pt safety, pain control, rest  Patient Goals: rest, comfort    Progress made toward(s) clinical / shift goals:  Patient's pain managed per MAR. Patient able to ambulate safely to the bathroom standby assist with a FWW. Patient able to rest during this shift.    Patient is not progressing towards the following goals:

## 2023-08-09 NOTE — DISCHARGE PLANNING
Care Transition Team Assessment    RN SOFIYA met with pt at bedside, pt confirmed demographics on facesheet. Pt lives in a mobile home next door to her son, she has a ramp at the entrance. Prior to admission, pt was independent with all ADLs, drives herself, and able to manage her appointments and medications.     Pt has been having difficulty with movements that require abdominal twisting, such as the mechanics of toileting and showering. However, her son has been modifying the home environment to make it easier for pt, such as installing a bedet and a walk in shower.     Pt states either her son or daughter will give her a ride home when DC. Discussed home with home health, pt states she had [facility] in the past and would like to use them again.    Obtained HH choice and faxed to [facility].      Information Source  Orientation Level: Oriented X4  Information Given By: Patient  Who is responsible for making decisions for patient? : Patient    Readmission Evaluation  Is this a readmission?: No    Elopement Risk  Legal Hold: No  Ambulatory or Self Mobile in Wheelchair: Yes  Disoriented: No  Psychiatric Symptoms: None  History of Wandering: No  Elopement this Admit: No  Vocalizing Wanting to Leave: No  Displays Behaviors, Body Language Wanting to Leave: No-Not at Risk for Elopement  Elopement Risk: Not at Risk for Elopement    Interdisciplinary Discharge Planning  Lives with - Patient's Self Care Capacity: Alone and Able to Care For Self  Patient or legal guardian wants to designate a caregiver: Yes  Caregiver name: Carmen  Support Systems: Family Member(s), Friends / Neighbors  Housing / Facility: [address]  Prior Services: None  Durable Medical Equipment: Not Applicable    Discharge Preparedness  What is your plan after discharge?: Home with help, Home health care  What are your discharge supports?: Child  Prior Functional Level: Ambulatory, Drives Self, Independent with Activities of Daily Living, Independent with  Medication Management  Difficulity with ADLs: None  Difficulity with IADLs: None    Functional Assesment  Prior Functional Level: Ambulatory, Drives Self, Independent with Activities of Daily Living, Independent with Medication Management    Finances  Financial Barriers to Discharge: No  Prescription Coverage: Yes    Vision / Hearing Impairment  Vision Impairment : Yes  Right Eye Vision: Impaired, Wears Glasses  Left Eye Vision: Impaired, Wears Glasses  Hearing Impairment : Yes  Hearing Impairment: Both Ears, Hearing Device Not Available  Does Pt Need Special Equipment for the Hearing Impaired?: No    Advance Directive  Advance Directive?: None  Advance Directive offered?: AD Booklet given    Domestic Abuse  Have you ever been the victim of abuse or violence?: No  Physical Abuse or Sexual Abuse: No  Verbal Abuse or Emotional Abuse: No  Possible Abuse/Neglect Reported to:: Not Applicable    Psychological Assessment  History of Substance Abuse: None  History of Psychiatric Problems: No  Non-compliant with Treatment: No  Newly Diagnosed Illness: No    Discharge Risks or Barriers  Discharge risks or barriers?: Complex medical needs, Post-acute placement / services  Patient risk factors: Cognitive / sensory / physical deficit, Complex medical needs    Anticipated Discharge Information  Discharge Disposition: D/T to home under [facility] care in anticipation of covered skilled care (06)  Discharge Address: [address] [place] [address]

## 2023-08-09 NOTE — CARE PLAN
The patient is Watcher - Medium risk of patient condition declining or worsening    Shift Goals  Clinical Goals: Patient will wean off O2 thruout shift  Patient Goals: rest  Family Goals: updated on poc    Progress made toward(s) clinical / shift goals:  Patient is maintaining adequate oxygenation on room air; reports no pain    Patient is not progressing towards the following goals:

## 2023-08-10 VITALS
WEIGHT: 230.82 LBS | BODY MASS INDEX: 40.9 KG/M2 | RESPIRATION RATE: 18 BRPM | DIASTOLIC BLOOD PRESSURE: 61 MMHG | OXYGEN SATURATION: 91 % | TEMPERATURE: 97.9 F | HEART RATE: 73 BPM | SYSTOLIC BLOOD PRESSURE: 128 MMHG | HEIGHT: 63 IN

## 2023-08-10 LAB
ANION GAP SERPL CALC-SCNC: 11 MMOL/L (ref 7–16)
BASOPHILS # BLD AUTO: 0.5 % (ref 0–1.8)
BASOPHILS # BLD: 0.05 K/UL (ref 0–0.12)
BUN SERPL-MCNC: 15 MG/DL (ref 8–22)
CALCIUM SERPL-MCNC: 8.9 MG/DL (ref 8.5–10.5)
CHLORIDE SERPL-SCNC: 104 MMOL/L (ref 96–112)
CO2 SERPL-SCNC: 22 MMOL/L (ref 20–33)
CREAT SERPL-MCNC: 0.84 MG/DL (ref 0.5–1.4)
EOSINOPHIL # BLD AUTO: 0.32 K/UL (ref 0–0.51)
EOSINOPHIL NFR BLD: 3.4 % (ref 0–6.9)
ERYTHROCYTE [DISTWIDTH] IN BLOOD BY AUTOMATED COUNT: 45.2 FL (ref 35.9–50)
GFR SERPLBLD CREATININE-BSD FMLA CKD-EPI: 71 ML/MIN/1.73 M 2
GLUCOSE SERPL-MCNC: 104 MG/DL (ref 65–99)
HCT VFR BLD AUTO: 30.5 % (ref 37–47)
HGB BLD-MCNC: 9.7 G/DL (ref 12–16)
IMM GRANULOCYTES # BLD AUTO: 0.13 K/UL (ref 0–0.11)
IMM GRANULOCYTES NFR BLD AUTO: 1.4 % (ref 0–0.9)
LYMPHOCYTES # BLD AUTO: 0.99 K/UL (ref 1–4.8)
LYMPHOCYTES NFR BLD: 10.4 % (ref 22–41)
MAGNESIUM SERPL-MCNC: 1.8 MG/DL (ref 1.5–2.5)
MCH RBC QN AUTO: 26.9 PG (ref 27–33)
MCHC RBC AUTO-ENTMCNC: 31.8 G/DL (ref 32.2–35.5)
MCV RBC AUTO: 84.5 FL (ref 81.4–97.8)
MONOCYTES # BLD AUTO: 0.79 K/UL (ref 0–0.85)
MONOCYTES NFR BLD AUTO: 8.3 % (ref 0–13.4)
NEUTROPHILS # BLD AUTO: 7.2 K/UL (ref 1.82–7.42)
NEUTROPHILS NFR BLD: 76 % (ref 44–72)
NRBC # BLD AUTO: 0 K/UL
NRBC BLD-RTO: 0 /100 WBC (ref 0–0.2)
PHOSPHATE SERPL-MCNC: 3.5 MG/DL (ref 2.5–4.5)
PLATELET # BLD AUTO: 323 K/UL (ref 164–446)
PMV BLD AUTO: 9.4 FL (ref 9–12.9)
POTASSIUM SERPL-SCNC: 3.5 MMOL/L (ref 3.6–5.5)
RBC # BLD AUTO: 3.61 M/UL (ref 4.2–5.4)
SODIUM SERPL-SCNC: 137 MMOL/L (ref 135–145)
WBC # BLD AUTO: 9.5 K/UL (ref 4.8–10.8)

## 2023-08-10 PROCEDURE — 80048 BASIC METABOLIC PNL TOTAL CA: CPT

## 2023-08-10 PROCEDURE — 97535 SELF CARE MNGMENT TRAINING: CPT

## 2023-08-10 PROCEDURE — A9270 NON-COVERED ITEM OR SERVICE: HCPCS | Performed by: SURGERY

## 2023-08-10 PROCEDURE — 84100 ASSAY OF PHOSPHORUS: CPT

## 2023-08-10 PROCEDURE — 83735 ASSAY OF MAGNESIUM: CPT

## 2023-08-10 PROCEDURE — 700102 HCHG RX REV CODE 250 W/ 637 OVERRIDE(OP): Performed by: SURGERY

## 2023-08-10 PROCEDURE — 700111 HCHG RX REV CODE 636 W/ 250 OVERRIDE (IP): Performed by: SURGERY

## 2023-08-10 PROCEDURE — 85025 COMPLETE CBC W/AUTO DIFF WBC: CPT

## 2023-08-10 RX ORDER — OXYCODONE HYDROCHLORIDE 5 MG/1
5 TABLET ORAL EVERY 4 HOURS PRN
Qty: 24 TABLET | Refills: 0 | Status: SHIPPED | OUTPATIENT
Start: 2023-08-10 | End: 2023-08-14

## 2023-08-10 RX ORDER — ONDANSETRON 4 MG/1
4 TABLET, ORALLY DISINTEGRATING ORAL EVERY 6 HOURS PRN
Qty: 10 TABLET | Refills: 0 | Status: SHIPPED | OUTPATIENT
Start: 2023-08-10

## 2023-08-10 RX ORDER — OXYCODONE HYDROCHLORIDE 5 MG/1
5 TABLET ORAL
Qty: 20 TABLET | Refills: 0 | Status: SHIPPED | OUTPATIENT
Start: 2023-08-10 | End: 2023-08-13

## 2023-08-10 RX ORDER — ONDANSETRON 4 MG/1
4 TABLET, ORALLY DISINTEGRATING ORAL EVERY 6 HOURS PRN
Qty: 12 TABLET | Refills: 1 | Status: SHIPPED | OUTPATIENT
Start: 2023-08-10

## 2023-08-10 RX ADMIN — ENOXAPARIN SODIUM 30 MG: 100 INJECTION SUBCUTANEOUS at 05:43

## 2023-08-10 RX ADMIN — ACETAMINOPHEN 650 MG: 325 TABLET, FILM COATED ORAL at 00:45

## 2023-08-10 RX ADMIN — ATENOLOL 50 MG: 25 TABLET ORAL at 05:44

## 2023-08-10 ASSESSMENT — COGNITIVE AND FUNCTIONAL STATUS - GENERAL
SUGGESTED CMS G CODE MODIFIER DAILY ACTIVITY: CJ
HELP NEEDED FOR BATHING: A LITTLE
DRESSING REGULAR LOWER BODY CLOTHING: A LITTLE
TOILETING: A LITTLE
DAILY ACTIVITIY SCORE: 21

## 2023-08-10 ASSESSMENT — ENCOUNTER SYMPTOMS
FEVER: 0
CHILLS: 0
VOMITING: 0
NAUSEA: 0

## 2023-08-10 ASSESSMENT — PAIN DESCRIPTION - PAIN TYPE: TYPE: CHRONIC PAIN

## 2023-08-10 NOTE — DISCHARGE PLANNING
8026-  Received Choice Form @: 9246 8/9/23  Agency/Facility Name: [facility]  Referral Sent per Choice Form @: 9975

## 2023-08-10 NOTE — PROGRESS NOTES
Received report from previous shift RN  Assessment complete.  A&O x 4. Patient calls appropriately.  Patient ambulates with standby assist w/ FWW. Bed alarm off.   Patient has 0/10 pain. Pain managed with prescribed medications.  Denies N&V. Tolerating low fiber GI soft diet.  MLI + 4 lap sites to abdomen  + void, + flatus, + BM.  Patient denies SOB.  SCD's refused    Review plan of care with patient. Call light and personal belongings with in reach. Hourly rounding in place. All needs met at this time.

## 2023-08-10 NOTE — CARE PLAN
The patient is Stable - Low risk of patient condition declining or worsening    Shift Goals  Clinical Goals: D/C  Patient Goals: Rest  Family Goals: updated on poc    Progress made toward(s) clinical / shift goals:    Problem: Pain - Standard  Goal: Alleviation of pain or a reduction in pain to the patient’s comfort goal  Description: Target End Date:  Prior to discharge or change in level of care    Document on Vitals flowsheet    1.  Document pain using the appropriate pain scale per order or unit policy  2.  Educate and implement non-pharmacologic comfort measures (i.e. relaxation, distraction, massage, cold/heat therapy, etc.)  3.  Pain management medications as ordered  4.  Reassess pain after pain med administration per policy  5.  If opiods administered assess patient's response to pain medication is appropriate per POSS sedation scale  6.  Follow pain management plan developed in collaboration with patient and interdisciplinary team (including palliative care or pain specialists if applicable)  Outcome: Progressing     Problem: Knowledge Deficit - Standard  Goal: Patient and family/care givers will demonstrate understanding of plan of care, disease process/condition, diagnostic tests and medications  Description: Target End Date:  1-3 days or as soon as patient condition allows    Document in Patient Education    1.  Patient and family/caregiver oriented to unit, equipment, visitation policy and means for communicating concern  2.  Complete/review Learning Assessment  3.  Assess knowledge level of disease process/condition, treatment plan, diagnostic tests and medications  4.  Explain disease process/condition, treatment plan, diagnostic tests and medications  Outcome: Progressing     Problem: Fall Risk  Goal: Patient will remain free from falls  Description: Target End Date:  Prior to discharge or change in level of care    Document interventions on the Liza Meng Fall Risk Assessment    1.  Assess for  fall risk factors  2.  Implement fall precautions  Outcome: Progressing     Problem: Skin Integrity  Goal: Skin integrity is maintained or improved  Description: Target End Date:  Prior to discharge or change in level of care    Document interventions on Skin Risk/Justo flowsheet groups and corresponding LDA    1.  Assess and monitor skin integrity, appearance and/or temperature  2.  Assess risk factors for impaired skin integrity and/or pressures ulcers  3.  Implement precautions to protect skin integrity in collaboration with interdisciplinary team  4.  Implement pressure ulcer prevention protocol if at risk for skin breakdown  5.  Confirm wound care consult if at risk for skin breakdown  6.  Ensure patient use of pressure relieving devices  (Low air loss bed, waffle overlay, heel protectors, ROHO cushion, etc)  Outcome: Progressing

## 2023-08-10 NOTE — DISCHARGE SUMMARY
Discharge Summary    CHIEF COMPLAINT ON ADMISSION  No chief complaint on file.      Reason for Admission  Attention to colostomy (HCC)     Admission Date  8/2/2023    CODE STATUS  Full Code    HPI & HOSPITAL COURSE  This is a 78 y.o. female here with a colostomy and parastomal hernia.  She underwent elective repair of the parastomal hernia as well as colostomy reversal by Dr. Asher on 8/2/2023.  She tolerated the procedure well and progressed to tolerating regular diet, obtaining good pain control with p.o. pain Occasions, urinating without a catheter and slowly progressed to ambulating independently.  She had PT and OT evaluations for her debility.  She was initially referred to [facility] rehab but was declined.  She declined going to a skilled nursing facility, but progressed to the point where she would do well with home health.  That has been arranged and she is thus discharged.    Therefore, she is discharged in good and stable condition to home with close outpatient follow-up.    The patient met 2-midnight criteria for an inpatient stay at the time of discharge.    Discharge Date  8/10/2023    FOLLOW UP ITEMS POST DISCHARGE  Follow up with Dr. Asher    DISCHARGE DIAGNOSES  Principal Problem:    Colostomy hernia (HCC) (POA: Yes)  Resolved Problems:    * No resolved hospital problems. *      FOLLOW UP  No future appointments.  Huseyin Asher M.D.  [address] [facility]  [place] [address]  [phone]    Follow up in 1 week(s)        MEDICATIONS ON DISCHARGE     Medication List        START taking these medications        Instructions   ondansetron 4 MG Tbdp  Commonly known as: Zofran ODT   Take 1 Tablet by mouth every 6 hours as needed for Nausea/Vomiting.  Dose: 4 mg     oxyCODONE immediate-release 5 MG Tabs  Commonly known as: Roxicodone   Take 1 Tablet by mouth every 3 hours as needed for Severe Pain for up to 3 days.  Dose: 5 mg            CONTINUE taking these medications        Instructions   atenolol  "50 MG Tabs  Commonly known as: Tenormin   Take 1 Tablet by mouth every day.  Dose: 50 mg     CALCIUM 1000 + D PO   Take 500 mg by mouth 2 times a day.  Dose: 500 mg     SUPER B COMPLEX PO   Take 1 Tablet by mouth every day.  Dose: 1 Tablet     Vitamin D3 2000 UNIT Caps   Take 1 Capsule by mouth every day.  Dose: 1 Capsule              Allergies  Allergies   Allergen Reactions    Sulfa Drugs Unspecified     \"Destroyed my blood cells, I almost \"       DIET  Orders Placed This Encounter   Procedures    Diet Order Diet: Low Fiber(GI Soft)     Standing Status:   Standing     Number of Occurrences:   1     Order Specific Question:   Diet:     Answer:   Low Fiber(GI Soft) [2]       ACTIVITY  Light duty.  Weight bearing as tolerated    CONSULTATIONS  PT, OT    PROCEDURES   robotic assisted laparoscopic converted to open repair of anterior abdominal wall hernia with colostomy takedown low anterior resection and anastomosis with mobilization of splenic flexure and small bowel resection with enteroenterostomy    LABORATORY  Lab Results   Component Value Date    SODIUM 137 08/10/2023    POTASSIUM 3.5 (L) 08/10/2023    CHLORIDE 104 08/10/2023    CO2 22 08/10/2023    GLUCOSE 104 (H) 08/10/2023    BUN 15 08/10/2023    CREATININE 0.84 08/10/2023    GLOMRATE 54 (L) 2022        Lab Results   Component Value Date    WBC 9.5 08/10/2023    HEMOGLOBIN 9.7 (L) 08/10/2023    HEMATOCRIT 30.5 (L) 08/10/2023    PLATELETCT 323 08/10/2023        "

## 2023-08-10 NOTE — PROGRESS NOTES
Pt DC'd. IV removed, discharge instructions provided to patient, pt verbalizes understanding. Pt states all questions have been answered. Copy of discharge paperwork provided to pt, signed copy in chart. Pt states all belongings in possession. [facility] unable to fill oxy prescription, pt opted to have sent to home pharmacy. Pt left unit via

## 2023-08-10 NOTE — THERAPY
"Occupational Therapy  Discharge Note     Patient Name: Maira Johnson  Age:  78 y.o., Sex:  female  Medical Record #: [id number]  Today's Date: 8/10/2023       Precautions: Fall Risk  Comments: Abdominal Precautions, Abdominal binder for comfort    Assessment    Pt seen for OT tx. Pt demo improved performance with ADLs, functional mobility, and txfs; able to complete with supv using FWW. She reports that she has purchased a bidet to help with pericare (installing on Saturday) which was her biggest OT concern. Her daughter has also purchased 2 reachers, tongs with wipes (help with pericare in the meantime), and a bed rail. Pt reports that she will be wearing stretchy pj pants and slip-on shoes when she goes home. Provided pt will education regarding home safety, energy conservation, and compensatory strategies to complete ADLs (further information listed under education group). Pt has a supportive family who are able to provide assist as needed and has acquired DME to assist with safety at home after DC. Pt reports that she feels close to her baseline and is eager to go home. Recommend home health for additional OT services after DC.     Plan    Treatment Plan Status: Modify Current Treatment Plan  Type of Treatment: Self Care / Activities of Daily Living, Neuro Re-Education / Balance, Therapeutic Exercises, Therapeutic Activity, Adaptive Equipment  Treatment Frequency: 3 Times per Week  Treatment Duration: Until Therapy Goals Met    DC Equipment Recommendations: None  Discharge Recommendations: Recommend home health for continued occupational therapy services (With increased support from family)    Subjective    \"I woke up yesterday and felt like myself, more confident, and eager to go home.\"      Objective      Vitals   O2 Delivery Device None - Room Air   Pain 0 - 10 Group   Therapist Pain Assessment Post Activity Pain Same as Prior to Activity;Nurse Notified  (Not rated, agreeable to activity)   Non Verbal " Descriptors   Non Verbal Scale  Calm;Unlabored Breathing   Cognition    Cognition / Consciousness WDL   Level of Consciousness Alert   Comments Very pleasant, cooperative, receptive to education, and eager to go home.   Balance   Sitting Balance (Static) Good   Sitting Balance (Dynamic) Good   Standing Balance (Static) Fair +   Standing Balance (Dynamic) Fair +   Weight Shift Sitting Good   Weight Shift Standing Good   Skilled Intervention Verbal Cuing   Comments w/FWW   Bed Mobility    Supine to Sit Supervised   Sit to Supine Supervised   Scooting Supervised   Rolling Supervised   Skilled Intervention Verbal Cuing   Comments HOB elevated, use of rails   Activities of Daily Living   Eating Independent   Grooming Supervision   Upper Body Dressing Supervision   Lower Body Dressing Supervision  (Pt reports that she has been practicing pulling adult diaper up/down during toileting and has no problems. Reports that she will be wearing stretchy pj pants at home and wearing slip on shoes.)   Toileting   (NT; declined need. Reported that she is getting a bidet installed on Saturday and daugther has purchased tongs and wipes to help until bidet is installed. No concerns regarding completing txfs.)   Skilled Intervention Verbal Cuing   6 Clicks Daily Activity Score 21   Functional Mobility   Sit to Stand Supervised   Bed, Chair, Wheelchair Transfer Supervised   Mobility Functional mobility in hallway, w/FWW   Activity Tolerance   Sitting Edge of Bed 3 min   Standing 5 min   Patient / Family Goals   Patient / Family Goal #1 To go home with confidence   Short Term Goals   Short Term Goal # 1 Pt will demo LB dressing using AE w/ SPV   Goal Outcome # 1 Goal met   Short Term Goal # 2 Pt will demo pericare w/ SPV   Goal Outcome # 2 Other (see comments)  (Pt has purchased a bidet (gets installed on Saturday) to complete pericare. Reports daughter has purchased tongs and wipes to help until then.)   Education Group   Education  Provided Energy Conservation;Home Safety;Role of Occupational Therapist;Activities of Daily Living   Role of Occupational Therapist Patient Response Patient;Acceptance;Explanation;Verbal Demonstration   Energy Conservation Patient Response Patient;Acceptance;Explanation;Verbal Demonstration  (Pacing activities and gradually increasing activity intensity)   Home Safety Patient Response Patient;Acceptance;Explanation;Verbal Demonstration  (Recommended use of built-in shower bench and having a family member present when bathing to reduce risk of falls)   ADL Patient Response Patient;Acceptance;Verbal Demonstration

## 2023-08-10 NOTE — CARE PLAN
The patient is Watcher - Medium risk of patient condition declining or worsening    Shift Goals  Clinical Goals: Patient will d/c home by end of shift  Patient Goals: ambulate with comfort  Family Goals: updated on poc    Progress made toward(s) clinical / shift goals:  Patient is reporting minimal pain, ambulated to the bathroom x2, expresses understanding of the importance of mobility and activity     Patient is not progressing towards the following goals:

## 2023-08-10 NOTE — PROGRESS NOTES
Late entry -    Patient doing well. Pain controlled. Having some fecal urgency and incontinence.   Incisions c/d/I.     Face to face placed for home health.   Surgical clear for discharge once home health is in place.

## (undated) DEVICE — SUTURE 4-0 MONOCRYL PLUS PS-2 - 27 INCH (36/BX)

## (undated) DEVICE — SET LEADWIRE 5 LEAD BEDSIDE DISPOSABLE ECG (1SET OF 5/EA)

## (undated) DEVICE — SEAL CANNULA STAPLER 12 MM (10EA/BX)

## (undated) DEVICE — SUTURE 2-0 ETHILON FS - (36/BX) 18 INCH

## (undated) DEVICE — GLOVE BIOGEL SZ 6.5 SURGICAL PF LTX (50PR/BX 4BX/CA)

## (undated) DEVICE — SUCTION INSTRUMENT YANKAUER BULBOUS TIP W/O VENT (50EA/CA)

## (undated) DEVICE — STAPLE 60MM WHTE 2.6MM - (12/BX) WAS PART #ECR60W

## (undated) DEVICE — CLIP MED LG INTNL HRZN TI ESCP - (20/BX)

## (undated) DEVICE — SUTURE 2-0 COATED VICRYL PLUS - 12 X 18 INCH (12/BX)

## (undated) DEVICE — STAPLER CIRCULAR POWERED 29MM ECHELON (3EA/BX)

## (undated) DEVICE — STAPLE 60MM BLUE 3.5MM - ECHELON (12/BX)

## (undated) DEVICE — BOVIE  BLADE 6 EXTENDED - (50/PK)

## (undated) DEVICE — SLEEVE VASO CALF MED - (10PR/CA)

## (undated) DEVICE — SUTURE 1 VICRYL PLUS CTX - 8 X 18 INCH (12/BX)

## (undated) DEVICE — PACK DAVINCI LOW ANTERIOR RESECTION (1EA/CA)

## (undated) DEVICE — GLOVE BIOGEL SZ 7 SURGICAL PF LTX - (50PR/BX 4BX/CA)

## (undated) DEVICE — SYSTEM PREVEAN CUSTOMIZABLE 90CM/150ML CANISTER INCIS

## (undated) DEVICE — SUTURE 1 PDS II PLUS TP-1 - (12PK/BX)

## (undated) DEVICE — LACTATED RINGERS INJ 1000 ML - (14EA/CA 60CA/PF)

## (undated) DEVICE — TUBE CONNECT SUCTION CLEAR 120 X 1/4" (50EA/CA)"

## (undated) DEVICE — COVER LIGHT HANDLE ALC PLUS DISP (18EA/BX)

## (undated) DEVICE — DRAPE MAYO STAND - (30/CA)

## (undated) DEVICE — SET EXTENSION WITH 2 PORTS (48EA/CA) ***PART #2C8610 IS A SUBSTITUTE*****

## (undated) DEVICE — SUTURE 3-0 VICRYL PLUS SH - 8X 18 INCH (12/BX)

## (undated) DEVICE — DRAIN J-VAC 10MM FLAT - (10/CA)

## (undated) DEVICE — SUTURE 2-0 PROLENE SH (36PK/BX)

## (undated) DEVICE — SEAL 5MM-8MM UNIVERSAL  BOX OF 10

## (undated) DEVICE — COVER TIP ENDOWRIST HOT SHEAR - (10EA/BX) DA VINCI

## (undated) DEVICE — RESERVOIR SUCTION 100 CC - SILICONE (20EA/CA)

## (undated) DEVICE — GOWN WARMING STANDARD FLEX - (30/CA)

## (undated) DEVICE — RELOAD WITH GRIPPING SURFACE TECHNOLOGY BLUE 60MM (12EA/BX)

## (undated) DEVICE — SUTURE 1 VICRYL PLUS CTX - 36 INCH (36/BX)

## (undated) DEVICE — DRAPE ARM  BOX OF 20

## (undated) DEVICE — CLIP LG INTNL HRZN TI ESCP LGT - (20/BX)

## (undated) DEVICE — SUTURE GENERAL

## (undated) DEVICE — ELECTRODE DUAL RETURN W/ CORD - (50/PK)

## (undated) DEVICE — TUBE NG SALEM SUMP 16FR (50EA/CA)

## (undated) DEVICE — STAPLER 60MM BLUE 3.5MM WITH - STAPLE (3EA/BX)

## (undated) DEVICE — SENSOR OXIMETER ADULT SPO2 RD SET (20EA/BX)

## (undated) DEVICE — SUTURE 0 COATED VICRYL 6-18IN - (12PK/BX)

## (undated) DEVICE — SUTURE 3-0 VICRYL PLUS - 12 X 18 INCH (12/BX)

## (undated) DEVICE — BANDAGE ROLL STERILE BULKEE 4.5 IN X 4 YD (100EA/CA)

## (undated) DEVICE — TRAY CATHETER FOLEY URINE METER W/STATLOCK 350ML (10EA/CA)

## (undated) DEVICE — SUTURE 0 STRATRFIX SYMMETRIC PDS PLUS 30CM CT-1 (12EA/BX)

## (undated) DEVICE — ROBOTIC SURGERY SERVICES

## (undated) DEVICE — DRAPE LAPAROTOMY T SHEET - (12EA/CA)

## (undated) DEVICE — SUTURE 3-0 VICRYL PLUS CT-1 - 36 INCH (36/BX)

## (undated) DEVICE — GLOVE BIOGEL INDICATOR SZ 7SURGICAL PF LTX - (50/BX 4BX/CA)

## (undated) DEVICE — DRAIN J-VAC 19FR. ROUND - (10/CS)

## (undated) DEVICE — PACK MAJOR BASIN - (2EA/CA)

## (undated) DEVICE — GOWN SURGICAL X-LARGE ULTRA - FILM-REINFORCED (20/CA)

## (undated) DEVICE — GLOVE BIOGEL PI INDICATOR SZ 7.0 SURGICAL PF LF - (50/BX 4BX/CA)

## (undated) DEVICE — STAPLER 60MM ARTICULATING (3EA/BX)

## (undated) DEVICE — SPONGE XRAY 8X4 STERL. 12PL - (10EA/TY 80TY/CA)

## (undated) DEVICE — PAD LAP STERILE 18 X 18 - (5/PK 40PK/CA)

## (undated) DEVICE — CHLORAPREP 26 ML APPLICATOR - ORANGE TINT(25/CA)

## (undated) DEVICE — SUTURE 2-0 VICRYL PLUS CT-1 36 (36PK/BX)"

## (undated) DEVICE — SODIUM CHL IRRIGATION 0.9% 1000ML (12EA/CA)

## (undated) DEVICE — DRESSING ABDOMINAL PAD STERILE 8 X 10" (360EA/CA)"

## (undated) DEVICE — SLEEVE, VASO, THIGH, MED

## (undated) DEVICE — GLOVE SIZE 7.0 SURGEON ACCELERATOR FREE GREEN (50PR/BX 4BX/CA)

## (undated) DEVICE — STAPLER SKIN DISP - (6/BX 10BX/CA) VISISTAT

## (undated) DEVICE — CANISTER SUCTION 3000ML MECHANICAL FILTER AUTO SHUTOFF MEDI-VAC NONSTERILE LF DISP  (40EA/CA)

## (undated) DEVICE — TOWELS CLOTH SURGICAL - (4/PK 20PK/CA)

## (undated) DEVICE — DRAPE LARGE 3 QUARTER - (20/CA)

## (undated) DEVICE — CLIP MED INTNL HRZN TI ESCP - (25/BX)

## (undated) DEVICE — TUBING CLEARLINK DUO-VENT - C-FLO (48EA/CA)

## (undated) DEVICE — SPONGE GAUZESTER 4 X 4 4PLY - (128PK/CA)

## (undated) DEVICE — GLOVE BIOGEL PI ORTHO SZ 8 PF LF (40PR/BX)

## (undated) DEVICE — OBTURATOR BLADELESS STANDARD 8MM (6EA/BX)

## (undated) DEVICE — STAPLER CONTOUR CURVED GREEN 4.8MM W/STAPLE (3EA/BX)

## (undated) DEVICE — PAD OR TABLE DA VINCI 2IN X 20IN X 72IN - (12EA/CA)

## (undated) DEVICE — DRAPE COLUMN  BOX OF 20

## (undated) DEVICE — TROCAR Z THREAD12MM OPTICAL - NON BLADED (6/BX)

## (undated) DEVICE — SUTURE 3-0 SILK SH 30 (36PK/BX)

## (undated) DEVICE — DRAIN JACKSON PRATT 19FR - (10/CS)

## (undated) DEVICE — GLOVE COTTON STERILE (50/CA)

## (undated) DEVICE — SET TUBING PNEUMOCLEAR HIGH FLOW SMOKE EVACUATION (10EA/BX)

## (undated) DEVICE — TUBE E-T HI-LO CUFF 7.0MM (10EA/PK)